# Patient Record
Sex: MALE | Race: BLACK OR AFRICAN AMERICAN | NOT HISPANIC OR LATINO | Employment: UNEMPLOYED | URBAN - METROPOLITAN AREA
[De-identification: names, ages, dates, MRNs, and addresses within clinical notes are randomized per-mention and may not be internally consistent; named-entity substitution may affect disease eponyms.]

---

## 2018-05-24 ENCOUNTER — HOSPITAL ENCOUNTER (EMERGENCY)
Facility: OTHER | Age: 53
Discharge: HOME | End: 2018-05-24
Attending: EMERGENCY MEDICINE
Payer: MEDICAID

## 2018-05-24 VITALS
SYSTOLIC BLOOD PRESSURE: 152 MMHG | TEMPERATURE: 98 F | OXYGEN SATURATION: 99 % | HEART RATE: 63 BPM | BODY MASS INDEX: 31.83 KG/M2 | HEIGHT: 72 IN | RESPIRATION RATE: 17 BRPM | DIASTOLIC BLOOD PRESSURE: 102 MMHG | WEIGHT: 235 LBS

## 2018-05-24 DIAGNOSIS — S86.911A MUSCLE STRAIN OF RIGHT LOWER LEG, INITIAL ENCOUNTER: Primary | ICD-10-CM

## 2018-05-24 PROCEDURE — 99283 EMERGENCY DEPT VISIT LOW MDM: CPT | Mod: 25

## 2018-05-24 PROCEDURE — 96372 THER/PROPH/DIAG INJ SC/IM: CPT

## 2018-05-24 PROCEDURE — 63600175 PHARM REV CODE 636 W HCPCS: Performed by: EMERGENCY MEDICINE

## 2018-05-24 RX ORDER — ORPHENADRINE CITRATE 30 MG/ML
60 INJECTION INTRAMUSCULAR; INTRAVENOUS
Status: COMPLETED | OUTPATIENT
Start: 2018-05-24 | End: 2018-05-24

## 2018-05-24 RX ORDER — MELOXICAM 7.5 MG/1
7.5 TABLET ORAL DAILY
Qty: 30 TABLET | Refills: 0 | Status: ON HOLD | OUTPATIENT
Start: 2018-05-24 | End: 2023-11-06 | Stop reason: HOSPADM

## 2018-05-24 RX ORDER — KETOROLAC TROMETHAMINE 30 MG/ML
30 INJECTION, SOLUTION INTRAMUSCULAR; INTRAVENOUS
Status: COMPLETED | OUTPATIENT
Start: 2018-05-24 | End: 2018-05-24

## 2018-05-24 RX ORDER — METHOCARBAMOL 500 MG/1
1000 TABLET, FILM COATED ORAL 3 TIMES DAILY PRN
Qty: 20 TABLET | Refills: 0 | Status: SHIPPED | OUTPATIENT
Start: 2018-05-24 | End: 2018-05-29

## 2018-05-24 RX ADMIN — ORPHENADRINE CITRATE 60 MG: 30 INJECTION INTRAMUSCULAR; INTRAVENOUS at 11:05

## 2018-05-24 RX ADMIN — KETOROLAC TROMETHAMINE 30 MG: 30 INJECTION, SOLUTION INTRAMUSCULAR at 11:05

## 2018-05-24 NOTE — ED PROVIDER NOTES
Encounter Date: 5/24/2018    SCRIBE #1 NOTE: I, Constantino Melo, am scribing for, and in the presence of, Dr. Brandon .       History     Chief Complaint   Patient presents with    Leg Pain     Patient c/o right knee and foot pain for the past 2 days.      Time seen by provider: 11:22 AM    This is a 52 y.o. male who presents with complaint of constant, moderate, right lower extremity pain and swelling that began two days ago. Patient states he experiences pain at his right knee, and it radiates down to his right foot. The pain worsens with sitting for extended periods of time, and standing relieves the pain. He is a , walks up four flights of stairs multiple times a day, and denies recent falls or trauma. He also reports taking Ibuprofen and Tylenol with no relief. He denies significant past medical history. Patient has no additional complaints. NKDA.         The history is provided by the patient.     Review of patient's allergies indicates:  No Known Allergies  History reviewed. No pertinent past medical history.  History reviewed. No pertinent surgical history.  History reviewed. No pertinent family history.  Social History   Substance Use Topics    Smoking status: Never Smoker    Smokeless tobacco: Never Used    Alcohol use Yes      Comment: occasional     Review of Systems   Constitutional: Negative for chills and fever.   HENT: Negative for congestion, rhinorrhea and sore throat.    Respiratory: Negative for cough and shortness of breath.    Cardiovascular: Negative for chest pain.   Gastrointestinal: Negative for abdominal pain, diarrhea, nausea and vomiting.   Genitourinary: Negative for decreased urine volume and dysuria.   Musculoskeletal: Negative for back pain.        Positive for RLE pain and swelling.    Skin: Negative for rash.   Neurological: Negative for dizziness and weakness.       Physical Exam     Initial Vitals [05/24/18 1013]   BP Pulse Resp Temp SpO2   (!) 160/101 79 18  98.1 °F (36.7 °C) 100 %      MAP       120.67         Physical Exam    Nursing note and vitals reviewed.  Constitutional: He appears well-developed and well-nourished. No distress.   HENT:   Head: Normocephalic and atraumatic.   Eyes: Conjunctivae and EOM are normal. Pupils are equal, round, and reactive to light.   Neck: Normal range of motion. Neck supple.   Musculoskeletal: He exhibits tenderness.   Tenderness present to right, anterolateral, proximal half of lower leg over muscular region. No bony tenderness. Small suprapatellar effusion present. Good ROM of knee and able to bear full weight independently on right leg. No distal edema or palpable cords. NVID. Hip and ankle normal.    Neurological: He is alert and oriented to person, place, and time. He has normal strength.   Skin: Skin is warm and dry.   Psychiatric: He has a normal mood and affect. His behavior is normal. Judgment and thought content normal.         ED Course   Procedures  Labs Reviewed - No data to display                     Scribe Attestation:   Scribe #1: I performed the above scribed service and the documentation accurately describes the services I performed. I attest to the accuracy of the note.    Attending Attestation:           Physician Attestation for Scribe:  Physician Attestation Statement for Scribe #1: I, Dr. Brandon, reviewed documentation, as scribed by Constantino Melo  in my presence, and it is both accurate and complete.           Patient presents complaining of pain in the right lower leg.  He has tenderness along the muscular lateral aspect of the upper portion of the lower leg.  No bony tenderness of the knee or tib/fib.  He does have a small effusion.  Neurovascular intact distally.  No distal edema.  Treat with anti-inflammatories, muscle relaxant for likely muscular pain.  Work note for tomorrow provided.  Encouraged follow-up with primary care, especially if symptoms persist.  Return to the emergency department in the  interim for new/worsening symptoms.             Clinical Impression:     1. Muscle strain of right lower leg, initial encounter                                 Chong Brandon II, MD  05/24/18 1958

## 2018-05-24 NOTE — ED NOTES
Patient Identifiers for Dionicio Bess checked and correct  LOC: The patient is awake, alert and aware of environment with an appropriate affect, the patient is oriented x 3 and speaking appropriate.  APPEARANCE: Patient resting comfortably and in no acute distress. The patient is clean and well groomed. The patient's clothing is properly fastened.  SKIN: The skin is warm and dry. The patient has normal skin turgor and moist mucus membranes. No rashes or lesions upon observation. Skin Intact , no breakdown noted.  Musculoskeletal :  Normal right knee & RLE range of motion noted. Moves all extremities well. Swelling observed on the lateral right knee with mild palpation tenderness of the left knee. Palpation tenderness of the LLE greater.  RESPIRATORY: Airway is open and patent, respirations are spontaneous, patient has a normal effort and rate.   PULSES: 2+ radial & pedal pulses, symmetrical in all extremities.  Will continue to monitor

## 2018-05-24 NOTE — ED TRIAGE NOTES
Pt c/o right lateral knee pain & swelling which started Monday & has progressively worsened. Pt c/o pain which radiated from the lateral knee to the lateral lower leg above the ankle. Pt reports pain is exacerbated with walking, standing or when the LLE is dependent. Pt reports taking ibuprofen & tylenol with no relief.

## 2021-06-14 ENCOUNTER — HOSPITAL ENCOUNTER (EMERGENCY)
Facility: HOSPITAL | Age: 56
Discharge: HOME | End: 2021-06-14
Attending: EMERGENCY MEDICINE
Payer: MEDICAID

## 2021-06-14 VITALS
BODY MASS INDEX: 30.48 KG/M2 | OXYGEN SATURATION: 96 % | HEIGHT: 72 IN | SYSTOLIC BLOOD PRESSURE: 146 MMHG | DIASTOLIC BLOOD PRESSURE: 83 MMHG | TEMPERATURE: 98 F | WEIGHT: 225 LBS | RESPIRATION RATE: 18 BRPM | HEART RATE: 83 BPM

## 2021-06-14 DIAGNOSIS — M25.40 JOINT SWELLING: Primary | ICD-10-CM

## 2021-06-14 DIAGNOSIS — M70.22 OLECRANON BURSITIS OF LEFT ELBOW: ICD-10-CM

## 2021-06-14 PROCEDURE — 99283 PR EMERGENCY DEPT VISIT,LEVEL III: ICD-10-PCS | Mod: ,,, | Performed by: EMERGENCY MEDICINE

## 2021-06-14 PROCEDURE — 25000003 PHARM REV CODE 250: Performed by: EMERGENCY MEDICINE

## 2021-06-14 PROCEDURE — 99283 EMERGENCY DEPT VISIT LOW MDM: CPT | Mod: ,,, | Performed by: EMERGENCY MEDICINE

## 2021-06-14 PROCEDURE — 99283 EMERGENCY DEPT VISIT LOW MDM: CPT | Mod: 25

## 2021-06-14 RX ORDER — IBUPROFEN 600 MG/1
600 TABLET ORAL EVERY 6 HOURS PRN
Qty: 20 TABLET | Refills: 0 | Status: ON HOLD | OUTPATIENT
Start: 2021-06-14 | End: 2023-11-06 | Stop reason: HOSPADM

## 2021-06-14 RX ORDER — IBUPROFEN 600 MG/1
600 TABLET ORAL
Status: COMPLETED | OUTPATIENT
Start: 2021-06-14 | End: 2021-06-14

## 2021-06-14 RX ADMIN — IBUPROFEN 600 MG: 600 TABLET, FILM COATED ORAL at 01:06

## 2021-06-15 ENCOUNTER — TELEPHONE (OUTPATIENT)
Dept: EMERGENCY MEDICINE | Facility: HOSPITAL | Age: 56
End: 2021-06-15

## 2023-07-11 ENCOUNTER — HOSPITAL ENCOUNTER (EMERGENCY)
Facility: OTHER | Age: 58
Discharge: HOME | End: 2023-07-11
Attending: EMERGENCY MEDICINE
Payer: MEDICAID

## 2023-07-11 VITALS
HEART RATE: 70 BPM | BODY MASS INDEX: 29.8 KG/M2 | WEIGHT: 220 LBS | RESPIRATION RATE: 18 BRPM | HEIGHT: 72 IN | SYSTOLIC BLOOD PRESSURE: 142 MMHG | DIASTOLIC BLOOD PRESSURE: 92 MMHG | TEMPERATURE: 99 F | OXYGEN SATURATION: 97 %

## 2023-07-11 DIAGNOSIS — I26.99 PULMONARY EMBOLISM, UNSPECIFIED CHRONICITY, UNSPECIFIED PULMONARY EMBOLISM TYPE, UNSPECIFIED WHETHER ACUTE COR PULMONALE PRESENT: ICD-10-CM

## 2023-07-11 DIAGNOSIS — M79.622 LEFT UPPER ARM PAIN: Primary | ICD-10-CM

## 2023-07-11 DIAGNOSIS — R07.9 CHEST PAIN: ICD-10-CM

## 2023-07-11 LAB
ANION GAP SERPL CALC-SCNC: 12 MMOL/L (ref 8–16)
BASOPHILS # BLD AUTO: 0.04 K/UL (ref 0–0.2)
BASOPHILS NFR BLD: 0.6 % (ref 0–1.9)
BNP SERPL-MCNC: 106 PG/ML (ref 0–99)
BUN SERPL-MCNC: 11 MG/DL (ref 6–20)
CALCIUM SERPL-MCNC: 9.7 MG/DL (ref 8.7–10.5)
CHLORIDE SERPL-SCNC: 104 MMOL/L (ref 95–110)
CO2 SERPL-SCNC: 23 MMOL/L (ref 23–29)
CREAT SERPL-MCNC: 0.9 MG/DL (ref 0.5–1.4)
D DIMER PPP IA.FEU-MCNC: 1.38 MG/L FEU
DIFFERENTIAL METHOD: ABNORMAL
EOSINOPHIL # BLD AUTO: 0.3 K/UL (ref 0–0.5)
EOSINOPHIL NFR BLD: 4.6 % (ref 0–8)
ERYTHROCYTE [DISTWIDTH] IN BLOOD BY AUTOMATED COUNT: 13.2 % (ref 11.5–14.5)
EST. GFR  (NO RACE VARIABLE): >60 ML/MIN/1.73 M^2
GLUCOSE SERPL-MCNC: 89 MG/DL (ref 70–110)
HCT VFR BLD AUTO: 40.2 % (ref 40–54)
HGB BLD-MCNC: 13.4 G/DL (ref 14–18)
IMM GRANULOCYTES # BLD AUTO: 0.03 K/UL (ref 0–0.04)
IMM GRANULOCYTES NFR BLD AUTO: 0.5 % (ref 0–0.5)
LYMPHOCYTES # BLD AUTO: 2.3 K/UL (ref 1–4.8)
LYMPHOCYTES NFR BLD: 37.1 % (ref 18–48)
MCH RBC QN AUTO: 30.3 PG (ref 27–31)
MCHC RBC AUTO-ENTMCNC: 33.3 G/DL (ref 32–36)
MCV RBC AUTO: 91 FL (ref 82–98)
MONOCYTES # BLD AUTO: 0.6 K/UL (ref 0.3–1)
MONOCYTES NFR BLD: 8.8 % (ref 4–15)
NEUTROPHILS # BLD AUTO: 3 K/UL (ref 1.8–7.7)
NEUTROPHILS NFR BLD: 48.4 % (ref 38–73)
NRBC BLD-RTO: 0 /100 WBC
PLATELET # BLD AUTO: 486 K/UL (ref 150–450)
PMV BLD AUTO: 9 FL (ref 9.2–12.9)
POTASSIUM SERPL-SCNC: 3.9 MMOL/L (ref 3.5–5.1)
RBC # BLD AUTO: 4.42 M/UL (ref 4.6–6.2)
SODIUM SERPL-SCNC: 139 MMOL/L (ref 136–145)
TROPONIN I SERPL DL<=0.01 NG/ML-MCNC: 0.02 NG/ML (ref 0–0.03)
WBC # BLD AUTO: 6.25 K/UL (ref 3.9–12.7)

## 2023-07-11 PROCEDURE — 80048 BASIC METABOLIC PNL TOTAL CA: CPT | Performed by: EMERGENCY MEDICINE

## 2023-07-11 PROCEDURE — 93005 ELECTROCARDIOGRAM TRACING: CPT

## 2023-07-11 PROCEDURE — 85379 FIBRIN DEGRADATION QUANT: CPT | Performed by: EMERGENCY MEDICINE

## 2023-07-11 PROCEDURE — 96374 THER/PROPH/DIAG INJ IV PUSH: CPT

## 2023-07-11 PROCEDURE — 93010 EKG 12-LEAD: ICD-10-PCS | Mod: ,,, | Performed by: INTERNAL MEDICINE

## 2023-07-11 PROCEDURE — 63600175 PHARM REV CODE 636 W HCPCS: Performed by: EMERGENCY MEDICINE

## 2023-07-11 PROCEDURE — 85025 COMPLETE CBC W/AUTO DIFF WBC: CPT | Performed by: EMERGENCY MEDICINE

## 2023-07-11 PROCEDURE — 93010 ELECTROCARDIOGRAM REPORT: CPT | Mod: ,,, | Performed by: INTERNAL MEDICINE

## 2023-07-11 PROCEDURE — 25500020 PHARM REV CODE 255: Performed by: EMERGENCY MEDICINE

## 2023-07-11 PROCEDURE — 25000003 PHARM REV CODE 250: Performed by: EMERGENCY MEDICINE

## 2023-07-11 PROCEDURE — 84484 ASSAY OF TROPONIN QUANT: CPT | Performed by: EMERGENCY MEDICINE

## 2023-07-11 PROCEDURE — 83880 ASSAY OF NATRIURETIC PEPTIDE: CPT | Performed by: EMERGENCY MEDICINE

## 2023-07-11 PROCEDURE — 99285 EMERGENCY DEPT VISIT HI MDM: CPT | Mod: 25

## 2023-07-11 RX ORDER — KETOROLAC TROMETHAMINE 30 MG/ML
10 INJECTION, SOLUTION INTRAMUSCULAR; INTRAVENOUS
Status: COMPLETED | OUTPATIENT
Start: 2023-07-11 | End: 2023-07-11

## 2023-07-11 RX ORDER — NAPROXEN SODIUM 220 MG/1
162 TABLET, FILM COATED ORAL
Status: COMPLETED | OUTPATIENT
Start: 2023-07-11 | End: 2023-07-11

## 2023-07-11 RX ORDER — HYDROCODONE BITARTRATE AND ACETAMINOPHEN 5; 325 MG/1; MG/1
1 TABLET ORAL
Status: COMPLETED | OUTPATIENT
Start: 2023-07-11 | End: 2023-07-11

## 2023-07-11 RX ADMIN — ASPIRIN 81 MG CHEWABLE TABLET 162 MG: 81 TABLET CHEWABLE at 02:07

## 2023-07-11 RX ADMIN — KETOROLAC TROMETHAMINE 10 MG: 30 INJECTION, SOLUTION INTRAMUSCULAR; INTRAVENOUS at 02:07

## 2023-07-11 RX ADMIN — HYDROCODONE BITARTRATE AND ACETAMINOPHEN 1 TABLET: 5; 325 TABLET ORAL at 05:07

## 2023-07-11 RX ADMIN — IOHEXOL 100 ML: 350 INJECTION, SOLUTION INTRAVENOUS at 03:07

## 2023-07-11 NOTE — ED TRIAGE NOTES
Pt c/o mid sternal chest pain without radiation and SOB that's worse with exertion X 10 hours. Pt also c/o LUE swelling X 1 day, hx of stroke, left sided deficits at baseline.  Pt is aaox3

## 2023-07-11 NOTE — DISCHARGE INSTRUCTIONS
It is very important that you continue taking the Eliquis as prescribed and that you follow-up with your primary care physician in 2 days as scheduled to establish care and for further management of your chronic left arm pain as well as monitoring of your blood clot.

## 2023-07-11 NOTE — ED PROVIDER NOTES
Encounter Date: 7/11/2023    SCRIBE #1 NOTE: I, Marietta Nieto, am scribing for, and in the presence of,  Vida Cruz MD. I have scribed the following portions of the note - the EKG reading. Other sections scribed: HPI, ROS, PE.     History     Chief Complaint   Patient presents with    Chest Pain     Pt c/o mid sternal chest pain without radiation and SOB that's worse with exertion X 10 hours. Pt also c/o LUE swelling X 1 day, hx of stroke, left sided deficits at baseline.       Dionicio Bess is a 58 y.o. male who presents to the ED c/o chest pain onset 1500 this afternoon. Patient states he was outside when he started experiencing mid sternal chest pain described as a achy stabbing. It has since been consistent and waxes and wanes in severity. Pt endorses Sob accompanied with chest pain. States earlier this year he suffered a stroke and was left with left sided weakness. At examination patient endorses having left sided arm pain secondary to stroke and previous fracture x 2 months ago. Pt denies smoking and EtOH use. He does endorse an unrelated dry cough. In addition, he reports being diagnosed with a blood clot x 1 week ago where he was then placed on blood thinners. Denies dizziness, lightheadedness, diaphoresis, nausea, and emesis. This is the extent of the patient's complaints at this time.        The history is provided by the patient and medical records. No  was used.   Review of patient's allergies indicates:  No Known Allergies  Past Medical History:   Diagnosis Date    Hypertension     Stroke      No past surgical history on file.  No family history on file.  Social History     Tobacco Use    Smoking status: Never    Smokeless tobacco: Never   Substance Use Topics    Alcohol use: Yes     Comment: occasional    Drug use: No     Review of Systems   Constitutional:  Negative for chills, diaphoresis and fever.   HENT:  Negative for sore throat.    Respiratory:  Positive for cough and  shortness of breath.    Cardiovascular:  Positive for chest pain.   Gastrointestinal:  Negative for blood in stool, constipation, diarrhea, nausea and vomiting.   Genitourinary:  Negative for dysuria.   Musculoskeletal:  Negative for back pain.        Positive for left sided arm pain.   Skin:  Negative for rash.   Neurological:  Negative for dizziness, syncope, weakness, light-headedness and headaches.   Hematological:  Does not bruise/bleed easily.   All other systems reviewed and are negative.    Physical Exam     Initial Vitals [07/11/23 0121]   BP Pulse Resp Temp SpO2   (!) 132/92 (!) 53 20 98.6 °F (37 °C) 97 %      MAP       --         Physical Exam    Nursing note and vitals reviewed.  Constitutional: He appears well-developed. He is cooperative.   HENT:   Head: Atraumatic.   Eyes: Conjunctivae and lids are normal.   Neck:   Normal range of motion.  Cardiovascular:  Normal rate.           Musculoskeletal:         General: Normal range of motion.      Cervical back: Normal range of motion.     Neurological: He is alert.   Skin: No rash noted.   Psychiatric: He has a normal mood and affect. His speech is normal and behavior is normal.       ED Course   Procedures  Labs Reviewed   CBC W/ AUTO DIFFERENTIAL - Abnormal; Notable for the following components:       Result Value    RBC 4.42 (*)     Hemoglobin 13.4 (*)     Platelets 486 (*)     MPV 9.0 (*)     All other components within normal limits   D DIMER, QUANTITATIVE - Abnormal; Notable for the following components:    D-Dimer 1.38 (*)     All other components within normal limits   B-TYPE NATRIURETIC PEPTIDE - Abnormal; Notable for the following components:     (*)     All other components within normal limits   TROPONIN I   BASIC METABOLIC PANEL     EKG Readings: (Independently Interpreted)   Initial Reading: No STEMI.   I have personally reviewed the EKG, sinus rhythm, 70 rate, first degree AV block, T-wave inferior inversion in leads 5 and 6, No st  elevations.        Imaging Results               CTA Chest Non-Coronary (PE Studies) (Final result)  Result time 07/11/23 05:05:01      Final result by Jodie Londono MD (07/11/23 05:05:01)                   Impression:      1. Findings concerning for pulmonary embolism involving a distal segmental/subsegmental branch vessel supplying the right lower lobe.  2. Right lower lobe pleural based opacity with right pleural fluid.  Findings could relate to underlying developing pulmonary infarct versus underlying infectious process.  Evaluation of pulmonary parenchyma is limited due to respiratory motion artifact.  3. Cardiomegaly.  Reflux of contrast into the IVC and hepatic veins which can be seen with elevated right heart pressure.  No compelling evidence of right heart strain.  Coronary artery atherosclerosis.  Further assessment with echocardiography as warranted.  This report was flagged in Epic as abnormal.      Electronically signed by: Jodie Londono MD  Date:    07/11/2023  Time:    05:05               Narrative:    EXAMINATION:  CTA CHEST NON CORONARY (PE STUDIES)    CLINICAL HISTORY:  Pulmonary embolism (PE) suspected, high prob;    TECHNIQUE:  Low dose axial images, sagittal and coronal reformations were obtained from the thoracic inlet to the lung bases following the IV administration of 100 mL of Omnipaque 350.  Contrast timing was optimized to evaluate the pulmonary arteries.  MIP images were performed.    COMPARISON:  None    FINDINGS:  The thoracic aorta maintains normal caliber, contour, and course with scattered atherosclerotic calcification within its course.  There is no evidence of aneurysmal dilation or dissection.  The heart is prominent in size.  No significant pericardial fluid.  There is calcific atherosclerosis of the coronary vessels.  There is reflux of contrast into the IVC and hepatic veins which can be seen with elevated right heart pressures.  Esophagus maintains a normal course and  caliber.There is no axillary, mediastinal, or hilar lymph node enlargement.    There is a possible filling defect within a distal segmental/subsegmental branch vessel supplying the right lower lobe concerning for pulmonary possible pulmonary thromboembolism.  No large central pulmonary embolus.    Evaluation of pulmonary parenchyma is limited due to respiratory motion artifact.  There is right pleural fluid with the pleural based focal right lower lobe airspace opacity.  Findings could relate to underlying infectious process or developing pulmonary infarct.  No pneumothorax.    Visualized structures of the upper abdomen demonstrate no acute abnormalities.  Visualized osseous structures are intact with degenerative change.    Findings discussed with Dr. Vida Cruz MD at time of discovery/dictation via epic secure chat messenger.  Receipt of results was confirmed.                                       Medications   aspirin chewable tablet 162 mg (162 mg Oral Given 7/11/23 0233)   ketorolac injection 9.999 mg (9.999 mg Intravenous Given 7/11/23 0233)   iohexoL (OMNIPAQUE 350) injection 100 mL (100 mLs Intravenous Given 7/11/23 0330)   HYDROcodone-acetaminophen 5-325 mg per tablet 1 tablet (1 tablet Oral Given 7/11/23 0528)     Medical Decision Making:   History:   Old Medical Records: I decided to obtain old medical records.  Clinical Tests:   Lab Tests: Reviewed and Ordered  Radiological Study: Ordered and Reviewed  Additional MDM:   Comments: 58-year-old male with history of hypertension, AFib/a flutter, NSTEMI and PE presents complaining of left-sided chest pain that started proximally 2 hours prior to arrival.  He states the chest pain was minimal compared to the left arm pain which has been present since his CVA and fall 5 months ago.    His initial EKG did show inverted T-waves in the inferior and lateral leads with no available old EKG for comparison.  Per chart review, he has been previously diagnosed with an  NSTEMI.  From the records I was able to review, I do not see where he was diagnosed with a PE.  He did have an ultrasound of the left upper extremity at [facility] on 06/06 which showed no evidence of a DVT.  I also see that he has been prescribed Eliquis 5 mg b.i.d..  He states he was not initially compliant with it but has been taking it consistently for the past week.  He is currently staying at the [facility] and states that the give it to him every morning and evening.    Troponin obtained over 10 hours after the onset of his chest pain was within normal limits.  D-dimer, however, was elevated.  Since I was not able to confirm the PE that the patient reports he was told he had recently at [facility], I did obtain a CT of the chest.  The CT shows findings concerning for a pulmonary embolism involving a distal segmental/subsegmental branch of the right lower lobe with opacity and right pleural fluid and no right heart strain.  Concern for developing pulmonary infarct versus infectious process.  The patient denies any symptoms concerning for an infectious process.    Throughout his ED stay the patient has not been in any apparent respiratory distress and oxygen saturation on room air have remained in the upper 90s.  The  only complaint the patient has continuously expressed was left arm pain which is chronic.  On exam his fingers are contracted and he has pain with flexing as well as palpation of the upper arm.  Neurovascularly he is intact.  I do not feel any further emergent imaging is warranted at this time.    Per the patient, the possible right lower lobe pulmonary embolism is a known problem for which he is already being anticoagulated and he is reliably taking his medications since it is being administered by the staff at the [facility].  He also reports having an upcoming appointment with primary care in 2 days.  Based on this, I do not feel that he is appropriate for further  outpatient management with which the patient is in agreement.  He will be discharged back to the [facility] at this time in stable condition..      Scribe Attestation:   Scribe #1: I performed the above scribed service and the documentation accurately describes the services I performed. I attest to the accuracy of the note.                   Clinical Impression:   Final diagnoses:  [R07.9] Chest pain  [M79.622] Left upper arm pain (Primary)  [I26.99] Pulmonary embolism, unspecified chronicity, unspecified pulmonary embolism type, unspecified whether acute cor pulmonale present        ED Disposition Condition    Discharge Stable          ED Prescriptions    None       Follow-up Information       Follow up With Specialties Details Why Contact Info    [facility] Emergency Dept Emergency Medicine Go to  If symptoms worsen [id number] [id number] [phone]    Your primary care doctor              Physician Attestation for Scribe: I, Vida Cruz  , reviewed documentation as scribed in my presence, which is both accurate and complete.       Vida Cruz MD  07/11/23 [id number]

## 2023-11-05 ENCOUNTER — HOSPITAL ENCOUNTER (OUTPATIENT)
Facility: HOSPITAL | Age: 58
Discharge: HOME | End: 2023-11-07
Attending: EMERGENCY MEDICINE | Admitting: INTERNAL MEDICINE
Payer: MEDICAID

## 2023-11-05 DIAGNOSIS — R06.02 SOB (SHORTNESS OF BREATH): ICD-10-CM

## 2023-11-05 DIAGNOSIS — R07.9 CHEST PAIN: ICD-10-CM

## 2023-11-05 DIAGNOSIS — I50.21 ACUTE SYSTOLIC CONGESTIVE HEART FAILURE: Primary | ICD-10-CM

## 2023-11-05 DIAGNOSIS — R79.89 ELEVATED BRAIN NATRIURETIC PEPTIDE (BNP) LEVEL: ICD-10-CM

## 2023-11-05 DIAGNOSIS — R79.89 ELEVATED TROPONIN I LEVEL: ICD-10-CM

## 2023-11-05 DIAGNOSIS — I50.43 ACUTE ON CHRONIC COMBINED SYSTOLIC AND DIASTOLIC CONGESTIVE HEART FAILURE: ICD-10-CM

## 2023-11-05 PROBLEM — I69.30 HISTORY OF ISCHEMIC CEREBROVASCULAR ACCIDENT (CVA) WITH RESIDUAL DEFICIT: Status: ACTIVE | Noted: 2023-05-18

## 2023-11-05 PROBLEM — I10 ESSENTIAL HYPERTENSION: Status: ACTIVE | Noted: 2023-05-18

## 2023-11-05 PROBLEM — I48.91 ATRIAL FIBRILLATION WITH RVR: Status: ACTIVE | Noted: 2023-05-28

## 2023-11-05 LAB
ALBUMIN SERPL BCP-MCNC: 4.1 G/DL (ref 3.5–5.2)
ALP SERPL-CCNC: 53 U/L (ref 55–135)
ALT SERPL W/O P-5'-P-CCNC: 14 U/L (ref 10–44)
ANION GAP SERPL CALC-SCNC: 13 MMOL/L (ref 8–16)
AST SERPL-CCNC: 37 U/L (ref 10–40)
BASOPHILS # BLD AUTO: 0.03 K/UL (ref 0–0.2)
BASOPHILS NFR BLD: 0.6 % (ref 0–1.9)
BILIRUB SERPL-MCNC: 0.2 MG/DL (ref 0.1–1)
BNP SERPL-MCNC: 215 PG/ML (ref 0–99)
BUN SERPL-MCNC: 8 MG/DL (ref 6–20)
CALCIUM SERPL-MCNC: 9.2 MG/DL (ref 8.7–10.5)
CHLORIDE SERPL-SCNC: 105 MMOL/L (ref 95–110)
CO2 SERPL-SCNC: 21 MMOL/L (ref 23–29)
CREAT SERPL-MCNC: 1.1 MG/DL (ref 0.5–1.4)
DIFFERENTIAL METHOD: ABNORMAL
EOSINOPHIL # BLD AUTO: 0.2 K/UL (ref 0–0.5)
EOSINOPHIL NFR BLD: 3.5 % (ref 0–8)
ERYTHROCYTE [DISTWIDTH] IN BLOOD BY AUTOMATED COUNT: 13.9 % (ref 11.5–14.5)
EST. GFR  (NO RACE VARIABLE): >60 ML/MIN/1.73 M^2
GLUCOSE SERPL-MCNC: 83 MG/DL (ref 70–110)
HCT VFR BLD AUTO: 41.2 % (ref 40–54)
HGB BLD-MCNC: 14 G/DL (ref 14–18)
IMM GRANULOCYTES # BLD AUTO: 0 K/UL (ref 0–0.04)
IMM GRANULOCYTES NFR BLD AUTO: 0 % (ref 0–0.5)
LYMPHOCYTES # BLD AUTO: 2.3 K/UL (ref 1–4.8)
LYMPHOCYTES NFR BLD: 50 % (ref 18–48)
MCH RBC QN AUTO: 32.6 PG (ref 27–31)
MCHC RBC AUTO-ENTMCNC: 34 G/DL (ref 32–36)
MCV RBC AUTO: 96 FL (ref 82–98)
MONOCYTES # BLD AUTO: 0.4 K/UL (ref 0.3–1)
MONOCYTES NFR BLD: 8.7 % (ref 4–15)
NEUTROPHILS # BLD AUTO: 1.7 K/UL (ref 1.8–7.7)
NEUTROPHILS NFR BLD: 37.2 % (ref 38–73)
NRBC BLD-RTO: 0 /100 WBC
PLATELET # BLD AUTO: 201 K/UL (ref 150–450)
PMV BLD AUTO: 10.3 FL (ref 9.2–12.9)
POTASSIUM SERPL-SCNC: 4.3 MMOL/L (ref 3.5–5.1)
PROT SERPL-MCNC: 8.1 G/DL (ref 6–8.4)
RBC # BLD AUTO: 4.3 M/UL (ref 4.6–6.2)
SODIUM SERPL-SCNC: 139 MMOL/L (ref 136–145)
TROPONIN I SERPL DL<=0.01 NG/ML-MCNC: 0.09 NG/ML (ref 0–0.03)
WBC # BLD AUTO: 4.62 K/UL (ref 3.9–12.7)

## 2023-11-05 PROCEDURE — 94761 N-INVAS EAR/PLS OXIMETRY MLT: CPT

## 2023-11-05 PROCEDURE — 84484 ASSAY OF TROPONIN QUANT: CPT | Performed by: EMERGENCY MEDICINE

## 2023-11-05 PROCEDURE — 85025 COMPLETE CBC W/AUTO DIFF WBC: CPT | Performed by: EMERGENCY MEDICINE

## 2023-11-05 PROCEDURE — 63600175 PHARM REV CODE 636 W HCPCS

## 2023-11-05 PROCEDURE — 93005 ELECTROCARDIOGRAM TRACING: CPT

## 2023-11-05 PROCEDURE — G0378 HOSPITAL OBSERVATION PER HR: HCPCS

## 2023-11-05 PROCEDURE — 96374 THER/PROPH/DIAG INJ IV PUSH: CPT

## 2023-11-05 PROCEDURE — 99285 EMERGENCY DEPT VISIT HI MDM: CPT | Mod: 25

## 2023-11-05 PROCEDURE — 83880 ASSAY OF NATRIURETIC PEPTIDE: CPT | Performed by: EMERGENCY MEDICINE

## 2023-11-05 PROCEDURE — 93010 ELECTROCARDIOGRAM REPORT: CPT | Mod: ,,, | Performed by: INTERNAL MEDICINE

## 2023-11-05 PROCEDURE — 80053 COMPREHEN METABOLIC PANEL: CPT | Performed by: EMERGENCY MEDICINE

## 2023-11-05 PROCEDURE — 93010 EKG 12-LEAD: ICD-10-PCS | Mod: ,,, | Performed by: INTERNAL MEDICINE

## 2023-11-05 RX ORDER — TALC
6 POWDER (GRAM) TOPICAL NIGHTLY PRN
Status: DISCONTINUED | OUTPATIENT
Start: 2023-11-05 | End: 2023-11-07 | Stop reason: HOSPADM

## 2023-11-05 RX ORDER — FUROSEMIDE 10 MG/ML
40 INJECTION INTRAMUSCULAR; INTRAVENOUS
Status: COMPLETED | OUTPATIENT
Start: 2023-11-05 | End: 2023-11-05

## 2023-11-05 RX ORDER — METOPROLOL SUCCINATE 50 MG/1
50 TABLET, EXTENDED RELEASE ORAL
Status: ON HOLD | COMMUNITY
Start: 2023-08-03 | End: 2023-11-06 | Stop reason: SDUPTHER

## 2023-11-05 RX ORDER — GABAPENTIN 400 MG/1
400 CAPSULE ORAL
Status: ON HOLD | COMMUNITY
Start: 2023-07-05 | End: 2023-11-06 | Stop reason: HOSPADM

## 2023-11-05 RX ORDER — METHOCARBAMOL 500 MG/1
1000 TABLET, FILM COATED ORAL
Status: ON HOLD | COMMUNITY
Start: 2023-07-05 | End: 2023-11-06 | Stop reason: HOSPADM

## 2023-11-05 RX ORDER — PANTOPRAZOLE SODIUM 20 MG/1
1 TABLET, DELAYED RELEASE ORAL EVERY MORNING
Status: ON HOLD | COMMUNITY
Start: 2023-05-30 | End: 2023-11-06 | Stop reason: SDUPTHER

## 2023-11-05 RX ORDER — IBUPROFEN 200 MG
24 TABLET ORAL
Status: DISCONTINUED | OUTPATIENT
Start: 2023-11-05 | End: 2023-11-07 | Stop reason: HOSPADM

## 2023-11-05 RX ORDER — LANOLIN ALCOHOL/MO/W.PET/CERES
200 CREAM (GRAM) TOPICAL
Status: ON HOLD | COMMUNITY
Start: 2023-06-29 | End: 2023-11-06 | Stop reason: SDUPTHER

## 2023-11-05 RX ORDER — ATORVASTATIN CALCIUM 80 MG/1
80 TABLET, FILM COATED ORAL NIGHTLY
Status: ON HOLD | COMMUNITY
Start: 2023-06-29 | End: 2023-11-06 | Stop reason: SDUPTHER

## 2023-11-05 RX ORDER — ACETAMINOPHEN 325 MG/1
650 TABLET ORAL EVERY 4 HOURS PRN
Status: DISCONTINUED | OUTPATIENT
Start: 2023-11-05 | End: 2023-11-07 | Stop reason: HOSPADM

## 2023-11-05 RX ORDER — SODIUM CHLORIDE 0.9 % (FLUSH) 0.9 %
10 SYRINGE (ML) INJECTION
Status: DISCONTINUED | OUTPATIENT
Start: 2023-11-05 | End: 2023-11-07 | Stop reason: HOSPADM

## 2023-11-05 RX ORDER — SPIRONOLACTONE 25 MG/1
25 TABLET ORAL
Status: ON HOLD | COMMUNITY
Start: 2023-06-29 | End: 2023-11-06 | Stop reason: SDUPTHER

## 2023-11-05 RX ORDER — TRAMADOL HYDROCHLORIDE 50 MG/1
50 TABLET ORAL 2 TIMES DAILY
Status: ON HOLD | COMMUNITY
Start: 2023-08-02 | End: 2023-11-06 | Stop reason: HOSPADM

## 2023-11-05 RX ORDER — NALTREXONE HYDROCHLORIDE 50 MG/1
50 TABLET, FILM COATED ORAL
Status: ON HOLD | COMMUNITY
Start: 2023-07-05 | End: 2023-11-06 | Stop reason: HOSPADM

## 2023-11-05 RX ORDER — GLUCAGON 1 MG
1 KIT INJECTION
Status: DISCONTINUED | OUTPATIENT
Start: 2023-11-05 | End: 2023-11-07 | Stop reason: HOSPADM

## 2023-11-05 RX ORDER — FUROSEMIDE 40 MG/1
40 TABLET ORAL
Status: ON HOLD | COMMUNITY
Start: 2023-06-07 | End: 2023-11-06 | Stop reason: SDUPTHER

## 2023-11-05 RX ORDER — ONDANSETRON 8 MG/1
8 TABLET, ORALLY DISINTEGRATING ORAL EVERY 8 HOURS PRN
Status: DISCONTINUED | OUTPATIENT
Start: 2023-11-05 | End: 2023-11-07 | Stop reason: HOSPADM

## 2023-11-05 RX ORDER — NAPROXEN SODIUM 220 MG/1
81 TABLET, FILM COATED ORAL
Status: ON HOLD | COMMUNITY
Start: 2023-06-29 | End: 2023-11-06 | Stop reason: SDUPTHER

## 2023-11-05 RX ORDER — EMPAGLIFLOZIN 10 MG/1
10 TABLET, FILM COATED ORAL
Status: ON HOLD | COMMUNITY
Start: 2023-06-29 | End: 2023-11-06 | Stop reason: SDUPTHER

## 2023-11-05 RX ORDER — FLUOXETINE HYDROCHLORIDE 20 MG/1
20 CAPSULE ORAL
Status: ON HOLD | COMMUNITY
Start: 2023-06-29 | End: 2023-11-06 | Stop reason: SDUPTHER

## 2023-11-05 RX ORDER — AMLODIPINE BESYLATE 10 MG/1
10 TABLET ORAL
Status: ON HOLD | COMMUNITY
Start: 2023-08-03 | End: 2023-11-06 | Stop reason: SDUPTHER

## 2023-11-05 RX ORDER — POLYETHYLENE GLYCOL 3350 17 G/17G
17 POWDER, FOR SOLUTION ORAL DAILY PRN
Status: DISCONTINUED | OUTPATIENT
Start: 2023-11-05 | End: 2023-11-07 | Stop reason: HOSPADM

## 2023-11-05 RX ORDER — BISACODYL 10 MG
10 SUPPOSITORY, RECTAL RECTAL DAILY PRN
Status: DISCONTINUED | OUTPATIENT
Start: 2023-11-05 | End: 2023-11-07 | Stop reason: HOSPADM

## 2023-11-05 RX ORDER — IBUPROFEN 200 MG
16 TABLET ORAL
Status: DISCONTINUED | OUTPATIENT
Start: 2023-11-05 | End: 2023-11-07 | Stop reason: HOSPADM

## 2023-11-05 RX ORDER — PROMETHAZINE HYDROCHLORIDE 25 MG/1
25 TABLET ORAL EVERY 6 HOURS PRN
Status: DISCONTINUED | OUTPATIENT
Start: 2023-11-05 | End: 2023-11-07 | Stop reason: HOSPADM

## 2023-11-05 RX ORDER — LOSARTAN POTASSIUM 25 MG/1
1 TABLET ORAL DAILY
Status: ON HOLD | COMMUNITY
Start: 2023-06-04 | End: 2023-11-06 | Stop reason: SDUPTHER

## 2023-11-05 RX ADMIN — FUROSEMIDE 40 MG: 10 INJECTION, SOLUTION INTRAVENOUS at 10:11

## 2023-11-05 NOTE — Clinical Note
Diagnosis: SOB (shortness of breath) [968540]   Future Attending Provider: NIKKI BRENNER [[id number]]   Admitting Provider:: NIKKI BRENNER [[id number]]

## 2023-11-06 DIAGNOSIS — R06.02 SOB (SHORTNESS OF BREATH): Primary | ICD-10-CM

## 2023-11-06 PROBLEM — I25.119 CORONARY ARTERY DISEASE INVOLVING NATIVE CORONARY ARTERY WITH ANGINA PECTORIS: Status: ACTIVE | Noted: 2023-11-06

## 2023-11-06 PROBLEM — R79.89 ELEVATED TROPONIN: Status: ACTIVE | Noted: 2023-11-06

## 2023-11-06 LAB
ANION GAP SERPL CALC-SCNC: 12 MMOL/L (ref 8–16)
ASCENDING AORTA: 3.47 CM
AV INDEX (PROSTH): 0.52
AV MEAN GRADIENT: 7 MMHG
AV VALVE AREA: 2.36 CM²
BASOPHILS # BLD AUTO: 0.02 K/UL (ref 0–0.2)
BASOPHILS NFR BLD: 0.5 % (ref 0–1.9)
BSA FOR ECHO PROCEDURE: 2.17 M2
BUN SERPL-MCNC: 9 MG/DL (ref 6–20)
CALCIUM SERPL-MCNC: 9.6 MG/DL (ref 8.7–10.5)
CHLORIDE SERPL-SCNC: 102 MMOL/L (ref 95–110)
CHOLEST SERPL-MCNC: 225 MG/DL (ref 120–199)
CHOLEST/HDLC SERPL: 2.6 {RATIO} (ref 2–5)
CO2 SERPL-SCNC: 27 MMOL/L (ref 23–29)
CREAT SERPL-MCNC: 1.1 MG/DL (ref 0.5–1.4)
CV ECHO LV RWT: 0.41 CM
DIFFERENTIAL METHOD: ABNORMAL
DOP CALC AO VTI: 23.6 CM
DOP CALC LVOT AREA: 4.5 CM2
DOP CALC LVOT DIAMETER: 2.4 CM
DOP CALC LVOT PEAK VEL: 0.55 M/S
DOP CALC LVOT STROKE VOLUME: 55.62 CM3
DOP CALCLVOT PEAK VEL VTI: 12.3 CM
E WAVE DECELERATION TIME: 173.52 MSEC
E/A RATIO: 0.84
ECHO LV POSTERIOR WALL: 1.15 CM (ref 0.6–1.1)
EOSINOPHIL # BLD AUTO: 0.2 K/UL (ref 0–0.5)
EOSINOPHIL NFR BLD: 4.2 % (ref 0–8)
ERYTHROCYTE [DISTWIDTH] IN BLOOD BY AUTOMATED COUNT: 14 % (ref 11.5–14.5)
EST. GFR  (NO RACE VARIABLE): >60 ML/MIN/1.73 M^2
ESTIMATED AVG GLUCOSE: 100 MG/DL (ref 68–131)
FRACTIONAL SHORTENING: 21 % (ref 28–44)
GLUCOSE SERPL-MCNC: 83 MG/DL (ref 70–110)
HBA1C MFR BLD: 5.1 % (ref 4–5.6)
HCT VFR BLD AUTO: 42.6 % (ref 40–54)
HCV AB SERPL QL IA: NORMAL
HDLC SERPL-MCNC: 85 MG/DL (ref 40–75)
HDLC SERPL: 37.8 % (ref 20–50)
HGB BLD-MCNC: 14.2 G/DL (ref 14–18)
HIV 1+2 AB+HIV1 P24 AG SERPL QL IA: NORMAL
IMM GRANULOCYTES # BLD AUTO: 0 K/UL (ref 0–0.04)
IMM GRANULOCYTES NFR BLD AUTO: 0 % (ref 0–0.5)
INTERVENTRICULAR SEPTUM: 1.02 CM (ref 0.6–1.1)
IVRT: 145.58 MSEC
LA MAJOR: 6.59 CM
LA MINOR: 6 CM
LA WIDTH: 4.36 CM
LDLC SERPL CALC-MCNC: 126 MG/DL (ref 63–159)
LEFT ATRIUM SIZE: 4.31 CM
LEFT ATRIUM VOLUME INDEX MOD: 35.8 ML/M2
LEFT ATRIUM VOLUME INDEX: 46.7 ML/M2
LEFT ATRIUM VOLUME MOD: 76.95 CM3
LEFT ATRIUM VOLUME: 100.33 CM3
LEFT INTERNAL DIMENSION IN SYSTOLE: 4.45 CM (ref 2.1–4)
LEFT VENTRICLE DIASTOLIC VOLUME INDEX: 72.36 ML/M2
LEFT VENTRICLE DIASTOLIC VOLUME: 155.58 ML
LEFT VENTRICLE MASS INDEX: 115 G/M2
LEFT VENTRICLE SYSTOLIC VOLUME INDEX: 42 ML/M2
LEFT VENTRICLE SYSTOLIC VOLUME: 90.24 ML
LEFT VENTRICULAR INTERNAL DIMENSION IN DIASTOLE: 5.63 CM (ref 3.5–6)
LEFT VENTRICULAR MASS: 246.95 G
LV SEPTAL E/E' RATIO: 9.8 M/S
LYMPHOCYTES # BLD AUTO: 1.5 K/UL (ref 1–4.8)
LYMPHOCYTES NFR BLD: 37 % (ref 18–48)
MAGNESIUM SERPL-MCNC: 1.9 MG/DL (ref 1.6–2.6)
MCH RBC QN AUTO: 32 PG (ref 27–31)
MCHC RBC AUTO-ENTMCNC: 33.3 G/DL (ref 32–36)
MCV RBC AUTO: 96 FL (ref 82–98)
MONOCYTES # BLD AUTO: 0.4 K/UL (ref 0.3–1)
MONOCYTES NFR BLD: 10.3 % (ref 4–15)
MV A" WAVE DURATION": 9.42 MSEC
MV PEAK A VEL: 0.58 M/S
MV PEAK E VEL: 0.49 M/S
MV STENOSIS PRESSURE HALF TIME: 50.32 MS
MV VALVE AREA P 1/2 METHOD: 4.37 CM2
NEUTROPHILS # BLD AUTO: 2 K/UL (ref 1.8–7.7)
NEUTROPHILS NFR BLD: 48 % (ref 38–73)
NONHDLC SERPL-MCNC: 140 MG/DL
NRBC BLD-RTO: 0 /100 WBC
PHOSPHATE SERPL-MCNC: 3.6 MG/DL (ref 2.7–4.5)
PISA TR MAX VEL: 2.01 M/S
PLATELET # BLD AUTO: 208 K/UL (ref 150–450)
PMV BLD AUTO: 10.6 FL (ref 9.2–12.9)
POCT GLUCOSE: 108 MG/DL (ref 70–110)
POCT GLUCOSE: 68 MG/DL (ref 70–110)
POCT GLUCOSE: 86 MG/DL (ref 70–110)
POCT GLUCOSE: 97 MG/DL (ref 70–110)
POCT GLUCOSE: 99 MG/DL (ref 70–110)
POTASSIUM SERPL-SCNC: 3.4 MMOL/L (ref 3.5–5.1)
PULM VEIN S/D RATIO: 1.13
PV PEAK D VEL: 0.24 M/S
PV PEAK S VEL: 0.27 M/S
RA MAJOR: 5.74 CM
RA PRESSURE ESTIMATED: 3 MMHG
RA WIDTH: 4 CM
RBC # BLD AUTO: 4.44 M/UL (ref 4.6–6.2)
RIGHT VENTRICULAR END-DIASTOLIC DIMENSION: 4.1 CM
RV TB RVSP: 5 MMHG
SINUS: 3.57 CM
SODIUM SERPL-SCNC: 141 MMOL/L (ref 136–145)
STJ: 2.99 CM
TDI SEPTAL: 0.05 M/S
TR MAX PG: 16 MMHG
TRICUSPID ANNULAR PLANE SYSTOLIC EXCURSION: 2.3 CM
TRIGL SERPL-MCNC: 70 MG/DL (ref 30–150)
TROPONIN I SERPL DL<=0.01 NG/ML-MCNC: 0.07 NG/ML (ref 0–0.03)
TROPONIN I SERPL DL<=0.01 NG/ML-MCNC: 0.11 NG/ML (ref 0–0.03)
TSH SERPL DL<=0.005 MIU/L-ACNC: 1.57 UIU/ML (ref 0.4–4)
TV REST PULMONARY ARTERY PRESSURE: 19 MMHG
WBC # BLD AUTO: 4.08 K/UL (ref 3.9–12.7)
Z-SCORE OF LEFT VENTRICULAR DIMENSION IN END DIASTOLE: -2.15
Z-SCORE OF LEFT VENTRICULAR DIMENSION IN END SYSTOLE: 0.36

## 2023-11-06 PROCEDURE — 25000003 PHARM REV CODE 250: Performed by: PHYSICIAN ASSISTANT

## 2023-11-06 PROCEDURE — 84443 ASSAY THYROID STIM HORMONE: CPT | Performed by: PHYSICIAN ASSISTANT

## 2023-11-06 PROCEDURE — 84484 ASSAY OF TROPONIN QUANT: CPT | Mod: 91 | Performed by: PHYSICIAN ASSISTANT

## 2023-11-06 PROCEDURE — 83735 ASSAY OF MAGNESIUM: CPT | Performed by: PHYSICIAN ASSISTANT

## 2023-11-06 PROCEDURE — 84484 ASSAY OF TROPONIN QUANT: CPT | Performed by: PHYSICIAN ASSISTANT

## 2023-11-06 PROCEDURE — 85025 COMPLETE CBC W/AUTO DIFF WBC: CPT | Performed by: PHYSICIAN ASSISTANT

## 2023-11-06 PROCEDURE — 84100 ASSAY OF PHOSPHORUS: CPT | Performed by: PHYSICIAN ASSISTANT

## 2023-11-06 PROCEDURE — 96375 TX/PRO/DX INJ NEW DRUG ADDON: CPT | Mod: 59

## 2023-11-06 PROCEDURE — G0378 HOSPITAL OBSERVATION PER HR: HCPCS

## 2023-11-06 PROCEDURE — 80048 BASIC METABOLIC PNL TOTAL CA: CPT | Performed by: PHYSICIAN ASSISTANT

## 2023-11-06 PROCEDURE — 80061 LIPID PANEL: CPT | Performed by: PHYSICIAN ASSISTANT

## 2023-11-06 PROCEDURE — 63600175 PHARM REV CODE 636 W HCPCS: Performed by: PHYSICIAN ASSISTANT

## 2023-11-06 PROCEDURE — 86803 HEPATITIS C AB TEST: CPT | Performed by: PHYSICIAN ASSISTANT

## 2023-11-06 PROCEDURE — 83036 HEMOGLOBIN GLYCOSYLATED A1C: CPT | Performed by: PHYSICIAN ASSISTANT

## 2023-11-06 PROCEDURE — 25000003 PHARM REV CODE 250

## 2023-11-06 PROCEDURE — 87389 HIV-1 AG W/HIV-1&-2 AB AG IA: CPT | Performed by: PHYSICIAN ASSISTANT

## 2023-11-06 PROCEDURE — 36415 COLL VENOUS BLD VENIPUNCTURE: CPT | Performed by: PHYSICIAN ASSISTANT

## 2023-11-06 RX ORDER — PANTOPRAZOLE SODIUM 20 MG/1
20 TABLET, DELAYED RELEASE ORAL EVERY MORNING
Status: DISCONTINUED | OUTPATIENT
Start: 2023-11-06 | End: 2023-11-07 | Stop reason: HOSPADM

## 2023-11-06 RX ORDER — METOPROLOL SUCCINATE 50 MG/1
50 TABLET, EXTENDED RELEASE ORAL DAILY
Qty: 30 TABLET | Refills: 2 | Status: ON HOLD | OUTPATIENT
Start: 2023-11-06 | End: 2024-01-20

## 2023-11-06 RX ORDER — LOSARTAN POTASSIUM 25 MG/1
25 TABLET ORAL DAILY
Qty: 90 TABLET | Refills: 0 | Status: ON HOLD | OUTPATIENT
Start: 2023-11-06 | End: 2024-01-20

## 2023-11-06 RX ORDER — THIAMINE HCL 100 MG
200 TABLET ORAL DAILY
Status: DISCONTINUED | OUTPATIENT
Start: 2023-11-06 | End: 2023-11-07 | Stop reason: HOSPADM

## 2023-11-06 RX ORDER — FLUOXETINE HYDROCHLORIDE 20 MG/1
20 CAPSULE ORAL DAILY
Qty: 30 CAPSULE | Refills: 2 | Status: ON HOLD | OUTPATIENT
Start: 2023-11-06 | End: 2024-01-20

## 2023-11-06 RX ORDER — ATORVASTATIN CALCIUM 40 MG/1
80 TABLET, FILM COATED ORAL NIGHTLY
Status: DISCONTINUED | OUTPATIENT
Start: 2023-11-06 | End: 2023-11-07 | Stop reason: HOSPADM

## 2023-11-06 RX ORDER — ATORVASTATIN CALCIUM 80 MG/1
80 TABLET, FILM COATED ORAL NIGHTLY
Qty: 90 TABLET | Refills: 0 | Status: ON HOLD | OUTPATIENT
Start: 2023-11-06 | End: 2024-01-20

## 2023-11-06 RX ORDER — NAPROXEN SODIUM 220 MG/1
81 TABLET, FILM COATED ORAL DAILY
Qty: 90 TABLET | Refills: 0 | Status: ON HOLD | OUTPATIENT
Start: 2023-11-06 | End: 2024-01-20

## 2023-11-06 RX ORDER — METOPROLOL SUCCINATE 50 MG/1
50 TABLET, EXTENDED RELEASE ORAL DAILY
Status: DISCONTINUED | OUTPATIENT
Start: 2023-11-06 | End: 2023-11-07 | Stop reason: HOSPADM

## 2023-11-06 RX ORDER — LANOLIN ALCOHOL/MO/W.PET/CERES
200 CREAM (GRAM) TOPICAL DAILY
Qty: 60 TABLET | Refills: 2 | Status: SHIPPED | OUTPATIENT
Start: 2023-11-06 | End: 2024-02-04

## 2023-11-06 RX ORDER — PANTOPRAZOLE SODIUM 20 MG/1
20 TABLET, DELAYED RELEASE ORAL EVERY MORNING
Qty: 30 TABLET | Refills: 2 | Status: SHIPPED | OUTPATIENT
Start: 2023-11-06 | End: 2024-03-18

## 2023-11-06 RX ORDER — AMLODIPINE BESYLATE 10 MG/1
10 TABLET ORAL DAILY
Qty: 30 TABLET | Refills: 2 | Status: ON HOLD | OUTPATIENT
Start: 2023-11-06 | End: 2024-01-20

## 2023-11-06 RX ORDER — LANOLIN ALCOHOL/MO/W.PET/CERES
400 CREAM (GRAM) TOPICAL EVERY 4 HOURS
Status: COMPLETED | OUTPATIENT
Start: 2023-11-06 | End: 2023-11-06

## 2023-11-06 RX ORDER — NAPROXEN SODIUM 220 MG/1
81 TABLET, FILM COATED ORAL DAILY
Status: DISCONTINUED | OUTPATIENT
Start: 2023-11-06 | End: 2023-11-07 | Stop reason: HOSPADM

## 2023-11-06 RX ORDER — EMPAGLIFLOZIN 10 MG/1
10 TABLET, FILM COATED ORAL DAILY
Qty: 30 TABLET | Refills: 2 | Status: ON HOLD | OUTPATIENT
Start: 2023-11-06 | End: 2024-01-20

## 2023-11-06 RX ORDER — SPIRONOLACTONE 25 MG/1
25 TABLET ORAL DAILY
Status: DISCONTINUED | OUTPATIENT
Start: 2023-11-06 | End: 2023-11-07 | Stop reason: HOSPADM

## 2023-11-06 RX ORDER — FUROSEMIDE 10 MG/ML
40 INJECTION INTRAMUSCULAR; INTRAVENOUS
Status: DISCONTINUED | OUTPATIENT
Start: 2023-11-06 | End: 2023-11-07 | Stop reason: HOSPADM

## 2023-11-06 RX ORDER — FLUOXETINE HYDROCHLORIDE 20 MG/1
20 CAPSULE ORAL DAILY
Status: DISCONTINUED | OUTPATIENT
Start: 2023-11-06 | End: 2023-11-07 | Stop reason: HOSPADM

## 2023-11-06 RX ORDER — FUROSEMIDE 40 MG/1
40 TABLET ORAL DAILY
Qty: 30 TABLET | Refills: 2 | Status: ON HOLD | OUTPATIENT
Start: 2023-11-06 | End: 2024-01-20

## 2023-11-06 RX ORDER — AMLODIPINE BESYLATE 10 MG/1
10 TABLET ORAL DAILY
Status: DISCONTINUED | OUTPATIENT
Start: 2023-11-06 | End: 2023-11-07 | Stop reason: HOSPADM

## 2023-11-06 RX ORDER — LOSARTAN POTASSIUM 25 MG/1
25 TABLET ORAL DAILY
Status: DISCONTINUED | OUTPATIENT
Start: 2023-11-06 | End: 2023-11-07 | Stop reason: HOSPADM

## 2023-11-06 RX ORDER — POTASSIUM CHLORIDE 20 MEQ/1
40 TABLET, EXTENDED RELEASE ORAL ONCE
Status: COMPLETED | OUTPATIENT
Start: 2023-11-06 | End: 2023-11-06

## 2023-11-06 RX ORDER — SPIRONOLACTONE 25 MG/1
25 TABLET ORAL DAILY
Qty: 90 TABLET | Refills: 0 | Status: ON HOLD | OUTPATIENT
Start: 2023-11-06 | End: 2024-01-20

## 2023-11-06 RX ADMIN — FLUOXETINE 20 MG: 20 CAPSULE ORAL at 09:11

## 2023-11-06 RX ADMIN — ATORVASTATIN CALCIUM 80 MG: 40 TABLET, FILM COATED ORAL at 08:11

## 2023-11-06 RX ADMIN — APIXABAN 5 MG: 5 TABLET, FILM COATED ORAL at 09:11

## 2023-11-06 RX ADMIN — APIXABAN 5 MG: 5 TABLET, FILM COATED ORAL at 08:11

## 2023-11-06 RX ADMIN — ASPIRIN 81 MG CHEWABLE TABLET 81 MG: 81 TABLET CHEWABLE at 09:11

## 2023-11-06 RX ADMIN — POTASSIUM CHLORIDE 40 MEQ: 1500 TABLET, EXTENDED RELEASE ORAL at 09:11

## 2023-11-06 RX ADMIN — PANTOPRAZOLE SODIUM 20 MG: 20 TABLET, DELAYED RELEASE ORAL at 06:11

## 2023-11-06 RX ADMIN — Medication 200 MG: at 09:11

## 2023-11-06 RX ADMIN — METOPROLOL SUCCINATE 50 MG: 50 TABLET, EXTENDED RELEASE ORAL at 09:11

## 2023-11-06 RX ADMIN — LOSARTAN POTASSIUM 25 MG: 25 TABLET, FILM COATED ORAL at 09:11

## 2023-11-06 RX ADMIN — APIXABAN 5 MG: 5 TABLET, FILM COATED ORAL at 01:11

## 2023-11-06 RX ADMIN — Medication 400 MG: at 09:11

## 2023-11-06 RX ADMIN — FUROSEMIDE 40 MG: 10 INJECTION, SOLUTION INTRAVENOUS at 08:11

## 2023-11-06 RX ADMIN — ATORVASTATIN CALCIUM 80 MG: 40 TABLET, FILM COATED ORAL at 01:11

## 2023-11-06 RX ADMIN — FUROSEMIDE 40 MG: 10 INJECTION, SOLUTION INTRAVENOUS at 09:11

## 2023-11-06 RX ADMIN — DEXTROSE MONOHYDRATE 125 ML: 100 INJECTION, SOLUTION INTRAVENOUS at 07:11

## 2023-11-06 RX ADMIN — SPIRONOLACTONE 25 MG: 25 TABLET, FILM COATED ORAL at 09:11

## 2023-11-06 RX ADMIN — AMLODIPINE BESYLATE 10 MG: 10 TABLET ORAL at 09:11

## 2023-11-06 NOTE — ASSESSMENT & PLAN NOTE
Elevated troponin  Elevated trop and chest pain possibly from volume overload and uncontrolled HTN, though also concerned for ACS collin since non compliant w/ meds (possible stent placement in the past).    - trop 0.086>> trend  - EKG with nonspecific TWAs  - trend trop-- if uptrends, will place cardiology consult   - NPO at [facility] as precaution  - echo  - continue home BB, statin, ASA  - IV diuresis as above

## 2023-11-06 NOTE — SUBJECTIVE & OBJECTIVE
Past Medical History:   Diagnosis Date    Hypertension     Stroke        History reviewed. No pertinent surgical history.    Review of patient's allergies indicates:  No Known Allergies    No current facility-administered medications on file prior to encounter.     Current Outpatient Medications on File Prior to Encounter   Medication Sig    apixaban (ELIQUIS) 5 mg Tab Take 5 mg by mouth.    losartan (COZAAR) 25 MG tablet Take 1 tablet by mouth once daily.    pantoprazole (PROTONIX) 20 MG tablet Take 1 tablet by mouth every morning.    amLODIPine (NORVASC) 10 MG tablet Take 10 mg by mouth.    aspirin 81 MG Chew Take 81 mg by mouth.    atorvastatin (LIPITOR) 80 MG tablet Take 80 mg by mouth every evening.    FLUoxetine 20 MG capsule Take 20 mg by mouth.    furosemide (LASIX) 40 MG tablet Take 40 mg by mouth.    gabapentin (NEURONTIN) 400 MG capsule Take 400 mg by mouth.    ibuprofen (ADVIL,MOTRIN) 600 MG tablet Take 1 tablet (600 mg total) by mouth every 6 (six) hours as needed for Pain.    JARDIANCE 10 mg tablet Take 10 mg by mouth.    meloxicam (MOBIC) 7.5 MG tablet Take 1 tablet (7.5 mg total) by mouth once daily.    methocarbamoL (ROBAXIN) 500 MG Tab Take 1,000 mg by mouth.    metoprolol succinate (TOPROL-XL) 50 MG 24 hr tablet Take 50 mg by mouth.    naltrexone (DEPADE) 50 mg tablet Take 50 mg by mouth.    spironolactone (ALDACTONE) 25 MG tablet Take 25 mg by mouth.    thiamine 100 MG tablet Take 200 mg by mouth.    traMADoL (ULTRAM) 50 mg tablet Take 50 mg by mouth 2 (two) times daily.     Family History    None       Tobacco Use    Smoking status: Never    Smokeless tobacco: Never   Substance and Sexual Activity    Alcohol use: Yes     Comment: occasional    Drug use: No    Sexual activity: Not on file     Review of Systems   Constitutional:  Negative for activity change, chills and fever.   HENT:  Negative for congestion, trouble swallowing and voice change.    Eyes:  Negative for photophobia and visual  disturbance.   Respiratory:  Positive for chest tightness and shortness of breath. Negative for wheezing.    Cardiovascular:  Positive for chest pain. Negative for palpitations and leg swelling.   Gastrointestinal:  Negative for abdominal pain, constipation, diarrhea, nausea and vomiting.   Genitourinary:  Negative for dysuria, frequency, hematuria and urgency.   Musculoskeletal:  Negative for arthralgias, back pain and gait problem.   Skin:  Negative for color change and rash.   Neurological:  Negative for dizziness, syncope, weakness, light-headedness, numbness and headaches.   Psychiatric/Behavioral:  Negative for agitation and confusion. The patient is not nervous/anxious.      Objective:     Vital Signs (Most Recent):  Temp: 98.5 °F (36.9 °C) (11/06/23 0014)  Pulse: 86 (11/06/23 0014)  Resp: 18 (11/06/23 0014)  BP: (!) 162/99 (11/06/23 0014)  SpO2: 98 % (11/06/23 0014) Vital Signs (24h Range):  Temp:  [98 °F (36.7 °C)-98.5 °F (36.9 °C)] 98.5 °F (36.9 °C)  Pulse:  [77-90] 86  Resp:  [18-20] 18  SpO2:  [97 %-98 %] 98 %  BP: (134-164)/() 162/99     Weight: 92.7 kg (204 lb 5.9 oz)  Body mass index is 27.72 kg/m².     Physical Exam  Vitals and nursing note reviewed.   Constitutional:       General: He is not in acute distress.     Appearance: He is well-developed.   HENT:      Head: Normocephalic and atraumatic.      Mouth/Throat:      Pharynx: No oropharyngeal exudate.   Eyes:      General: No scleral icterus.     Conjunctiva/sclera: Conjunctivae normal.   Cardiovascular:      Rate and Rhythm: Normal rate and regular rhythm.      Heart sounds: Normal heart sounds.   Pulmonary:      Effort: Pulmonary effort is normal. No respiratory distress.      Breath sounds: No wheezing.      Comments: Diminished BS to BLL  Abdominal:      General: Bowel sounds are normal. There is no distension.      Palpations: Abdomen is soft.      Tenderness: There is no abdominal tenderness.   Musculoskeletal:         General: No  tenderness. Normal range of motion.      Cervical back: Normal range of motion and neck supple.      Right lower leg: Edema present.      Left lower leg: Edema present.   Lymphadenopathy:      Cervical: No cervical adenopathy.   Skin:     General: Skin is warm and dry.      Capillary Refill: Capillary refill takes less than 2 seconds.      Findings: No rash.   Neurological:      Mental Status: He is alert and oriented to person, place, and time.      Cranial Nerves: No cranial nerve deficit.      Sensory: No sensory deficit.      Coordination: Coordination normal.   Psychiatric:         Behavior: Behavior normal.         Thought Content: Thought content normal.         Judgment: Judgment normal.                Significant Labs: All pertinent labs within the past 24 hours have been reviewed.  CBC:   Recent Labs   Lab 11/05/23  2140   WBC 4.62   HGB 14.0   HCT 41.2        CMP:   Recent Labs   Lab 11/05/23  2140      K 4.3      CO2 21*   GLU 83   BUN 8   CREATININE 1.1   CALCIUM 9.2   PROT 8.1   ALBUMIN 4.1   BILITOT 0.2   ALKPHOS 53*   AST 37   ALT 14   ANIONGAP 13       Significant Imaging: I have reviewed all pertinent imaging results/findings within the past 24 hours.  X-Ray Chest AP Portable  Narrative: EXAMINATION:  XR CHEST AP PORTABLE    CLINICAL HISTORY:  shortness of breath;    TECHNIQUE:  Single frontal view of the chest was performed.    COMPARISON:  None    FINDINGS:  Loop recorder device projects over the left side of the heart.    No consolidation, pleural effusion or pneumothorax.    Cardiomediastinal silhouette is enlarged.  Impression: No acute findings in the chest.    Cardiomegaly.  Loop recorder device present.    Electronically signed by: Brendon Dillard MD  Date:    11/05/2023  Time:    22:23

## 2023-11-06 NOTE — HOSPITAL COURSE
Dionicio Bess is a 58 y.o. male who was admitted to hospital medicine for CHF exacerbation. Started on Lasix 40mg daily with good urine output. Most recent echo with EF of 25-30%, grade I diastolic function, CVP of 3. Patient dry appearing on exam. Working with  for placement following d/c as patient is homeless and has not had his medications in months. On my evaluation this morning, the patient reports feeling well and is eager to discharge home. All questions were answered. Patient acknowledged understanding of discharge instructions and feels safe to discharge home. Patient was discharged on 11/7/2023 in stable condition with cardiology follow-up. Education regarding condition provided and return precautions given.

## 2023-11-06 NOTE — ED NOTES
Received pt AAO and in NAD.  Pt breathing E/U on room air.  Call bell in reach with bed rails up x2.  Pt placed on continuous cardiac, O2, and BP monitoring.  Pt and family advised of plan of care to include all test and procedures. Patient stable at this time; will continue with plan of care.

## 2023-11-06 NOTE — ED TRIAGE NOTES
57 y/o M presents to ER with c/c 8/10 nonradiating, L midclavicular, pressure quality c/p x 2 days. States he has not been taking his medicine. H/x heart stents and CVA

## 2023-11-06 NOTE — ED PROVIDER NOTES
Encounter Date: 11/5/2023       History     Chief Complaint   Patient presents with    Shortness of Breath     Patient reports that he began feeling SOB yesterday, states that it is worsened when lying down. Reports that he is having some CP associated.      Patient is a 58-year-old male with past medical history of heart failure, previous stroke in February, hypertension and tobacco use that presents to the emergency department with shortness of breath.  Patient states that for the past couple of days he has been having worsening shortness of breath.  Yesterday evening he was trying to sleep he woke up gasping for air and had to sit side of the bed/sulfa for 10 15 minutes for him to catch his breath.  States that he had some chest pain at that time.  It resolved when he was able to catch his breath.  He denies cough, denies fever, denies lower extremity swelling.  States he walks every day for about a mile but has to stop every block to catch his breath.    No other symptoms at this time.         Review of patient's allergies indicates:  No Known Allergies  Past Medical History:   Diagnosis Date    Hypertension     Stroke      History reviewed. No pertinent surgical history.  History reviewed. No pertinent family history.  Social History     Tobacco Use    Smoking status: Never    Smokeless tobacco: Never   Substance Use Topics    Alcohol use: Yes     Comment: occasional    Drug use: No     Review of Systems  ROS negative except as noted in HPI     Physical Exam     Initial Vitals [11/05/23 2038]   BP Pulse Resp Temp SpO2   (!) 134/99 90 18 98 °F (36.7 °C) 97 %      MAP       --         Physical Exam    Nursing note and vitals reviewed.  Constitutional: Vital signs are normal. He is Obese . He is cooperative.  Non-toxic appearance. He does not have a sickly appearance.   HENT:   Head: Normocephalic.   Right Ear: External ear normal.   Left Ear: External ear normal.   Nose: Nose normal.   Mouth/Throat: Oropharynx is  clear and moist.   Eyes: Conjunctivae and EOM are normal. Right eye exhibits no discharge. Left eye exhibits no discharge. No scleral icterus.   Neck: No thyromegaly present.   Normal range of motion.  Cardiovascular:  Normal rate, regular rhythm, normal heart sounds and intact distal pulses.     Exam reveals no friction rub.       No murmur heard.  Pulmonary/Chest: No respiratory distress. He has no wheezes. He has no rhonchi. He has rales.   Abdominal: Abdomen is soft. He exhibits no distension.   Musculoskeletal:         General: No tenderness. Normal range of motion.      Cervical back: Normal range of motion.     Neurological: He is alert and oriented to person, place, and time. He has normal strength. No cranial nerve deficit or sensory deficit.   Skin: Skin is warm. Capillary refill takes less than 2 seconds. No abscess noted. No erythema.   Psychiatric: He has a normal mood and affect.         ED Course   Procedures  Labs Reviewed   CBC W/ AUTO DIFFERENTIAL - Abnormal; Notable for the following components:       Result Value    RBC 4.30 (*)     MCH 32.6 (*)     Gran # (ANC) 1.7 (*)     Gran % 37.2 (*)     Lymph % 50.0 (*)     All other components within normal limits   COMPREHENSIVE METABOLIC PANEL - Abnormal; Notable for the following components:    CO2 21 (*)     Alkaline Phosphatase 53 (*)     All other components within normal limits   TROPONIN I - Abnormal; Notable for the following components:    Troponin I 0.089 (*)     All other components within normal limits   B-TYPE NATRIURETIC PEPTIDE - Abnormal; Notable for the following components:     (*)     All other components within normal limits   HIV 1 / 2 ANTIBODY   HEPATITIS C ANTIBODY   TROPONIN I     EKG Readings: (Independently Interpreted)   Sinus rhythm with first-degree AV block, no ST elevations or depressions.  Some nonspecific ST changes.       Imaging Results              X-Ray Chest AP Portable (Final result)  Result time 11/05/23  22:23:12      Final result by Brendon Dillard MD (11/05/23 22:23:12)                   Impression:      No acute findings in the chest.    Cardiomegaly.  Loop recorder device present.      Electronically signed by: Brendon Dillard MD  Date:    11/05/2023  Time:    22:23               Narrative:    EXAMINATION:  XR CHEST AP PORTABLE    CLINICAL HISTORY:  shortness of breath;    TECHNIQUE:  Single frontal view of the chest was performed.    COMPARISON:  None    FINDINGS:  Loop recorder device projects over the left side of the heart.    No consolidation, pleural effusion or pneumothorax.    Cardiomediastinal silhouette is enlarged.                                    X-Rays:   Independently Interpreted Readings:   Chest X-Ray: Increased vascular congestion however no consolidation or pulmonary effusion, patient does have a cardiac monitor in place.  There is cardiomegaly     Medications   sodium chloride 0.9% flush 10 mL (has no administration in time range)   melatonin tablet 6 mg (has no administration in time range)   ondansetron disintegrating tablet 8 mg (has no administration in time range)   promethazine tablet 25 mg (has no administration in time range)   polyethylene glycol packet 17 g (has no administration in time range)   bisacodyL suppository 10 mg (has no administration in time range)   acetaminophen tablet 650 mg (has no administration in time range)   glucose chewable tablet 16 g (has no administration in time range)   glucose chewable tablet 24 g (has no administration in time range)   glucagon (human recombinant) injection 1 mg (has no administration in time range)   dextrose 10% bolus 125 mL 125 mL (has no administration in time range)   dextrose 10% bolus 250 mL 250 mL (has no administration in time range)   furosemide injection 40 mg (40 mg Intravenous Given 11/5/23 4025)     Medical Decision Making  Patient is a 58-year-old male with past medical history of heart failure, previous stroke in  February, hypertension and tobacco use that presents to the emergency department with shortness of breath.    On initial evaluation patient is not acute distress, protecting his airway in vitals are within normal limits.  He was cooperative with physical exam and history taking    Differential includes CHF exacerbation, fluid overload secondary to other pathology or medication noncompliance.  Low suspicion for pneumonia or pulmonary embolism.  Low suspicion for ACS or dysrhythmia.    Workup to include cardiac labs, EKG, basic labs and a chest x-ray.  Bedside ultrasound demonstrated reduced EF as well as possible pleural effusion.  No pericardial effusion.    Troponin elevated, BNP elevated in setting of shortness a breath concerning for CHF exacerbation.    Interventions given IV Lasix x1.    We will admit to hospital medicine for CHF exacerbation and medication titration.  Suspect that exacerbation secondary to medication noncompliance at this time.        Amount and/or Complexity of Data Reviewed  Labs: ordered. Decision-making details documented in ED Course.  Radiology: ordered and independent interpretation performed. Decision-making details documented in ED Course.  ECG/medicine tests: ordered and independent interpretation performed. Decision-making details documented in ED Course.    Risk  Prescription drug management.                               Clinical Impression:   Final diagnoses:  [R06.02] SOB (shortness of breath)  [I50.21] Acute systolic congestive heart failure (Primary)  [R79.89] Elevated troponin I level  [R79.89] Elevated brain natriuretic peptide (BNP) level        ED Disposition Condition    Observation Stable                Beatriz Lewis,   Resident  11/06/23 0006

## 2023-11-06 NOTE — PLAN OF CARE
Pt arrived to unit via w/c, AAOX4. Pleasant mood. Eyeglasses intact, broken. Resp even and unlabored. Ambulated w/o difficulty. Lt sided weakness/upper ext contraction noted. Skin W/D to touch. Cont of B/B, urinal at bedside. NAD noted at present. Will continue to monitor.  Problem: Adult Inpatient Plan of Care  Goal: Plan of Care Review  Outcome: Ongoing, Progressing     Problem: Fall Injury Risk  Goal: Absence of Fall and Fall-Related Injury  Outcome: Ongoing, Progressing     Problem: Chest Pain  Goal: Resolution of Chest Pain Symptoms  Outcome: Ongoing, Progressing

## 2023-11-06 NOTE — CONSULTS
Food & Nutrition  Education    Diet Education: Fluid and Salt Restriction  Time Spent: 8 Minutes  Learners: Patient       Nutrition Education provided with handouts: Low Sodium Nutrition Therapy, Fluid-Restricted Nutrition Therapy          Comments: RD attempted to speak with patient regarding fluid and salt restriction. Patient is not appropriate for diet education at this time. RD left educational handout at bedside for patient to review on his own. Considering patients housing and financial barriers, patient would benefit form continuous assistance from SW/CM. Ensure coupons provided to patient. Recommendations provided below.      All questions and concerns answered. Dietitian's contact information provided.       Follow-Up: Yes    Please Re-consult as needed    Recommendations     1. Continue Cardiac/ Low-Sodium Diet, fluid per MD.  2. Consider adding Boost Plus BID- Vanilla to better optimize nutrition.   3. Monitor I/O's, weight, and labs.  4. RD to monitor and follow-up.     Goals: Meet % EEN/EPN by follow-up date.  Nutrition Goal Status: new  Communication of RD Recs: other (comment) (POC)    Thanks!    Maria Isabel Núñez Registration Eligible, Provisional LDN

## 2023-11-06 NOTE — SUBJECTIVE & OBJECTIVE
Interval History: NAEON. VSS. Continuing IV diuresis and will d/c when medically ready.     Review of Systems   Constitutional:  Negative for chills and fever.   Respiratory:  Positive for shortness of breath. Negative for chest tightness.    Cardiovascular:  Negative for chest pain and leg swelling.   Gastrointestinal:  Negative for abdominal pain and nausea.   Neurological:  Negative for dizziness and weakness.     Objective:     Vital Signs (Most Recent):  Temp: 98 °F (36.7 °C) (11/06/23 1159)  Pulse: 66 (11/06/23 1458)  Resp: 18 (11/06/23 1159)  BP: (!) 114/58 (11/06/23 1159)  SpO2: 97 % (11/06/23 1159) Vital Signs (24h Range):  Temp:  [98 °F (36.7 °C)-98.5 °F (36.9 °C)] 98 °F (36.7 °C)  Pulse:  [66-90] 66  Resp:  [18-20] 18  SpO2:  [95 %-98 %] 97 %  BP: (114-164)/() 114/58     Weight: 92.5 kg (204 lb)  Body mass index is 27.66 kg/m².    Intake/Output Summary (Last 24 hours) at 11/6/2023 1524  Last data filed at 11/6/2023 1133  Gross per 24 hour   Intake --   Output 1800 ml   Net -1800 ml         Physical Exam  Vitals and nursing note reviewed.   Constitutional:       Appearance: He is well-developed.   Eyes:      Pupils: Pupils are equal, round, and reactive to light.   Neck:      Vascular: JVD present.   Cardiovascular:      Rate and Rhythm: Normal rate and regular rhythm.   Pulmonary:      Effort: Pulmonary effort is normal.      Breath sounds: Normal breath sounds.   Abdominal:      General: Bowel sounds are normal. There is distension.      Palpations: Abdomen is soft.      Tenderness: There is no abdominal tenderness.   Musculoskeletal:         General: No tenderness.      Right lower leg: No edema.      Left lower leg: No edema.   Skin:     General: Skin is warm and dry.   Neurological:      Mental Status: He is alert and oriented to person, place, and time.   Psychiatric:         Behavior: Behavior normal.             Significant Labs: All pertinent labs within the past 24 hours have been  reviewed.  CBC:   Recent Labs   Lab 11/05/23 2140 11/06/23  0632   WBC 4.62 4.08   HGB 14.0 14.2   HCT 41.2 42.6    208     CMP:   Recent Labs   Lab 11/05/23 2140 11/06/23  0632    141   K 4.3 3.4*    102   CO2 21* 27   GLU 83 83   BUN 8 9   CREATININE 1.1 1.1   CALCIUM 9.2 9.6   PROT 8.1  --    ALBUMIN 4.1  --    BILITOT 0.2  --    ALKPHOS 53*  --    AST 37  --    ALT 14  --    ANIONGAP 13 12     Troponin:   Recent Labs   Lab 11/05/23 2140 11/06/23  0014 11/06/23  0632   TROPONINI 0.089* 0.108* 0.068*       Significant Imaging: I have reviewed all pertinent imaging results/findings within the past 24 hours.

## 2023-11-06 NOTE — PROGRESS NOTES
[facility] [address] [place] [address] Medicine  Progress Note    Patient Name: Dionicio Bess  MRN: [id number]  Patient Class: OP- Observation   Admission Date: 11/5/2023  Length of Stay: 0 days  Attending Physician: Corey Turner MD  Primary Care Provider: Isela, Primary Doctor        Subjective:     Principal Problem:Acute on chronic combined systolic and diastolic congestive heart failure        HPI:  Dionicio Bess is a 58 y.o. male with a PMHx of CHF (EF 25-30%), recent CVA in February, possible CAD with stent placement (?), HTN who [place] [facility]. Patient is a poor historian. He reports worsening shortness of breath for the past few weeks but most notable over the last few days. SOB worsens with exertion and with lying flat. He also complains of intermittent left sided chest pain described as a pressure since symptoms onset. Chest pain worsens with exertion. He had to sit up onside his bed yesterday to try to catch his breath for several minutes. Symptoms have improved since receiving IV lasix in the ED prior to my exam. Admits to non compliance with home medications for the past month as he is currently homeless and is out of his medications. Denies fever, chills, N/V, abdominal pain, LE swelling, dizziness, HA, vision change or syncope.     ED: AFVSS. No leukocytosis or electroylyte abnormalities. Trop 0.89, . EKG with non-specific TWAs. CXR without acute findings. Given lasix 40mg IVP.       Overview/Hospital Course:  Dionicio Bess is a 58 y.o. male who was admitted to hospital medicine for CHF exacerbation. Started on Lasix 40mg daily with good urine output. Most recent echo with EF of 25-30%, grade I diastolic function, CVP of 3. Working with CM for placement following d/c as patient is homeless and has not had his medications in months. Patient will be discharged when medically ready with cardiology follow up.       Interval History: NAEON. VSS. Continuing IV diuresis  and will d/c when medically ready.     Review of Systems   Constitutional:  Negative for chills and fever.   Respiratory:  Positive for shortness of breath. Negative for chest tightness.    Cardiovascular:  Negative for chest pain and leg swelling.   Gastrointestinal:  Negative for abdominal pain and nausea.   Neurological:  Negative for dizziness and weakness.     Objective:     Vital Signs (Most Recent):  Temp: 98 °F (36.7 °C) (11/06/23 1159)  Pulse: 66 (11/06/23 1458)  Resp: 18 (11/06/23 1159)  BP: (!) 114/58 (11/06/23 1159)  SpO2: 97 % (11/06/23 1159) Vital Signs (24h Range):  Temp:  [98 °F (36.7 °C)-98.5 °F (36.9 °C)] 98 °F (36.7 °C)  Pulse:  [66-90] 66  Resp:  [18-20] 18  SpO2:  [95 %-98 %] 97 %  BP: (114-164)/() 114/58     Weight: 92.5 kg (204 lb)  Body mass index is 27.66 kg/m².    Intake/Output Summary (Last 24 hours) at 11/6/2023 1524  Last data filed at 11/6/2023 1133  Gross per 24 hour   Intake --   Output 1800 ml   Net -1800 ml         Physical Exam  Vitals and nursing note reviewed.   Constitutional:       Appearance: He is well-developed.   Eyes:      Pupils: Pupils are equal, round, and reactive to light.   Neck:      Vascular: JVD present.   Cardiovascular:      Rate and Rhythm: Normal rate and regular rhythm.   Pulmonary:      Effort: Pulmonary effort is normal.      Breath sounds: Normal breath sounds.   Abdominal:      General: Bowel sounds are normal. There is distension.      Palpations: Abdomen is soft.      Tenderness: There is no abdominal tenderness.   Musculoskeletal:         General: No tenderness.      Right lower leg: No edema.      Left lower leg: No edema.   Skin:     General: Skin is warm and dry.   Neurological:      Mental Status: He is alert and oriented to person, place, and time.   Psychiatric:         Behavior: Behavior normal.             Significant Labs: All pertinent labs within the past 24 hours have been reviewed.  CBC:   Recent Labs   Lab 11/05/23  3380  11/06/23  0632   WBC 4.62 4.08   HGB 14.0 14.2   HCT 41.2 42.6    208     CMP:   Recent Labs   Lab 11/05/23  2140 11/06/23  0632    141   K 4.3 3.4*    102   CO2 21* 27   GLU 83 83   BUN 8 9   CREATININE 1.1 1.1   CALCIUM 9.2 9.6   PROT 8.1  --    ALBUMIN 4.1  --    BILITOT 0.2  --    ALKPHOS 53*  --    AST 37  --    ALT 14  --    ANIONGAP 13 12     Troponin:   Recent Labs   Lab 11/05/23  2140 11/06/23  0014 11/06/23  0632   TROPONINI 0.089* 0.108* 0.068*       Significant Imaging: I have reviewed all pertinent imaging results/findings within the past 24 hours.      Assessment/Plan:      * Acute on chronic combined systolic and diastolic congestive heart failure  - noncompliant with low Na diet and home diuretics  - , CXR without acute findings  - start IV lasix 40 mg BID  - echo   -Results for orders placed during the hospital encounter of 11/05/23    Echo    Interpretation Summary    Left Ventricle: The left ventricle is normal in size. Normal wall thickness. Global hypokinesis present. There is severely reduced systolic function with a visually estimated ejection fraction of 25 - 30%. Grade I diastolic dysfunction.    Right Ventricle: Mild right ventricular enlargement. Wall thickness is normal. Right ventricle wall motion  is normal. Systolic function is mildly reduced.    Biatrial enlargement    Pulmonary Artery: The estimated pulmonary artery systolic pressure is 19 mmHg.    IVC/SVC: Normal venous pressure at 3 mmHg.  - optimize GDMT as toletated  - keep K>4, Mg>2  - monitor tele  - strict I/Os, daily weights    Coronary artery disease involving native coronary artery with angina pectoris  Elevated troponin  Elevated trop and chest pain possibly from volume overload and uncontrolled HTN, though also concerned for ACS collin since non compliant w/ meds (possible stent placement in the past).    - trop 0.086>> 0.108 >> 0.068  - EKG with nonspecific TWAs  - trend trop-- if uptrends,  will place cardiology consult   - NPO at [facility] as precaution  - echo see resulted in CHF exacerbation  - continue home BB, statin, ASA  - IV diuresis as above    History of ischemic cerebrovascular accident (CVA) with residual deficit  - continue home ASA, statin     Essential hypertension  - continue amlodipine, losartan and MTP    Atrial fibrillation  - resume home eliquis and MTP      VTE Risk Mitigation (From admission, onward)         Ordered     apixaban tablet 5 mg  2 times daily         11/06/23 0721     IP VTE HIGH RISK PATIENT  Once         11/05/23 2259     Reason for No Pharmacological VTE Prophylaxis  Once        Question:  Reasons:  Answer:  Already adequately anticoagulated on oral Anticoagulants    11/05/23 2259     Place sequential compression device  Until discontinued         11/05/23 2258                Discharge Planning   ERWIN: 11/7/2023     Code Status: Full Code   Is the patient medically ready for discharge?: No    Reason for patient still in hospital (select all that apply): Patient trending condition, Laboratory test, Treatment and Pending disposition  Discharge Plan A: Other            Blaire Vaca PA-C  Department of Hospital Medicine   [facility] 11[facility]

## 2023-11-06 NOTE — ASSESSMENT & PLAN NOTE
- noncompliant with low Na diet and home diuretics  - , CXR without acute findings  - start IV lasix 40 mg BID  - echo   -Results for orders placed during the hospital encounter of 11/05/23    Echo    Interpretation Summary    Left Ventricle: The left ventricle is normal in size. Normal wall thickness. Global hypokinesis present. There is severely reduced systolic function with a visually estimated ejection fraction of 25 - 30%. Grade I diastolic dysfunction.    Right Ventricle: Mild right ventricular enlargement. Wall thickness is normal. Right ventricle wall motion  is normal. Systolic function is mildly reduced.    Biatrial enlargement    Pulmonary Artery: The estimated pulmonary artery systolic pressure is 19 mmHg.    IVC/SVC: Normal venous pressure at 3 mmHg.  - optimize GDMT as toletated  - keep K>4, Mg>2  - monitor tele  - strict I/Os, daily weights

## 2023-11-06 NOTE — EKG INTERPRETATIONS - EMERGENCY DEPT.
Independently interpreted by MD:  Rate 87, NSR, no stemi, no ectopy, no hypertrophy, 1st degree AV block, LAFB, nonspecific T wave changes

## 2023-11-06 NOTE — ED NOTES
Telemetry Verification   Patient placed on Telemetry Box  Verified with War Room  Tech    Box # [id number]   Rate 80   Rhythm nsr

## 2023-11-06 NOTE — ASSESSMENT & PLAN NOTE
Elevated troponin  Elevated trop and chest pain possibly from volume overload and uncontrolled HTN, though also concerned for ACS collin since non compliant w/ meds (possible stent placement in the past).    - trop 0.086>> 0.108 >> 0.068  - EKG with nonspecific TWAs  - trend trop-- if uptrends, will place cardiology consult   - NPO at MN as precaution  - echo see resulted in CHF exacerbation  - continue home BB, statin, ASA  - IV diuresis as above

## 2023-11-06 NOTE — PLAN OF CARE
11/06/23 0948   Discharge Assessment   Assessment Type Discharge Planning Assessment   Confirmed/corrected address, phone number and insurance No   Reason Other (see comment)  (Patient states that he is homeless and haven't lived at the [facility] in a while per patient)   Source of Information patient   When was your last doctors appointment?   ([facility] last month)   Communicated ERWIN with patient/caregiver Date not available/Unable to determine   Reason For Admission SOB   People in Home alone   Do you expect to return to your current living situation? Yes   Do you have help at home or someone to help you manage your care at home? No   Prior to hospitilization cognitive status: Alert/Oriented   Current cognitive status: Alert/Oriented   Walking or Climbing Stairs ambulation difficulty, requires equipment   Mobility Management cane   Equipment Currently Used at Home cane, straight   Readmission within 30 days? No   Patient currently being followed by outpatient case management? No   Do you currently have service(s) that help you manage your care at home? No   Do you take prescription medications? Yes   Do you have prescription coverage? Yes   Coverage [employer]   Do you have any problems affording any of your prescribed medications? No   Is the patient taking medications as prescribed? yes   Who is going to help you get home at discharge? no one   How do you get to doctors appointments? other (see comments)   Are you on dialysis? No   Do you take coumadin? No   DME Needed Upon Discharge  none   Discharge Plan discussed with: Patient   Transition of Care Barriers Homeless   Discharge Plan A Other   Discharge Plan B Shelter   Housing Stability   In the last 12 months, was there a time when you were not able to pay the mortgage or rent on time? Y   In the last 12 months, was there a time when you did not have a steady place to sleep or slept in a shelter (including now)? Y   Transportation Needs   In the  past 12 months, has lack of transportation kept you from medical appointments or from getting medications? yes   In the past 12 months, has lack of transportation kept you from meetings, work, or from getting things needed for daily living? Yes     Patient states that he has been homeless since 2023. CM asked about his income. Patient states that he does not receive an income. CM asked about how he afford his medications. Patient states that  he chantelle his  insurance. Patient did not state any issue with with taking his medications. CM asked about going to a shelter. Patient states that he does not want to go to a shelter. CM asked about family. Patient states that he has a nephew in town but he does not have room for him. That's the only member he said. Patient states that his parents are . Patient states that he uses a cane but no cane at bedside. Cecelia Aquino RN

## 2023-11-06 NOTE — NURSING
Patient alert and oriented today. Not complaining but stated that he had no one to help him get his daily necessities and medications.

## 2023-11-06 NOTE — H&P
[facility] - Emergency Dept  [facility] Medicine  History & Physical    Patient Name: Dionicio Bess  MRN: [id number]  Patient Class: OP- Observation  Admission Date: 11/5/2023  Attending Physician: Corey Turner MD   Primary Care Provider: Isela, Primary Doctor         Patient information was obtained from patient, past medical records and ER records.     Subjective:     Principal Problem:Acute on chronic combined systolic and diastolic congestive heart failure    Chief Complaint:   Chief Complaint   Patient presents with    Shortness of Breath     Patient reports that he began feeling SOB yesterday, states that it is worsened when lying down. Reports that he is having some CP associated.         HPI: Dionicio Bess is a 58 y.o. male with a PMHx of CHF (EF 25-30%), recent CVA in February, possible CAD with stent placement (?), HTN who presents to [facility] for evaluation of shortness of breath. Patient is a poor historian. He reports worsening shortness of breath for the past few weeks but most notable over the last few days. SOB worsens with exertion and with lying flat. He also complains of intermittent left sided chest pain described as a pressure since symptoms onset. Chest pain worsens with exertion. He had to sit up onside his bed yesterday to try to catch his breath for several minutes. Symptoms have improved since receiving IV lasix in the ED prior to my exam. Admits to non compliance with home medications for the past month as he is currently homeless and is out of his medications. Denies fever, chills, N/V, abdominal pain, LE swelling, dizziness, HA, vision change or syncope.     ED: AFVSS. No leukocytosis or electroylyte abnormalities. Trop 0.89, . EKG with non-specific TWAs. CXR without acute findings. Given lasix 40mg IVP.       Past Medical History:   Diagnosis Date    Hypertension     Stroke        History reviewed. No pertinent surgical history.    Review of patient's allergies indicates:  No Known  Allergies    No current facility-administered medications on file prior to encounter.     Current Outpatient Medications on File Prior to Encounter   Medication Sig    apixaban (ELIQUIS) 5 mg Tab Take 5 mg by mouth.    losartan (COZAAR) 25 MG tablet Take 1 tablet by mouth once daily.    pantoprazole (PROTONIX) 20 MG tablet Take 1 tablet by mouth every morning.    amLODIPine (NORVASC) 10 MG tablet Take 10 mg by mouth.    aspirin 81 MG Chew Take 81 mg by mouth.    atorvastatin (LIPITOR) 80 MG tablet Take 80 mg by mouth every evening.    FLUoxetine 20 MG capsule Take 20 mg by mouth.    furosemide (LASIX) 40 MG tablet Take 40 mg by mouth.    gabapentin (NEURONTIN) 400 MG capsule Take 400 mg by mouth.    ibuprofen (ADVIL,MOTRIN) 600 MG tablet Take 1 tablet (600 mg total) by mouth every 6 (six) hours as needed for Pain.    JARDIANCE 10 mg tablet Take 10 mg by mouth.    meloxicam (MOBIC) 7.5 MG tablet Take 1 tablet (7.5 mg total) by mouth once daily.    methocarbamoL (ROBAXIN) 500 MG Tab Take 1,000 mg by mouth.    metoprolol succinate (TOPROL-XL) 50 MG 24 hr tablet Take 50 mg by mouth.    naltrexone (DEPADE) 50 mg tablet Take 50 mg by mouth.    spironolactone (ALDACTONE) 25 MG tablet Take 25 mg by mouth.    thiamine 100 MG tablet Take 200 mg by mouth.    traMADoL (ULTRAM) 50 mg tablet Take 50 mg by mouth 2 (two) times daily.     Family History    None       Tobacco Use    Smoking status: Never    Smokeless tobacco: Never   Substance and Sexual Activity    Alcohol use: Yes     Comment: occasional    Drug use: No    Sexual activity: Not on file     Review of Systems   Constitutional:  Negative for activity change, chills and fever.   HENT:  Negative for congestion, trouble swallowing and voice change.    Eyes:  Negative for photophobia and visual disturbance.   Respiratory:  Positive for chest tightness and shortness of breath. Negative for wheezing.    Cardiovascular:  Positive for chest pain. Negative for palpitations and  leg swelling.   Gastrointestinal:  Negative for abdominal pain, constipation, diarrhea, nausea and vomiting.   Genitourinary:  Negative for dysuria, frequency, hematuria and urgency.   Musculoskeletal:  Negative for arthralgias, back pain and gait problem.   Skin:  Negative for color change and rash.   Neurological:  Negative for dizziness, syncope, weakness, light-headedness, numbness and headaches.   Psychiatric/Behavioral:  Negative for agitation and confusion. The patient is not nervous/anxious.      Objective:     Vital Signs (Most Recent):  Temp: 98.5 °F (36.9 °C) (11/06/23 0014)  Pulse: 86 (11/06/23 0014)  Resp: 18 (11/06/23 0014)  BP: (!) 162/99 (11/06/23 0014)  SpO2: 98 % (11/06/23 0014) Vital Signs (24h Range):  Temp:  [98 °F (36.7 °C)-98.5 °F (36.9 °C)] 98.5 °F (36.9 °C)  Pulse:  [77-90] 86  Resp:  [18-20] 18  SpO2:  [97 %-98 %] 98 %  BP: (134-164)/() 162/99     Weight: 92.7 kg (204 lb 5.9 oz)  Body mass index is 27.72 kg/m².     Physical Exam  Vitals and nursing note reviewed.   Constitutional:       General: He is not in acute distress.     Appearance: He is well-developed.   HENT:      Head: Normocephalic and atraumatic.      Mouth/Throat:      Pharynx: No oropharyngeal exudate.   Eyes:      General: No scleral icterus.     Conjunctiva/sclera: Conjunctivae normal.   Cardiovascular:      Rate and Rhythm: Normal rate and regular rhythm.      Heart sounds: Normal heart sounds.   Pulmonary:      Effort: Pulmonary effort is normal. No respiratory distress.      Breath sounds: No wheezing.      Comments: Diminished BS to BLL  Abdominal:      General: Bowel sounds are normal. There is no distension.      Palpations: Abdomen is soft.      Tenderness: There is no abdominal tenderness.   Musculoskeletal:         General: No tenderness. Normal range of motion.      Cervical back: Normal range of motion and neck supple.      Right lower leg: Edema present.      Left lower leg: Edema present.    Lymphadenopathy:      Cervical: No cervical adenopathy.   Skin:     General: Skin is warm and dry.      Capillary Refill: Capillary refill takes less than 2 seconds.      Findings: No rash.   Neurological:      Mental Status: He is alert and oriented to person, place, and time.      Cranial Nerves: No cranial nerve deficit.      Sensory: No sensory deficit.      Coordination: Coordination normal.   Psychiatric:         Behavior: Behavior normal.         Thought Content: Thought content normal.         Judgment: Judgment normal.                Significant Labs: All pertinent labs within the past 24 hours have been reviewed.  CBC:   Recent Labs   Lab 11/05/23  2140   WBC 4.62   HGB 14.0   HCT 41.2        CMP:   Recent Labs   Lab 11/05/23  2140      K 4.3      CO2 21*   GLU 83   BUN 8   CREATININE 1.1   CALCIUM 9.2   PROT 8.1   ALBUMIN 4.1   BILITOT 0.2   ALKPHOS 53*   AST 37   ALT 14   ANIONGAP 13       Significant Imaging: I have reviewed all pertinent imaging results/findings within the past 24 hours.  X-Ray Chest AP Portable  Narrative: EXAMINATION:  XR CHEST AP PORTABLE    CLINICAL HISTORY:  shortness of breath;    TECHNIQUE:  Single frontal view of the chest was performed.    COMPARISON:  None    FINDINGS:  Loop recorder device projects over the left side of the heart.    No consolidation, pleural effusion or pneumothorax.    Cardiomediastinal silhouette is enlarged.  Impression: No acute findings in the chest.    Cardiomegaly.  Loop recorder device present.    Electronically signed by: Brendon Dillard MD  Date:    11/05/2023  Time:    22:23        Assessment/Plan:     * Acute on chronic combined systolic and diastolic congestive heart failure  - noncompliant with low Na diet and home diuretics  - , CXR without acute findings  - start IV lasix 40 mg BID  - repeat echo  - optimize GDMT as toletated  - keep K>4, Mg>2  - monitor tele  - strict I/Os, daily weights    Coronary artery  disease involving native coronary artery with angina pectoris  Elevated troponin  Elevated trop and chest pain possibly from volume overload and uncontrolled HTN, though also concerned for ACS collin since non compliant w/ meds (possible stent placement in the past).    - trop 0.086>> trend  - EKG with nonspecific TWAs  - trend trop-- if uptrends, will place cardiology consult   - NPO at MN as precaution  - echo  - continue home BB, statin, ASA  - IV diuresis as above    History of ischemic cerebrovascular accident (CVA) with residual deficit  - continue home ASA, statin     Essential hypertension  - continue amlodipine, losartan and MTP    Atrial fibrillation  - resume home eliquis and MTP    VTE Risk Mitigation (From admission, onward)           Ordered     apixaban tablet 5 mg  2 times daily         11/06/23 0035     IP VTE HIGH RISK PATIENT  Once         11/05/23 2259     Reason for No Pharmacological VTE Prophylaxis  Once        Question:  Reasons:  Answer:  Already adequately anticoagulated on oral Anticoagulants    11/05/23 2259     Place sequential compression device  Until discontinued         11/05/23 2258                         On 11/06/2023, patient should be placed in hospital observation services under my care in collaboration with Dr. Giovanny Rodriguez.          Sanaz Parker PA-C  Department of Hospital Medicine  [facility] - Emergency Dept

## 2023-11-06 NOTE — HPI
Dionicio Bess is a 58 y.o. male with a PMHx of CHF (EF 25-30%), recent CVA in February, possible CAD with stent placement (?), HTN who presents to [facility] for evaluation of shortness of breath. Patient is a poor historian. He reports worsening shortness of breath for the past few weeks but most notable over the last few days. SOB worsens with exertion and with lying flat. He also complains of intermittent left sided chest pain described as a pressure since symptoms onset. Chest pain worsens with exertion. He had to sit up onside his bed yesterday to try to catch his breath for several minutes. Symptoms have improved since receiving IV lasix in the ED prior to my exam. Admits to non compliance with home medications for the past month as he is currently homeless and is out of his medications. Denies fever, chills, N/V, abdominal pain, LE swelling, dizziness, HA, vision change or syncope.     ED: AFVSS. No leukocytosis or electroylyte abnormalities. Trop 0.89, . EKG with non-specific TWAs. CXR without acute findings. Given lasix 40mg IVP.

## 2023-11-06 NOTE — ASSESSMENT & PLAN NOTE
- noncompliant with low Na diet and home diuretics  - , CXR without acute findings  - start IV lasix 40 mg BID  - repeat echo  - optimize GDMT as toletated  - keep K>4, Mg>2  - monitor tele  - strict I/Os, daily weights

## 2023-11-06 NOTE — ED NOTES
Patient identifiers for Dionicio Bess 58 y.o. male checked and correct.  Chief Complaint   Patient presents with    Shortness of Breath     Patient reports that he began feeling SOB yesterday, states that it is worsened when lying down. Reports that he is having some CP associated.      Past Medical History:   Diagnosis Date    Hypertension     Stroke      Allergies reported: Review of patient's allergies indicates:  No Known Allergies      LOC: Patient is awake, alert, and aware of environment with an appropriate affect. Patient is oriented x 4 and speaking appropriately.  APPEARANCE: Patient resting comfortably and in no acute distress. Patient is clean and well groomed, patient's clothing is properly fastened.  HEENT: - JVD, + midline trach  SKIN: The skin is warm and dry. Patient has normal skin turgor and moist mucus membranes.   MUSKULOSKELETAL: Patient is moving all extremities, L hemiparesis.  RESPIRATORY: Airway is open and patent. Respirations are spontaneous and non-labored with normal effort and rate.  CARDIAC: Patient has a normal rate and rhythm. 90 on cardiac monitor. No peripheral edema noted. Endorses 8/10, pressure quality, nonradiating L midclavicular c/p.   ABDOMEN: No distention noted. Soft and non-tender upon palpation.  NEUROLOGICAL: pupils 4 mm, PERRL. Facial expression is symmetrical. Hand grasps are equal bilaterally. Normal sensation in all extremities when touched with finger.

## 2023-11-07 VITALS
HEART RATE: 60 BPM | HEIGHT: 72 IN | OXYGEN SATURATION: 97 % | SYSTOLIC BLOOD PRESSURE: 125 MMHG | DIASTOLIC BLOOD PRESSURE: 90 MMHG | TEMPERATURE: 99 F | WEIGHT: 204 LBS | RESPIRATION RATE: 18 BRPM | BODY MASS INDEX: 27.63 KG/M2

## 2023-11-07 LAB
ANION GAP SERPL CALC-SCNC: 12 MMOL/L (ref 8–16)
BASOPHILS # BLD AUTO: 0.03 K/UL (ref 0–0.2)
BASOPHILS NFR BLD: 0.6 % (ref 0–1.9)
BUN SERPL-MCNC: 12 MG/DL (ref 6–20)
CALCIUM SERPL-MCNC: 10.3 MG/DL (ref 8.7–10.5)
CHLORIDE SERPL-SCNC: 98 MMOL/L (ref 95–110)
CO2 SERPL-SCNC: 28 MMOL/L (ref 23–29)
CREAT SERPL-MCNC: 1 MG/DL (ref 0.5–1.4)
DIFFERENTIAL METHOD: ABNORMAL
EOSINOPHIL # BLD AUTO: 0.2 K/UL (ref 0–0.5)
EOSINOPHIL NFR BLD: 4.8 % (ref 0–8)
ERYTHROCYTE [DISTWIDTH] IN BLOOD BY AUTOMATED COUNT: 13.7 % (ref 11.5–14.5)
EST. GFR  (NO RACE VARIABLE): >60 ML/MIN/1.73 M^2
GLUCOSE SERPL-MCNC: 97 MG/DL (ref 70–110)
HCT VFR BLD AUTO: 46.8 % (ref 40–54)
HGB BLD-MCNC: 15.7 G/DL (ref 14–18)
IMM GRANULOCYTES # BLD AUTO: 0.01 K/UL (ref 0–0.04)
IMM GRANULOCYTES NFR BLD AUTO: 0.2 % (ref 0–0.5)
LYMPHOCYTES # BLD AUTO: 1.8 K/UL (ref 1–4.8)
LYMPHOCYTES NFR BLD: 36.6 % (ref 18–48)
MAGNESIUM SERPL-MCNC: 2 MG/DL (ref 1.6–2.6)
MCH RBC QN AUTO: 31.7 PG (ref 27–31)
MCHC RBC AUTO-ENTMCNC: 33.5 G/DL (ref 32–36)
MCV RBC AUTO: 95 FL (ref 82–98)
MONOCYTES # BLD AUTO: 0.6 K/UL (ref 0.3–1)
MONOCYTES NFR BLD: 11.6 % (ref 4–15)
NEUTROPHILS # BLD AUTO: 2.2 K/UL (ref 1.8–7.7)
NEUTROPHILS NFR BLD: 46.2 % (ref 38–73)
NRBC BLD-RTO: 0 /100 WBC
PHOSPHATE SERPL-MCNC: 3.8 MG/DL (ref 2.7–4.5)
PLATELET # BLD AUTO: 236 K/UL (ref 150–450)
PMV BLD AUTO: 10 FL (ref 9.2–12.9)
POCT GLUCOSE: 94 MG/DL (ref 70–110)
POCT GLUCOSE: 97 MG/DL (ref 70–110)
POCT GLUCOSE: 97 MG/DL (ref 70–110)
POTASSIUM SERPL-SCNC: 3.5 MMOL/L (ref 3.5–5.1)
RBC # BLD AUTO: 4.95 M/UL (ref 4.6–6.2)
SODIUM SERPL-SCNC: 138 MMOL/L (ref 136–145)
WBC # BLD AUTO: 4.81 K/UL (ref 3.9–12.7)

## 2023-11-07 PROCEDURE — 80048 BASIC METABOLIC PNL TOTAL CA: CPT | Performed by: PHYSICIAN ASSISTANT

## 2023-11-07 PROCEDURE — 36415 COLL VENOUS BLD VENIPUNCTURE: CPT

## 2023-11-07 PROCEDURE — 36415 COLL VENOUS BLD VENIPUNCTURE: CPT | Performed by: PHYSICIAN ASSISTANT

## 2023-11-07 PROCEDURE — 84100 ASSAY OF PHOSPHORUS: CPT | Performed by: PHYSICIAN ASSISTANT

## 2023-11-07 PROCEDURE — 25000003 PHARM REV CODE 250: Performed by: PHYSICIAN ASSISTANT

## 2023-11-07 PROCEDURE — 63600175 PHARM REV CODE 636 W HCPCS

## 2023-11-07 PROCEDURE — 96375 TX/PRO/DX INJ NEW DRUG ADDON: CPT

## 2023-11-07 PROCEDURE — G0378 HOSPITAL OBSERVATION PER HR: HCPCS

## 2023-11-07 PROCEDURE — 83735 ASSAY OF MAGNESIUM: CPT

## 2023-11-07 PROCEDURE — 85025 COMPLETE CBC W/AUTO DIFF WBC: CPT | Performed by: PHYSICIAN ASSISTANT

## 2023-11-07 PROCEDURE — 63600175 PHARM REV CODE 636 W HCPCS: Performed by: PHYSICIAN ASSISTANT

## 2023-11-07 PROCEDURE — 25000003 PHARM REV CODE 250

## 2023-11-07 RX ORDER — METHOCARBAMOL 500 MG/1
500 TABLET, FILM COATED ORAL ONCE
Status: COMPLETED | OUTPATIENT
Start: 2023-11-07 | End: 2023-11-07

## 2023-11-07 RX ORDER — POTASSIUM CHLORIDE 20 MEQ/1
40 TABLET, EXTENDED RELEASE ORAL ONCE
Status: COMPLETED | OUTPATIENT
Start: 2023-11-07 | End: 2023-11-07

## 2023-11-07 RX ORDER — KETOROLAC TROMETHAMINE 15 MG/ML
15 INJECTION, SOLUTION INTRAMUSCULAR; INTRAVENOUS ONCE
Status: COMPLETED | OUTPATIENT
Start: 2023-11-07 | End: 2023-11-07

## 2023-11-07 RX ADMIN — ASPIRIN 81 MG CHEWABLE TABLET 81 MG: 81 TABLET CHEWABLE at 08:11

## 2023-11-07 RX ADMIN — PANTOPRAZOLE SODIUM 20 MG: 20 TABLET, DELAYED RELEASE ORAL at 06:11

## 2023-11-07 RX ADMIN — KETOROLAC TROMETHAMINE 15 MG: 15 INJECTION, SOLUTION INTRAMUSCULAR; INTRAVENOUS at 03:11

## 2023-11-07 RX ADMIN — ACETAMINOPHEN 650 MG: 325 TABLET ORAL at 08:11

## 2023-11-07 RX ADMIN — METOPROLOL SUCCINATE 50 MG: 50 TABLET, EXTENDED RELEASE ORAL at 08:11

## 2023-11-07 RX ADMIN — FUROSEMIDE 40 MG: 10 INJECTION, SOLUTION INTRAVENOUS at 08:11

## 2023-11-07 RX ADMIN — Medication 200 MG: at 08:11

## 2023-11-07 RX ADMIN — POTASSIUM CHLORIDE 40 MEQ: 1500 TABLET, EXTENDED RELEASE ORAL at 08:11

## 2023-11-07 RX ADMIN — LOSARTAN POTASSIUM 25 MG: 25 TABLET, FILM COATED ORAL at 08:11

## 2023-11-07 RX ADMIN — APIXABAN 5 MG: 5 TABLET, FILM COATED ORAL at 09:11

## 2023-11-07 RX ADMIN — AMLODIPINE BESYLATE 10 MG: 10 TABLET ORAL at 09:11

## 2023-11-07 RX ADMIN — SPIRONOLACTONE 25 MG: 25 TABLET, FILM COATED ORAL at 08:11

## 2023-11-07 RX ADMIN — METHOCARBAMOL 500 MG: 500 TABLET ORAL at 03:11

## 2023-11-07 RX ADMIN — FLUOXETINE 20 MG: 20 CAPSULE ORAL at 08:11

## 2023-11-07 NOTE — PLAN OF CARE
CM called Single home [phone] and [facility] apartment, [phone]. CM had to leave a message for each. Cecelia Aquino RN

## 2023-11-07 NOTE — PLAN OF CARE
CM received a call from Luke. He states that he is  for [employer]. He states that they work with [employer]. He states that they have an application process through a third party. They have a place in [facility] that is a dorm type property and drug free environment.  He gave CM 3 [phone],  [facility] [phone], and [facility] [phone]. Information given to patient. Cecelia adhikari RN.

## 2023-11-07 NOTE — PLAN OF CARE
had reached out to [facility] but they do not have a bed.  states that patient can go to [facility] medical respite.  CM talked with patient about respite at [facility]. Patient is in agreement. MD had ordered his medication in bubble pack. CM called pharmacy and spoke with Ilana @ [facility]. She states that they are working on it. Medications should be delivered between 5:30and 6pm. CM notified  medical team, nurse, and patient. Patient's cards appointment has been made and placed on his AVS. Cecelia Aquino RN

## 2023-11-07 NOTE — PLAN OF CARE
Cm gave patient information for [employer]. Patient  states that he called number on the flyer [phone]. Patient states it went to voicemail. CM called and it went to voicemail.CM left voice message. CM looked for another number for [employer]. CM called [phone]. They gave Cm number for Group home [phone],   Single [phone]  [facility] apartments [phone].   CM called  number for group home but its for intellectually delayed individuals.  CM called for single home but no one answered. CM will continue to follow. Cecelia Aquino, RN

## 2023-11-07 NOTE — PLAN OF CARE
Problem: Adult Inpatient Plan of Care  Goal: Plan of Care Review  Outcome: Ongoing, Progressing     Problem: Infection  Goal: Absence of Infection Signs and Symptoms  Outcome: Ongoing, Progressing     Problem: Fall Injury Risk  Goal: Absence of Fall and Fall-Related Injury  Outcome: Ongoing, Progressing     Problem: Pain Acute  Goal: Acceptable Pain Control and Functional Ability  Outcome: Ongoing, Progressing     Problem: Chest Pain  Goal: Resolution of Chest Pain Symptoms  Outcome: Ongoing, Progressing

## 2023-11-08 NOTE — PLAN OF CARE
QUINTON ordered [employer] for patient and advised nurse of pickup time.    QUINTON Feliciano  [phone]

## 2023-11-08 NOTE — PLAN OF CARE
[facility] - Observation 11H  Discharge Final Note    Primary Care Provider: No, Primary Doctor    Expected Discharge Date: 11/7/2023    Patient discharged to [facility] via lyft transportation. 11/7/23.      Discharge Plan A and Plan B have been determined by review of patient's clinical status, future medical and therapeutic needs, and coverage/benefits for post-acute care in coordination with multidisciplinary team members.        Final Discharge Note (most recent)       Final Note - 11/08/23 0932          Final Note    Assessment Type Final Discharge Note (P)                      Important Message from Medicare                 Future Appointments   Date Time Provider Department Center   11/13/2023  2:40 PM Sami Glez MD [facility] CARDIO [facility]   11/14/2023  1:00 PM LAB, APPOINTMENT [facility] LAB P [facility]   11/14/2023  1:30 PM Kiesha Ly PA-C [facility]   11/14/2023  1:30 PM [facility]        scheduled post-discharge follow-up appointment and information added to AVS.  Cecelia Aquino, RN

## 2023-11-08 NOTE — DISCHARGE SUMMARY
[facility] [address] [place] [address] Medicine  Discharge Summary      Patient Name: Dionicio Bess  MRN: [id number]  ALLY: [id number]  Patient Class: OP- Observation  Admission Date: 11/5/2023  Hospital Length of Stay: 0 days  Discharge Date and Time: 11/7/2023  7:00 PM  Attending Physician: Isela att. providers found   Discharging Provider: Blaire Vaca PA-C  Primary Care Provider: Isela Primary Doctor  [facility] [place]: [facility] Blaire Vaca PA-C  Primary Care Team: [facility]     HPI:   Dionicio Bess is a 58 y.o. male with a PMHx of CHF (EF 25-30%), recent CVA in February, possible CAD with stent placement (?), HTN who [place] [facility] [place] of shortness of breath. Patient is a poor historian. He reports worsening shortness of breath for the past few weeks but most notable over the last few days. SOB worsens with exertion and with lying flat. He also complains of intermittent left sided chest pain described as a pressure since symptoms onset. Chest pain worsens with exertion. He had to sit up onside his bed yesterday to try to catch his breath for several minutes. Symptoms have improved since receiving IV lasix in the ED prior to my exam. Admits to non compliance with home medications for the past month as he is currently homeless and is out of his medications. Denies fever, chills, N/V, abdominal pain, LE swelling, dizziness, HA, vision change or syncope.     ED: AFVSS. No leukocytosis or electroylyte abnormalities. Trop 0.89, . EKG with non-specific TWAs. CXR without acute findings. Given lasix 40mg IVP.       * No surgery found *      Hospital Course:   Dionicio Bess is a 58 y.o. male who was admitted to hospital medicine for CHF exacerbation. Started on Lasix 40mg daily with good urine output. Most recent echo with EF of 25-30%, grade I diastolic function, CVP of 3. Patient dry appearing on exam. Working with CM for placement following d/c as patient is homeless and has not had his medications  in months. On my evaluation this morning, the patient reports feeling well and is eager to discharge home. All questions were answered. Patient acknowledged understanding of discharge instructions and feels safe to discharge home. Patient was discharged on 11/7/2023 in stable condition with cardiology follow-up. Education regarding condition provided and return precautions given.          Goals of Care Treatment Preferences:  Code Status: Full Code      Consults:   Consults (From admission, onward)        Status Ordering Provider     Inpatient consult to Registered Dietitian/Nutritionist  Once        Provider:  (Not yet assigned)    ELO Barton          No new Assessment & Plan notes have been filed under this hospital service since the last note was generated.  Service: Hospital Medicine    Final Active Diagnoses:    Diagnosis Date Noted POA    PRINCIPAL PROBLEM:  Acute on chronic combined systolic and diastolic congestive heart failure [I50.43] 11/05/2023 Yes    Coronary artery disease involving native coronary artery with angina pectoris [I25.119] 11/06/2023 Yes    Elevated troponin [R79.89] 11/06/2023 Yes    Atrial fibrillation [I48.91] 05/28/2023 Yes    Essential hypertension [I10] 05/18/2023 Yes    History of ischemic cerebrovascular accident (CVA) with residual deficit [I69.30] 05/18/2023 Not Applicable      Problems Resolved During this Admission:       Discharged Condition: stable    Disposition: Home or Self Care    Follow Up:    Patient Instructions:      Ambulatory referral/consult to Cardiology   Standing Status: Future   Referral Priority: Urgent Referral Type: Consultation   Referral Reason: Specialty Services Required   Requested Specialty: Cardiology   Number of Visits Requested: 1     Diet Cardiac     Notify your health care provider if you experience any of the following:  difficulty breathing or increased cough     Notify your health care provider if you experience any of  the following:  persistent dizziness, light-headedness, or visual disturbances     Notify your health care provider if you experience any of the following:  increased confusion or weakness     Activity as tolerated       Significant Diagnostic Studies: Labs: All labs within the past 24 hours have been reviewed  Results for orders placed during the hospital encounter of 11/05/23    Echo    Interpretation Summary    Left Ventricle: The left ventricle is normal in size. Normal wall thickness. Global hypokinesis present. There is severely reduced systolic function with a visually estimated ejection fraction of 25 - 30%. Grade I diastolic dysfunction.    Right Ventricle: Mild right ventricular enlargement. Wall thickness is normal. Right ventricle wall motion  is normal. Systolic function is mildly reduced.    Biatrial enlargement    Pulmonary Artery: The estimated pulmonary artery systolic pressure is 19 mmHg.    IVC/SVC: Normal venous pressure at 3 mmHg.      Pending Diagnostic Studies:     None         Medications:  Reconciled Home Medications:      Medication List      CHANGE how you take these medications    amLODIPine 10 MG tablet  Commonly known as: NORVASC  Take 1 tablet (10 mg total) by mouth once daily.  What changed: when to take this     aspirin 81 MG Chew  Chew and swallow 1 (1 mg total) by mouth once daily.  What changed: when to take this     ELIQUIS 5 mg Tab  Generic drug: apixaban  Take 1 tablet (5 mg total) by mouth 2 (two) times daily.  What changed: when to take this     FLUoxetine 20 MG capsule  Take 1 capsule (20 mg total) by mouth once daily.  What changed: when to take this     furosemide 40 MG tablet  Commonly known as: LASIX  Take 1 tablet (40 mg total) by mouth once daily.  What changed: when to take this     JARDIANCE 10 mg tablet  Generic drug: empagliflozin  Take 1 tablet (10 mg total) by mouth once daily.  What changed: when to take this     losartan 25 MG tablet  Commonly known as:  COZAAR  Take 1 tablet (25 mg total) by mouth once daily.  What changed: when to take this     metoprolol succinate 50 MG 24 hr tablet  Commonly known as: TOPROL-XL  Take 1 tablet (50 mg total) by mouth once daily.  What changed: when to take this     spironolactone 25 MG tablet  Commonly known as: ALDACTONE  Take 1 tablet (25 mg total) by mouth once daily.  What changed: when to take this     thiamine 100 MG tablet  Take 2 tablets (200 mg total) by mouth once daily.  What changed: when to take this        CONTINUE taking these medications    atorvastatin 80 MG tablet  Commonly known as: LIPITOR  Take 1 tablet (80 mg total) by mouth every evening.     pantoprazole 20 MG tablet  Commonly known as: PROTONIX  Take 1 tablet (20 mg total) by mouth every morning.        STOP taking these medications    gabapentin 400 MG capsule  Commonly known as: NEURONTIN     ibuprofen 600 MG tablet  Commonly known as: ADVIL,MOTRIN     meloxicam 7.5 MG tablet  Commonly known as: MOBIC     methocarbamoL 500 MG Tab  Commonly known as: ROBAXIN     naltrexone 50 mg tablet  Commonly known as: DEPADE     traMADoL 50 mg tablet  Commonly known as: ULTRAM            Indwelling Lines/Drains at time of discharge:   Lines/Drains/Airways     None                 Time spent on the discharge of patient: 36 minutes         Blaire Vaca PA-C  Department of Hospital Medicine  [facility] 11H

## 2023-11-09 ENCOUNTER — TELEPHONE (OUTPATIENT)
Dept: CARDIOLOGY | Facility: CLINIC | Age: 58
End: 2023-11-09
Payer: MEDICAID

## 2023-11-09 NOTE — TELEPHONE ENCOUNTER
"[facility]) hospital discharge 48-72 hour phone follow up completed.     Most Recent Hospital Discharge Date:  November 7, 2023    Last admission Diagnosis/chief complaint: SOB, BLEE    TCC RN Navigator spoke with  Pt, Mr Greenberg.    Current Patient reported weight: Pt unable to weight self to to socioeconomic barriers.    Current Patient reported blood pressure and heart rate: Pt unable to weight self to to socioeconomic barriers.    Pt reports the following:  [x]  Shortness of Breath with Activity  []  Shortness of Breath at rest   []  Fatigue  []  Edema   [] Chest pain or tightness  [] Weight Increase since discharge  [] None of the above    Medications:   Discharge medication reviewed with pt.  Pt reports having medication list available and has all medications at home for use per list.   Pt appreciative of blister packages from [facility] asks that all Rxs be filled at facility going forward. RN reassured pt he can  medications from [facility] pharmacy.     Education:   Confirmed pt received "Home Care Guide for Heart Failure Patients" while admitted.   Reviewed key points as listed below.     Recommend 2 -3 gram sodium restriction and 1500 cc-2000 cc fluid restriction.  Encourage physical activity with graded exercise program.  Requested patient to weigh themselves daily, and to notify us if their weight increases by more than 3 lbs in 1 day or 5 lbs in 3 days.   Reminded patient to use "Daily weight and symptom tracker" to record and guide patient on when and how to call HFTCC. PT may also use symptom tracker if no scale available  Pt reports being in the GREEN Zone. If in yellow/red, reminded that they should be calling HFTCC today or now.     Watch for these Signs and Symptoms: If any of these occur, contact HFTCC immediately:   Increase in shortness of breath with movement   Increase in swelling in your legs and ankles   Weight gain of more than 3 pounds in a day or 5 pounds in 3 " days.   Difficulty breathing when you are lying down   Worsening fatigue or tiredness   Stomach bloating, a full feeling or a loss of appetite   Increased coughing--especially when you are lying down      Pt was able to verbalize back to RN in their own words correct diet/fluid restrictions, necessity for exercise, warning signs and symptoms, when and how to contact their TCC team.      Pt educated on follow-up plan while in HFTCC program to include:   Week 1 -  F/u appt with Provider and RN (Date)  November 14, 2023 at 1:30 PM   Week 2-5 - In person/ Virtual/ phone call check ins    Week 5-7 - Pt will discharge from H[facility] and transition to longterm care provider (Cardiology/PCP/ Advanced Heart Failure).      Patient active on myChart? No      Pt given the following contact information for ease of communication: [phone] (Mon-Fri, 8a-5p) & for urgent issues on the weekend to page the Heart Transplant MD on call.  Pt also encouraged utilize [facility] messaging as well.      Will follow up with pt at first clinic visit and RN navigator available for pt questions, issues or concerns. RN reached put to cardiology  for new appt for [place] in [facility] [place]. Appt on 11/13/23 is not feasible d/t lack of transportation.   to contact pt to reschedule.

## 2023-11-13 ENCOUNTER — OFFICE VISIT (OUTPATIENT)
Dept: CARDIOLOGY | Facility: CLINIC | Age: 58
End: 2023-11-13
Payer: MEDICAID

## 2023-11-13 ENCOUNTER — TELEPHONE (OUTPATIENT)
Dept: OPTOMETRY | Facility: CLINIC | Age: 58
End: 2023-11-13
Payer: MEDICAID

## 2023-11-13 VITALS
RESPIRATION RATE: 18 BRPM | BODY MASS INDEX: 27.11 KG/M2 | HEIGHT: 72 IN | HEART RATE: 65 BPM | SYSTOLIC BLOOD PRESSURE: 118 MMHG | DIASTOLIC BLOOD PRESSURE: 88 MMHG | WEIGHT: 200.19 LBS | OXYGEN SATURATION: 98 %

## 2023-11-13 DIAGNOSIS — I69.30 HISTORY OF ISCHEMIC CEREBROVASCULAR ACCIDENT (CVA) WITH RESIDUAL DEFICIT: ICD-10-CM

## 2023-11-13 DIAGNOSIS — I50.42 CHRONIC COMBINED SYSTOLIC AND DIASTOLIC HEART FAILURE: Primary | ICD-10-CM

## 2023-11-13 DIAGNOSIS — I48.0 PAROXYSMAL ATRIAL FIBRILLATION: ICD-10-CM

## 2023-11-13 DIAGNOSIS — I50.43 ACUTE ON CHRONIC COMBINED SYSTOLIC AND DIASTOLIC CONGESTIVE HEART FAILURE: ICD-10-CM

## 2023-11-13 DIAGNOSIS — E78.5 HYPERLIPIDEMIA, UNSPECIFIED HYPERLIPIDEMIA TYPE: ICD-10-CM

## 2023-11-13 DIAGNOSIS — I48.0 PAROXYSMAL A-FIB: ICD-10-CM

## 2023-11-13 DIAGNOSIS — I25.119 CORONARY ARTERY DISEASE INVOLVING NATIVE CORONARY ARTERY OF NATIVE HEART WITH ANGINA PECTORIS: ICD-10-CM

## 2023-11-13 DIAGNOSIS — I21.4 NSTEMI (NON-ST ELEVATED MYOCARDIAL INFARCTION): ICD-10-CM

## 2023-11-13 DIAGNOSIS — I10 ESSENTIAL HYPERTENSION: ICD-10-CM

## 2023-11-13 PROCEDURE — 3079F PR MOST RECENT DIASTOLIC BLOOD PRESSURE 80-89 MM HG: ICD-10-PCS | Mod: CPTII,,, | Performed by: INTERNAL MEDICINE

## 2023-11-13 PROCEDURE — 99999 PR PBB SHADOW E&M-EST. PATIENT-LVL IV: ICD-10-PCS | Mod: PBBFAC,,, | Performed by: INTERNAL MEDICINE

## 2023-11-13 PROCEDURE — 99214 OFFICE O/P EST MOD 30 MIN: CPT | Mod: PBBFAC | Performed by: INTERNAL MEDICINE

## 2023-11-13 PROCEDURE — 1159F PR MEDICATION LIST DOCUMENTED IN MEDICAL RECORD: ICD-10-PCS | Mod: CPTII,,, | Performed by: INTERNAL MEDICINE

## 2023-11-13 PROCEDURE — 3008F BODY MASS INDEX DOCD: CPT | Mod: CPTII,,, | Performed by: INTERNAL MEDICINE

## 2023-11-13 PROCEDURE — 3074F PR MOST RECENT SYSTOLIC BLOOD PRESSURE < 130 MM HG: ICD-10-PCS | Mod: CPTII,,, | Performed by: INTERNAL MEDICINE

## 2023-11-13 PROCEDURE — 3079F DIAST BP 80-89 MM HG: CPT | Mod: CPTII,,, | Performed by: INTERNAL MEDICINE

## 2023-11-13 PROCEDURE — 3044F HG A1C LEVEL LT 7.0%: CPT | Mod: CPTII,,, | Performed by: INTERNAL MEDICINE

## 2023-11-13 PROCEDURE — 99204 OFFICE O/P NEW MOD 45 MIN: CPT | Mod: S$PBB,,, | Performed by: INTERNAL MEDICINE

## 2023-11-13 PROCEDURE — 1159F MED LIST DOCD IN RCRD: CPT | Mod: CPTII,,, | Performed by: INTERNAL MEDICINE

## 2023-11-13 PROCEDURE — 3044F PR MOST RECENT HEMOGLOBIN A1C LEVEL <7.0%: ICD-10-PCS | Mod: CPTII,,, | Performed by: INTERNAL MEDICINE

## 2023-11-13 PROCEDURE — 4010F PR ACE/ARB THEARPY RXD/TAKEN: ICD-10-PCS | Mod: CPTII,,, | Performed by: INTERNAL MEDICINE

## 2023-11-13 PROCEDURE — 3074F SYST BP LT 130 MM HG: CPT | Mod: CPTII,,, | Performed by: INTERNAL MEDICINE

## 2023-11-13 PROCEDURE — 4010F ACE/ARB THERAPY RXD/TAKEN: CPT | Mod: CPTII,,, | Performed by: INTERNAL MEDICINE

## 2023-11-13 PROCEDURE — 99999 PR PBB SHADOW E&M-EST. PATIENT-LVL IV: CPT | Mod: PBBFAC,,, | Performed by: INTERNAL MEDICINE

## 2023-11-13 PROCEDURE — 3008F PR BODY MASS INDEX (BMI) DOCUMENTED: ICD-10-PCS | Mod: CPTII,,, | Performed by: INTERNAL MEDICINE

## 2023-11-13 PROCEDURE — 99204 PR OFFICE/OUTPT VISIT, NEW, LEVL IV, 45-59 MIN: ICD-10-PCS | Mod: S$PBB,,, | Performed by: INTERNAL MEDICINE

## 2023-11-13 NOTE — PROGRESS NOTES
HF T[facility] Provider Note (Initial Clinic) Consult Note    Date of original referral: 11/6/23  Age: 58 y.o.  Gender: male  Ethnicity: Black or   Number of admissions for CHF within the preceding year: 1   Duration of CHF: diagnosed recent admission  Type of Congestive Heart Failure: Combined   Etiology: unspecified  Social history: denies tobacco use, endorses current alcohol use of 2 beers daily, former crack-cocaine use (quit s/p CVA)  Enrolled in Infusion suite: no    Diagnostic Labs:   EKG - 11/06/2023  CXR - 11/05/2023  ECHO - 11/06/2023  Stress test -   Stress echo -   Pharmacologic stress -   Cardiac catheterization -    Cardiac MRI -     Lab Results   Component Value Date     11/07/2023     11/06/2023    K 3.5 11/07/2023    K 3.4 (L) 11/06/2023    CL 98 11/07/2023     11/06/2023    CO2 28 11/07/2023    CO2 27 11/06/2023    GLU 97 11/07/2023    GLU 83 11/06/2023    BUN 12 11/07/2023    BUN 9 11/06/2023    CREATININE 1.0 11/07/2023    CREATININE 1.1 11/06/2023    CALCIUM 10.3 11/07/2023    CALCIUM 9.6 11/06/2023    PROT 8.1 11/05/2023    ALBUMIN 4.1 11/05/2023    ALBUMIN 4.6 05/28/2023    BILITOT 0.2 11/05/2023    BILITOT 1.0 05/28/2023    ALKPHOS 53 (L) 11/05/2023    AST 37 11/05/2023    AST 15 05/28/2023    ALT 14 11/05/2023    ALT 5 05/28/2023    ANIONGAP 12 11/07/2023    ANIONGAP 12 11/06/2023    ESTGFRAFRICA 43 (L) 05/30/2023    ESTGFRAFRICA 42 (L) 05/29/2023       Lab Results   Component Value Date    WBC 4.81 11/07/2023    WBC 4.08 11/06/2023    RBC 4.95 11/07/2023    RBC 4.44 (L) 11/06/2023    HGB 15.7 11/07/2023    HGB 14.2 11/06/2023    HCT 46.8 11/07/2023    HCT 42.6 11/06/2023    MCV 95 11/07/2023    MCV 96 11/06/2023    MCH 31.7 (H) 11/07/2023    MCH 32.0 (H) 11/06/2023    MCHC 33.5 11/07/2023    MCHC 33.3 11/06/2023    RDW 13.7 11/07/2023    RDW 14.0 11/06/2023     11/07/2023     11/06/2023    MPV 10.0 11/07/2023    MPV 10.6 11/06/2023    IMMGR 0.2  11/07/2023    IMMGR 0.0 11/06/2023    IGABS 0.01 11/07/2023    IGABS 0.00 11/06/2023    LYMPH 1.8 11/07/2023    LYMPH 36.6 11/07/2023    MONO 0.6 11/07/2023    MONO 11.6 11/07/2023    EOS 0.2 11/07/2023    EOS 0.2 11/06/2023    BASO 0.03 11/07/2023    BASO 0.02 11/06/2023    NRBC 0 11/07/2023    NRBC 0 11/06/2023    GRAN 2.2 11/07/2023    GRAN 46.2 11/07/2023    EOSINOPHIL 4.8 11/07/2023    EOSINOPHIL 4.2 11/06/2023    BASOPHIL 0.6 11/07/2023    BASOPHIL 0.5 11/06/2023       Lab Results   Component Value Date     (H) 11/05/2023     (H) 07/11/2023    MG 2.0 11/07/2023    MG 1.9 11/06/2023    PHOS 3.8 11/07/2023    PHOS 3.6 11/06/2023    TROPONINI 0.068 (H) 11/06/2023    TROPONINI 0.108 (H) 11/06/2023    HGBA1C 5.1 11/06/2023    HGBA1C 5.0 05/18/2023    TSH 1.569 11/06/2023    TSH 3.71 05/28/2023       Lab Results   Component Value Date    CHOL 225 (H) 11/06/2023    TRIG 70 11/06/2023    HDL 85 (H) 11/06/2023    LDLCALC 126.0 11/06/2023    CHOLHDL 37.8 11/06/2023    TOTALCHOLEST 2.6 11/06/2023    NONHDLCHOL 140 11/06/2023    COLORU Dark Yellow (A) 06/02/2023    GLUCUA Negative 06/02/2023    BILIRUBINUA Negative 06/02/2023    OCCULTUA Negative 06/02/2023    LEUKOCYTESUR Trace 06/02/2023       List all implanted cardiac devices:   Loop recorder: yes      Current Outpatient Medications on File Prior to Visit   Medication Sig Dispense Refill    amLODIPine (NORVASC) 10 MG tablet Take 1 tablet (10 mg total) by mouth once daily. 30 tablet 2    apixaban (ELIQUIS) 5 mg Tab Take 1 tablet (5 mg total) by mouth 2 (two) times daily. 60 tablet 2    aspirin 81 MG Chew Chew and swallow 1 (1 mg total) by mouth once daily. 90 tablet 0    atorvastatin (LIPITOR) 80 MG tablet Take 1 tablet (80 mg total) by mouth every evening. 90 tablet 0    FLUoxetine 20 MG capsule Take 1 capsule (20 mg total) by mouth once daily. 30 capsule 2    furosemide (LASIX) 40 MG tablet Take 1 tablet (40 mg total) by mouth once daily. 30 tablet 2     JARDIANCE 10 mg tablet Take 1 tablet (10 mg total) by mouth once daily. 30 tablet 2    losartan (COZAAR) 25 MG tablet Take 1 tablet (25 mg total) by mouth once daily. 90 tablet 0    metoprolol succinate (TOPROL-XL) 50 MG 24 hr tablet Take 1 tablet (50 mg total) by mouth once daily. 30 tablet 2    pantoprazole (PROTONIX) 20 MG tablet Take 1 tablet (20 mg total) by mouth every morning. 30 tablet 2    spironolactone (ALDACTONE) 25 MG tablet Take 1 tablet (25 mg total) by mouth once daily. 90 tablet 0    thiamine 100 MG tablet Take 2 tablets (200 mg total) by mouth once daily. 60 tablet 2     No current facility-administered medications on file prior to visit.         HPI:  Patient previously could walk 4-5 blocks and pace normal before SOB. Endorses SOB walking across lobby to clinic today   Patient sleeps on 1 number of pillows   Patient wakes up SOB, has to get out of bed, associated cough- denies PND symptoms   Palpitations - denies   Dizzy, light-headed, pre-syncope or syncope- intermittent dizziness 2/2 losing glasses and associated blurry vision. Denies pre-syncope/syncope   Other information felt pertinent to HPI: Mr. Dionicio Bess is a 57 yo male with a PMHx of HFrEF (EF 25-30%), recent CVA in February, possible CAD with stent placement (?), HTN, homelessness who [facility] recent admission for ADHF. In ED AFVSS. No leukocytosis or electroylyte abnormalities. Trop 0.89, . EKG with non-specific TWAs. CXR without acute findings. TTE with EF of 25-30%, grade I diastolic function. He was adequately diuresed with IVP lasix 40mg daily and GDMT initiated prior to discharge. Working with  for placement following d/c as patient is homeless and has not had his medications in months.      PHYSICAL: There were no vitals filed for this visit.   Wt Readings from Last 3 Encounters:   11/13/23 90.8 kg (200 lb 2.8 oz)   11/06/23 92.5 kg (204 lb)   07/11/23 99.8 kg (220 lb)       JVD: no    Heart rhythm: regular  Cardiac murmur: No    S3: no  S4: no  Lungs: clear   Hepatojugular reflux: no  Edema: no      Echo 11/6/23:    Left Ventricle: The left ventricle is normal in size. Normal wall thickness. Global hypokinesis present. There is severely reduced systolic function with a visually estimated ejection fraction of 25 - 30%. Grade I diastolic dysfunction.    Right Ventricle: Mild right ventricular enlargement. Wall thickness is normal. Right ventricle wall motion  is normal. Systolic function is mildly reduced.    Biatrial enlargement    Pulmonary Artery: The estimated pulmonary artery systolic pressure is 19 mmHg.    IVC/SVC: Normal venous pressure at 3 mmHg.    ASSESSMENT: HFrEF    PLAN:      Patient Instructions:   Instruct the patient to notify this clinic if HH, a physician or an advanced care provider wants to change medication one of their HF medications   Activity and Diet restrictions:   Recommend 2-3 gram sodium restriction and 1500cc- 2000cc fluid restriction.  Encourage physical activity with graded exercise program.  Requested patient to weigh themselves daily, and to notify us if their weight increases by more than 3 lbs in 1 day or 5 lbs in 3 days.    Medication changes (include current dose and changed dose): NYHA Class II-III symptoms. Appears well compensated on exam. Established care with [facility] yesterday with plan for ischemic eval given new HFrEF. Otherwise on adequate GDMT. Relies on prepackaged med packs for prescriptions. Continue current regimen for now. Consider changing losartan to entresto in future to further optimize GDMT as finances allow. Former crack-cocaine use, quit s/p CVA earlier this year. Discussed risk of adverse interactions with BB therapy not limited to and including death. Pt voiced understanding.  Upcoming labs and date anticipated: RTC in 2 weeks for repeat labs and close follow up  Other diagnostic tests ordered: PT/OT ordered for LUE stiffness s/p  CVA    Plan for HTS referral on HFTCC completion.    Kiesha Ly PA-C

## 2023-11-13 NOTE — ASSESSMENT & PLAN NOTE
Non walking nuclear stress test ordered.  Because of his previous stroke he can not ambulate on the treadmill.

## 2023-11-13 NOTE — TELEPHONE ENCOUNTER
----- Message from Becka Goyal sent at 11/13/2023 11:23 AM CST -----  Contact: pt @ [phone]  LIANG WAN calling regarding Appointment Access  (message) for #pt is calling to get n p appt for routine, asking for call back

## 2023-11-13 NOTE — ASSESSMENT & PLAN NOTE
Holter monitor ordered.  He is in sinus rhythm today.  I presume his stroke was AFib related.  Eliquis to continue.

## 2023-11-13 NOTE — PROGRESS NOTES
[facility] Cardiology     Subjective:    Patient ID:  Dionicio Bess is a 58 y.o. male who presents for evaluation of Coronary Artery Disease, Atrial Fibrillation, Cerebrovascular Accident, Hypertension, and Hyperlipidemia    Review of patient's allergies indicates:  No Known Allergies   He was recently discharged from [facility] with history of recent stroke related to AFib February 2023.  His echo November 6, 2023 shows EF 25-30% with wall motion abnormalities.  He had heart failure symptoms.  He was discharged on furosemide 40 mg and Jardiance.  He is on metoprolol and losartan.  His blood pressure is in good range today.    The patient has no documented history of myocardial infarction and ischemic cardiomyopathy.  He does have coronary calcifications noted on imaging studies.  His Electrocardiogram during his last hospitalization was sinus rhythm.  It looks like he has had diagnosis of AFib earlier this year.  He is on DOAC anticoagulation and aspirin.  Peak troponin was 0.10 ng/mL.    He is compliant with medications.  He resides at a shelter home.  His shortness of breath has improved.  He reports occasional chest pain but not with activity classically.  He does have a normal serum creatinine.  His pretreatment LDL was 126 mg%, he was discharged on 80 mg atorvastatin.    It does not look that Cardiology was consulted when he was hospitalized.  He has residual left-sided weakness from his stroke arm weakness greater than leg weakness.        Review of Systems   Constitutional: Negative for chills, decreased appetite, diaphoresis, fever, malaise/fatigue, night sweats, weight gain and weight loss.   HENT:  Negative for congestion, ear discharge, ear pain, hearing loss, hoarse voice, nosebleeds, odynophagia, sore throat, stridor and tinnitus.    Eyes:  Negative for blurred vision, discharge, double vision, pain, photophobia, redness, vision loss in left  eye, vision loss in right eye, visual disturbance and visual halos.   Cardiovascular:  Positive for chest pain and dyspnea on exertion. Negative for claudication, cyanosis, irregular heartbeat, leg swelling, near-syncope, orthopnea, palpitations, paroxysmal nocturnal dyspnea and syncope.   Respiratory:  Positive for shortness of breath. Negative for cough, hemoptysis, sleep disturbances due to breathing, snoring, sputum production and wheezing.    Endocrine: Negative for cold intolerance, heat intolerance, polydipsia, polyphagia and polyuria.   Hematologic/Lymphatic: Negative for adenopathy and bleeding problem. Does not bruise/bleed easily.   Skin:  Negative for color change, dry skin, flushing, itching, nail changes, poor wound healing, rash, skin cancer, suspicious lesions and unusual hair distribution.   Musculoskeletal:  Negative for arthritis, back pain, falls, gout, joint pain, joint swelling, muscle cramps, muscle weakness, myalgias, neck pain and stiffness.   Gastrointestinal:  Negative for bloating, abdominal pain, anorexia, change in bowel habit, bowel incontinence, constipation, diarrhea, dysphagia, excessive appetite, flatus, heartburn, hematemesis, hematochezia, hemorrhoids, jaundice, melena, nausea and vomiting.   Genitourinary:  Negative for bladder incontinence, decreased libido, dysuria, flank pain, frequency, genital sores, hematuria, hesitancy, incomplete emptying, nocturia and urgency.   Neurological:  Positive for weakness. Negative for aphonia, brief paralysis, difficulty with concentration, disturbances in coordination, excessive daytime sleepiness, dizziness, focal weakness, headaches, light-headedness, loss of balance, numbness, paresthesias, seizures, sensory change, tremors and vertigo.   Psychiatric/Behavioral:  Negative for altered mental status, depression, hallucinations, memory loss, substance abuse, suicidal ideas and thoughts of violence. The patient does not have insomnia and is  "not nervous/anxious.    Allergic/Immunologic: Negative for hives and persistent infections.        Objective:       Vitals:    11/13/23 1446   BP: 118/88   Pulse: 65   Resp: 18   SpO2: 98%   Weight: 90.8 kg (200 lb 2.8 oz)   Height: 6' 0.01" (1.829 m)    Physical Exam  Constitutional:       General: He is not in acute distress.     Appearance: He is well-developed. He is not diaphoretic.   HENT:      Head: Normocephalic and atraumatic.      Nose: Nose normal.   Eyes:      General: No scleral icterus.        Right eye: No discharge.      Conjunctiva/sclera: Conjunctivae normal.      Pupils: Pupils are equal, round, and reactive to light.   Neck:      Thyroid: No thyromegaly.      Vascular: No JVD.      Trachea: No tracheal deviation.   Cardiovascular:      Rate and Rhythm: Normal rate and regular rhythm.      Pulses:           Carotid pulses are 2+ on the right side and 2+ on the left side.       Radial pulses are 2+ on the right side and 2+ on the left side.      Heart sounds: Normal heart sounds. No murmur heard.     No friction rub. No gallop.   Pulmonary:      Effort: Pulmonary effort is normal. No respiratory distress.      Breath sounds: Normal breath sounds. No stridor. No wheezing or rales.   Chest:      Chest wall: No tenderness.   Abdominal:      General: Bowel sounds are normal. There is no distension.      Palpations: Abdomen is soft. There is no mass.      Tenderness: There is no abdominal tenderness. There is no guarding or rebound.   Musculoskeletal:         General: No tenderness. Normal range of motion.      Cervical back: Normal range of motion and neck supple.   Lymphadenopathy:      Cervical: No cervical adenopathy.   Skin:     General: Skin is warm and dry.      Coloration: Skin is not pale.      Findings: No erythema or rash.   Neurological:      Mental Status: He is alert and oriented to person, place, and time.      Cranial Nerves: No cranial nerve deficit.      Coordination: Coordination " normal.      Comments: Left-sided weakness   Psychiatric:         Behavior: Behavior normal.         Thought Content: Thought content normal.         Judgment: Judgment normal.           Assessment:       1. Chronic combined systolic and diastolic heart failure    2. Paroxysmal A-fib    3. Acute on chronic combined systolic and diastolic congestive heart failure    4. Paroxysmal atrial fibrillation    5. NSTEMI (non-ST elevated myocardial infarction)    6. Essential hypertension    7. Coronary artery disease involving native coronary artery of native heart with angina pectoris    8. History of ischemic cerebrovascular accident (CVA) with residual deficit    9. Hyperlipidemia, unspecified hyperlipidemia type      Results for orders placed or performed during the hospital encounter of 11/05/23   CBC auto differential   Result Value Ref Range    WBC 4.62 3.90 - 12.70 K/uL    RBC 4.30 (L) 4.60 - 6.20 M/uL    Hemoglobin 14.0 14.0 - 18.0 g/dL    Hematocrit 41.2 40.0 - 54.0 %    MCV 96 82 - 98 fL    MCH 32.6 (H) 27.0 - 31.0 pg    MCHC 34.0 32.0 - 36.0 g/dL    RDW 13.9 11.5 - 14.5 %    Platelets 201 150 - 450 K/uL    MPV 10.3 9.2 - 12.9 fL    Immature Granulocytes 0.0 0.0 - 0.5 %    Gran # (ANC) 1.7 (L) 1.8 - 7.7 K/uL    Immature Grans (Abs) 0.00 0.00 - 0.04 K/uL    Lymph # 2.3 1.0 - 4.8 K/uL    Mono # 0.4 0.3 - 1.0 K/uL    Eos # 0.2 0.0 - 0.5 K/uL    Baso # 0.03 0.00 - 0.20 K/uL    nRBC 0 0 /100 WBC    Gran % 37.2 (L) 38.0 - 73.0 %    Lymph % 50.0 (H) 18.0 - 48.0 %    Mono % 8.7 4.0 - 15.0 %    Eosinophil % 3.5 0.0 - 8.0 %    Basophil % 0.6 0.0 - 1.9 %    Differential Method Automated    Comprehensive metabolic panel   Result Value Ref Range    Sodium 139 136 - 145 mmol/L    Potassium 4.3 3.5 - 5.1 mmol/L    Chloride 105 95 - 110 mmol/L    CO2 21 (L) 23 - 29 mmol/L    Glucose 83 70 - 110 mg/dL    BUN 8 6 - 20 mg/dL    Creatinine 1.1 0.5 - 1.4 mg/dL    Calcium 9.2 8.7 - 10.5 mg/dL    Total Protein 8.1 6.0 - 8.4 g/dL     Albumin 4.1 3.5 - 5.2 g/dL    Total Bilirubin 0.2 0.1 - 1.0 mg/dL    Alkaline Phosphatase 53 (L) 55 - 135 U/L    AST 37 10 - 40 U/L    ALT 14 10 - 44 U/L    eGFR >60.0 >60 mL/min/1.73 m^2    Anion Gap 13 8 - 16 mmol/L   Troponin I   Result Value Ref Range    Troponin I 0.089 (H) 0.000 - 0.026 ng/mL   Brain natriuretic peptide   Result Value Ref Range     (H) 0 - 99 pg/mL   HIV 1/2 Ag/Ab (4th Gen)   Result Value Ref Range    HIV 1/2 Ag/Ab Non-reactive Non-reactive   Hepatitis C Antibody   Result Value Ref Range    Hepatitis C Ab Non-reactive Non-reactive   Troponin I   Result Value Ref Range    Troponin I 0.108 (H) 0.000 - 0.026 ng/mL   Basic metabolic panel   Result Value Ref Range    Sodium 141 136 - 145 mmol/L    Potassium 3.4 (L) 3.5 - 5.1 mmol/L    Chloride 102 95 - 110 mmol/L    CO2 27 23 - 29 mmol/L    Glucose 83 70 - 110 mg/dL    BUN 9 6 - 20 mg/dL    Creatinine 1.1 0.5 - 1.4 mg/dL    Calcium 9.6 8.7 - 10.5 mg/dL    Anion Gap 12 8 - 16 mmol/L    eGFR >60.0 >60 mL/min/1.73 m^2   Hemoglobin A1c   Result Value Ref Range    Hemoglobin A1C 5.1 4.0 - 5.6 %    Estimated Avg Glucose 100 68 - 131 mg/dL   Magnesium   Result Value Ref Range    Magnesium 1.9 1.6 - 2.6 mg/dL   Phosphorus   Result Value Ref Range    Phosphorus 3.6 2.7 - 4.5 mg/dL   CBC with Automated Differential   Result Value Ref Range    WBC 4.08 3.90 - 12.70 K/uL    RBC 4.44 (L) 4.60 - 6.20 M/uL    Hemoglobin 14.2 14.0 - 18.0 g/dL    Hematocrit 42.6 40.0 - 54.0 %    MCV 96 82 - 98 fL    MCH 32.0 (H) 27.0 - 31.0 pg    MCHC 33.3 32.0 - 36.0 g/dL    RDW 14.0 11.5 - 14.5 %    Platelets 208 150 - 450 K/uL    MPV 10.6 9.2 - 12.9 fL    Immature Granulocytes 0.0 0.0 - 0.5 %    Gran # (ANC) 2.0 1.8 - 7.7 K/uL    Immature Grans (Abs) 0.00 0.00 - 0.04 K/uL    Lymph # 1.5 1.0 - 4.8 K/uL    Mono # 0.4 0.3 - 1.0 K/uL    Eos # 0.2 0.0 - 0.5 K/uL    Baso # 0.02 0.00 - 0.20 K/uL    nRBC 0 0 /100 WBC    Gran % 48.0 38.0 - 73.0 %    Lymph % 37.0 18.0 - 48.0 %     Mono % 10.3 4.0 - 15.0 %    Eosinophil % 4.2 0.0 - 8.0 %    Basophil % 0.5 0.0 - 1.9 %    Differential Method Automated    Troponin I   Result Value Ref Range    Troponin I 0.068 (H) 0.000 - 0.026 ng/mL   Lipid Panel   Result Value Ref Range    Cholesterol 225 (H) 120 - 199 mg/dL    Triglycerides 70 30 - 150 mg/dL    HDL 85 (H) 40 - 75 mg/dL    LDL Cholesterol 126.0 63.0 - 159.0 mg/dL    HDL/Cholesterol Ratio 37.8 20.0 - 50.0 %    Total Cholesterol/HDL Ratio 2.6 2.0 - 5.0    Non-HDL Cholesterol 140 mg/dL   TSH   Result Value Ref Range    TSH 1.569 0.400 - 4.000 uIU/mL   Basic metabolic panel   Result Value Ref Range    Sodium 138 136 - 145 mmol/L    Potassium 3.5 3.5 - 5.1 mmol/L    Chloride 98 95 - 110 mmol/L    CO2 28 23 - 29 mmol/L    Glucose 97 70 - 110 mg/dL    BUN 12 6 - 20 mg/dL    Creatinine 1.0 0.5 - 1.4 mg/dL    Calcium 10.3 8.7 - 10.5 mg/dL    Anion Gap 12 8 - 16 mmol/L    eGFR >60.0 >60 mL/min/1.73 m^2   Phosphorus   Result Value Ref Range    Phosphorus 3.8 2.7 - 4.5 mg/dL   CBC with Automated Differential   Result Value Ref Range    WBC 4.81 3.90 - 12.70 K/uL    RBC 4.95 4.60 - 6.20 M/uL    Hemoglobin 15.7 14.0 - 18.0 g/dL    Hematocrit 46.8 40.0 - 54.0 %    MCV 95 82 - 98 fL    MCH 31.7 (H) 27.0 - 31.0 pg    MCHC 33.5 32.0 - 36.0 g/dL    RDW 13.7 11.5 - 14.5 %    Platelets 236 150 - 450 K/uL    MPV 10.0 9.2 - 12.9 fL    Immature Granulocytes 0.2 0.0 - 0.5 %    Gran # (ANC) 2.2 1.8 - 7.7 K/uL    Immature Grans (Abs) 0.01 0.00 - 0.04 K/uL    Lymph # 1.8 1.0 - 4.8 K/uL    Mono # 0.6 0.3 - 1.0 K/uL    Eos # 0.2 0.0 - 0.5 K/uL    Baso # 0.03 0.00 - 0.20 K/uL    nRBC 0 0 /100 WBC    Gran % 46.2 38.0 - 73.0 %    Lymph % 36.6 18.0 - 48.0 %    Mono % 11.6 4.0 - 15.0 %    Eosinophil % 4.8 0.0 - 8.0 %    Basophil % 0.6 0.0 - 1.9 %    Differential Method Automated    Magnesium   Result Value Ref Range    Magnesium 2.0 1.6 - 2.6 mg/dL   Echo   Result Value Ref Range    Ascending aorta 3.47 cm    STJ 2.99 cm    AV  "mean gradient 7 mmHg    Ao VTI 23.60 cm    IVRT 145.58 msec    IVS 1.02 0.6 - 1.1 cm    LA size 4.31 cm    Left Atrium Major Axis 6.59 cm    Left Atrium Minor Axis 6.00 cm    LVIDd 5.63 3.5 - 6.0 cm    LVIDs 4.45 (A) 2.1 - 4.0 cm    LVOT diameter 2.4 cm    LVOT peak VTI 12.30 cm    Posterior Wall 1.15 (A) 0.6 - 1.1 cm    MV Peak A Brandon 0.58 m/s    E wave deceleration time 173.52 msec    MV Peak E Brandon 0.49 m/s    PV Peak D Brandon 0.24 m/s    PV Peak S Brandon 0.27 m/s    RA Major Axis 5.74 cm    RA Width 4.00 cm    RVDD 4.10 cm    Sinus 3.57 cm    TAPSE 2.30 cm    TR Max Brandon 2.01 m/s    LA WIDTH 4.36 cm    MV stenosis pressure 1/2 time 50.32 ms    LV Diastolic Volume 155.58 mL    LV Systolic Volume 90.24 mL    LVOT peak brandon 0.55 m/s    TDI SEPTAL 0.05 m/s    LA volume (mod) 76.95 cm3    MV "A" wave duration 9.42 msec    LV SEPTAL E/E' RATIO 9.80 m/s    FS 21 %    LA volume 100.33 cm3    LV mass 246.95 g    ZLVIDD -2.15     ZLVIDS 0.36     Left Ventricle Relative Wall Thickness 0.41 cm    AV valve area 2.36 cm²    AV index (prosthetic) 0.52     MV valve area p 1/2 method 4.37 cm2    E/A ratio 0.84     Pulm vein S/D ratio 1.13     LVOT area 4.5 cm2    LVOT stroke volume 55.62 cm3    LV Systolic Volume Index 42.0 mL/m2    LV Diastolic Volume Index 72.36 mL/m2    LA Volume Index 46.7 mL/m2    LV Mass Index 115 g/m2    Triscuspid Valve Regurgitation Peak Gradient 16 mmHg    LA Volume Index (Mod) 35.8 mL/m2    BSA 2.17 m2    TV resting pulmonary artery pressure 19 mmHg    RV TB RVSP 5 mmHg    Est. RA pres 3 mmHg   POCT glucose   Result Value Ref Range    POCT Glucose 86 70 - 110 mg/dL   POCT glucose   Result Value Ref Range    POCT Glucose 68 (L) 70 - 110 mg/dL   POCT glucose   Result Value Ref Range    POCT Glucose 99 70 - 110 mg/dL   POCT glucose   Result Value Ref Range    POCT Glucose 97 70 - 110 mg/dL   POCT glucose   Result Value Ref Range    POCT Glucose 108 70 - 110 mg/dL   POCT glucose   Result Value Ref Range    POCT " Glucose 97 70 - 110 mg/dL   POCT glucose   Result Value Ref Range    POCT Glucose 97 70 - 110 mg/dL   POCT glucose   Result Value Ref Range    POCT Glucose 94 70 - 110 mg/dL         Current Outpatient Medications:     amLODIPine (NORVASC) 10 MG tablet, Take 1 tablet (10 mg total) by mouth once daily., Disp: 30 tablet, Rfl: 2    apixaban (ELIQUIS) 5 mg Tab, Take 1 tablet (5 mg total) by mouth 2 (two) times daily., Disp: 60 tablet, Rfl: 2    aspirin 81 MG Chew, Chew and swallow 1 (1 mg total) by mouth once daily., Disp: 90 tablet, Rfl: 0    atorvastatin (LIPITOR) 80 MG tablet, Take 1 tablet (80 mg total) by mouth every evening., Disp: 90 tablet, Rfl: 0    FLUoxetine 20 MG capsule, Take 1 capsule (20 mg total) by mouth once daily., Disp: 30 capsule, Rfl: 2    furosemide (LASIX) 40 MG tablet, Take 1 tablet (40 mg total) by mouth once daily., Disp: 30 tablet, Rfl: 2    JARDIANCE 10 mg tablet, Take 1 tablet (10 mg total) by mouth once daily., Disp: 30 tablet, Rfl: 2    losartan (COZAAR) 25 MG tablet, Take 1 tablet (25 mg total) by mouth once daily., Disp: 90 tablet, Rfl: 0    metoprolol succinate (TOPROL-XL) 50 MG 24 hr tablet, Take 1 tablet (50 mg total) by mouth once daily., Disp: 30 tablet, Rfl: 2    pantoprazole (PROTONIX) 20 MG tablet, Take 1 tablet (20 mg total) by mouth every morning., Disp: 30 tablet, Rfl: 2    spironolactone (ALDACTONE) 25 MG tablet, Take 1 tablet (25 mg total) by mouth once daily., Disp: 90 tablet, Rfl: 0    thiamine 100 MG tablet, Take 2 tablets (200 mg total) by mouth once daily., Disp: 60 tablet, Rfl: 2     Lab Results   Component Value Date    WBC 4.81 11/07/2023    RBC 4.95 11/07/2023    HGB 15.7 11/07/2023    HCT 46.8 11/07/2023    MCV 95 11/07/2023    MCH 31.7 (H) 11/07/2023    MCHC 33.5 11/07/2023    RDW 13.7 11/07/2023     11/07/2023    MPV 10.0 11/07/2023    GRAN 2.2 11/07/2023    GRAN 46.2 11/07/2023    LYMPH 1.8 11/07/2023    LYMPH 36.6 11/07/2023    MONO 0.6 11/07/2023     "MONO 11.6 11/07/2023    EOS 0.2 11/07/2023    BASO 0.03 11/07/2023    EOSINOPHIL 4.8 11/07/2023    BASOPHIL 0.6 11/07/2023    MG 2.0 11/07/2023        CMP  Lab Results   Component Value Date     11/07/2023    K 3.5 11/07/2023    CL 98 11/07/2023    CO2 28 11/07/2023    GLU 97 11/07/2023    BUN 12 11/07/2023    CREATININE 1.0 11/07/2023    CALCIUM 10.3 11/07/2023    PROT 8.1 11/05/2023    ALBUMIN 4.1 11/05/2023    BILITOT 0.2 11/05/2023    ALKPHOS 53 (L) 11/05/2023    AST 37 11/05/2023    ALT 14 11/05/2023    ANIONGAP 12 11/07/2023    ESTGFRAFRICA 43 (L) 05/30/2023        No results found for: "LABBLOO", "LABURIN", "RESPIRATORYC", "GSRESP"         Results for orders placed or performed during the hospital encounter of 11/05/23   EKG 12-lead    Collection Time: 11/05/23  8:51 PM    Narrative    Test Reason : R06.02,    Vent. Rate : 087 BPM     Atrial Rate : 087 BPM     P-R Int : 232 ms          QRS Dur : 092 ms      QT Int : 390 ms       P-R-T Axes : 065 -62 062 degrees     QTc Int : 469 ms    Sinus rhythm with 1st degree A-V block  Left anterior fascicular block  Nonspecific T wave abnormality  Abnormal ECG  When compared with ECG of 11-JUL-2023 01:30,  Premature atrial complexes are no longer Present  Confirmed by Saúl Klein MD ([id number]) on 11/6/2023 3:13:28 PM    Referred By: [email or web address]   SELF           Confirmed By:Saúl Klein MD                  Plan:       Problem List Items Addressed This Visit          Neuro    History of ischemic cerebrovascular accident (CVA) with residual deficit     Presumed AFib related February 2023 event.  He has left-sided weakness.            Cardiac/Vascular    Paroxysmal atrial fibrillation     Holter monitor ordered.  He is in sinus rhythm today.  I presume his stroke was AFib related.  Eliquis to continue.         Essential hypertension     I will keep his current medications the same.         NSTEMI (non-ST elevated myocardial infarction)     Non walking nuclear stress " test ordered.  Because of his previous stroke he can not ambulate on the treadmill.         Acute on chronic combined systolic and diastolic congestive heart failure     EF 25-30% with segmental wall motion abnormalities.  Lexiscan ordered to look for viability.    There is no infarct pattern on his electrocardiogram, findings support left anterior fascicular block with ST T wave abnormalities.           Coronary artery disease involving native coronary artery with angina pectoris     No definitive infarct pattern on Electrocardiogram.  Peak troponin on recent hospitalization 0.10 ng/mL.  He is reporting chest pain intermittently but not on exertion regularly.                       Hyperlipidemia     Pretreatment .  Atorvastatin 80 mg ordered.  Follow-up labs will be needed.          Other Visit Diagnoses       Chronic combined systolic and diastolic heart failure    -  Primary    Relevant Orders    NM Myocardial Perfusion Spect Multi Pharmacologic    Nuclear Stress Test    Paroxysmal A-fib        Relevant Orders    Holter monitor - 48 hour            By echo assessment he has an ischemic cardiomyopathy.  A Lexiscan is ordered as well as a Holter monitor.  All medications currently utilize will be continued.    I advised a 2 month follow-up.    Thank you for allowing me to participate in your patient's care.                Sami Glez MD  11/13/2023   3:05 PM

## 2023-11-13 NOTE — ASSESSMENT & PLAN NOTE
EF 25-30% with segmental wall motion abnormalities.  Lexiscan ordered to look for viability.    There is no infarct pattern on his electrocardiogram, findings support left anterior fascicular block with ST T wave abnormalities.

## 2023-11-13 NOTE — ASSESSMENT & PLAN NOTE
No definitive infarct pattern on Electrocardiogram.  Peak troponin on recent hospitalization 0.10 ng/mL.  He is reporting chest pain intermittently but not on exertion regularly.

## 2023-11-14 ENCOUNTER — OFFICE VISIT (OUTPATIENT)
Dept: CARDIOLOGY | Facility: CLINIC | Age: 58
End: 2023-11-14
Payer: MEDICAID

## 2023-11-14 ENCOUNTER — LAB VISIT (OUTPATIENT)
Dept: LAB | Facility: OTHER | Age: 58
End: 2023-11-14
Payer: MEDICAID

## 2023-11-14 ENCOUNTER — DOCUMENTATION ONLY (OUTPATIENT)
Dept: CARDIOLOGY | Facility: CLINIC | Age: 58
End: 2023-11-14

## 2023-11-14 VITALS
HEART RATE: 58 BPM | WEIGHT: 202.94 LBS | SYSTOLIC BLOOD PRESSURE: 118 MMHG | DIASTOLIC BLOOD PRESSURE: 78 MMHG | BODY MASS INDEX: 27.49 KG/M2 | HEIGHT: 72 IN | OXYGEN SATURATION: 97 %

## 2023-11-14 DIAGNOSIS — R06.02 SOB (SHORTNESS OF BREATH): ICD-10-CM

## 2023-11-14 DIAGNOSIS — I10 ESSENTIAL HYPERTENSION: ICD-10-CM

## 2023-11-14 DIAGNOSIS — I48.0 PAROXYSMAL ATRIAL FIBRILLATION: ICD-10-CM

## 2023-11-14 DIAGNOSIS — I50.20 HFREF (HEART FAILURE WITH REDUCED EJECTION FRACTION): Primary | ICD-10-CM

## 2023-11-14 DIAGNOSIS — I69.30 HISTORY OF ISCHEMIC CEREBROVASCULAR ACCIDENT (CVA) WITH RESIDUAL DEFICIT: ICD-10-CM

## 2023-11-14 LAB
ALBUMIN SERPL BCP-MCNC: 4.5 G/DL (ref 3.5–5.2)
ALP SERPL-CCNC: 57 U/L (ref 55–135)
ALT SERPL W/O P-5'-P-CCNC: 14 U/L (ref 10–44)
ANION GAP SERPL CALC-SCNC: 12 MMOL/L (ref 8–16)
AST SERPL-CCNC: 24 U/L (ref 10–40)
BASOPHILS # BLD AUTO: 0.02 K/UL (ref 0–0.2)
BASOPHILS NFR BLD: 0.4 % (ref 0–1.9)
BILIRUB SERPL-MCNC: 0.6 MG/DL (ref 0.1–1)
BNP SERPL-MCNC: 107 PG/ML (ref 0–99)
BUN SERPL-MCNC: 14 MG/DL (ref 6–20)
CALCIUM SERPL-MCNC: 9.7 MG/DL (ref 8.7–10.5)
CHLORIDE SERPL-SCNC: 99 MMOL/L (ref 95–110)
CO2 SERPL-SCNC: 25 MMOL/L (ref 23–29)
CREAT SERPL-MCNC: 1.4 MG/DL (ref 0.5–1.4)
DIFFERENTIAL METHOD: ABNORMAL
EOSINOPHIL # BLD AUTO: 0.2 K/UL (ref 0–0.5)
EOSINOPHIL NFR BLD: 3.6 % (ref 0–8)
ERYTHROCYTE [DISTWIDTH] IN BLOOD BY AUTOMATED COUNT: 12.8 % (ref 11.5–14.5)
EST. GFR  (NO RACE VARIABLE): 59 ML/MIN/1.73 M^2
GLUCOSE SERPL-MCNC: 109 MG/DL (ref 70–110)
HCT VFR BLD AUTO: 46 % (ref 40–54)
HGB BLD-MCNC: 15.7 G/DL (ref 14–18)
IMM GRANULOCYTES # BLD AUTO: 0.01 K/UL (ref 0–0.04)
IMM GRANULOCYTES NFR BLD AUTO: 0.2 % (ref 0–0.5)
LYMPHOCYTES # BLD AUTO: 2 K/UL (ref 1–4.8)
LYMPHOCYTES NFR BLD: 37.2 % (ref 18–48)
MAGNESIUM SERPL-MCNC: 2 MG/DL (ref 1.6–2.6)
MCH RBC QN AUTO: 32 PG (ref 27–31)
MCHC RBC AUTO-ENTMCNC: 34.1 G/DL (ref 32–36)
MCV RBC AUTO: 94 FL (ref 82–98)
MONOCYTES # BLD AUTO: 0.7 K/UL (ref 0.3–1)
MONOCYTES NFR BLD: 12.8 % (ref 4–15)
NEUTROPHILS # BLD AUTO: 2.5 K/UL (ref 1.8–7.7)
NEUTROPHILS NFR BLD: 45.8 % (ref 38–73)
NRBC BLD-RTO: 0 /100 WBC
PHOSPHATE SERPL-MCNC: 3.9 MG/DL (ref 2.7–4.5)
PLATELET # BLD AUTO: 232 K/UL (ref 150–450)
PMV BLD AUTO: 10.2 FL (ref 9.2–12.9)
POTASSIUM SERPL-SCNC: 3.7 MMOL/L (ref 3.5–5.1)
PROT SERPL-MCNC: 8.5 G/DL (ref 6–8.4)
RBC # BLD AUTO: 4.9 M/UL (ref 4.6–6.2)
SODIUM SERPL-SCNC: 136 MMOL/L (ref 136–145)
WBC # BLD AUTO: 5.49 K/UL (ref 3.9–12.7)

## 2023-11-14 PROCEDURE — 99999 PR PBB SHADOW E&M-EST. PATIENT-LVL IV: ICD-10-PCS | Mod: PBBFAC,,,

## 2023-11-14 PROCEDURE — 3074F SYST BP LT 130 MM HG: CPT | Mod: CPTII,,,

## 2023-11-14 PROCEDURE — 99204 OFFICE O/P NEW MOD 45 MIN: CPT | Mod: S$PBB,,,

## 2023-11-14 PROCEDURE — 80053 COMPREHEN METABOLIC PANEL: CPT

## 2023-11-14 PROCEDURE — 1160F PR REVIEW ALL MEDS BY PRESCRIBER/CLIN PHARMACIST DOCUMENTED: ICD-10-PCS | Mod: CPTII,,,

## 2023-11-14 PROCEDURE — 3078F PR MOST RECENT DIASTOLIC BLOOD PRESSURE < 80 MM HG: ICD-10-PCS | Mod: CPTII,,,

## 2023-11-14 PROCEDURE — 99204 PR OFFICE/OUTPT VISIT, NEW, LEVL IV, 45-59 MIN: ICD-10-PCS | Mod: S$PBB,,,

## 2023-11-14 PROCEDURE — 83880 ASSAY OF NATRIURETIC PEPTIDE: CPT

## 2023-11-14 PROCEDURE — 1159F PR MEDICATION LIST DOCUMENTED IN MEDICAL RECORD: ICD-10-PCS | Mod: CPTII,,,

## 2023-11-14 PROCEDURE — 85025 COMPLETE CBC W/AUTO DIFF WBC: CPT

## 2023-11-14 PROCEDURE — 4010F PR ACE/ARB THEARPY RXD/TAKEN: ICD-10-PCS | Mod: CPTII,,,

## 2023-11-14 PROCEDURE — 83735 ASSAY OF MAGNESIUM: CPT

## 2023-11-14 PROCEDURE — 3044F PR MOST RECENT HEMOGLOBIN A1C LEVEL <7.0%: ICD-10-PCS | Mod: CPTII,,,

## 2023-11-14 PROCEDURE — 3008F BODY MASS INDEX DOCD: CPT | Mod: CPTII,,,

## 2023-11-14 PROCEDURE — 99214 OFFICE O/P EST MOD 30 MIN: CPT | Mod: PBBFAC

## 2023-11-14 PROCEDURE — 1160F RVW MEDS BY RX/DR IN RCRD: CPT | Mod: CPTII,,,

## 2023-11-14 PROCEDURE — 3044F HG A1C LEVEL LT 7.0%: CPT | Mod: CPTII,,,

## 2023-11-14 PROCEDURE — 3074F PR MOST RECENT SYSTOLIC BLOOD PRESSURE < 130 MM HG: ICD-10-PCS | Mod: CPTII,,,

## 2023-11-14 PROCEDURE — 84100 ASSAY OF PHOSPHORUS: CPT

## 2023-11-14 PROCEDURE — 3008F PR BODY MASS INDEX (BMI) DOCUMENTED: ICD-10-PCS | Mod: CPTII,,,

## 2023-11-14 PROCEDURE — 99999 PR PBB SHADOW E&M-EST. PATIENT-LVL IV: CPT | Mod: PBBFAC,,,

## 2023-11-14 PROCEDURE — 36415 COLL VENOUS BLD VENIPUNCTURE: CPT

## 2023-11-14 PROCEDURE — 3078F DIAST BP <80 MM HG: CPT | Mod: CPTII,,,

## 2023-11-14 PROCEDURE — 4010F ACE/ARB THERAPY RXD/TAKEN: CPT | Mod: CPTII,,,

## 2023-11-14 PROCEDURE — 1159F MED LIST DOCD IN RCRD: CPT | Mod: CPTII,,,

## 2023-11-14 NOTE — PROGRESS NOTES
PT here for his first [facility] visit, PT is Homeless ans staying at the [facility] while he gets all of his MD appointments taken care of. PT requests assistance with getting a voucher so that he can get a place to stay. Reiterated salt and fluid restrictions in his diet however PT reminds me that he has no place to cook and is looking for housing. PT had not eaten today so Peanut butter crackers and Yi Sun given. PT is not allergic to Peanuts.

## 2023-11-21 ENCOUNTER — TELEPHONE (OUTPATIENT)
Dept: CARDIOLOGY | Facility: CLINIC | Age: 58
End: 2023-11-21
Payer: MEDICAID

## 2023-11-28 ENCOUNTER — LAB VISIT (OUTPATIENT)
Dept: LAB | Facility: OTHER | Age: 58
End: 2023-11-28
Payer: MEDICAID

## 2023-11-28 DIAGNOSIS — R06.02 SOB (SHORTNESS OF BREATH): ICD-10-CM

## 2023-11-28 LAB
ALBUMIN SERPL BCP-MCNC: 4.3 G/DL (ref 3.5–5.2)
ALP SERPL-CCNC: 61 U/L (ref 55–135)
ALT SERPL W/O P-5'-P-CCNC: 21 U/L (ref 10–44)
ANION GAP SERPL CALC-SCNC: 8 MMOL/L (ref 8–16)
AST SERPL-CCNC: 34 U/L (ref 10–40)
BASOPHILS # BLD AUTO: 0.03 K/UL (ref 0–0.2)
BASOPHILS NFR BLD: 0.6 % (ref 0–1.9)
BILIRUB SERPL-MCNC: 0.8 MG/DL (ref 0.1–1)
BNP SERPL-MCNC: 116 PG/ML (ref 0–99)
BUN SERPL-MCNC: 13 MG/DL (ref 6–20)
CALCIUM SERPL-MCNC: 9.8 MG/DL (ref 8.7–10.5)
CHLORIDE SERPL-SCNC: 101 MMOL/L (ref 95–110)
CO2 SERPL-SCNC: 31 MMOL/L (ref 23–29)
CREAT SERPL-MCNC: 1.2 MG/DL (ref 0.5–1.4)
DIFFERENTIAL METHOD: ABNORMAL
EOSINOPHIL # BLD AUTO: 0.2 K/UL (ref 0–0.5)
EOSINOPHIL NFR BLD: 4 % (ref 0–8)
ERYTHROCYTE [DISTWIDTH] IN BLOOD BY AUTOMATED COUNT: 13.1 % (ref 11.5–14.5)
EST. GFR  (NO RACE VARIABLE): >60 ML/MIN/1.73 M^2
GLUCOSE SERPL-MCNC: 94 MG/DL (ref 70–110)
HCT VFR BLD AUTO: 46.3 % (ref 40–54)
HGB BLD-MCNC: 15.7 G/DL (ref 14–18)
IMM GRANULOCYTES # BLD AUTO: 0.01 K/UL (ref 0–0.04)
IMM GRANULOCYTES NFR BLD AUTO: 0.2 % (ref 0–0.5)
LYMPHOCYTES # BLD AUTO: 1.8 K/UL (ref 1–4.8)
LYMPHOCYTES NFR BLD: 35.4 % (ref 18–48)
MAGNESIUM SERPL-MCNC: 2 MG/DL (ref 1.6–2.6)
MCH RBC QN AUTO: 31.8 PG (ref 27–31)
MCHC RBC AUTO-ENTMCNC: 33.9 G/DL (ref 32–36)
MCV RBC AUTO: 94 FL (ref 82–98)
MONOCYTES # BLD AUTO: 0.5 K/UL (ref 0.3–1)
MONOCYTES NFR BLD: 9.3 % (ref 4–15)
NEUTROPHILS # BLD AUTO: 2.6 K/UL (ref 1.8–7.7)
NEUTROPHILS NFR BLD: 50.5 % (ref 38–73)
NRBC BLD-RTO: 0 /100 WBC
PHOSPHATE SERPL-MCNC: 3.8 MG/DL (ref 2.7–4.5)
PLATELET # BLD AUTO: 261 K/UL (ref 150–450)
PMV BLD AUTO: 9.5 FL (ref 9.2–12.9)
POTASSIUM SERPL-SCNC: 3.8 MMOL/L (ref 3.5–5.1)
PROT SERPL-MCNC: 8.7 G/DL (ref 6–8.4)
RBC # BLD AUTO: 4.93 M/UL (ref 4.6–6.2)
SODIUM SERPL-SCNC: 140 MMOL/L (ref 136–145)
WBC # BLD AUTO: 5.06 K/UL (ref 3.9–12.7)

## 2023-11-28 PROCEDURE — 36415 COLL VENOUS BLD VENIPUNCTURE: CPT

## 2023-11-28 PROCEDURE — 85025 COMPLETE CBC W/AUTO DIFF WBC: CPT

## 2023-11-28 PROCEDURE — 80053 COMPREHEN METABOLIC PANEL: CPT

## 2023-11-28 PROCEDURE — 83735 ASSAY OF MAGNESIUM: CPT

## 2023-11-28 PROCEDURE — 84100 ASSAY OF PHOSPHORUS: CPT

## 2023-11-28 PROCEDURE — 83880 ASSAY OF NATRIURETIC PEPTIDE: CPT

## 2023-12-01 ENCOUNTER — HOSPITAL ENCOUNTER (OUTPATIENT)
Dept: CARDIOLOGY | Facility: HOSPITAL | Age: 58
Discharge: HOME | End: 2023-12-01
Attending: INTERNAL MEDICINE
Payer: MEDICAID

## 2023-12-01 ENCOUNTER — TELEPHONE (OUTPATIENT)
Dept: ELECTROPHYSIOLOGY | Facility: CLINIC | Age: 58
End: 2023-12-01
Payer: MEDICAID

## 2023-12-01 ENCOUNTER — HOSPITAL ENCOUNTER (OUTPATIENT)
Dept: RADIOLOGY | Facility: HOSPITAL | Age: 58
Discharge: HOME | End: 2023-12-01
Attending: INTERNAL MEDICINE
Payer: MEDICAID

## 2023-12-01 DIAGNOSIS — I50.42 CHRONIC COMBINED SYSTOLIC AND DIASTOLIC HEART FAILURE: ICD-10-CM

## 2023-12-01 DIAGNOSIS — I48.0 PAROXYSMAL A-FIB: ICD-10-CM

## 2023-12-01 LAB
CV STRESS BASE HR: 75 BPM
DIASTOLIC BLOOD PRESSURE: 36 MMHG
OHS CV CPX 85 PERCENT MAX PREDICTED HEART RATE MALE: 139
OHS CV CPX MAX PREDICTED HEART RATE: 164
OHS CV CPX PATIENT IS FEMALE: 0
OHS CV CPX PATIENT IS MALE: 1
OHS CV CPX PEAK DIASTOLIC BLOOD PRESSURE: 83 MMHG
OHS CV CPX PEAK HEAR RATE: 103 BPM
OHS CV CPX PEAK RATE PRESSURE PRODUCT: NORMAL
OHS CV CPX PEAK SYSTOLIC BLOOD PRESSURE: 129 MMHG
OHS CV CPX PERCENT MAX PREDICTED HEART RATE ACHIEVED: 63
OHS CV CPX RATE PRESSURE PRODUCT PRESENTING: 7125
SYSTOLIC BLOOD PRESSURE: 95 MMHG

## 2023-12-01 PROCEDURE — 93018 NUCLEAR STRESS TEST (CUPID ONLY): ICD-10-PCS | Mod: ,,, | Performed by: INTERNAL MEDICINE

## 2023-12-01 PROCEDURE — 93017 CV STRESS TEST TRACING ONLY: CPT

## 2023-12-01 PROCEDURE — 78452 NM MYOCARDIAL PERFUSION SPECT MULTI PHARM: ICD-10-PCS | Mod: 26,,, | Performed by: STUDENT IN AN ORGANIZED HEALTH CARE EDUCATION/TRAINING PROGRAM

## 2023-12-01 PROCEDURE — 93018 CV STRESS TEST I&R ONLY: CPT | Mod: ,,, | Performed by: INTERNAL MEDICINE

## 2023-12-01 PROCEDURE — A9502 TC99M TETROFOSMIN: HCPCS

## 2023-12-01 PROCEDURE — 78452 HT MUSCLE IMAGE SPECT MULT: CPT | Mod: TC

## 2023-12-01 PROCEDURE — 93016 NUCLEAR STRESS TEST (CUPID ONLY): ICD-10-PCS | Mod: ,,, | Performed by: INTERNAL MEDICINE

## 2023-12-01 PROCEDURE — 93225 XTRNL ECG REC<48 HRS REC: CPT

## 2023-12-01 PROCEDURE — 78452 HT MUSCLE IMAGE SPECT MULT: CPT | Mod: 26,,, | Performed by: STUDENT IN AN ORGANIZED HEALTH CARE EDUCATION/TRAINING PROGRAM

## 2023-12-01 PROCEDURE — 93016 CV STRESS TEST SUPVJ ONLY: CPT | Mod: ,,, | Performed by: INTERNAL MEDICINE

## 2023-12-01 NOTE — TELEPHONE ENCOUNTER
----- Message from Meghan Vazquez sent at 12/1/2023  1:15 PM CST -----  Regarding: Needs Appt Today  Good Afternoon,       Pt thought his 48 holter monitor appt was at [facility] but it's at the [facility] location,he would like to get and appt today for [facility].     Contact @ [phone]    Thanks

## 2023-12-01 NOTE — TELEPHONE ENCOUNTER
Received message regarding holter monitor. It appears that patient checked in at the [place] location to get his monitor. I called the patient to make sure. He did not answer. I left a message with my call back number.

## 2023-12-05 ENCOUNTER — PATIENT OUTREACH (OUTPATIENT)
Dept: ADMINISTRATIVE | Facility: OTHER | Age: 58
End: 2023-12-05
Payer: MEDICAID

## 2023-12-05 ENCOUNTER — TELEPHONE (OUTPATIENT)
Dept: PHARMACY | Facility: CLINIC | Age: 58
End: 2023-12-05
Payer: MEDICAID

## 2023-12-05 NOTE — TELEPHONE ENCOUNTER
Unfortunately, The Pharmacy Patient Assistance Team is unable to assist Mr. Bess at this time due to the following reasons      Patient has Medicaid/Government Insurance          Pharmacy Patient Assistance Team

## 2023-12-12 ENCOUNTER — DOCUMENTATION ONLY (OUTPATIENT)
Dept: CARDIOLOGY | Facility: CLINIC | Age: 58
End: 2023-12-12
Payer: MEDICAID

## 2023-12-22 ENCOUNTER — HOSPITAL ENCOUNTER (OUTPATIENT)
Dept: CARDIOLOGY | Facility: HOSPITAL | Age: 58
Discharge: HOME | End: 2023-12-22
Attending: INTERNAL MEDICINE
Payer: MEDICAID

## 2023-12-22 PROCEDURE — 93226 XTRNL ECG REC<48 HR SCAN A/R: CPT

## 2023-12-27 LAB
OHS CV EVENT MONITOR DAY: 0
OHS CV HOLTER LENGTH DECIMAL HOURS: 36.3
OHS CV HOLTER LENGTH HOURS: 36
OHS CV HOLTER LENGTH MINUTES: 18
OHS CV HOLTER SINUS AVERAGE HR: 89
OHS CV HOLTER SINUS MAX HR: 150
OHS CV HOLTER SINUS MIN HR: 55

## 2023-12-29 ENCOUNTER — HOSPITAL ENCOUNTER (INPATIENT)
Facility: HOSPITAL | Age: 58
LOS: 10 days | Discharge: NURSING, REHAB OR LONG-TERM CARE | DRG: 492 | End: 2024-01-08
Attending: EMERGENCY MEDICINE | Admitting: EMERGENCY MEDICINE
Payer: MEDICAID

## 2023-12-29 DIAGNOSIS — S99.919A ANKLE INJURY, INITIAL ENCOUNTER: ICD-10-CM

## 2023-12-29 DIAGNOSIS — R07.9 CHEST PAIN: ICD-10-CM
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
## 2023-12-29 DIAGNOSIS — S82.852A CLOSED TRIMALLEOLAR FRACTURE OF LEFT ANKLE, INITIAL ENCOUNTER: Primary | ICD-10-CM

## 2023-12-29 DIAGNOSIS — S93.432A ANKLE SYNDESMOSIS DISRUPTION, LEFT, INITIAL ENCOUNTER: ICD-10-CM

## 2023-12-29 DIAGNOSIS — S89.90XA LEG INJURY: ICD-10-CM

## 2023-12-29 DIAGNOSIS — S99.929A FOOT INJURY: ICD-10-CM

## 2023-12-29 LAB
ALBUMIN SERPL BCP-MCNC: 4.3 G/DL (ref 3.5–5.2)
ALP SERPL-CCNC: 56 U/L (ref 55–135)
ALT SERPL W/O P-5'-P-CCNC: 10 U/L (ref 10–44)
ANION GAP SERPL CALC-SCNC: 14 MMOL/L (ref 8–16)
AST SERPL-CCNC: 29 U/L (ref 10–40)
BASOPHILS # BLD AUTO: 0.02 K/UL (ref 0–0.2)
BASOPHILS NFR BLD: 0.4 % (ref 0–1.9)
BILIRUB SERPL-MCNC: 0.4 MG/DL (ref 0.1–1)
BUN SERPL-MCNC: 14 MG/DL (ref 6–20)
CALCIUM SERPL-MCNC: 9.3 MG/DL (ref 8.7–10.5)
CHLORIDE SERPL-SCNC: 98 MMOL/L (ref 95–110)
CO2 SERPL-SCNC: 27 MMOL/L (ref 23–29)
CREAT SERPL-MCNC: 0.9 MG/DL (ref 0.5–1.4)
DIFFERENTIAL METHOD BLD: ABNORMAL
EOSINOPHIL # BLD AUTO: 0.2 K/UL (ref 0–0.5)
EOSINOPHIL NFR BLD: 2.6 % (ref 0–8)
ERYTHROCYTE [DISTWIDTH] IN BLOOD BY AUTOMATED COUNT: 13.5 % (ref 11.5–14.5)
EST. GFR  (NO RACE VARIABLE): >60 ML/MIN/1.73 M^2
ETHANOL SERPL-MCNC: 277 MG/DL
GLUCOSE SERPL-MCNC: 103 MG/DL (ref 70–110)
HCT VFR BLD AUTO: 38.5 % (ref 40–54)
HGB BLD-MCNC: 13.2 G/DL (ref 14–18)
IMM GRANULOCYTES # BLD AUTO: 0.02 K/UL (ref 0–0.04)
IMM GRANULOCYTES NFR BLD AUTO: 0.4 % (ref 0–0.5)
LYMPHOCYTES # BLD AUTO: 1.8 K/UL (ref 1–4.8)
LYMPHOCYTES NFR BLD: 31.6 % (ref 18–48)
MCH RBC QN AUTO: 32 PG (ref 27–31)
MCHC RBC AUTO-ENTMCNC: 34.3 G/DL (ref 32–36)
MCV RBC AUTO: 93 FL (ref 82–98)
MONOCYTES # BLD AUTO: 0.5 K/UL (ref 0.3–1)
MONOCYTES NFR BLD: 8.1 % (ref 4–15)
NEUTROPHILS # BLD AUTO: 3.3 K/UL (ref 1.8–7.7)
NEUTROPHILS NFR BLD: 56.9 % (ref 38–73)
NRBC BLD-RTO: 0 /100 WBC
PLATELET # BLD AUTO: 212 K/UL (ref 150–450)
PMV BLD AUTO: 10.3 FL (ref 9.2–12.9)
POTASSIUM SERPL-SCNC: 4 MMOL/L (ref 3.5–5.1)
PROT SERPL-MCNC: 8.5 G/DL (ref 6–8.4)
RBC # BLD AUTO: 4.13 M/UL (ref 4.6–6.2)
SODIUM SERPL-SCNC: 139 MMOL/L (ref 136–145)
WBC # BLD AUTO: 5.7 K/UL (ref 3.9–12.7)

## 2023-12-29 PROCEDURE — 12000002 HC ACUTE/MED SURGE SEMI-PRIVATE ROOM

## 2023-12-29 PROCEDURE — 80053 COMPREHEN METABOLIC PANEL: CPT | Performed by: EMERGENCY MEDICINE

## 2023-12-29 PROCEDURE — 82077 ASSAY SPEC XCP UR&BREATH IA: CPT | Performed by: EMERGENCY MEDICINE

## 2023-12-29 PROCEDURE — 93005 ELECTROCARDIOGRAM TRACING: CPT | Performed by: INTERNAL MEDICINE

## 2023-12-29 PROCEDURE — 83880 ASSAY OF NATRIURETIC PEPTIDE: CPT | Performed by: EMERGENCY MEDICINE

## 2023-12-29 PROCEDURE — 63600175 PHARM REV CODE 636 W HCPCS

## 2023-12-29 PROCEDURE — 93005 ELECTROCARDIOGRAM TRACING: CPT

## 2023-12-29 PROCEDURE — 85025 COMPLETE CBC W/AUTO DIFF WBC: CPT | Performed by: EMERGENCY MEDICINE

## 2023-12-29 PROCEDURE — 96375 TX/PRO/DX INJ NEW DRUG ADDON: CPT

## 2023-12-29 PROCEDURE — 93010 ELECTROCARDIOGRAM REPORT: CPT | Mod: ,,, | Performed by: INTERNAL MEDICINE

## 2023-12-29 PROCEDURE — 96374 THER/PROPH/DIAG INJ IV PUSH: CPT

## 2023-12-29 PROCEDURE — 84484 ASSAY OF TROPONIN QUANT: CPT | Performed by: EMERGENCY MEDICINE

## 2023-12-29 PROCEDURE — 99285 EMERGENCY DEPT VISIT HI MDM: CPT | Mod: 25

## 2023-12-29 RX ORDER — KETOROLAC TROMETHAMINE 30 MG/ML
10 INJECTION, SOLUTION INTRAMUSCULAR; INTRAVENOUS
Status: COMPLETED | OUTPATIENT
Start: 2023-12-29 | End: 2023-12-29

## 2023-12-29 RX ADMIN — KETOROLAC TROMETHAMINE 10 MG: 30 INJECTION, SOLUTION INTRAMUSCULAR; INTRAVENOUS at 11:12

## 2023-12-30 PROBLEM — I50.43 ACUTE ON CHRONIC COMBINED SYSTOLIC AND DIASTOLIC CONGESTIVE HEART FAILURE: Chronic | Status: ACTIVE | Noted: 2023-11-05

## 2023-12-30 PROBLEM — I10 ESSENTIAL HYPERTENSION: Chronic | Status: ACTIVE | Noted: 2023-05-18

## 2023-12-30 PROBLEM — F10.10 ETOH ABUSE: Status: ACTIVE | Noted: 2023-12-30

## 2023-12-30 PROBLEM — R07.9 CHEST PAIN: Status: ACTIVE | Noted: 2023-12-30

## 2023-12-30 PROBLEM — S82.852A CLOSED TRIMALLEOLAR FRACTURE OF LEFT ANKLE: Status: ACTIVE | Noted: 2023-12-30

## 2023-12-30 PROBLEM — I50.42 CHRONIC COMBINED SYSTOLIC AND DIASTOLIC CONGESTIVE HEART FAILURE: Status: ACTIVE | Noted: 2023-11-05

## 2023-12-30 PROBLEM — F10.220 ALCOHOL DEPENDENCE WITH UNCOMPLICATED INTOXICATION: Status: ACTIVE | Noted: 2023-12-30
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
## 2023-12-30 PROBLEM — I63.411 STROKE DUE TO EMBOLISM OF RIGHT MIDDLE CEREBRAL ARTERY: Status: ACTIVE | Noted: 2023-12-30

## 2023-12-30 PROBLEM — E78.5 HYPERLIPIDEMIA: Chronic | Status: ACTIVE | Noted: 2023-11-13

## 2023-12-30 PROBLEM — I69.30 HISTORY OF ISCHEMIC CEREBROVASCULAR ACCIDENT (CVA) WITH RESIDUAL DEFICIT: Chronic | Status: ACTIVE | Noted: 2023-05-18

## 2023-12-30 PROBLEM — S82.842A BIMALLEOLAR ANKLE FRACTURE, LEFT, CLOSED, INITIAL ENCOUNTER: Status: ACTIVE | Noted: 2023-12-30

## 2023-12-30 PROBLEM — I48.0 PAROXYSMAL ATRIAL FIBRILLATION: Chronic | Status: ACTIVE | Noted: 2023-05-28

## 2023-12-30 PROBLEM — E78.00 PURE HYPERCHOLESTEROLEMIA: Status: ACTIVE | Noted: 2023-11-13

## 2023-12-30 PROBLEM — I25.119 CORONARY ARTERY DISEASE INVOLVING NATIVE CORONARY ARTERY WITH ANGINA PECTORIS: Chronic | Status: ACTIVE | Noted: 2023-11-06

## 2023-12-30 LAB
BNP SERPL-MCNC: 84 PG/ML (ref 0–99)
TROPONIN I SERPL DL<=0.01 NG/ML-MCNC: 0.01 NG/ML (ref 0–0.03)
TROPONIN I SERPL DL<=0.01 NG/ML-MCNC: 0.03 NG/ML (ref 0–0.03)

## 2023-12-30 PROCEDURE — 63600175 PHARM REV CODE 636 W HCPCS

## 2023-12-30 PROCEDURE — 84484 ASSAY OF TROPONIN QUANT: CPT

## 2023-12-30 PROCEDURE — 25500020 PHARM REV CODE 255: Performed by: EMERGENCY MEDICINE

## 2023-12-30 PROCEDURE — G0378 HOSPITAL OBSERVATION PER HR: HCPCS

## 2023-12-30 PROCEDURE — 93005 ELECTROCARDIOGRAM TRACING: CPT

## 2023-12-30 PROCEDURE — 99223 1ST HOSP IP/OBS HIGH 75: CPT | Mod: ,,, | Performed by: PSYCHIATRY & NEUROLOGY

## 2023-12-30 PROCEDURE — 11000001 HC ACUTE MED/SURG PRIVATE ROOM

## 2023-12-30 PROCEDURE — A9585 GADOBUTROL INJECTION: HCPCS | Performed by: EMERGENCY MEDICINE

## 2023-12-30 PROCEDURE — 93010 ELECTROCARDIOGRAM REPORT: CPT | Mod: ,,, | Performed by: INTERNAL MEDICINE

## 2023-12-30 PROCEDURE — 36415 COLL VENOUS BLD VENIPUNCTURE: CPT

## 2023-12-30 PROCEDURE — 93005 ELECTROCARDIOGRAM TRACING: CPT | Performed by: INTERNAL MEDICINE

## 2023-12-30 PROCEDURE — 25000003 PHARM REV CODE 250

## 2023-12-30 PROCEDURE — 25000003 PHARM REV CODE 250: Performed by: PHYSICIAN ASSISTANT

## 2023-12-30 RX ORDER — ACETAMINOPHEN 500 MG
1000 TABLET ORAL EVERY 8 HOURS
Status: DISCONTINUED | OUTPATIENT
Start: 2023-12-30 | End: 2024-01-08 | Stop reason: HOSPADM

## 2023-12-30 RX ORDER — NALOXONE HCL 0.4 MG/ML
0.02 VIAL (ML) INJECTION
Status: DISCONTINUED | OUTPATIENT
Start: 2023-12-30 | End: 2024-01-08 | Stop reason: HOSPADM

## 2023-12-30 RX ORDER — SODIUM CHLORIDE, SODIUM LACTATE, POTASSIUM CHLORIDE, CALCIUM CHLORIDE 600; 310; 30; 20 MG/100ML; MG/100ML; MG/100ML; MG/100ML
INJECTION, SOLUTION INTRAVENOUS CONTINUOUS
Status: DISCONTINUED | OUTPATIENT
Start: 2023-12-30 | End: 2023-12-30

## 2023-12-30 RX ORDER — FOLIC ACID 1 MG/1
1 TABLET ORAL DAILY
Status: DISCONTINUED | OUTPATIENT
Start: 2023-12-31 | End: 2024-01-08 | Stop reason: HOSPADM

## 2023-12-30 RX ORDER — ONDANSETRON 8 MG/1
8 TABLET, ORALLY DISINTEGRATING ORAL EVERY 8 HOURS PRN
Status: DISCONTINUED | OUTPATIENT
Start: 2023-12-30 | End: 2024-01-08 | Stop reason: HOSPADM

## 2023-12-30 RX ORDER — PROCHLORPERAZINE EDISYLATE 5 MG/ML
5 INJECTION INTRAMUSCULAR; INTRAVENOUS EVERY 6 HOURS PRN
Status: DISCONTINUED | OUTPATIENT
Start: 2023-12-30 | End: 2024-01-08 | Stop reason: HOSPADM

## 2023-12-30 RX ORDER — PANTOPRAZOLE SODIUM 20 MG/1
20 TABLET, DELAYED RELEASE ORAL EVERY MORNING
Status: DISCONTINUED | OUTPATIENT
Start: 2023-12-30 | End: 2024-01-08 | Stop reason: HOSPADM

## 2023-12-30 RX ORDER — OXYCODONE HYDROCHLORIDE 5 MG/1
5 TABLET ORAL EVERY 6 HOURS PRN
Status: DISCONTINUED | OUTPATIENT
Start: 2023-12-30 | End: 2024-01-08 | Stop reason: HOSPADM

## 2023-12-30 RX ORDER — HYDROMORPHONE HYDROCHLORIDE 1 MG/ML
1 INJECTION, SOLUTION INTRAMUSCULAR; INTRAVENOUS; SUBCUTANEOUS
Status: COMPLETED | OUTPATIENT
Start: 2023-12-30 | End: 2023-12-30

## 2023-12-30 RX ORDER — METHOCARBAMOL 500 MG/1
500 TABLET, FILM COATED ORAL 4 TIMES DAILY
Status: DISCONTINUED | OUTPATIENT
Start: 2023-12-30 | End: 2023-12-31

## 2023-12-30 RX ORDER — IPRATROPIUM BROMIDE AND ALBUTEROL SULFATE 2.5; .5 MG/3ML; MG/3ML
3 SOLUTION RESPIRATORY (INHALATION) EVERY 4 HOURS PRN
Status: DISCONTINUED | OUTPATIENT
Start: 2023-12-30 | End: 2024-01-08 | Stop reason: HOSPADM

## 2023-12-30 RX ORDER — LOSARTAN POTASSIUM 25 MG/1
25 TABLET ORAL DAILY
Status: DISCONTINUED | OUTPATIENT
Start: 2023-12-31 | End: 2024-01-08 | Stop reason: HOSPADM

## 2023-12-30 RX ORDER — GADOBUTROL 604.72 MG/ML
10 INJECTION INTRAVENOUS
Status: COMPLETED | OUTPATIENT
Start: 2023-12-30 | End: 2023-12-30

## 2023-12-30 RX ORDER — TALC
6 POWDER (GRAM) TOPICAL NIGHTLY PRN
Status: DISCONTINUED | OUTPATIENT
Start: 2023-12-30 | End: 2024-01-08 | Stop reason: HOSPADM

## 2023-12-30 RX ORDER — METOPROLOL SUCCINATE 50 MG/1
50 TABLET, EXTENDED RELEASE ORAL DAILY
Status: DISCONTINUED | OUTPATIENT
Start: 2023-12-30 | End: 2024-01-08 | Stop reason: HOSPADM

## 2023-12-30 RX ORDER — MAG HYDROX/ALUMINUM HYD/SIMETH 200-200-20
30 SUSPENSION, ORAL (FINAL DOSE FORM) ORAL 4 TIMES DAILY PRN
Status: DISCONTINUED | OUTPATIENT
Start: 2023-12-30 | End: 2024-01-08 | Stop reason: HOSPADM

## 2023-12-30 RX ORDER — FACIAL-BODY WIPES
10 EACH TOPICAL DAILY PRN
Status: DISCONTINUED | OUTPATIENT
Start: 2023-12-30 | End: 2024-01-08 | Stop reason: HOSPADM

## 2023-12-30 RX ORDER — THIAMINE HCL 100 MG
200 TABLET ORAL DAILY
Status: DISCONTINUED | OUTPATIENT
Start: 2023-12-31 | End: 2024-01-08 | Stop reason: HOSPADM

## 2023-12-30 RX ORDER — OXYCODONE HYDROCHLORIDE 10 MG/1
10 TABLET ORAL EVERY 6 HOURS PRN
Status: DISCONTINUED | OUTPATIENT
Start: 2023-12-30 | End: 2024-01-08 | Stop reason: HOSPADM

## 2023-12-30 RX ORDER — HYDROMORPHONE HYDROCHLORIDE 1 MG/ML
1 INJECTION, SOLUTION INTRAMUSCULAR; INTRAVENOUS; SUBCUTANEOUS
Status: DISCONTINUED | OUTPATIENT
Start: 2023-12-30 | End: 2023-12-30

## 2023-12-30 RX ORDER — ACETAMINOPHEN 325 MG/1
650 TABLET ORAL EVERY 4 HOURS PRN
Status: DISCONTINUED | OUTPATIENT
Start: 2023-12-30 | End: 2023-12-30

## 2023-12-30 RX ORDER — AMLODIPINE BESYLATE 5 MG/1
10 TABLET ORAL DAILY
Status: DISCONTINUED | OUTPATIENT
Start: 2023-12-31 | End: 2024-01-08 | Stop reason: HOSPADM

## 2023-12-30 RX ORDER — FUROSEMIDE 40 MG/1
40 TABLET ORAL DAILY
Status: DISCONTINUED | OUTPATIENT
Start: 2023-12-30 | End: 2024-01-08 | Stop reason: HOSPADM

## 2023-12-30 RX ORDER — FLUOXETINE HYDROCHLORIDE 20 MG/1
20 CAPSULE ORAL DAILY
Status: DISCONTINUED | OUTPATIENT
Start: 2023-12-30 | End: 2024-01-08 | Stop reason: HOSPADM

## 2023-12-30 RX ORDER — MORPHINE SULFATE 4 MG/ML
4 INJECTION, SOLUTION INTRAMUSCULAR; INTRAVENOUS
Status: COMPLETED | OUTPATIENT
Start: 2023-12-30 | End: 2023-12-30

## 2023-12-30 RX ORDER — ONDANSETRON 2 MG/ML
4 INJECTION INTRAMUSCULAR; INTRAVENOUS
Status: COMPLETED | OUTPATIENT
Start: 2023-12-30 | End: 2023-12-30

## 2023-12-30 RX ORDER — SODIUM CHLORIDE 0.9 % (FLUSH) 0.9 %
5 SYRINGE (ML) INJECTION
Status: DISCONTINUED | OUTPATIENT
Start: 2023-12-30 | End: 2024-01-08 | Stop reason: HOSPADM

## 2023-12-30 RX ORDER — THIAMINE HCL 100 MG
200 TABLET ORAL DAILY
Status: DISCONTINUED | OUTPATIENT
Start: 2023-12-30 | End: 2023-12-30

## 2023-12-30 RX ORDER — LORAZEPAM 2 MG/ML
1 INJECTION INTRAMUSCULAR
Status: DISCONTINUED | OUTPATIENT
Start: 2023-12-30 | End: 2024-01-08 | Stop reason: HOSPADM

## 2023-12-30 RX ORDER — GUAIFENESIN 100 MG/5ML
81 LIQUID (ML) ORAL DAILY
Status: DISCONTINUED | OUTPATIENT
Start: 2023-12-30 | End: 2024-01-08 | Stop reason: HOSPADM

## 2023-12-30 RX ORDER — DIPHENHYDRAMINE HCL 25 MG
25 CAPSULE ORAL EVERY 6 HOURS PRN
Status: DISCONTINUED | OUTPATIENT
Start: 2023-12-30 | End: 2024-01-08 | Stop reason: HOSPADM

## 2023-12-30 RX ORDER — POLYETHYLENE GLYCOL 3350 17 G/17G
17 POWDER, FOR SOLUTION ORAL 2 TIMES DAILY PRN
Status: DISCONTINUED | OUTPATIENT
Start: 2023-12-30 | End: 2024-01-02

## 2023-12-30 RX ORDER — SIMETHICONE 80 MG
1 TABLET,CHEWABLE ORAL 4 TIMES DAILY PRN
Status: DISCONTINUED | OUTPATIENT
Start: 2023-12-30 | End: 2024-01-08 | Stop reason: HOSPADM

## 2023-12-30 RX ORDER — SPIRONOLACTONE 25 MG/1
25 TABLET ORAL DAILY
Status: DISCONTINUED | OUTPATIENT
Start: 2023-12-31 | End: 2024-01-08 | Stop reason: HOSPADM

## 2023-12-30 RX ORDER — ACETAMINOPHEN 500 MG
1000 TABLET ORAL EVERY 8 HOURS PRN
Status: DISCONTINUED | OUTPATIENT
Start: 2023-12-30 | End: 2023-12-30

## 2023-12-30 RX ORDER — ATORVASTATIN CALCIUM 40 MG/1
80 TABLET, FILM COATED ORAL NIGHTLY
Status: DISCONTINUED | OUTPATIENT
Start: 2023-12-30 | End: 2024-01-08 | Stop reason: HOSPADM

## 2023-12-30 RX ADMIN — METHOCARBAMOL 500 MG: 500 TABLET ORAL at 05:12

## 2023-12-30 RX ADMIN — FUROSEMIDE 40 MG: 40 TABLET ORAL at 02:12

## 2023-12-30 RX ADMIN — GADOBUTROL 10 ML: 604.72 INJECTION INTRAVENOUS at 09:12

## 2023-12-30 RX ADMIN — ACETAMINOPHEN 1000 MG: 500 TABLET ORAL at 02:12

## 2023-12-30 RX ADMIN — FLUOXETINE HYDROCHLORIDE 20 MG: 20 CAPSULE ORAL at 02:12

## 2023-12-30 RX ADMIN — METHOCARBAMOL 500 MG: 500 TABLET ORAL at 02:12

## 2023-12-30 RX ADMIN — OXYCODONE HYDROCHLORIDE 10 MG: 10 TABLET ORAL at 02:12

## 2023-12-30 RX ADMIN — ATORVASTATIN CALCIUM 80 MG: 40 TABLET, FILM COATED ORAL at 09:12

## 2023-12-30 RX ADMIN — HYDROMORPHONE HYDROCHLORIDE 1 MG: 1 INJECTION, SOLUTION INTRAMUSCULAR; INTRAVENOUS; SUBCUTANEOUS at 10:12

## 2023-12-30 RX ADMIN — PANTOPRAZOLE SODIUM 20 MG: 20 TABLET, DELAYED RELEASE ORAL at 02:12

## 2023-12-30 RX ADMIN — APIXABAN 5 MG: 5 TABLET, FILM COATED ORAL at 09:12

## 2023-12-30 RX ADMIN — OXYCODONE HYDROCHLORIDE 10 MG: 10 TABLET ORAL at 09:12

## 2023-12-30 RX ADMIN — ASPIRIN 81 MG CHEWABLE TABLET 81 MG: 81 TABLET CHEWABLE at 02:12

## 2023-12-30 RX ADMIN — DIPHENHYDRAMINE HYDROCHLORIDE 25 MG: 25 CAPSULE ORAL at 09:12

## 2023-12-30 RX ADMIN — METOPROLOL SUCCINATE 50 MG: 50 TABLET, EXTENDED RELEASE ORAL at 02:12

## 2023-12-30 RX ADMIN — METHOCARBAMOL 500 MG: 500 TABLET ORAL at 09:12

## 2023-12-30 RX ADMIN — ACETAMINOPHEN 1000 MG: 500 TABLET ORAL at 09:12

## 2023-12-30 RX ADMIN — ONDANSETRON 4 MG: 2 INJECTION INTRAMUSCULAR; INTRAVENOUS at 01:12

## 2023-12-30 RX ADMIN — MORPHINE SULFATE 4 MG: 4 INJECTION INTRAVENOUS at 01:12

## 2023-12-30 RX ADMIN — SODIUM CHLORIDE, POTASSIUM CHLORIDE, SODIUM LACTATE AND CALCIUM CHLORIDE: 600; 310; 30; 20 INJECTION, SOLUTION INTRAVENOUS at 02:12

## 2023-12-30 NOTE — NURSING
Nurses Note -- 4 Eyes      12/30/2023   5:56 PM      Skin assessed during: Admit      [x] No Altered Skin Integrity Present    []Prevention Measures Documented      [] Yes- Altered Skin Integrity Present or Discovered   [] LDA Added if Not in Epic (Describe Wound)   [] New Altered Skin Integrity was Present on Admit and Documented in LDA   [] Wound Image Taken    Wound Care Consulted? No    Attending Nurse:  Beatriz Oliver RN    Second RN/Staff Member:   Be WHITE RN

## 2023-12-30 NOTE — ED NOTES
Report received from Rylee, RN. Assume care of pt. Pt resting comfortably and independently repositioned in stretcher with bed locked in lowest position for safety. NAD noted at this time. Respirations even and unlabored and visible chest rise noted.  Patient offered bathroom assistance and denies need at this time. Pt instructed to call if assistance is needed. Pt on continuous cardiac, BP, and O2 monitoring. Call light within reach. Family at bedside. No needs at this time. Will continue to monitor.

## 2023-12-30 NOTE — ASSESSMENT & PLAN NOTE
Patient with Paroxysmal (<7 days) atrial fibrillation which is controlled currently with Beta Blocker. Patient is currently in sinus rhythm.[id number] Score: 1.  Anticoagulation indicated. Anticoagulation done with eliquis 5mg .

## 2023-12-30 NOTE — PROVIDER PROGRESS NOTES - EMERGENCY DEPT.
Encounter Date: 12/29/2023    ED Physician Progress Notes        Physician Note:   Signout Note  I received signout from the previous providers.     Chief complaint:  Ankle Pain (Tripped while walking. +deformity, swelling to left ankle. Distal pulses palpable. +ETOH. Hx of stroke and left side deficits. Pain 6/10 to left ankle. )      Per sign out and chart review: Dionicio Bess is a 58-year-old male with a history of paroxysmal AFib on Eliquis, hypertension, CVA with residual left-sided deficits, CAD status post stent placement, hyperlipidemia presenting to the emergency department after mechanical fall    During ED stay patient noted to have new hypodensity on CT scan concerning for CVA and MRI was ordered.  Patient noted to have displaced left ankle fracture ortho was consulted.    Pt signed out to me pending:  Ortho evaluation and MRI brain.    Update/ Disposition:  MRI brain demonstrates new acute infarct of basal ganglia and corona radiata stroke team was notified who evaluated the patient and recommended admission to hospital medicine.  Patient remained stable and was admitted to Hospital Medicine.    Patient, caregiver and/or family understands the plan and verbalized agreement. All questions answered.     Diagnostic Impression:    Fall  Foot injury  Ankle injury, initial encounter  Leg injury  Chest pain

## 2023-12-30 NOTE — ASSESSMENT & PLAN NOTE
Stroke risk factor. Listed home meds are amlodipine 10 mg, losartan 25 mg, lasix 40 mg QD, and spironolactone 25 mg.

## 2023-12-30 NOTE — ASSESSMENT & PLAN NOTE
Dionicio Bess is a 58 y.o. male with multiple medical comorbidities, an elevated troponin, elevated ETOH, new CT head findings concerning for infarct, and a left sided functional bimalleolar ankle fracture sustained after a ground level fall yesterday, closed and at neurovascular baseline for the LLE. Reduced and splinted in the ED, see procedure note below for further details.     - Patient is likely being admitted for work up of potential new infarct and trending of elevated troponin.    - At this time patient is too swollen for definitive fixation  - NWB to LLE, keep iced and elevated  - CT ankle pending for preoperative planning  - Orthopedics will continue to follow while inpatient  - WBC 4.13, Hgb 13.2, HCT 38.5, , , Creatinine 0.9    From orthopedic perspective, patient could follow up outpatient for definitive fixation of ankle; however, should patient be admitted for work up and treatment of other ailments, he could be scheduled for surgery next week if he is still admitted and optimized for surgery.

## 2023-12-30 NOTE — ED TRIAGE NOTES
Dionicio Bess, a 58 y.o. male presents to the ED w/ complaint of left ankle pain onset PTA. Patient reports he had a few beers and then stood up to walk and fell. Left ankle deformity and left knee deformity and pain. CMS intact in foot. Patient has baseline left side weakness d/t hx stroke. Denies other injury. Occasionally complains of chest pain and then redacts it    Triage note:  Chief Complaint   Patient presents with    Ankle Pain     Tripped while walking. +deformity, swelling to left ankle. Distal pulses palpable. +ETOH. Hx of stroke and left side deficits. Pain 6/10 to left ankle.      Review of patient's allergies indicates:  No Known Allergies  Past Medical History:   Diagnosis Date    Stroke

## 2023-12-30 NOTE — SUBJECTIVE & OBJECTIVE
Past Medical History:   Diagnosis Date    CAD (coronary artery disease)     HLD (hyperlipidemia)     HTN (hypertension)     Stroke     R MCA 2/2023       No past surgical history on file.    Review of patient's allergies indicates:  No Known Allergies    No current facility-administered medications on file prior to encounter.     Current Outpatient Medications on File Prior to Encounter   Medication Sig    amLODIPine (NORVASC) 10 MG tablet Take 1 tablet (10 mg total) by mouth once daily.    apixaban (ELIQUIS) 5 mg Tab Take 1 tablet (5 mg total) by mouth 2 (two) times daily.    aspirin 81 MG Chew Chew and swallow 1 (1 mg total) by mouth once daily.    atorvastatin (LIPITOR) 80 MG tablet Take 1 tablet (80 mg total) by mouth every evening.    FLUoxetine 20 MG capsule Take 1 capsule (20 mg total) by mouth once daily.    furosemide (LASIX) 40 MG tablet Take 1 tablet (40 mg total) by mouth once daily.    JARDIANCE 10 mg tablet Take 1 tablet (10 mg total) by mouth once daily.    losartan (COZAAR) 25 MG tablet Take 1 tablet (25 mg total) by mouth once daily.    metoprolol succinate (TOPROL-XL) 50 MG 24 hr tablet Take 1 tablet (50 mg total) by mouth once daily.    pantoprazole (PROTONIX) 20 MG tablet Take 1 tablet (20 mg total) by mouth every morning.    spironolactone (ALDACTONE) 25 MG tablet Take 1 tablet (25 mg total) by mouth once daily.    thiamine 100 MG tablet Take 2 tablets (200 mg total) by mouth once daily.     Family History    None       Tobacco Use    Smoking status: Never    Smokeless tobacco: Never   Substance and Sexual Activity    Alcohol use: Yes     Comment: occasional    Drug use: No    Sexual activity: Not on file     Review of Systems   Constitutional:  Negative for activity change, chills and fever.        +fall   HENT:  Negative for trouble swallowing.    Eyes:  Negative for photophobia and visual disturbance.   Respiratory:  Negative for chest tightness, shortness of breath and wheezing.     Cardiovascular:  Positive for chest pain. Negative for palpitations and leg swelling.   Gastrointestinal:  Positive for abdominal pain and nausea. Negative for constipation, diarrhea and vomiting.   Genitourinary:  Negative for dysuria, frequency, hematuria and urgency.   Musculoskeletal:  Positive for arthralgias and joint swelling. Negative for back pain and gait problem.   Skin:  Negative for color change and rash.   Neurological:  Positive for weakness (chronic L side). Negative for dizziness, syncope, light-headedness, numbness and headaches.   Psychiatric/Behavioral:  Negative for agitation and confusion. The patient is not nervous/anxious.      Objective:     Vital Signs (Most Recent):  Temp: 99.2 °F (37.3 °C) (12/30/23 1145)  Pulse: 65 (12/30/23 1145)  Resp: 18 (12/30/23 1145)  BP: 119/82 (12/30/23 1145)  SpO2: 95 % (12/30/23 1145) Vital Signs (24h Range):  Temp:  [98.4 °F (36.9 °C)-99.2 °F (37.3 °C)] 99.2 °F (37.3 °C)  Pulse:  [59-79] 65  Resp:  [10-23] 18  SpO2:  [94 %-100 %] 95 %  BP: (113-138)/(63-97) 119/82        There is no height or weight on file to calculate BMI.     Physical Exam  Vitals and nursing note reviewed.   Constitutional:       General: He is not in acute distress.     Appearance: He is well-developed.   HENT:      Head: Normocephalic and atraumatic.      Mouth/Throat:      Pharynx: No oropharyngeal exudate.   Eyes:      Conjunctiva/sclera: Conjunctivae normal.      Pupils: Pupils are equal, round, and reactive to light.   Cardiovascular:      Rate and Rhythm: Normal rate and regular rhythm.      Heart sounds: Normal heart sounds.   Pulmonary:      Effort: Pulmonary effort is normal. No respiratory distress.      Breath sounds: Normal breath sounds. No wheezing.   Abdominal:      General: Bowel sounds are normal. There is no distension.      Palpations: Abdomen is soft.      Tenderness: There is no abdominal tenderness.   Musculoskeletal:         General: Signs of injury present. No  tenderness. Normal range of motion.      Cervical back: Normal range of motion and neck supple.      Comments:  Cast and dressing in place over distal L leg. Able to wiggle toes. Sensation intact.    Lymphadenopathy:      Cervical: No cervical adenopathy.   Skin:     General: Skin is warm and dry.      Capillary Refill: Capillary refill takes less than 2 seconds.      Findings: No rash.   Neurological:      Mental Status: He is alert and oriented to person, place, and time.      Cranial Nerves: No cranial nerve deficit.      Sensory: No sensory deficit.      Coordination: Coordination normal.   Psychiatric:         Behavior: Behavior normal.         Thought Content: Thought content normal.         Judgment: Judgment normal.              CRANIAL NERVES     CN III, IV, VI   Pupils are equal, round, and reactive to light.       Significant Labs: All pertinent labs within the past 24 hours have been reviewed.  CBC:   Recent Labs   Lab 12/29/23 2211   WBC 5.70   HGB 13.2*   HCT 38.5*        CMP:   Recent Labs   Lab 12/29/23 2211      K 4.0   CL 98   CO2 27      BUN 14   CREATININE 0.9   CALCIUM 9.3   PROT 8.5*   ALBUMIN 4.3   BILITOT 0.4   ALKPHOS 56   AST 29   ALT 10   ANIONGAP 14     Cardiac Markers:   Recent Labs   Lab 12/29/23 2211   BNP 84     Troponin:   Recent Labs   Lab 12/29/23 2211   TROPONINI 0.026       Significant Imaging: I have reviewed all pertinent imaging results/findings within the past 24 hours.  Imaging Results              CT Ankle (Including Hindfoot) Without Contrast Right (Final result)  Result time 12/30/23 11:16:00      Final result by Cecelia Subramanian MD (12/30/23 11:16:00)                   Impression:      Trimalleolar fracture(Clay B, Lauge-Samano stage 4)      Electronically signed by: Cecelia Subramanian MD  Date:    12/30/2023  Time:    11:16               Narrative:    EXAMINATION:  CT ANKLE (INCLUDING HINDFOOT) WITHOUT CONTRAST RIGHT; CT 3D RECON WITH  INDEPENDENT WS    CLINICAL HISTORY:  2mm cuts with 3d recons;; 2mm cuts with 3d recons;Defer;  Unspecified injury of unspecified ankle, initial encounter    TECHNIQUE:  0.625 mm axial images of the ankle obtained, coronal and sagittal images reformatted.  3D images reconstructed.    COMPARISON:  None    FINDINGS:  Trimalleolar fracture.    -Oblique fracture of the distal fibula above the tibiotalar joint with 3 mm lateral displacement of the more distal fragment.    -There is 6.6 mm lateral subluxation/displacement of the talus relative to the talar dome.    -Ill define tiny avulsed osseous fragment displaced just anterior and lateral to the medial malleolus.  (200: 39-44).    -There is a tiny osseous fragment just adjacent to the posterior malleolus, suspicious for fracture.  (201: 37-41).    The talar dome/status is intact.  The calcaneus is intact.  The tarsal bones and proximal metatarsal bones appear normal.  Moderate-size inferior calcaneal spur.    The visualized flexor and extensor tendons appear to course normally.    Circumferential subcutaneous soft tissue edema about the lower leg and ankle region and dorsal aspect of the foot.  No soft tissue air, no foreign body.                                       CT 3D RECON WITH INDEPENDENT WS (Final result)  Result time 12/30/23 11:16:00      Final result by Cecelia Subramanian MD (12/30/23 11:16:00)                   Impression:      Trimalleolar fracture(Clay B, Lauge-Samano stage 4)      Electronically signed by: Cecelia Subramanian MD  Date:    12/30/2023  Time:    11:16               Narrative:    EXAMINATION:  CT ANKLE (INCLUDING HINDFOOT) WITHOUT CONTRAST RIGHT; CT 3D RECON WITH INDEPENDENT WS    CLINICAL HISTORY:  2mm cuts with 3d recons;; 2mm cuts with 3d recons;Defer;  Unspecified injury of unspecified ankle, initial encounter    TECHNIQUE:  0.625 mm axial images of the ankle obtained, coronal and sagittal images reformatted.  3D images  reconstructed.    COMPARISON:  None    FINDINGS:  Trimalleolar fracture.    -Oblique fracture of the distal fibula above the tibiotalar joint with 3 mm lateral displacement of the more distal fragment.    -There is 6.6 mm lateral subluxation/displacement of the talus relative to the talar dome.    -Ill define tiny avulsed osseous fragment displaced just anterior and lateral to the medial malleolus.  (200: 39-44).    -There is a tiny osseous fragment just adjacent to the posterior malleolus, suspicious for fracture.  (201: 37-41).    The talar dome/status is intact.  The calcaneus is intact.  The tarsal bones and proximal metatarsal bones appear normal.  Moderate-size inferior calcaneal spur.    The visualized flexor and extensor tendons appear to course normally.    Circumferential subcutaneous soft tissue edema about the lower leg and ankle region and dorsal aspect of the foot.  No soft tissue air, no foreign body.                                        MRI Brain W WO Contrast (Final result)  Result time 12/30/23 09:45:47      Final result by Avery Cervantes MD (12/30/23 09:45:47)                   Impression:      1. Motion compromised examination.  2. Small foci of acute to early subacute ischemia noted in the right corona radiata/basal ganglia/internal capsule, adjoining remote large MCA territory infarct.  Equivocal punctate right parietal cortical infarct margin the posterior aspect of the infarct territory.  3. No acute intracranial hemorrhage or mass effect.  4. Remote post ischemic sequelae, as discussed.  This report was flagged in Epic as abnormal.      Electronically signed by: Avery Cervantes  Date:    12/30/2023  Time:    09:45               Narrative:    EXAMINATION:  MRI BRAIN W WO CONTRAST    CLINICAL HISTORY:  Stroke, follow up;    TECHNIQUE:  Multiplanar multisequence MR imaging of the brain was performed before and after the administration of 10 mL Gadavist intravenous  contrast.    COMPARISON:  CT head performed 12/30/2023.    FINDINGS:  Parenchyma: Redemonstrated extensive encephalomalacia and gliosis in the right cerebral hemispheric corresponding to remote MCA territory infarct.  There is ex vacuo dilatation of the right lateral ventricle and its lateral wallerian degeneration.    Few small foci of restricted diffusion and T2 weighted signal abnormality are noted in the adjoining right corona radiata, basal ganglia internal capsule in keeping with acute to early subacute ischemia.  Question small focus of intermediate restricted diffusion involving posterior right parietal cortex (axial DWI series 6, image 44).  Additional at areas of suspected T2 shine through effect are noted marginating the remote right MCA infarct territory.    Remote blood products are noted with prior right MCA territory infarct.  No definite acute hemorrhage appreciated the current study.    Few additional nonspecific foci of nonenhancing T2/FLAIR hyperintense signal in the white matter would be in keeping with sequela of chronic small vessel ischemic disease.    Additional comments: There is no midline shift, abnormal extra-axial fluid collection, or acute intracranial hemorrhage. The basal cisterns are patent.    Ventricles: No evidence of hydrocephalus.    Flow voids: The normal major intracranial arterial flow voids are visualized.    Enhancement: No abnormal intra-axial or extra-axial enhancement is identified.    Sinuses and mastoid air cells: Scattered relatively modest paranasal sinus mucosal thickening with small retention cysts.  Nonspecific fluid signal opacification of the left greater than right mastoid air cells, possibly on the basis of effusion and/or mucosal thickening.    Orbits: Question minor elongation of the globes in AP dimension, as may be seen the setting of axial myopia versus staphyloma.  Note that appearance may be accentuated by motion.    Midline structures: The pituitary  and craniocervical junction are normal.    Marrow: Degenerative marrow change in the spine.                                       X-Ray Ankle Complete Left (Final result)  Result time 12/30/23 08:16:39      Final result by Cecelia Subramanian MD (12/30/23 08:16:39)                   Impression:      Distal fibula fracture, medial malleolar tip fracture, unchanged alignment from the prior examination.      Electronically signed by: Cecelia Subramanian MD  Date:    12/30/2023  Time:    08:16               Narrative:    EXAMINATION:  XR ANKLE COMPLETE 3 VIEW LEFT    CLINICAL HISTORY:  Fracture;    TECHNIQUE:  AP, lateral and oblique views of the left ankle were performed.    COMPARISON:  12/29/2023, 23:24    FINDINGS:  There is an immobilization device.    The tibiotalar joint alignment remains normal.    There is an oblique fracture of the distal fibula just proximal to the tibiotalar joint extending to the syndesmosis.    Subtle nondisplaced fracture of the medial malleolus tip.    The posterior tibial margin appears normal.    There is a sizable inferior calcaneal spur.    The remainder of the visualized osseous structures and soft tissues appear within normal limits.    The soft tissue edema noted about the ankle region.                                       CT Cervical Spine Without Contrast (Final result)  Result time 12/30/23 04:07:05      Final result by Brendon Dillard MD (12/30/23 04:07:05)                   Impression:      No evidence of acute fracture or traumatic subluxation.    Multilevel degenerative changes are detailed above.    Electronically signed by resident: Angie George  Date:    12/30/2023  Time:    03:33    Electronically signed by: Brendon Dillard MD  Date:    12/30/2023  Time:    04:07               Narrative:    EXAMINATION:  CT CERVICAL SPINE WITHOUT CONTRAST    CLINICAL HISTORY:  Neck trauma, intoxicated or obtunded (Age >= 16y);    TECHNIQUE:  Low dose axial images, sagittal and  coronal reformations were performed though the cervical spine.  Contrast was not administered.    COMPARISON:  CT 07/11/2023    FINDINGS:  Alignment: Normal.    Vertebrae: No fracture.  No lytic or blastic lesion.    Discs: Mild multilevel disc height loss.    C1-2: Dens is intact.  Pre-dens space is maintained.    Skull base and craniocervical junction: Normal.    Degenerative findings:    Multilevel degenerative change with areas of facet arthropathy and uncovertebral spurring.    There is no high-grade spinal canal stenosis through the cervical spine.    C2-C3: Moderate right neural foraminal narrowing.    C3-C4: Moderate to severe right neural foraminal narrowing.    C4-C5: No spinal canal stenosis or neural foraminal narrowing.    C5-C6: Severe right and mild-to-moderate left neural foraminal narrowing.    C6-C7: Mild bilateral neural foraminal narrowing.    Paraspinal muscles & soft tissues: Unremarkable.                                        CT Head Without Contrast (Final result)  Result time 12/30/23 03:36:03      Final result by Brendon Dillard MD (12/30/23 03:36:03)                   Impression:      Large area of hypoattenuation through the right MCA territory. Some of this likely corresponds to previously reported area of infarct on outside imaging report dated 05/07/2023 (images not available for comparison), but some regions are concerning for more recent component of infarct (there is edema with loss of normal sulcation).  Recommend correlation with outside images and further evaluation with MRI as clinically warranted to evaluate for potential acute on chronic infarct.    This report was flagged in Epic as abnormal.    Electronically signed by resident: Angie George  Date:    12/30/2023  Time:    03:26    Electronically signed by: Brendon Dillard MD  Date:    12/30/2023  Time:    03:36               Narrative:    EXAMINATION:  CT HEAD WITHOUT CONTRAST    CLINICAL HISTORY:  Head trauma,  moderate-severe;    TECHNIQUE:  Low dose axial CT images obtained throughout the head without the use of intravenous contrast.  Axial, sagittal and coronal reconstructions were performed.    COMPARISON:  Outside imaging report dated 05/07/2023 (images not available for comparison)    FINDINGS:  Large area of hypoattenuation through the right MCA territory.  Some of this likely corresponds to previously reported area of infarct on outside imaging report dated 05/07/2023 (images not available for comparison), but some regions may show edema with loss of normal sulcation, concerning for recent component of infarct.  Recommend correlation with outside imaging study and further evaluation with MRI if clinically warranted.  No evidence of acute intracranial hemorrhage.    Ex vacuo dilation of the right lateral ventricle, likely related to documented remote right MCA infarct.    No extra-axial blood or fluid collections.    No displaced calvarial fracture.    Small left mastoid effusion.  Mild mucosal thickening of the left maxillary sinus.                                       X-Ray Chest AP Portable (Final result)  Result time 12/30/23 00:52:32      Final result by Brendon Dillard MD (12/30/23 00:52:32)                   Impression:      Cardiomegaly with mild perihilar edema.      Electronically signed by: Brendon Dillard MD  Date:    12/30/2023  Time:    00:52               Narrative:    EXAMINATION:  XR CHEST AP PORTABLE    CLINICAL HISTORY:  Chest pain, unspecified    TECHNIQUE:  Single frontal view of the chest was performed.    COMPARISON:  11/05/2023.    FINDINGS:  Loop recorder device overlies the heart, similar to prior.    Cardiomegaly with mild perihilar edema.    Heart and lungs otherwise appear unchanged when allowing for differences in technique and positioning.                                       X-Ray Tibia Fibula 2 View Left (Final result)  Result time 12/30/23 01:03:34      Final result by Nishant  Brendon IBARRA MD (12/30/23 01:03:34)                   Impression:      Mildly displaced oblique fracture distal left fibula with distal fracture fragment displaced a few mm laterally. Fracture extends to the syndesmosis just above the tibiotalar joint. Minimally displaced avulsion fracture tip of the medial malleolus.  No additional acute fracture identified. No dislocation.  Slight widening of the ankle mortise medially.      Electronically signed by: Brendon Dillard MD  Date:    12/30/2023  Time:    01:03               Narrative:    EXAMINATION:  XR ANKLE COMPLETE 3 VIEW LEFT; XR TIBIA FIBULA 2 VIEW LEFT; XR FOOT COMPLETE 3 VIEW LEFT    CLINICAL HISTORY:  Unspecified injury of unspecified ankle, initial encounter; Unspecified injury of unspecified lower leg, initial encounter; Unspecified injury of unspecified foot, initial encounter    TECHNIQUE:  AP, lateral and oblique views of the left ankle and left foot were performed.  AP and lateral views left tibia and fibula were performed.    COMPARISON:  None    FINDINGS:  Mildly displaced oblique fracture distal left fibula with distal fracture fragment displaced a few mm laterally.  Fracture extends to the syndesmosis just above the tibiotalar joint.  Minimally displaced avulsion fracture tip of the medial malleolus.  No additional acute fracture identified.  No dislocation.  Slight widening of the ankle mortise medially.  Talar dome is maintained.  Lisfranc joints appear congruent.  Enthesopathy on the plantar aspect of the calcaneus.  Moderate degenerative change 1st MTP joint.  Soft tissue swelling about the ankle, particularly medially.                                       X-Ray Foot Complete Left (Final result)  Result time 12/30/23 01:03:34      Final result by Brendon Dillard MD (12/30/23 01:03:34)                   Impression:      Mildly displaced oblique fracture distal left fibula with distal fracture fragment displaced a few mm laterally. Fracture  extends to the syndesmosis just above the tibiotalar joint. Minimally displaced avulsion fracture tip of the medial malleolus.  No additional acute fracture identified. No dislocation.  Slight widening of the ankle mortise medially.      Electronically signed by: Brendon Dillard MD  Date:    12/30/2023  Time:    01:03               Narrative:    EXAMINATION:  XR ANKLE COMPLETE 3 VIEW LEFT; XR TIBIA FIBULA 2 VIEW LEFT; XR FOOT COMPLETE 3 VIEW LEFT    CLINICAL HISTORY:  Unspecified injury of unspecified ankle, initial encounter; Unspecified injury of unspecified lower leg, initial encounter; Unspecified injury of unspecified foot, initial encounter    TECHNIQUE:  AP, lateral and oblique views of the left ankle and left foot were performed.  AP and lateral views left tibia and fibula were performed.    COMPARISON:  None    FINDINGS:  Mildly displaced oblique fracture distal left fibula with distal fracture fragment displaced a few mm laterally.  Fracture extends to the syndesmosis just above the tibiotalar joint.  Minimally displaced avulsion fracture tip of the medial malleolus.  No additional acute fracture identified.  No dislocation.  Slight widening of the ankle mortise medially.  Talar dome is maintained.  Lisfranc joints appear congruent.  Enthesopathy on the plantar aspect of the calcaneus.  Moderate degenerative change 1st MTP joint.  Soft tissue swelling about the ankle, particularly medially.                                       X-Ray Ankle Complete Left (Final result)  Result time 12/30/23 01:03:34      Final result by Brendon Dillard MD (12/30/23 01:03:34)                   Impression:      Mildly displaced oblique fracture distal left fibula with distal fracture fragment displaced a few mm laterally. Fracture extends to the syndesmosis just above the tibiotalar joint. Minimally displaced avulsion fracture tip of the medial malleolus.  No additional acute fracture identified. No dislocation.  Slight  widening of the ankle mortise medially.      Electronically signed by: Brendon Dillard MD  Date:    12/30/2023  Time:    01:03               Narrative:    EXAMINATION:  XR ANKLE COMPLETE 3 VIEW LEFT; XR TIBIA FIBULA 2 VIEW LEFT; XR FOOT COMPLETE 3 VIEW LEFT    CLINICAL HISTORY:  Unspecified injury of unspecified ankle, initial encounter; Unspecified injury of unspecified lower leg, initial encounter; Unspecified injury of unspecified foot, initial encounter    TECHNIQUE:  AP, lateral and oblique views of the left ankle and left foot were performed.  AP and lateral views left tibia and fibula were performed.    COMPARISON:  None    FINDINGS:  Mildly displaced oblique fracture distal left fibula with distal fracture fragment displaced a few mm laterally.  Fracture extends to the syndesmosis just above the tibiotalar joint.  Minimally displaced avulsion fracture tip of the medial malleolus.  No additional acute fracture identified.  No dislocation.  Slight widening of the ankle mortise medially.  Talar dome is maintained.  Lisfranc joints appear congruent.  Enthesopathy on the plantar aspect of the calcaneus.  Moderate degenerative change 1st MTP joint.  Soft tissue swelling about the ankle, particularly medially.

## 2023-12-30 NOTE — ASSESSMENT & PLAN NOTE
- serum ETOH 277 on admit  - denies hx of withdrawal or seizures  - start folic/thiamine/MV  - start cIVFs  - last drink on prior to fall  - does not appear to be actively withdrawing on interview. CIWA q4h for now  - Ativan 1mg IV prn for anxiety, tremulousness, HR >110, CIWA >8

## 2023-12-30 NOTE — ED NOTES
LOC: The patient is awake, alert and aware of environment with an appropriate affect, the patient is oriented x 3 and speaking appropriately.   APPEARANCE: Patient appears comfortable and in no acute distress, patient is clean and well groomed.  HEENT: Head is normocephalic and sits midline on the shoulders, eyes are symmetrical with no blurry vision or glasses, Ears are clean, dry, intact, patent with no cerumen blocking the pathway, no hearing aids present, nose is free from clean, dry, intact, without any drainage, both nostrils are patent.   SKIN: The skin dry, color consistent with ethnicity, patient has normal skin turgor and moist mucus membranes, skin intact, no breakdown or bruising noted.   MUSCULOSKELETAL: + unable to move left ankle beyond wiggling toes, obvious deformity noted in left ankle, all other extremities are WDL  RESPIRATORY: Airway is open and patent, respirations are spontaneous, patient has a normal effort and rate, no accessory muscle use noted, pt has O2 sats noted at 99% on 2L NC, breath sounds are clear and equal bilaterally  CARDIAC: Patient has a normal rate and regular rhythm, no edema noted, capillary refill < 3 seconds.   GASTRO: Soft and non tender to palpation, no distention noted, normoactive bowel sounds present in all four quadrants. Pt states bowel movements have been regular.  : Pt denies any pain or frequency with urination.  NEURO: Pt opens eyes spontaneously, behavior appropriate to situation, follows commands, facial expression symmetrical, bilateral hand grasp equal and even, purposeful motor response noted, normal sensation in all extremities when touched with a finger, JOSE LUIS noted  Peripheral Vascular: All pulses 2+, all extremities warm except for left foot which is ice cold compared to right foot, no numbness or tingling to any extremities, no edema noted

## 2023-12-30 NOTE — ASSESSMENT & PLAN NOTE
- echo with EF 25-30% with GI LV DD, RV systolic function mildly reduced  - BNP 84 on admit. No rales on exam. Swelling to LUE 2/2 injury, but no edema to RLE. No concern for acute exacerbation  - Continue Beta Blocker, ACE/ARB, Furosemide, and Aldactone and monitor clinical status closely.      - Monitor on telemetry.   - Monitor strict Is&Os and daily weights.  Place on fluid restriction of 1.5 L.

## 2023-12-30 NOTE — ASSESSMENT & PLAN NOTE
- reports 2-3 days of intermittent chest pain. Episodes lasting for a few minutes.  - trop 0.026, will repeat (lower than baseline)  - EKG without acute ischemic changes   - stat EKG for chest pain   - monitor tele

## 2023-12-30 NOTE — PROVIDER PROGRESS NOTES - EMERGENCY DEPT.
Encounter Date: 12/29/2023    ED Physician Progress Notes         EKG - STEMI Decision  Initial Reading: No STEMI present.

## 2023-12-30 NOTE — CONSULTS
[facility] - Emergency Dept  Orthopedics  Consult Note    Patient Name: Dionicio Bess  MRN: [id number]  Admission Date: 12/29/2023  Hospital Length of Stay: 0 days  Attending Provider: Rebecca Sanches MD  Primary Care Provider: Isela, Primary Doctor    Inpatient consult to Orthopedic Surgery  Consult performed by: Tony Carl MD  Consult ordered by: Rebecca Sanches MD        Subjective:     Principal Problem:<principal problem not specified>    Chief Complaint:   Chief Complaint   Patient presents with    Ankle Pain     Tripped while walking. +deformity, swelling to left ankle. Distal pulses palpable. +ETOH. Hx of stroke and left side deficits. Pain 6/10 to left ankle.         HPI: Dionicio Bess is a 58 y.o. male with PMH significant for a fib on eliquis, HTN, CVA with residual left sided weakness, NSTEMI, CHF, CAD, and HLD presenting with left sided ankle pain after sustaining a ground level fall last night. Patient states that he was intoxicated last night when he tripped and felt immediate pain and inability to bear weight on the left ankle. Patient denies any head trauma or LOC. The patient denies prior hx of falls. Patient denies any new numbness and tingling. Denies any other musculoskeletal pain or injuries. Prior right sided FPL repair done at [facility] in 2015.  Walks w/out assisted devices at baseline.  They deny IV drug use.   They deny tobacco use.   They endorse frequent alcohol use with current history of alcohol abuse disorder, but he denies history of alcohol withdrawal, seizures, or hallucinations.   They deny immunosuppressant medications.  They deny chemotherapy.  They deny radiation therapy.     Past Medical History:   Diagnosis Date    Stroke        No past surgical history on file.    Review of patient's allergies indicates:  No Known Allergies    Current Facility-Administered Medications   Medication    HYDROmorphone injection 1 mg     Current Outpatient Medications   Medication Sig     amLODIPine (NORVASC) 10 MG tablet Take 1 tablet (10 mg total) by mouth once daily.    apixaban (ELIQUIS) 5 mg Tab Take 1 tablet (5 mg total) by mouth 2 (two) times daily.    aspirin 81 MG Chew Chew and swallow 1 (1 mg total) by mouth once daily.    atorvastatin (LIPITOR) 80 MG tablet Take 1 tablet (80 mg total) by mouth every evening.    FLUoxetine 20 MG capsule Take 1 capsule (20 mg total) by mouth once daily.    furosemide (LASIX) 40 MG tablet Take 1 tablet (40 mg total) by mouth once daily.    JARDIANCE 10 mg tablet Take 1 tablet (10 mg total) by mouth once daily.    losartan (COZAAR) 25 MG tablet Take 1 tablet (25 mg total) by mouth once daily.    metoprolol succinate (TOPROL-XL) 50 MG 24 hr tablet Take 1 tablet (50 mg total) by mouth once daily.    pantoprazole (PROTONIX) 20 MG tablet Take 1 tablet (20 mg total) by mouth every morning.    spironolactone (ALDACTONE) 25 MG tablet Take 1 tablet (25 mg total) by mouth once daily.    thiamine 100 MG tablet Take 2 tablets (200 mg total) by mouth once daily.     Family History    None       Tobacco Use    Smoking status: Never    Smokeless tobacco: Never   Substance and Sexual Activity    Alcohol use: Yes     Comment: occasional    Drug use: No    Sexual activity: Not on file     ROS    Constitutional: negative for fevers  Eyes: negative visual changes  ENT: negative for hearing loss  Respiratory: negative for dyspnea  Cardiovascular: negative for chest pain  Gastrointestinal: negative for abdominal pain  Genitourinary: negative for dysuria  Neurological: negative for headaches  Behavioral/Psych: negative for hallucinations  Endocrine: negative for temperature intolerance    Objective:     Vital Signs (Most Recent):  Temp: 98.7 °F (37.1 °C) (12/30/23 0700)  Pulse: (!) 59 (12/30/23 0700)  Resp: 10 (12/30/23 0700)  BP: 117/85 (12/30/23 0700)  SpO2: 100 % (12/30/23 0700) Vital Signs (24h Range):  Temp:  [98.4 °F (36.9 °C)-98.7 °F (37.1 °C)] 98.7 °F (37.1 °C)  Pulse:   "[59-72] 59  Resp:  [10-23] 10  SpO2:  [97 %-100 %] 100 %  BP: (113-125)/(63-91) 117/85           There is no height or weight on file to calculate BMI.    No intake or output data in the 24 hours ending 12/30/23 0820     Ortho/SPM Exam     Gen:  No acute distress, well-developed, well nourished.  CV:  Peripherally well-perfused. 2+ radial pulses, symmetric.  Respiratory:  Normal respiratory effort. No accessory muscle use.   Head/Neck:  Normocephalic.  Atraumatic. Sclera anicteric. TM. Neck supple.  Neuro: CN 2-12 grossly intact. No FND. Awake. Alert. Oriented to person, place, time, and situation.   Abdomen: Soft, NTND.      MSK:  Left Upper Extremity  Inspection  - Skin intact throughout, no open wounds  - No swelling  - No ecchymosis, erythema, or signs of cellulitis  Palpation  - NonTTP throughout, no palpable abnormality   Range of motion  - AROM of the shoulder, elbow, wrist, and fingers limited due to prior stroke, flexion contracture at the wrist that is passively correctable.   Stability  - No evidence of joint dislocation or abnormal laxity   Neurovascular  - SILT throughout  - Compartments soft  - Radial artery palpated  - Capillary Refill <3s  - Muscle tone normal    Right Upper Extremity  Inspection  - Skin intact throughout, no open wounds  - No swelling  - No ecchymosis, erythema, or signs of cellulitis  Palpation  - NonTTP throughout, no palpable abnormality   Range of motion  - AROM and PROM of the shoulder, elbow, wrist, and hand intact  Stability  - No evidence of joint dislocation or abnormal laxity Neurovascular  - AIN/PIN/Radial/Median/Ulnar Nerves assessed in isolation without deficit  - Able to give thumbs up, make "OK" sign, cross IF/LF, abduct/adduct fingers, make fist  - SILT throughout  - Compartments soft  - Radial artery palpated  - Capillary Refill <3s  - Muscle tone normal      Left Lower Extremity  Inspection  - Skin intact throughout, no open wounds  - Significant swelling about the " ankle, ecchymosis over the dorsum of the ankle  - No erythema, or signs of cellulitis  Palpation  - TTP throughout the ankle, nontender otherwise through LLE  Range of motion  - AROM and PROM of the hip, knee, and foot intact  Stability  - Ankle appears moderately dislocated laterally  Neurovascular  - TA/EHL/Gastroc/FHL assessed in isolation without deficit  - SILT throughout  - Compartments soft  - DP palpated   - Capillary Refill <3s  - Negative Log roll  - Muscle tone normal    Righgt Lower Extremity  Inspection  - Skin intact throughout, no open wounds  - No swelling  - No ecchymosis, erythema, or signs of cellulitis  Palpation  - NonTTP throughout, no palpable abnormality   Range of motion  - AROM and PROM of the hip, knee, ankle, and foot intact  Stability  - No evidence of joint dislocation or abnormal laxity, Neurovascular  - TA/EHL/Gastroc/FHL assessed in isolation without deficit  - SILT throughout  - Compartments soft  - DP palpated   - Capillary Refill <3s  - Negative Log roll  - Negative Stinchfield  - Muscle tone normal    Spine/pelvis/axial body:  No tenderness to palpation of cervical, thoracic, or lumbar spine  No pain with compression of pelvis  No chest wall or abdominal tenderness  No decubitus ulcers  Muscle tone normal      Significant Labs: CBC:   Recent Labs   Lab 12/29/23  2211   WBC 5.70   HGB 13.2*   HCT 38.5*        CMP:   Recent Labs   Lab 12/29/23  2211      K 4.0   CL 98   CO2 27      BUN 14   CREATININE 0.9   CALCIUM 9.3   PROT 8.5*   ALBUMIN 4.3   BILITOT 0.4   ALKPHOS 56   AST 29   ALT 10   ANIONGAP 14     All pertinent labs within the past 24 hours have been reviewed.    Significant Imaging: CT: I have reviewed all pertinent results/findings and my personal findings are:  CT ankle pending, CT head shows large area of hypoattenuation through the right MCA territory concerning for new infarct.   X-Ray: I have reviewed all pertinent results/findings and my  personal findings are:  XR of the left ankle shows a functional bimalleolar ankle fracture with smith B fibula fracture and lateral talar shift. Post reduction films show improved tibiotalar alignment.   I have reviewed and interpreted all pertinent imaging results/findings.  Assessment/Plan:     Bimalleolar ankle fracture, left, closed, initial encounter  Dionicio Bess is a 58 y.o. male with multiple medical comorbidities, an elevated troponin, elevated ETOH, new CT head findings concerning for infarct, and a left sided functional bimalleolar ankle fracture sustained after a ground level fall yesterday, closed and at neurovascular baseline for the LLE. Reduced and splinted in the ED, see procedure note below for further details.     - Patient is likely being admitted for work up of potential new infarct and trending of elevated troponin.    - At this time patient is too swollen for definitive fixation  - NWB to LLE, keep iced and elevated  - CT ankle pending for preoperative planning  - Orthopedics will continue to follow while inpatient  - WBC 4.13, Hgb 13.2, HCT 38.5, , , Creatinine 0.9    From orthopedic perspective, patient could follow up outpatient for definitive fixation of ankle; however, should patient be admitted for work up and treatment of other ailments, he could be scheduled for surgery next week if he is still admitted and optimized for surgery.         MUNIRA MOSLEY MD  Orthopedics  [facility] - Emergency Dept

## 2023-12-30 NOTE — CONSULTS
[facility] - Emergency Dept  Vascular Neurology  [facility]  Consult Note    Consults  Assessment/Plan:     Patient is a 58 y.o. year old male with:    Stroke due to embolism of right middle cerebral artery  58 year old man with Afib on Eliquis, HTN, HLD, CAD, prior R MCA infarct 2/2023 with residual LSW and dysarthria who presented after fall from standing 12/29 PM. Found to have left ankle fracture. CTH with hypodensity, MRI showed small infarcts in R MCA territory. Believe this is an incidental finding given patient reports his weakness and dysarthria are at baseline with no recent change.     Etiology of these infarcts likely cardioembolic from known Afib. No further stroke workup indicated at this time. Recommend continuing anticoagulation with Eliquis 5 mg BID. If plan for surgery, then resume AC when safe postop from surgical perspective. Med list includes ASA 81 mg, which is not indicated from a stroke perspective given he is anticoagulated.     Vascular Neurology will sign off. Please call with questions.    Antithrombotics for secondary stroke prevention: Anticoagulants: continue Apixaban 5 mg BID     Statins for secondary stroke prevention and hyperlipidemia, if present: Statins: continue Atorvastatin- 80 mg daily    Aggressive risk factor modification: HTN, HLD, Diet, Exercise, A-Fib, CAD     Rehab efforts: The patient has been evaluated by a stroke team provider and the therapy needs have been fully considered based off the presenting complaints and exam findings. Does not require therapy eval from stroke perspective given no new or worse weakness/dysarthria.    Diagnostics ordered/pending: None       Bimalleolar ankle fracture, left, closed, initial encounter  Found on presentation, evaluated by Ortho    Hyperlipidemia  Stroke risk factor   - continue atorvastatin 80 mg QD    Coronary artery disease involving native coronary artery with angina pectoris  - high intensity statin    Acute  on chronic combined systolic and diastolic congestive heart failure  TTE 11/2023 with EF 25-30%, biatrial enlargement, and global hypokinesis    History of ischemic cerebrovascular accident (CVA) with residual deficit  Residual LSW and dysarthria after R MCA infarct in 2/2023    Essential hypertension  Stroke risk factor. Listed home meds are amlodipine 10 mg, losartan 25 mg, lasix 40 mg QD, and spironolactone 25 mg.    Paroxysmal atrial fibrillation  Stroke risk factor  - continue Eliquis  - on metoprolol succinate for rate control        STROKE DOCUMENTATION          NIH Scale:  1a. Level of Consciousness: 0-->Alert, keenly responsive  1b. LOC Questions: 0-->Answers both questions correctly  1c. LOC Commands: 0-->Performs both tasks correctly  2. Best Gaze: 0-->Normal  3. Visual: 0-->No visual loss  4. Facial Palsy: 2-->Partial paralysis (total or near-total paralysis of lower face)  5a. Motor Arm, Left: 1-->Drift, limb holds 90 (or 45) degrees, but drifts down before full 10 seconds, does not hit bed or other support  5b. Motor Arm, Right: 0-->No drift, limb holds 90 (or 45) degrees for full 10 secs  6a. Motor Leg, Left: 2-->Some effort against gravity, leg falls to bed by 5 secs, but has some effort against gravity  6b. Motor Leg, Right: 0-->No drift, leg holds 30 degree position for full 5 secs  7. Limb Ataxia: 0-->Absent  8. Sensory: 0-->Normal, no sensory loss  9. Best Language: 0-->No aphasia, normal  10. Dysarthria: 1-->Mild-to-moderate dysarthria, patient slurs at least some words and, at worst, can be understood with some difficulty  11. Extinction and Inattention (formerly Neglect): 0-->No abnormality  Total (NIH Stroke Scale): 6    Modified Jacob Score: 2  [place] Coma Scale:15   [id number] Score:5      Thrombolysis Candidate? No, Out of window - Symptom onset > 4.5 hours, Patient back to neurological baseline     Delays to Thrombolysis?  Not Applicable    Interventional Revascularization Candidate?    Is the patient eligible for mechanical endovascular reperfusion (CELESTE)?  No; at this time symptoms not suggestive of large vessel occlusion    Delays to Thrombectomy? Not Applicable    Hemorrhagic change of an Ischemic Stroke: Does this patient have an ischemic stroke with hemorrhagic changes? No     Subjective:     History of Present Illness:  Dionicio Bess is a 58 year old man with PMH Afib on Eliquis, prior R MCA infarct 2/2023 with residual LSW and dysarthria, HTN, CAD, HLD who presented 12/29 after ground level fall. He reports that he was walking up the steps at the shelter he [facility] at when he tripped and fell onto his left side. Found to have L malleolus fracture and evaluated by Orthopedics. CTH showed hypodensity, MRI showed punctate infarcts in R MCA in territory.    Patient reports no recent change in his speech or his left side strength. He takes his meds in pill packs and reports adherence to his AM and PM doses although is unable to specify the name of the blood thinner he takes. Denies missing doses recently.           Past Medical History:   Diagnosis Date    CAD (coronary artery disease)     HLD (hyperlipidemia)     HTN (hypertension)     Stroke     R MCA 2/2023     No past surgical history on file.  Social History     Tobacco Use    Smoking status: Never    Smokeless tobacco: Never   Substance Use Topics    Alcohol use: Yes     Comment: occasional    Drug use: No     Review of patient's allergies indicates:  No Known Allergies    Medications: I have reviewed the current medication administration record.    (Not in a hospital admission)      Review of Systems   Constitutional:  Negative for diaphoresis and fever.   HENT:  Negative for nosebleeds and rhinorrhea.    Eyes:  Negative for redness and itching.   Respiratory:  Negative for cough and stridor.    Cardiovascular:  Negative for chest pain and leg swelling.   Gastrointestinal:  Negative for abdominal distention and vomiting.   Genitourinary:   Negative for difficulty urinating and hematuria.   Musculoskeletal:  Positive for myalgias (L side s/p fall). Negative for neck stiffness.   Neurological:  Positive for weakness (L side, chronic). Negative for facial asymmetry and speech difficulty.   Psychiatric/Behavioral:  Negative for agitation and confusion.      Objective:     Vital Signs (Most Recent):  Temp: 99.1 °F (37.3 °C) (12/30/23 1015)  Pulse: 79 (12/30/23 1030)  Resp: 18 (12/30/23 1030)  BP: 119/77 (12/30/23 1030)  SpO2: (!) 94 % (12/30/23 1030)    Vital Signs Range (Last 24H):  Temp:  [98.4 °F (36.9 °C)-99.1 °F (37.3 °C)]   Pulse:  [59-79]   Resp:  [10-23]   BP: (113-138)/(63-97)   SpO2:  [94 %-100 %]        Physical Exam  Vitals and nursing note reviewed.   Constitutional:       General: He is not in acute distress.  HENT:      Head: Normocephalic and atraumatic.      Nose: Nose normal. No congestion.   Eyes:      Extraocular Movements: Extraocular movements intact.      Conjunctiva/sclera: Conjunctivae normal.   Cardiovascular:      Rate and Rhythm: Normal rate.   Pulmonary:      Effort: Pulmonary effort is normal. No respiratory distress.   Musculoskeletal:      Comments: Cast and dressing in place over distal L leg   Skin:     General: Skin is warm and dry.   Neurological:      Mental Status: He is alert.          Neurological Exam:   LOC: alert  Attention Span: Good   Language: No aphasia  Articulation: Dysarthria - baseline per patient  Orientation: Person, Place, Time   EOM (CN III, IV, VI): Full/intact  Facial Sensation (CN V): Normal  Facial Movement (CN VII): Lower facial weakness on the Left - baseline per patient  Motor: Arm left  4/5 shoulder abduction, elbow flexion/extension; 1/5 finger extension  Leg left  5/5 proximally  Arm right  Normal 5/5  Leg right Normal 5/5  Cerebellum: No evidence of appendicular or axial ataxia  Sensation: intact to light touch in all extremities      Laboratory:  CMP:   Recent Labs   Lab 12/29/23  9275    CALCIUM 9.3   ALBUMIN 4.3   PROT 8.5*      K 4.0   CO2 27   CL 98   BUN 14   CREATININE 0.9   ALKPHOS 56   ALT 10   AST 29   BILITOT 0.4     CBC:   Recent Labs   Lab 12/29/23  2211   WBC 5.70   RBC 4.13*   HGB 13.2*   HCT 38.5*      MCV 93   MCH 32.0*   MCHC 34.3       Diagnostic Results:  Brain imaging:  CT Head w/o contrast 12/30/23  Large area of hypoattenuation through the right MCA territory. Some of this likely corresponds to previously reported area of infarct on outside imaging report dated 05/07/2023 (images not available for comparison), but some regions are concerning for more recent component of infarct (there is edema with loss of normal sulcation).  Recommend correlation with outside images and further evaluation with MRI as clinically warranted to evaluate for potential acute on chronic infarct.     MRI Brain w/wo contrast 12/30/23      Vessel Imaging:  N/A    Cardiac Evaluation:   TTE 11/6/23    Left Ventricle: The left ventricle is normal in size. Normal wall thickness. Global hypokinesis present. There is severely reduced systolic function with a visually estimated ejection fraction of 25 - 30%. Grade I diastolic dysfunction.    Right Ventricle: Mild right ventricular enlargement. Wall thickness is normal. Right ventricle wall motion  is normal. Systolic function is mildly reduced.    Biatrial enlargement    Pulmonary Artery: The estimated pulmonary artery systolic pressure is 19 mmHg.    IVC/SVC: Normal venous pressure at 3 mmHg.    Tammi Cazares MD  Com[facility] St[facility]  Department of Vascular Neurology   [facility] - Emergency Dept

## 2023-12-30 NOTE — HPI
Dionicio Bess is a 58 year old man with PMH Afib on Eliquis, prior R MCA infarct 2/2023 with residual LSW and dysarthria, HTN, CAD, HLD who presented 12/29 after ground level fall. He reports that he was walking up the steps at the shelter he stays at when he tripped and fell onto his left side. Found to have L malleolus fracture and evaluated by Orthopedics. CTH showed hypodensity, MRI showed punctate infarcts in R MCA in territory.    Patient reports no recent change in his speech or his left side strength. He takes his meds in pill packs and reports adherence to his AM and PM doses although is unable to specify the name of the blood thinner he takes. Denies missing doses recently.

## 2023-12-30 NOTE — ASSESSMENT & PLAN NOTE
- prior R MCA infarct 2/2023 with residual LSW and dysarthria   - Vascular neuro consulted:    - CTH with hypodensity, MRI showed small infarcts in R MCA territory. Believe this is an incidental finding given patient reports his weakness and dysarthria are at baseline with no recent change.     - Etiology of these infarcts likely cardioembolic from known Afib. No further stroke workup indicated at this time.    - Recommend continuing anticoagulation with Eliquis 5 mg BID. If plan for surgery, then resume AC when safe postop from surgical perspective. Med list includes ASA 81 mg, which is not indicated from a stroke perspective given he is anticoagulated.

## 2023-12-30 NOTE — ASSESSMENT & PLAN NOTE
- imaging with left sided functional bimalleolar ankle fracture sustained after a ground level fall yesterday   - reduced and splinted in ED  - Ortho consulted:   - At this time patient is too swollen for definitive fixation  - NWB to LLE, keep iced and elevated  - Orthopedics will continue to follow while inpatient    - From orthopedic perspective, patient could follow up outpatient for definitive fixation of ankle; however, should patient be admitted for work up and treatment of other ailments, he could be scheduled for surgery next week if he is still admitted and optimized for surgery.   - MM pain control

## 2023-12-30 NOTE — ED NOTES
Telemetry Verification   Patient placed on Telemetry Box  Verified with War Room  Box # [id number]   Monitor Tech Maykel   Rate 92   Rhythm Normal Sinus

## 2023-12-30 NOTE — SUBJECTIVE & OBJECTIVE
Past Medical History:   Diagnosis Date    Stroke        No past surgical history on file.    Review of patient's allergies indicates:  No Known Allergies    Current Facility-Administered Medications   Medication    HYDROmorphone injection 1 mg     Current Outpatient Medications   Medication Sig    amLODIPine (NORVASC) 10 MG tablet Take 1 tablet (10 mg total) by mouth once daily.    apixaban (ELIQUIS) 5 mg Tab Take 1 tablet (5 mg total) by mouth 2 (two) times daily.    aspirin 81 MG Chew Chew and swallow 1 (1 mg total) by mouth once daily.    atorvastatin (LIPITOR) 80 MG tablet Take 1 tablet (80 mg total) by mouth every evening.    FLUoxetine 20 MG capsule Take 1 capsule (20 mg total) by mouth once daily.    furosemide (LASIX) 40 MG tablet Take 1 tablet (40 mg total) by mouth once daily.    JARDIANCE 10 mg tablet Take 1 tablet (10 mg total) by mouth once daily.    losartan (COZAAR) 25 MG tablet Take 1 tablet (25 mg total) by mouth once daily.    metoprolol succinate (TOPROL-XL) 50 MG 24 hr tablet Take 1 tablet (50 mg total) by mouth once daily.    pantoprazole (PROTONIX) 20 MG tablet Take 1 tablet (20 mg total) by mouth every morning.    spironolactone (ALDACTONE) 25 MG tablet Take 1 tablet (25 mg total) by mouth once daily.    thiamine 100 MG tablet Take 2 tablets (200 mg total) by mouth once daily.     Family History    None       Tobacco Use    Smoking status: Never    Smokeless tobacco: Never   Substance and Sexual Activity    Alcohol use: Yes     Comment: occasional    Drug use: No    Sexual activity: Not on file     ROS    Constitutional: negative for fevers  Eyes: negative visual changes  ENT: negative for hearing loss  Respiratory: negative for dyspnea  Cardiovascular: negative for chest pain  Gastrointestinal: negative for abdominal pain  Genitourinary: negative for dysuria  Neurological: negative for headaches  Behavioral/Psych: negative for hallucinations  Endocrine: negative for temperature  intolerance    Objective:     Vital Signs (Most Recent):  Temp: 98.7 °F (37.1 °C) (12/30/23 0700)  Pulse: (!) 59 (12/30/23 0700)  Resp: 10 (12/30/23 0700)  BP: 117/85 (12/30/23 0700)  SpO2: 100 % (12/30/23 0700) Vital Signs (24h Range):  Temp:  [98.4 °F (36.9 °C)-98.7 °F (37.1 °C)] 98.7 °F (37.1 °C)  Pulse:  [59-72] 59  Resp:  [10-23] 10  SpO2:  [97 %-100 %] 100 %  BP: (113-125)/(63-91) 117/85           There is no height or weight on file to calculate BMI.    No intake or output data in the 24 hours ending 12/30/23 0820     Ortho/SPM Exam     Gen:  No acute distress, well-developed, well nourished.  CV:  Peripherally well-perfused. 2+ radial pulses, symmetric.  Respiratory:  Normal respiratory effort. No accessory muscle use.   Head/Neck:  Normocephalic.  Atraumatic. Sclera anicteric. TM. Neck supple.  Neuro: CN 2-12 grossly intact. No FND. Awake. Alert. Oriented to person, place, time, and situation.   Abdomen: Soft, NTND.      MSK:  Left Upper Extremity  Inspection  - Skin intact throughout, no open wounds  - No swelling  - No ecchymosis, erythema, or signs of cellulitis  Palpation  - NonTTP throughout, no palpable abnormality   Range of motion  - AROM of the shoulder, elbow, wrist, and fingers limited due to prior stroke, flexion contracture at the wrist that is passively correctable.   Stability  - No evidence of joint dislocation or abnormal laxity   Neurovascular  - SILT throughout  - Compartments soft  - Radial artery palpated  - Capillary Refill <3s  - Muscle tone normal    Right Upper Extremity  Inspection  - Skin intact throughout, no open wounds  - No swelling  - No ecchymosis, erythema, or signs of cellulitis  Palpation  - NonTTP throughout, no palpable abnormality   Range of motion  - AROM and PROM of the shoulder, elbow, wrist, and hand intact  Stability  - No evidence of joint dislocation or abnormal laxity Neurovascular  - AIN/PIN/Radial/Median/Ulnar Nerves assessed in isolation without deficit  -  "Able to give thumbs up, make "OK" sign, cross IF/LF, abduct/adduct fingers, make fist  - SILT throughout  - Compartments soft  - Radial artery palpated  - Capillary Refill <3s  - Muscle tone normal      Left Lower Extremity  Inspection  - Skin intact throughout, no open wounds  - Significant swelling about the ankle, ecchymosis over the dorsum of the ankle  - No erythema, or signs of cellulitis  Palpation  - TTP throughout the ankle, nontender otherwise through LLE  Range of motion  - AROM and PROM of the hip, knee, and foot intact  Stability  - Ankle appears moderately dislocated laterally  Neurovascular  - TA/EHL/Gastroc/FHL assessed in isolation without deficit  - SILT throughout  - Compartments soft  - DP palpated   - Capillary Refill <3s  - Negative Log roll  - Muscle tone normal    Righgt Lower Extremity  Inspection  - Skin intact throughout, no open wounds  - No swelling  - No ecchymosis, erythema, or signs of cellulitis  Palpation  - NonTTP throughout, no palpable abnormality   Range of motion  - AROM and PROM of the hip, knee, ankle, and foot intact  Stability  - No evidence of joint dislocation or abnormal laxity, Neurovascular  - TA/EHL/Gastroc/FHL assessed in isolation without deficit  - SILT throughout  - Compartments soft  - DP palpated   - Capillary Refill <3s  - Negative Log roll  - Negative Stinchfield  - Muscle tone normal    Spine/pelvis/axial body:  No tenderness to palpation of cervical, thoracic, or lumbar spine  No pain with compression of pelvis  No chest wall or abdominal tenderness  No decubitus ulcers  Muscle tone normal      Significant Labs: CBC:   Recent Labs   Lab 12/29/23  2211   WBC 5.70   HGB 13.2*   HCT 38.5*        CMP:   Recent Labs   Lab 12/29/23  2211      K 4.0   CL 98   CO2 27      BUN 14   CREATININE 0.9   CALCIUM 9.3   PROT 8.5*   ALBUMIN 4.3   BILITOT 0.4   ALKPHOS 56   AST 29   ALT 10   ANIONGAP 14     All pertinent labs within the past 24 hours have " been reviewed.    Significant Imaging: CT: I have reviewed all pertinent results/findings and my personal findings are:  CT ankle pending, CT head shows large area of hypoattenuation through the right MCA territory concerning for new infarct.   X-Ray: I have reviewed all pertinent results/findings and my personal findings are:  XR of the left ankle shows a functional bimalleolar ankle fracture with smith B fibula fracture and lateral talar shift. Post reduction films show improved tibiotalar alignment.   I have reviewed and interpreted all pertinent imaging results/findings.

## 2023-12-30 NOTE — ED PROVIDER NOTES
History:  Dionicio Bess is a 58 y.o. male who presents to the ED with Ankle Pain (Tripped while walking. +deformity, swelling to left ankle. Distal pulses palpable. +ETOH. Hx of stroke and left side deficits. Pain 6/10 to left ankle. )    Described as 58-year-old male with a history of paroxysmal AFib on Eliquis, hypertension, CVA with residual left-sided deficits, CAD status post stent placement, hyperlipidemia presenting to the emergency department after mechanical fall, also complaining of chest pain.  He reports today he tripped on a step, hurting his left ankle.  He does endorse striking his head in his on blood thinners.  He did not lose consciousness.  He also reports that he was had intermittent chest pain over the past 2-3 days, none currently, with no associated symptoms.  He reports he was a history of a stent and has had similar pains due to this in the past.    On chart review, patient had a stress test performed last month that was nondiagnostic though abnormal.  It does not appear that this was ever followed up on by cardiology as an outpatient.  The patient does have socioeconomic issues and resides in a homeless shelter.    Review of Systems: All systems reviewed and are negative except as per history of present illness.    Medications:   Previous Medications    AMLODIPINE (NORVASC) 10 MG TABLET    Take 1 tablet (10 mg total) by mouth once daily.    APIXABAN (ELIQUIS) 5 MG TAB    Take 1 tablet (5 mg total) by mouth 2 (two) times daily.    ASPIRIN 81 MG CHEW    Chew and swallow 1 (1 mg total) by mouth once daily.    ATORVASTATIN (LIPITOR) 80 MG TABLET    Take 1 tablet (80 mg total) by mouth every evening.    FLUOXETINE 20 MG CAPSULE    Take 1 capsule (20 mg total) by mouth once daily.    FUROSEMIDE (LASIX) 40 MG TABLET    Take 1 tablet (40 mg total) by mouth once daily.    JARDIANCE 10 MG TABLET    Take 1 tablet (10 mg total) by mouth once daily.    LOSARTAN (COZAAR) 25 MG TABLET    Take 1 tablet (25  mg total) by mouth once daily.    METOPROLOL SUCCINATE (TOPROL-XL) 50 MG 24 HR TABLET    Take 1 tablet (50 mg total) by mouth once daily.    PANTOPRAZOLE (PROTONIX) 20 MG TABLET    Take 1 tablet (20 mg total) by mouth every morning.    SPIRONOLACTONE (ALDACTONE) 25 MG TABLET    Take 1 tablet (25 mg total) by mouth once daily.    THIAMINE 100 MG TABLET    Take 2 tablets (200 mg total) by mouth once daily.       PMH:   Past Medical History:   Diagnosis Date    Stroke      PSH: No past surgical history on file.  Allergies: He has No Known Allergies.  Social History: Marital Status: single. He  reports that he has never smoked. He has never used smokeless tobacco.. He  reports current alcohol use..       Exam:  VITAL SIGNS:   Vitals:    12/30/23 0152 12/30/23 0157 12/30/23 0244 12/30/23 0503   BP:  125/88  (!) 125/91   Pulse:  70 70 60   Resp: 19  20 14   Temp:  98.7 °F (37.1 °C)     TempSrc:  Axillary     SpO2:  97%  100%     Const: Awake and alert, NAD   Head: Atraumatic  Eyes: Normal Conjunctiva  ENT: Normal External Ears, Nose and Mouth.  Neck: Full range of motion. No meningismus.  Resp: Normal respiratory effort, No distress  Cardio: Equal and intact distal pulses  Abd: Soft, non tender, non distended.   Skin: No petechiae or rashes  Ext: +TTP about L ankle with associated STS. Decreased ROM ankle due to pain. Distal pulse and sensation intact. Contractures L hand.   Neur: Awake and alert, L sided facial droop, 3/5 strength LUE/LLE  Psych: Normal Mood and Affect    Data:  Results for orders placed or performed during the hospital encounter of 12/29/23   CBC auto differential   Result Value Ref Range    WBC 5.70 3.90 - 12.70 K/uL    RBC 4.13 (L) 4.60 - 6.20 M/uL    Hemoglobin 13.2 (L) 14.0 - 18.0 g/dL    Hematocrit 38.5 (L) 40.0 - 54.0 %    MCV 93 82 - 98 fL    MCH 32.0 (H) 27.0 - 31.0 pg    MCHC 34.3 32.0 - 36.0 g/dL    RDW 13.5 11.5 - 14.5 %    Platelets 212 150 - 450 K/uL    MPV 10.3 9.2 - 12.9 fL    Immature  Granulocytes 0.4 0.0 - 0.5 %    Gran # (ANC) 3.3 1.8 - 7.7 K/uL    Immature Grans (Abs) 0.02 0.00 - 0.04 K/uL    Lymph # 1.8 1.0 - 4.8 K/uL    Mono # 0.5 0.3 - 1.0 K/uL    Eos # 0.2 0.0 - 0.5 K/uL    Baso # 0.02 0.00 - 0.20 K/uL    nRBC 0 0 /100 WBC    Gran % 56.9 38.0 - 73.0 %    Lymph % 31.6 18.0 - 48.0 %    Mono % 8.1 4.0 - 15.0 %    Eosinophil % 2.6 0.0 - 8.0 %    Basophil % 0.4 0.0 - 1.9 %    Differential Method Automated    Comprehensive metabolic panel   Result Value Ref Range    Sodium 139 136 - 145 mmol/L    Potassium 4.0 3.5 - 5.1 mmol/L    Chloride 98 95 - 110 mmol/L    CO2 27 23 - 29 mmol/L    Glucose 103 70 - 110 mg/dL    BUN 14 6 - 20 mg/dL    Creatinine 0.9 0.5 - 1.4 mg/dL    Calcium 9.3 8.7 - 10.5 mg/dL    Total Protein 8.5 (H) 6.0 - 8.4 g/dL    Albumin 4.3 3.5 - 5.2 g/dL    Total Bilirubin 0.4 0.1 - 1.0 mg/dL    Alkaline Phosphatase 56 55 - 135 U/L    AST 29 10 - 40 U/L    ALT 10 10 - 44 U/L    eGFR >60.0 >60 mL/min/1.73 m^2    Anion Gap 14 8 - 16 mmol/L   Ethanol   Result Value Ref Range    Alcohol, Serum 277 (H) <10 mg/dL   Troponin I   Result Value Ref Range    Troponin I 0.026 0.000 - 0.026 ng/mL   BNP   Result Value Ref Range    BNP 84 0 - 99 pg/mL     Imaging Results              CT Cervical Spine Without Contrast (Final result)  Result time 12/30/23 04:07:05      Final result by Brendon Dillard MD (12/30/23 04:07:05)                   Impression:      No evidence of acute fracture or traumatic subluxation.    Multilevel degenerative changes are detailed above.    Electronically signed by resident: Angie George  Date:    12/30/2023  Time:    03:33    Electronically signed by: Brendon Dillard MD  Date:    12/30/2023  Time:    04:07               Narrative:    EXAMINATION:  CT CERVICAL SPINE WITHOUT CONTRAST    CLINICAL HISTORY:  Neck trauma, intoxicated or obtunded (Age >= 16y);    TECHNIQUE:  Low dose axial images, sagittal and coronal reformations were performed though the cervical  spine.  Contrast was not administered.    COMPARISON:  CT 07/11/2023    FINDINGS:  Alignment: Normal.    Vertebrae: No fracture.  No lytic or blastic lesion.    Discs: Mild multilevel disc height loss.    C1-2: Dens is intact.  Pre-dens space is maintained.    Skull base and craniocervical junction: Normal.    Degenerative findings:    Multilevel degenerative change with areas of facet arthropathy and uncovertebral spurring.    There is no high-grade spinal canal stenosis through the cervical spine.    C2-C3: Moderate right neural foraminal narrowing.    C3-C4: Moderate to severe right neural foraminal narrowing.    C4-C5: No spinal canal stenosis or neural foraminal narrowing.    C5-C6: Severe right and mild-to-moderate left neural foraminal narrowing.    C6-C7: Mild bilateral neural foraminal narrowing.    Paraspinal muscles & soft tissues: Unremarkable.                                        CT Head Without Contrast (Final result)  Result time 12/30/23 03:36:03      Final result by Brendon Dillard MD (12/30/23 03:36:03)                   Impression:      Large area of hypoattenuation through the right MCA territory. Some of this likely corresponds to previously reported area of infarct on outside imaging report dated 05/07/2023 (images not available for comparison), but some regions are concerning for more recent component of infarct (there is edema with loss of normal sulcation).  Recommend correlation with outside images and further evaluation with MRI as clinically warranted to evaluate for potential acute on chronic infarct.    This report was flagged in Epic as abnormal.    Electronically signed by resident: Angei George  Date:    12/30/2023  Time:    03:26    Electronically signed by: Brendon Dillard MD  Date:    12/30/2023  Time:    03:36               Narrative:    EXAMINATION:  CT HEAD WITHOUT CONTRAST    CLINICAL HISTORY:  Head trauma, moderate-severe;    TECHNIQUE:  Low dose axial CT images  obtained throughout the head without the use of intravenous contrast.  Axial, sagittal and coronal reconstructions were performed.    COMPARISON:  Outside imaging report dated 05/07/2023 (images not available for comparison)    FINDINGS:  Large area of hypoattenuation through the right MCA territory.  Some of this likely corresponds to previously reported area of infarct on outside imaging report dated 05/07/2023 (images not available for comparison), but some regions may show edema with loss of normal sulcation, concerning for recent component of infarct.  Recommend correlation with outside imaging study and further evaluation with MRI if clinically warranted.  No evidence of acute intracranial hemorrhage.    Ex vacuo dilation of the right lateral ventricle, likely related to documented remote right MCA infarct.    No extra-axial blood or fluid collections.    No displaced calvarial fracture.    Small left mastoid effusion.  Mild mucosal thickening of the left maxillary sinus.                                       X-Ray Chest AP Portable (Final result)  Result time 12/30/23 00:52:32      Final result by Brendon Dillard MD (12/30/23 00:52:32)                   Impression:      Cardiomegaly with mild perihilar edema.      Electronically signed by: Brendon Dillard MD  Date:    12/30/2023  Time:    00:52               Narrative:    EXAMINATION:  XR CHEST AP PORTABLE    CLINICAL HISTORY:  Chest pain, unspecified    TECHNIQUE:  Single frontal view of the chest was performed.    COMPARISON:  11/05/2023.    FINDINGS:  Loop recorder device overlies the heart, similar to prior.    Cardiomegaly with mild perihilar edema.    Heart and lungs otherwise appear unchanged when allowing for differences in technique and positioning.                                       X-Ray Tibia Fibula 2 View Left (Final result)  Result time 12/30/23 01:03:34      Final result by Brendon Dillard MD (12/30/23 01:03:34)                    Impression:      Mildly displaced oblique fracture distal left fibula with distal fracture fragment displaced a few mm laterally. Fracture extends to the syndesmosis just above the tibiotalar joint. Minimally displaced avulsion fracture tip of the medial malleolus.  No additional acute fracture identified. No dislocation.  Slight widening of the ankle mortise medially.      Electronically signed by: Brendon Dillard MD  Date:    12/30/2023  Time:    01:03               Narrative:    EXAMINATION:  XR ANKLE COMPLETE 3 VIEW LEFT; XR TIBIA FIBULA 2 VIEW LEFT; XR FOOT COMPLETE 3 VIEW LEFT    CLINICAL HISTORY:  Unspecified injury of unspecified ankle, initial encounter; Unspecified injury of unspecified lower leg, initial encounter; Unspecified injury of unspecified foot, initial encounter    TECHNIQUE:  AP, lateral and oblique views of the left ankle and left foot were performed.  AP and lateral views left tibia and fibula were performed.    COMPARISON:  None    FINDINGS:  Mildly displaced oblique fracture distal left fibula with distal fracture fragment displaced a few mm laterally.  Fracture extends to the syndesmosis just above the tibiotalar joint.  Minimally displaced avulsion fracture tip of the medial malleolus.  No additional acute fracture identified.  No dislocation.  Slight widening of the ankle mortise medially.  Talar dome is maintained.  Lisfranc joints appear congruent.  Enthesopathy on the plantar aspect of the calcaneus.  Moderate degenerative change 1st MTP joint.  Soft tissue swelling about the ankle, particularly medially.                                       X-Ray Foot Complete Left (Final result)  Result time 12/30/23 01:03:34      Final result by Brendon Dillard MD (12/30/23 01:03:34)                   Impression:      Mildly displaced oblique fracture distal left fibula with distal fracture fragment displaced a few mm laterally. Fracture extends to the syndesmosis just above the tibiotalar  joint. Minimally displaced avulsion fracture tip of the medial malleolus.  No additional acute fracture identified. No dislocation.  Slight widening of the ankle mortise medially.      Electronically signed by: Brendon Dillard MD  Date:    12/30/2023  Time:    01:03               Narrative:    EXAMINATION:  XR ANKLE COMPLETE 3 VIEW LEFT; XR TIBIA FIBULA 2 VIEW LEFT; XR FOOT COMPLETE 3 VIEW LEFT    CLINICAL HISTORY:  Unspecified injury of unspecified ankle, initial encounter; Unspecified injury of unspecified lower leg, initial encounter; Unspecified injury of unspecified foot, initial encounter    TECHNIQUE:  AP, lateral and oblique views of the left ankle and left foot were performed.  AP and lateral views left tibia and fibula were performed.    COMPARISON:  None    FINDINGS:  Mildly displaced oblique fracture distal left fibula with distal fracture fragment displaced a few mm laterally.  Fracture extends to the syndesmosis just above the tibiotalar joint.  Minimally displaced avulsion fracture tip of the medial malleolus.  No additional acute fracture identified.  No dislocation.  Slight widening of the ankle mortise medially.  Talar dome is maintained.  Lisfranc joints appear congruent.  Enthesopathy on the plantar aspect of the calcaneus.  Moderate degenerative change 1st MTP joint.  Soft tissue swelling about the ankle, particularly medially.                                       X-Ray Ankle Complete Left (Final result)  Result time 12/30/23 01:03:34      Final result by Brendon Dillard MD (12/30/23 01:03:34)                   Impression:      Mildly displaced oblique fracture distal left fibula with distal fracture fragment displaced a few mm laterally. Fracture extends to the syndesmosis just above the tibiotalar joint. Minimally displaced avulsion fracture tip of the medial malleolus.  No additional acute fracture identified. No dislocation.  Slight widening of the ankle mortise  medially.      Electronically signed by: Brendon Dillard MD  Date:    2023  Time:    01:03               Narrative:    EXAMINATION:  XR ANKLE COMPLETE 3 VIEW LEFT; XR TIBIA FIBULA 2 VIEW LEFT; XR FOOT COMPLETE 3 VIEW LEFT    CLINICAL HISTORY:  Unspecified injury of unspecified ankle, initial encounter; Unspecified injury of unspecified lower leg, initial encounter; Unspecified injury of unspecified foot, initial encounter    TECHNIQUE:  AP, lateral and oblique views of the left ankle and left foot were performed.  AP and lateral views left tibia and fibula were performed.    COMPARISON:  None    FINDINGS:  Mildly displaced oblique fracture distal left fibula with distal fracture fragment displaced a few mm laterally.  Fracture extends to the syndesmosis just above the tibiotalar joint.  Minimally displaced avulsion fracture tip of the medial malleolus.  No additional acute fracture identified.  No dislocation.  Slight widening of the ankle mortise medially.  Talar dome is maintained.  Lisfranc joints appear congruent.  Enthesopathy on the plantar aspect of the calcaneus.  Moderate degenerative change 1st MTP joint.  Soft tissue swelling about the ankle, particularly medially.                                    12-LEAD EKG INTERPRETATION BY ME:  Rate/Rhythm: Normal Sinus Rhythm with rate of 71 beats per minute, PACs  QRS, ST, T-waves: No changes consistent with acute ischemia, nonspecific TW flattening  Impression:  No evidence of ischemia or arrhythmia     Labs & Imaging studies were reviewed independently by me.     Medical Decision Makin-year-old male presenting to the emergency department after a mechanical fall complaining of left ankle pain.  X-ray does show fracture of his lateral and medial malleolus with widening of his mortise.  Orthopedic surgery was consulted.  He was given morphine for pain control as well as Toradol.  No other signs of acute traumatic injury.  He was on blood thinners and  does report hitting his head.  CT imaging obtained and negative for ICH or C-spine injury.  CT of the head is concerning for acute on chronic stroke.  Patient denies any acute change in his chronic slight left-sided weakness.  MRI ordered and pending at time of sign-out.  He also complained of chest pain.  Chest x-ray does not reveal any acute abnormalities.  EKG she was nonspecific T-wave flattening with no acute ischemic changes.  Troponin is negative x1 and will be trended.  BNP is normal, doubt CHF exacerbation.  Low suspicion for PE.  He had a stress test performed in November that was reportedly abnormal and nondiagnostic.  He was not had any follow up since that time.  He was currently living in a homeless shelter.  Plan for admission for further ACS evaluation following MRI.    Critical Care Time: 55 minutes  Treatments/Evaluations: Close monitoring and treatment of unstable vital signs, cardiorespiratory, and neurologic status, while maintaining tight balance of fluid, respiratory, and cardiac interventions. This time includes discussing the case with the patient and the patients family. This time does not include all procedures stated elsewhere in this record. This time also includes reviewing old records, labs and radiological studies. This time includes examining and re-examining the patient. Additionally, this time also includes arranging care with admitting and consulting physicians.       Clinical Impression:  1. Fall    2. Foot injury    3. Ankle injury, initial encounter    4. Leg injury    5. Chest pain                 Rebecca Sanches MD  12/30/23 0697

## 2023-12-30 NOTE — HPI
Dionicio Bess is a 58 y.o. male with PMH significant for a fib on eliquis, HTN, CVA with residual left sided weakness, NSTEMI, CHF, CAD, and HLD presenting with left sided ankle pain after sustaining a ground level fall last night. Patient states that he was intoxicated last night when he tripped and felt immediate pain and inability to bear weight on the left ankle. Patient denies any head trauma or LOC. The patient denies prior hx of falls. Patient denies any new numbness and tingling. Denies any other musculoskeletal pain or injuries. Prior right sided FPL repair done at [facility] in 2015.  Walks w/out assisted devices at baseline.  They deny IV drug use.   They deny tobacco use.   They endorse frequent alcohol use with current history of alcohol abuse disorder, but he denies history of alcohol withdrawal, seizures, or hallucinations.   They deny immunosuppressant medications.  They deny chemotherapy.  They deny radiation therapy.

## 2023-12-30 NOTE — H&P
[facility] - Emergency Dept  [facility] Medicine  History & Physical    Patient Name: Dionicio Bess  MRN: [id number]  Patient Class: OP- Observation  Admission Date: 12/29/2023  Attending Physician: Sarah Hart MD   Primary Care Provider: Isela, Primary Doctor         Patient information was obtained from patient and ER records.     Subjective:     Principal Problem:Bimalleolar ankle fracture, left, closed, initial encounter    Chief Complaint:   Chief Complaint   Patient presents with    Ankle Pain     Tripped while walking. +deformity, swelling to left ankle. Distal pulses palpable. +ETOH. Hx of stroke and left side deficits. Pain 6/10 to left ankle.         HPI: Dionicio Bess is a 57 yo M with PMHx of pAfib (on eliquis), HTN, CVA with residual L sided deficits, CAD s/p PCI, HLD, HFrEF (EF 25-30%) who presented to ED for swelling and pain to his L ankle after a ground level mechanical fall. Patient reports he tripped on a step. Now c/o 9/10 pain to L ankle with inability to bear weight. Denies LOC or head trauma. Patient also endorses intermittent lower midsternal chest pain for the past 2-3 days. Denies chest pain currently.  Also admits cramping epigastric abdominal pain with associated nausea that developed 45 mins ago. On chart review, patient had a stress test performed last month that was nondiagnostic though abnormal.  It does not appear that this was ever followed up on by cardiology as an outpatient.  The patient does have socioeconomic issues and resides in a homeless shelter ([facility]). Endorses frequent alcohol use with 2-3 beers daily. States he did have some vodka and beers yesterday before the fall. Denies fever, chills, palpitations, SOB, cough, v/d, dysuria, numbness, paresthesias, headaches.     In ED: Afebrile. HDS. Satting well on 2L NC. CBC without leukocytosis and with stable chronic anemia. CMP unremarkable. BNP 84. Trop 0.026. serum ETOH 277.  CXR with cardiomegaly and mild perihilar  edema. CT ankle with trimalleolar fracture. Ortho consulted; recommending NWB to LLE and to keep iced and elevated. Too swollen for fixation at this time. CT cervical spine with degenerative changes, but without acute fracture or subluxation. CT/ MRI brain Small foci of acute to early subacute ischemia noted in the right corona radiata/basal ganglia/internal capsule, adjoining remote large MCA territory infarct.  Equivocal punctate right parietal cortical infarct margin the posterior aspect of the infarct territory. Vascular neurology consulted; recommend continuing eliquis and statin. Given IV morphine 4 mg,  dilaudid 1 mg , IV toradol, and IV zofran. Placed in observation.     Past Medical History:   Diagnosis Date    CAD (coronary artery disease)     HLD (hyperlipidemia)     HTN (hypertension)     Stroke     R MCA 2/2023       No past surgical history on file.    Review of patient's allergies indicates:  No Known Allergies    No current facility-administered medications on file prior to encounter.     Current Outpatient Medications on File Prior to Encounter   Medication Sig    amLODIPine (NORVASC) 10 MG tablet Take 1 tablet (10 mg total) by mouth once daily.    apixaban (ELIQUIS) 5 mg Tab Take 1 tablet (5 mg total) by mouth 2 (two) times daily.    aspirin 81 MG Chew Chew and swallow 1 (1 mg total) by mouth once daily.    atorvastatin (LIPITOR) 80 MG tablet Take 1 tablet (80 mg total) by mouth every evening.    FLUoxetine 20 MG capsule Take 1 capsule (20 mg total) by mouth once daily.    furosemide (LASIX) 40 MG tablet Take 1 tablet (40 mg total) by mouth once daily.    JARDIANCE 10 mg tablet Take 1 tablet (10 mg total) by mouth once daily.    losartan (COZAAR) 25 MG tablet Take 1 tablet (25 mg total) by mouth once daily.    metoprolol succinate (TOPROL-XL) 50 MG 24 hr tablet Take 1 tablet (50 mg total) by mouth once daily.    pantoprazole (PROTONIX) 20 MG tablet Take 1 tablet (20 mg total) by mouth every  morning.    spironolactone (ALDACTONE) 25 MG tablet Take 1 tablet (25 mg total) by mouth once daily.    thiamine 100 MG tablet Take 2 tablets (200 mg total) by mouth once daily.     Family History    None       Tobacco Use    Smoking status: Never    Smokeless tobacco: Never   Substance and Sexual Activity    Alcohol use: Yes     Comment: occasional    Drug use: No    Sexual activity: Not on file     Review of Systems   Constitutional:  Negative for activity change, chills and fever.        +fall   HENT:  Negative for trouble swallowing.    Eyes:  Negative for photophobia and visual disturbance.   Respiratory:  Negative for chest tightness, shortness of breath and wheezing.    Cardiovascular:  Positive for chest pain. Negative for palpitations and leg swelling.   Gastrointestinal:  Positive for abdominal pain and nausea. Negative for constipation, diarrhea and vomiting.   Genitourinary:  Negative for dysuria, frequency, hematuria and urgency.   Musculoskeletal:  Positive for arthralgias and joint swelling. Negative for back pain and gait problem.   Skin:  Negative for color change and rash.   Neurological:  Positive for weakness (chronic L side). Negative for dizziness, syncope, light-headedness, numbness and headaches.   Psychiatric/Behavioral:  Negative for agitation and confusion. The patient is not nervous/anxious.      Objective:     Vital Signs (Most Recent):  Temp: 99.2 °F (37.3 °C) (12/30/23 1145)  Pulse: 65 (12/30/23 1145)  Resp: 18 (12/30/23 1145)  BP: 119/82 (12/30/23 1145)  SpO2: 95 % (12/30/23 1145) Vital Signs (24h Range):  Temp:  [98.4 °F (36.9 °C)-99.2 °F (37.3 °C)] 99.2 °F (37.3 °C)  Pulse:  [59-79] 65  Resp:  [10-23] 18  SpO2:  [94 %-100 %] 95 %  BP: (113-138)/(63-97) 119/82        There is no height or weight on file to calculate BMI.     Physical Exam  Vitals and nursing note reviewed.   Constitutional:       General: He is not in acute distress.     Appearance: He is well-developed.   HENT:       Head: Normocephalic and atraumatic.      Mouth/Throat:      Pharynx: No oropharyngeal exudate.   Eyes:      Conjunctiva/sclera: Conjunctivae normal.      Pupils: Pupils are equal, round, and reactive to light.   Cardiovascular:      Rate and Rhythm: Normal rate and regular rhythm.      Heart sounds: Normal heart sounds.   Pulmonary:      Effort: Pulmonary effort is normal. No respiratory distress.      Breath sounds: Normal breath sounds. No wheezing.   Abdominal:      General: Bowel sounds are normal. There is no distension.      Palpations: Abdomen is soft.      Tenderness: There is no abdominal tenderness.   Musculoskeletal:         General: Signs of injury present. No tenderness. Normal range of motion.      Cervical back: Normal range of motion and neck supple.      Comments:  Cast and dressing in place over distal L leg. Able to wiggle toes. Sensation intact.    Lymphadenopathy:      Cervical: No cervical adenopathy.   Skin:     General: Skin is warm and dry.      Capillary Refill: Capillary refill takes less than 2 seconds.      Findings: No rash.   Neurological:      Mental Status: He is alert and oriented to person, place, and time.      Cranial Nerves: No cranial nerve deficit.      Sensory: No sensory deficit.      Coordination: Coordination normal.   Psychiatric:         Behavior: Behavior normal.         Thought Content: Thought content normal.         Judgment: Judgment normal.              CRANIAL NERVES     CN III, IV, VI   Pupils are equal, round, and reactive to light.       Significant Labs: All pertinent labs within the past 24 hours have been reviewed.  CBC:   Recent Labs   Lab 12/29/23  2211   WBC 5.70   HGB 13.2*   HCT 38.5*        CMP:   Recent Labs   Lab 12/29/23  2211      K 4.0   CL 98   CO2 27      BUN 14   CREATININE 0.9   CALCIUM 9.3   PROT 8.5*   ALBUMIN 4.3   BILITOT 0.4   ALKPHOS 56   AST 29   ALT 10   ANIONGAP 14     Cardiac Markers:   Recent Labs   Lab  12/29/23  2211   BNP 84     Troponin:   Recent Labs   Lab 12/29/23 2211   TROPONINI 0.026       Significant Imaging: I have reviewed all pertinent imaging results/findings within the past 24 hours.  Imaging Results              CT Ankle (Including Hindfoot) Without Contrast Right (Final result)  Result time 12/30/23 11:16:00      Final result by Cecelia Subramanian MD (12/30/23 11:16:00)                   Impression:      Trimalleolar fracture(Clay B, Lauge-Samano stage 4)      Electronically signed by: Cecelia Subramanian MD  Date:    12/30/2023  Time:    11:16               Narrative:    EXAMINATION:  CT ANKLE (INCLUDING HINDFOOT) WITHOUT CONTRAST RIGHT; CT 3D RECON WITH INDEPENDENT WS    CLINICAL HISTORY:  2mm cuts with 3d recons;; 2mm cuts with 3d recons;Defer;  Unspecified injury of unspecified ankle, initial encounter    TECHNIQUE:  0.625 mm axial images of the ankle obtained, coronal and sagittal images reformatted.  3D images reconstructed.    COMPARISON:  None    FINDINGS:  Trimalleolar fracture.    -Oblique fracture of the distal fibula above the tibiotalar joint with 3 mm lateral displacement of the more distal fragment.    -There is 6.6 mm lateral subluxation/displacement of the talus relative to the talar dome.    -Ill define tiny avulsed osseous fragment displaced just anterior and lateral to the medial malleolus.  (200: 39-44).    -There is a tiny osseous fragment just adjacent to the posterior malleolus, suspicious for fracture.  (201: 37-41).    The talar dome/status is intact.  The calcaneus is intact.  The tarsal bones and proximal metatarsal bones appear normal.  Moderate-size inferior calcaneal spur.    The visualized flexor and extensor tendons appear to course normally.    Circumferential subcutaneous soft tissue edema about the lower leg and ankle region and dorsal aspect of the foot.  No soft tissue air, no foreign body.                                       CT 3D RECON WITH INDEPENDENT  WS (Final result)  Result time 12/30/23 11:16:00      Final result by Cecelia Subramanian MD (12/30/23 11:16:00)                   Impression:      Trimalleolar fracture(Clay B, Lauge-Samano stage 4)      Electronically signed by: Cecelia Subramanian MD  Date:    12/30/2023  Time:    11:16               Narrative:    EXAMINATION:  CT ANKLE (INCLUDING HINDFOOT) WITHOUT CONTRAST RIGHT; CT 3D RECON WITH INDEPENDENT WS    CLINICAL HISTORY:  2mm cuts with 3d recons;; 2mm cuts with 3d recons;Defer;  Unspecified injury of unspecified ankle, initial encounter    TECHNIQUE:  0.625 mm axial images of the ankle obtained, coronal and sagittal images reformatted.  3D images reconstructed.    COMPARISON:  None    FINDINGS:  Trimalleolar fracture.    -Oblique fracture of the distal fibula above the tibiotalar joint with 3 mm lateral displacement of the more distal fragment.    -There is 6.6 mm lateral subluxation/displacement of the talus relative to the talar dome.    -Ill define tiny avulsed osseous fragment displaced just anterior and lateral to the medial malleolus.  (200: 39-44).    -There is a tiny osseous fragment just adjacent to the posterior malleolus, suspicious for fracture.  (201: 37-41).    The talar dome/status is intact.  The calcaneus is intact.  The tarsal bones and proximal metatarsal bones appear normal.  Moderate-size inferior calcaneal spur.    The visualized flexor and extensor tendons appear to course normally.    Circumferential subcutaneous soft tissue edema about the lower leg and ankle region and dorsal aspect of the foot.  No soft tissue air, no foreign body.                                        MRI Brain W WO Contrast (Final result)  Result time 12/30/23 09:45:47      Final result by Avery Cervantes MD (12/30/23 09:45:47)                   Impression:      1. Motion compromised examination.  2. Small foci of acute to early subacute ischemia noted in the right corona radiata/basal  ganglia/internal capsule, adjoining remote large MCA territory infarct.  Equivocal punctate right parietal cortical infarct margin the posterior aspect of the infarct territory.  3. No acute intracranial hemorrhage or mass effect.  4. Remote post ischemic sequelae, as discussed.  This report was flagged in Epic as abnormal.      Electronically signed by: Avery Cervantes  Date:    12/30/2023  Time:    09:45               Narrative:    EXAMINATION:  MRI BRAIN W WO CONTRAST    CLINICAL HISTORY:  Stroke, follow up;    TECHNIQUE:  Multiplanar multisequence MR imaging of the brain was performed before and after the administration of 10 mL Gadavist intravenous contrast.    COMPARISON:  CT head performed 12/30/2023.    FINDINGS:  Parenchyma: Redemonstrated extensive encephalomalacia and gliosis in the right cerebral hemispheric corresponding to remote MCA territory infarct.  There is ex vacuo dilatation of the right lateral ventricle and its lateral wallerian degeneration.    Few small foci of restricted diffusion and T2 weighted signal abnormality are noted in the adjoining right corona radiata, basal ganglia internal capsule in keeping with acute to early subacute ischemia.  Question small focus of intermediate restricted diffusion involving posterior right parietal cortex (axial DWI series 6, image 44).  Additional at areas of suspected T2 shine through effect are noted marginating the remote right MCA infarct territory.    Remote blood products are noted with prior right MCA territory infarct.  No definite acute hemorrhage appreciated the current study.    Few additional nonspecific foci of nonenhancing T2/FLAIR hyperintense signal in the white matter would be in keeping with sequela of chronic small vessel ischemic disease.    Additional comments: There is no midline shift, abnormal extra-axial fluid collection, or acute intracranial hemorrhage. The basal cisterns are patent.    Ventricles: No evidence of  hydrocephalus.    Flow voids: The normal major intracranial arterial flow voids are visualized.    Enhancement: No abnormal intra-axial or extra-axial enhancement is identified.    Sinuses and mastoid air cells: Scattered relatively modest paranasal sinus mucosal thickening with small retention cysts.  Nonspecific fluid signal opacification of the left greater than right mastoid air cells, possibly on the basis of effusion and/or mucosal thickening.    Orbits: Question minor elongation of the globes in AP dimension, as may be seen the setting of axial myopia versus staphyloma.  Note that appearance may be accentuated by motion.    Midline structures: The pituitary and craniocervical junction are normal.    Marrow: Degenerative marrow change in the spine.                                       X-Ray Ankle Complete Left (Final result)  Result time 12/30/23 08:16:39      Final result by Cecelia Subramanian MD (12/30/23 08:16:39)                   Impression:      Distal fibula fracture, medial malleolar tip fracture, unchanged alignment from the prior examination.      Electronically signed by: Cecelia Subramanian MD  Date:    12/30/2023  Time:    08:16               Narrative:    EXAMINATION:  XR ANKLE COMPLETE 3 VIEW LEFT    CLINICAL HISTORY:  Fracture;    TECHNIQUE:  AP, lateral and oblique views of the left ankle were performed.    COMPARISON:  12/29/2023, 23:24    FINDINGS:  There is an immobilization device.    The tibiotalar joint alignment remains normal.    There is an oblique fracture of the distal fibula just proximal to the tibiotalar joint extending to the syndesmosis.    Subtle nondisplaced fracture of the medial malleolus tip.    The posterior tibial margin appears normal.    There is a sizable inferior calcaneal spur.    The remainder of the visualized osseous structures and soft tissues appear within normal limits.    The soft tissue edema noted about the ankle region.                                        CT Cervical Spine Without Contrast (Final result)  Result time 12/30/23 04:07:05      Final result by Brendon Dillard MD (12/30/23 04:07:05)                   Impression:      No evidence of acute fracture or traumatic subluxation.    Multilevel degenerative changes are detailed above.    Electronically signed by resident: Angie George  Date:    12/30/2023  Time:    03:33    Electronically signed by: Brendon Dillard MD  Date:    12/30/2023  Time:    04:07               Narrative:    EXAMINATION:  CT CERVICAL SPINE WITHOUT CONTRAST    CLINICAL HISTORY:  Neck trauma, intoxicated or obtunded (Age >= 16y);    TECHNIQUE:  Low dose axial images, sagittal and coronal reformations were performed though the cervical spine.  Contrast was not administered.    COMPARISON:  CT 07/11/2023    FINDINGS:  Alignment: Normal.    Vertebrae: No fracture.  No lytic or blastic lesion.    Discs: Mild multilevel disc height loss.    C1-2: Dens is intact.  Pre-dens space is maintained.    Skull base and craniocervical junction: Normal.    Degenerative findings:    Multilevel degenerative change with areas of facet arthropathy and uncovertebral spurring.    There is no high-grade spinal canal stenosis through the cervical spine.    C2-C3: Moderate right neural foraminal narrowing.    C3-C4: Moderate to severe right neural foraminal narrowing.    C4-C5: No spinal canal stenosis or neural foraminal narrowing.    C5-C6: Severe right and mild-to-moderate left neural foraminal narrowing.    C6-C7: Mild bilateral neural foraminal narrowing.    Paraspinal muscles & soft tissues: Unremarkable.                                        CT Head Without Contrast (Final result)  Result time 12/30/23 03:36:03      Final result by Brendon Dillard MD (12/30/23 03:36:03)                   Impression:      Large area of hypoattenuation through the right MCA territory. Some of this likely corresponds to previously reported area of infarct on  outside imaging report dated 05/07/2023 (images not available for comparison), but some regions are concerning for more recent component of infarct (there is edema with loss of normal sulcation).  Recommend correlation with outside images and further evaluation with MRI as clinically warranted to evaluate for potential acute on chronic infarct.    This report was flagged in Epic as abnormal.    Electronically signed by resident: Angie George  Date:    12/30/2023  Time:    03:26    Electronically signed by: Brendon Dillard MD  Date:    12/30/2023  Time:    03:36               Narrative:    EXAMINATION:  CT HEAD WITHOUT CONTRAST    CLINICAL HISTORY:  Head trauma, moderate-severe;    TECHNIQUE:  Low dose axial CT images obtained throughout the head without the use of intravenous contrast.  Axial, sagittal and coronal reconstructions were performed.    COMPARISON:  Outside imaging report dated 05/07/2023 (images not available for comparison)    FINDINGS:  Large area of hypoattenuation through the right MCA territory.  Some of this likely corresponds to previously reported area of infarct on outside imaging report dated 05/07/2023 (images not available for comparison), but some regions may show edema with loss of normal sulcation, concerning for recent component of infarct.  Recommend correlation with outside imaging study and further evaluation with MRI if clinically warranted.  No evidence of acute intracranial hemorrhage.    Ex vacuo dilation of the right lateral ventricle, likely related to documented remote right MCA infarct.    No extra-axial blood or fluid collections.    No displaced calvarial fracture.    Small left mastoid effusion.  Mild mucosal thickening of the left maxillary sinus.                                       X-Ray Chest AP Portable (Final result)  Result time 12/30/23 00:52:32      Final result by Brendon Dillard MD (12/30/23 00:52:32)                   Impression:      Cardiomegaly with  mild perihilar edema.      Electronically signed by: Brendon Dillard MD  Date:    12/30/2023  Time:    00:52               Narrative:    EXAMINATION:  XR CHEST AP PORTABLE    CLINICAL HISTORY:  Chest pain, unspecified    TECHNIQUE:  Single frontal view of the chest was performed.    COMPARISON:  11/05/2023.    FINDINGS:  Loop recorder device overlies the heart, similar to prior.    Cardiomegaly with mild perihilar edema.    Heart and lungs otherwise appear unchanged when allowing for differences in technique and positioning.                                       X-Ray Tibia Fibula 2 View Left (Final result)  Result time 12/30/23 01:03:34      Final result by Brendon Dillard MD (12/30/23 01:03:34)                   Impression:      Mildly displaced oblique fracture distal left fibula with distal fracture fragment displaced a few mm laterally. Fracture extends to the syndesmosis just above the tibiotalar joint. Minimally displaced avulsion fracture tip of the medial malleolus.  No additional acute fracture identified. No dislocation.  Slight widening of the ankle mortise medially.      Electronically signed by: Brendon Dillard MD  Date:    12/30/2023  Time:    01:03               Narrative:    EXAMINATION:  XR ANKLE COMPLETE 3 VIEW LEFT; XR TIBIA FIBULA 2 VIEW LEFT; XR FOOT COMPLETE 3 VIEW LEFT    CLINICAL HISTORY:  Unspecified injury of unspecified ankle, initial encounter; Unspecified injury of unspecified lower leg, initial encounter; Unspecified injury of unspecified foot, initial encounter    TECHNIQUE:  AP, lateral and oblique views of the left ankle and left foot were performed.  AP and lateral views left tibia and fibula were performed.    COMPARISON:  None    FINDINGS:  Mildly displaced oblique fracture distal left fibula with distal fracture fragment displaced a few mm laterally.  Fracture extends to the syndesmosis just above the tibiotalar joint.  Minimally displaced avulsion fracture tip of the medial  malleolus.  No additional acute fracture identified.  No dislocation.  Slight widening of the ankle mortise medially.  Talar dome is maintained.  Lisfranc joints appear congruent.  Enthesopathy on the plantar aspect of the calcaneus.  Moderate degenerative change 1st MTP joint.  Soft tissue swelling about the ankle, particularly medially.                                       X-Ray Foot Complete Left (Final result)  Result time 12/30/23 01:03:34      Final result by Brendon Dillard MD (12/30/23 01:03:34)                   Impression:      Mildly displaced oblique fracture distal left fibula with distal fracture fragment displaced a few mm laterally. Fracture extends to the syndesmosis just above the tibiotalar joint. Minimally displaced avulsion fracture tip of the medial malleolus.  No additional acute fracture identified. No dislocation.  Slight widening of the ankle mortise medially.      Electronically signed by: Brendon Dillard MD  Date:    12/30/2023  Time:    01:03               Narrative:    EXAMINATION:  XR ANKLE COMPLETE 3 VIEW LEFT; XR TIBIA FIBULA 2 VIEW LEFT; XR FOOT COMPLETE 3 VIEW LEFT    CLINICAL HISTORY:  Unspecified injury of unspecified ankle, initial encounter; Unspecified injury of unspecified lower leg, initial encounter; Unspecified injury of unspecified foot, initial encounter    TECHNIQUE:  AP, lateral and oblique views of the left ankle and left foot were performed.  AP and lateral views left tibia and fibula were performed.    COMPARISON:  None    FINDINGS:  Mildly displaced oblique fracture distal left fibula with distal fracture fragment displaced a few mm laterally.  Fracture extends to the syndesmosis just above the tibiotalar joint.  Minimally displaced avulsion fracture tip of the medial malleolus.  No additional acute fracture identified.  No dislocation.  Slight widening of the ankle mortise medially.  Talar dome is maintained.  Lisfranc joints appear congruent.  Enthesopathy  on the plantar aspect of the calcaneus.  Moderate degenerative change 1st MTP joint.  Soft tissue swelling about the ankle, particularly medially.                                       X-Ray Ankle Complete Left (Final result)  Result time 12/30/23 01:03:34      Final result by Brendon Dillard MD (12/30/23 01:03:34)                   Impression:      Mildly displaced oblique fracture distal left fibula with distal fracture fragment displaced a few mm laterally. Fracture extends to the syndesmosis just above the tibiotalar joint. Minimally displaced avulsion fracture tip of the medial malleolus.  No additional acute fracture identified. No dislocation.  Slight widening of the ankle mortise medially.      Electronically signed by: Brendon Dillard MD  Date:    12/30/2023  Time:    01:03               Narrative:    EXAMINATION:  XR ANKLE COMPLETE 3 VIEW LEFT; XR TIBIA FIBULA 2 VIEW LEFT; XR FOOT COMPLETE 3 VIEW LEFT    CLINICAL HISTORY:  Unspecified injury of unspecified ankle, initial encounter; Unspecified injury of unspecified lower leg, initial encounter; Unspecified injury of unspecified foot, initial encounter    TECHNIQUE:  AP, lateral and oblique views of the left ankle and left foot were performed.  AP and lateral views left tibia and fibula were performed.    COMPARISON:  None    FINDINGS:  Mildly displaced oblique fracture distal left fibula with distal fracture fragment displaced a few mm laterally.  Fracture extends to the syndesmosis just above the tibiotalar joint.  Minimally displaced avulsion fracture tip of the medial malleolus.  No additional acute fracture identified.  No dislocation.  Slight widening of the ankle mortise medially.  Talar dome is maintained.  Lisfranc joints appear congruent.  Enthesopathy on the plantar aspect of the calcaneus.  Moderate degenerative change 1st MTP joint.  Soft tissue swelling about the ankle, particularly medially.                                     Assessment/Plan:     * Bimalleolar ankle fracture, left, closed, initial encounter  - imaging with left sided functional bimalleolar ankle fracture sustained after a ground level fall yesterday   - reduced and splinted in ED  - Ortho consulted:   - At this time patient is too swollen for definitive fixation  - NWB to LLE, keep iced and elevated  - Orthopedics will continue to follow while inpatient    - From orthopedic perspective, patient could follow up outpatient for definitive fixation of ankle; however, should patient be admitted for work up and treatment of other ailments, he could be scheduled for surgery next week if he is still admitted and optimized for surgery.   - MM pain control    Stroke due to embolism of right middle cerebral artery  - prior R MCA infarct 2/2023 with residual LSW and dysarthria   - Vascular neuro consulted:    - CTH with hypodensity, MRI showed small infarcts in R MCA territory. Believe this is an incidental finding given patient reports his weakness and dysarthria are at baseline with no recent change.     - Etiology of these infarcts likely cardioembolic from known Afib. No further stroke workup indicated at this time.    - Recommend continuing anticoagulation with Eliquis 5 mg BID. If plan for surgery, then resume AC when safe postop from surgical perspective. Med list includes ASA 81 mg, which is not indicated from a stroke perspective given he is anticoagulated.    ETOH abuse  - serum ETOH 277 on admit  - denies hx of withdrawal or seizures  - start folic/thiamine/MV  - start cIVFs  - last drink on prior to fall  - does not appear to be actively withdrawing on interview. CIWA q4h for now  - Ativan 1mg IV prn for anxiety, tremulousness, HR >110, CIWA >8    Chest pain  - reports 2-3 days of intermittent chest pain. Episodes lasting for a few minutes.  - trop 0.026, will repeat (lower than baseline)  - EKG without acute ischemic changes   - stat EKG for chest pain   - monitor  tele    Hyperlipidemia  - continue statin    Coronary artery disease involving native coronary artery with angina pectoris  Patient with known CAD s/p stent placement, which is controlled Will continue  eliquis and Statin and monitor for S/Sx of angina/ACS. Continue to monitor on telemetry.     Acute on chronic combined systolic and diastolic congestive heart failure  - echo with EF 25-30% with GI LV DD, RV systolic function mildly reduced  - BNP 84 on admit. No rales on exam. Swelling to LUE 2/2 injury, but no edema to RLE. No concern for acute exacerbation  - Continue Beta Blocker, ACE/ARB, Furosemide, and Aldactone and monitor clinical status closely.      - Monitor on telemetry.   - Monitor strict Is&Os and daily weights.  Place on fluid restriction of 1.5 L.     Essential hypertension  - normotensive on admit, states compliant with home meds  - continue amlodipine 10 mg, lasix 40 mg, toprol 50 mg, losartan 25 mg, and spironolactone 25 mg    Paroxysmal atrial fibrillation  Patient with Paroxysmal (<7 days) atrial fibrillation which is controlled currently with Beta Blocker. Patient is currently in sinus rhythm.[id number] Score: 1.  Anticoagulation indicated. Anticoagulation done with eliquis 5mg .      VTE Risk Mitigation (From admission, onward)           Ordered     apixaban tablet 5 mg  2 times daily         12/30/23 1242     IP VTE HIGH RISK PATIENT  Once         12/30/23 1206     Reason for No Pharmacological VTE Prophylaxis  Once        Question:  Reasons:  Answer:  Already adequately anticoagulated on oral Anticoagulants    12/30/23 1206                         On 12/30/2023, patient should be placed in hospital observation services under my care in collaboration with Dr. Sarah Hrat.           Amie Tripathi PA-C  Department of Hospital Medicine  [facility] - Emergency Dept

## 2023-12-30 NOTE — HPI
Dionicio Bess is a 59 yo M with PMHx of pAfib (on eliquis), HTN, CVA with residual L sided deficits, CAD s/p PCI, HLD, HFrEF (EF 25-30%) who presented to ED for swelling and pain to his L ankle after a ground level mechanical fall. Patient reports he tripped on a step. Now c/o 9/10 pain to L ankle with inability to bear weight. Denies LOC or head trauma. Patient also endorses intermittent lower midsternal chest pain for the past 2-3 days. Denies chest pain currently.  Also admits cramping epigastric abdominal pain with associated nausea that developed 45 mins ago. On chart review, patient had a stress test performed last month that was nondiagnostic though abnormal.  It does not appear that this was ever followed up on by cardiology as an outpatient.  The patient does have socioeconomic issues and resides in a homeless shelter ([facility]). Endorses frequent alcohol use with 2-3 beers daily. States he did have some vodka and beers yesterday before the fall. Denies fever, chills, palpitations, SOB, cough, v/d, dysuria, numbness, paresthesias, headaches.     In ED: Afebrile. HDS. Satting well on 2L NC. CBC without leukocytosis and with stable chronic anemia. CMP unremarkable. BNP 84. Trop 0.026. serum ETOH 277.  CXR with cardiomegaly and mild perihilar edema. CT ankle with trimalleolar fracture. Ortho consulted; recommending NWB to LLE and to keep iced and elevated. Too swollen for fixation at this time. CT cervical spine with degenerative changes, but without acute fracture or subluxation. CT/ MRI brain Small foci of acute to early subacute ischemia noted in the right corona radiata/basal ganglia/internal capsule, adjoining remote large MCA territory infarct.  Equivocal punctate right parietal cortical infarct margin the posterior aspect of the infarct territory. Vascular neurology consulted; recommend continuing eliquis and statin. Given IV morphine 4 mg,  dilaudid 1 mg , IV toradol, and IV zofran. Placed in  observation.

## 2023-12-30 NOTE — ASSESSMENT & PLAN NOTE
- normotensive on admit, states compliant with home meds  - continue amlodipine 10 mg, lasix 40 mg, toprol 50 mg, losartan 25 mg, and spironolactone 25 mg

## 2023-12-30 NOTE — SUBJECTIVE & OBJECTIVE
Past Medical History:   Diagnosis Date    CAD (coronary artery disease)     HLD (hyperlipidemia)     HTN (hypertension)     Stroke     R MCA 2/2023     No past surgical history on file.  Social History     Tobacco Use    Smoking status: Never    Smokeless tobacco: Never   Substance Use Topics    Alcohol use: Yes     Comment: occasional    Drug use: No     Review of patient's allergies indicates:  No Known Allergies    Medications: I have reviewed the current medication administration record.    (Not in a hospital admission)      Review of Systems   Constitutional:  Negative for diaphoresis and fever.   HENT:  Negative for nosebleeds and rhinorrhea.    Eyes:  Negative for redness and itching.   Respiratory:  Negative for cough and stridor.    Cardiovascular:  Negative for chest pain and leg swelling.   Gastrointestinal:  Negative for abdominal distention and vomiting.   Genitourinary:  Negative for difficulty urinating and hematuria.   Musculoskeletal:  Positive for myalgias (L side s/p fall). Negative for neck stiffness.   Neurological:  Positive for weakness (L side, chronic). Negative for facial asymmetry and speech difficulty.   Psychiatric/Behavioral:  Negative for agitation and confusion.      Objective:     Vital Signs (Most Recent):  Temp: 99.1 °F (37.3 °C) (12/30/23 1015)  Pulse: 79 (12/30/23 1030)  Resp: 18 (12/30/23 1030)  BP: 119/77 (12/30/23 1030)  SpO2: (!) 94 % (12/30/23 1030)    Vital Signs Range (Last 24H):  Temp:  [98.4 °F (36.9 °C)-99.1 °F (37.3 °C)]   Pulse:  [59-79]   Resp:  [10-23]   BP: (113-138)/(63-97)   SpO2:  [94 %-100 %]        Physical Exam  Vitals and nursing note reviewed.   Constitutional:       General: He is not in acute distress.  HENT:      Head: Normocephalic and atraumatic.      Nose: Nose normal. No congestion.   Eyes:      Extraocular Movements: Extraocular movements intact.      Conjunctiva/sclera: Conjunctivae normal.   Cardiovascular:      Rate and Rhythm: Normal rate.    Pulmonary:      Effort: Pulmonary effort is normal. No respiratory distress.   Musculoskeletal:      Comments: Cast and dressing in place over distal L leg   Skin:     General: Skin is warm and dry.   Neurological:      Mental Status: He is alert.          Neurological Exam:   LOC: alert  Attention Span: Good   Language: No aphasia  Articulation: Dysarthria - baseline per patient  Orientation: Person, Place, Time   EOM (CN III, IV, VI): Full/intact  Facial Sensation (CN V): Normal  Facial Movement (CN VII): Lower facial weakness on the Left - baseline per patient  Motor: Arm left  4/5 shoulder abduction, elbow flexion/extension; 1/5 finger extension  Leg left  5/5 proximally  Arm right  Normal 5/5  Leg right Normal 5/5  Cerebellum: No evidence of appendicular or axial ataxia  Sensation: intact to light touch in all extremities      Laboratory:  CMP:   Recent Labs   Lab 12/29/23  2211   CALCIUM 9.3   ALBUMIN 4.3   PROT 8.5*      K 4.0   CO2 27   CL 98   BUN 14   CREATININE 0.9   ALKPHOS 56   ALT 10   AST 29   BILITOT 0.4     CBC:   Recent Labs   Lab 12/29/23  2211   WBC 5.70   RBC 4.13*   HGB 13.2*   HCT 38.5*      MCV 93   MCH 32.0*   MCHC 34.3       Diagnostic Results:  Brain imaging:  CT Head w/o contrast 12/30/23  Large area of hypoattenuation through the right MCA territory. Some of this likely corresponds to previously reported area of infarct on outside imaging report dated 05/07/2023 (images not available for comparison), but some regions are concerning for more recent component of infarct (there is edema with loss of normal sulcation).  Recommend correlation with outside images and further evaluation with MRI as clinically warranted to evaluate for potential acute on chronic infarct.     MRI Brain w/wo contrast 12/30/23      Vessel Imaging:  N/A    Cardiac Evaluation:   TTE 11/6/23    Left Ventricle: The left ventricle is normal in size. Normal wall thickness. Global hypokinesis present. There  is severely reduced systolic function with a visually estimated ejection fraction of 25 - 30%. Grade I diastolic dysfunction.    Right Ventricle: Mild right ventricular enlargement. Wall thickness is normal. Right ventricle wall motion  is normal. Systolic function is mildly reduced.    Biatrial enlargement    Pulmonary Artery: The estimated pulmonary artery systolic pressure is 19 mmHg.    IVC/SVC: Normal venous pressure at 3 mmHg.

## 2023-12-30 NOTE — ASSESSMENT & PLAN NOTE
58 year old man with Afib on Eliquis, HTN, HLD, CAD, prior R MCA infarct 2/2023 with residual LSW and dysarthria who presented after fall from standing 12/29 PM. Found to have left ankle fracture. CTH with hypodensity, MRI showed small infarcts in R MCA territory. Believe this is an incidental finding given patient reports his weakness and dysarthria are at baseline with no recent change.     Etiology of these infarcts likely cardioembolic from known Afib. No further stroke workup indicated at this time. Recommend continuing anticoagulation with Eliquis 5 mg BID. If plan for surgery, then resume AC when safe postop from surgical perspective. Med list includes ASA 81 mg, which is not indicated from a stroke perspective given he is anticoagulated.    Antithrombotics for secondary stroke prevention: Anticoagulants: continue Apixaban 5 mg BID     Statins for secondary stroke prevention and hyperlipidemia, if present: Statins: continue Atorvastatin- 80 mg daily    Aggressive risk factor modification: HTN, HLD, Diet, Exercise, A-Fib, CAD     Rehab efforts: The patient has been evaluated by a stroke team provider and the therapy needs have been fully considered based off the presenting complaints and exam findings. Does not require therapy eval from stroke perspective given no new or worse weakness/dysarthria.    Diagnostics ordered/pending: None

## 2023-12-31 PROBLEM — E87.6 HYPOKALEMIA: Status: ACTIVE | Noted: 2023-12-31

## 2023-12-31 PROBLEM — I69.30 HISTORY OF ISCHEMIC CEREBROVASCULAR ACCIDENT (CVA) WITH RESIDUAL DEFICIT: Chronic | Status: RESOLVED | Noted: 2023-05-18 | Resolved: 2023-12-31

## 2023-12-31 PROBLEM — R07.89 NON-CARDIAC CHEST PAIN: Status: ACTIVE | Noted: 2023-12-30

## 2023-12-31 LAB
ANION GAP SERPL CALC-SCNC: 7 MMOL/L (ref 8–16)
BUN SERPL-MCNC: 14 MG/DL (ref 6–20)
CALCIUM SERPL-MCNC: 9.1 MG/DL (ref 8.7–10.5)
CHLORIDE SERPL-SCNC: 98 MMOL/L (ref 95–110)
CO2 SERPL-SCNC: 30 MMOL/L (ref 23–29)
CREAT SERPL-MCNC: 1 MG/DL (ref 0.5–1.4)
ERYTHROCYTE [DISTWIDTH] IN BLOOD BY AUTOMATED COUNT: 13.5 % (ref 11.5–14.5)
EST. GFR  (NO RACE VARIABLE): >60 ML/MIN/1.73 M^2
GLUCOSE SERPL-MCNC: 101 MG/DL (ref 70–110)
HCT VFR BLD AUTO: 36.6 % (ref 40–54)
HGB BLD-MCNC: 12.7 G/DL (ref 14–18)
MAGNESIUM SERPL-MCNC: 1.8 MG/DL (ref 1.6–2.6)
MCH RBC QN AUTO: 31.8 PG (ref 27–31)
MCHC RBC AUTO-ENTMCNC: 34.7 G/DL (ref 32–36)
MCV RBC AUTO: 92 FL (ref 82–98)
PLATELET # BLD AUTO: 194 K/UL (ref 150–450)
PMV BLD AUTO: 9.8 FL (ref 9.2–12.9)
POTASSIUM SERPL-SCNC: 3.4 MMOL/L (ref 3.5–5.1)
RBC # BLD AUTO: 3.99 M/UL (ref 4.6–6.2)
SODIUM SERPL-SCNC: 135 MMOL/L (ref 136–145)
WBC # BLD AUTO: 4.99 K/UL (ref 3.9–12.7)

## 2023-12-31 PROCEDURE — 83735 ASSAY OF MAGNESIUM: CPT

## 2023-12-31 PROCEDURE — 25000003 PHARM REV CODE 250: Performed by: PHYSICIAN ASSISTANT

## 2023-12-31 PROCEDURE — 85027 COMPLETE CBC AUTOMATED: CPT

## 2023-12-31 PROCEDURE — 25000003 PHARM REV CODE 250: Performed by: INTERNAL MEDICINE

## 2023-12-31 PROCEDURE — 94761 N-INVAS EAR/PLS OXIMETRY MLT: CPT

## 2023-12-31 PROCEDURE — 21400001 HC TELEMETRY ROOM

## 2023-12-31 PROCEDURE — 80048 BASIC METABOLIC PNL TOTAL CA: CPT

## 2023-12-31 PROCEDURE — 25000003 PHARM REV CODE 250

## 2023-12-31 PROCEDURE — 36415 COLL VENOUS BLD VENIPUNCTURE: CPT

## 2023-12-31 RX ORDER — METHOCARBAMOL 750 MG/1
750 TABLET, FILM COATED ORAL 4 TIMES DAILY
Status: DISCONTINUED | OUTPATIENT
Start: 2023-12-31 | End: 2024-01-01

## 2023-12-31 RX ORDER — POTASSIUM CHLORIDE 20 MEQ/1
40 TABLET, EXTENDED RELEASE ORAL ONCE
Status: COMPLETED | OUTPATIENT
Start: 2023-12-31 | End: 2023-12-31

## 2023-12-31 RX ADMIN — FUROSEMIDE 40 MG: 40 TABLET ORAL at 09:12

## 2023-12-31 RX ADMIN — THIAMINE HCL TAB 100 MG 200 MG: 100 TAB at 09:12

## 2023-12-31 RX ADMIN — POTASSIUM CHLORIDE 40 MEQ: 1500 TABLET, EXTENDED RELEASE ORAL at 09:12

## 2023-12-31 RX ADMIN — OXYCODONE HYDROCHLORIDE 10 MG: 10 TABLET ORAL at 02:12

## 2023-12-31 RX ADMIN — AMLODIPINE BESYLATE 10 MG: 5 TABLET ORAL at 09:12

## 2023-12-31 RX ADMIN — FLUOXETINE HYDROCHLORIDE 20 MG: 20 CAPSULE ORAL at 09:12

## 2023-12-31 RX ADMIN — LOSARTAN POTASSIUM 25 MG: 25 TABLET, FILM COATED ORAL at 09:12

## 2023-12-31 RX ADMIN — PANTOPRAZOLE SODIUM 20 MG: 20 TABLET, DELAYED RELEASE ORAL at 06:12

## 2023-12-31 RX ADMIN — APIXABAN 5 MG: 5 TABLET, FILM COATED ORAL at 08:12

## 2023-12-31 RX ADMIN — Medication 6 MG: at 08:12

## 2023-12-31 RX ADMIN — METHOCARBAMOL 500 MG: 500 TABLET ORAL at 09:12

## 2023-12-31 RX ADMIN — FOLIC ACID 1 MG: 1 TABLET ORAL at 09:12

## 2023-12-31 RX ADMIN — METHOCARBAMOL 750 MG: 750 TABLET ORAL at 05:12

## 2023-12-31 RX ADMIN — DIPHENHYDRAMINE HYDROCHLORIDE 25 MG: 25 CAPSULE ORAL at 08:12

## 2023-12-31 RX ADMIN — ACETAMINOPHEN 1000 MG: 500 TABLET ORAL at 06:12

## 2023-12-31 RX ADMIN — APIXABAN 5 MG: 5 TABLET, FILM COATED ORAL at 09:12

## 2023-12-31 RX ADMIN — ASPIRIN 81 MG CHEWABLE TABLET 81 MG: 81 TABLET CHEWABLE at 09:12

## 2023-12-31 RX ADMIN — ATORVASTATIN CALCIUM 80 MG: 40 TABLET, FILM COATED ORAL at 08:12

## 2023-12-31 RX ADMIN — OXYCODONE HYDROCHLORIDE 10 MG: 10 TABLET ORAL at 08:12

## 2023-12-31 RX ADMIN — METHOCARBAMOL 750 MG: 750 TABLET ORAL at 08:12

## 2023-12-31 RX ADMIN — METHOCARBAMOL 750 MG: 750 TABLET ORAL at 02:12

## 2023-12-31 RX ADMIN — ACETAMINOPHEN 1000 MG: 500 TABLET ORAL at 02:12

## 2023-12-31 RX ADMIN — SPIRONOLACTONE 25 MG: 25 TABLET ORAL at 09:12

## 2023-12-31 RX ADMIN — OXYCODONE HYDROCHLORIDE 10 MG: 10 TABLET ORAL at 06:12

## 2023-12-31 RX ADMIN — THERA TABS 1 TABLET: TAB at 09:12

## 2023-12-31 RX ADMIN — METOPROLOL SUCCINATE 50 MG: 50 TABLET, EXTENDED RELEASE ORAL at 09:12

## 2023-12-31 NOTE — NURSING
Nurses Note -- 4 Eyes      12/31/2023   1:01 AM      Skin assessed during: Q Shift Change      [x] No Altered Skin Integrity Present    []Prevention Measures Documented      [] Yes- Altered Skin Integrity Present or Discovered   [] LDA Added if Not in Epic (Describe Wound)   [] New Altered Skin Integrity was Present on Admit and Documented in LDA   [] Wound Image Taken    Wound Care Consulted? No    Attending Nurse:  Scooter Connors RN/Staff Member:   Beatriz

## 2023-12-31 NOTE — PROGRESS NOTES
[facility] Medicine  Progress Note    Patient Name: Dionicio Bess  MRN: [id number]  Patient Class: OP- Observation   Admission Date: 12/29/2023  Length of Stay: 0 days  Attending Physician: Sarah Hart MD  Primary Care Provider: Isela, Primary Doctor        Subjective:     Principal Problem:Closed trimalleolar fracture of left ankle        HPI:  Dionicio Bess is a 57 yo M with PMHx of pAfib (on eliquis), HTN, CVA with residual L sided deficits, CAD s/p PCI, HLD, HFrEF (EF 25-30%) who presented to ED for swelling and pain to his L ankle after a ground level mechanical fall. Patient reports he tripped on a step. Now c/o 9/10 pain to L ankle with inability to bear weight. Denies LOC or head trauma. Patient also endorses intermittent lower midsternal chest pain for the past 2-3 days. Denies chest pain currently.  Also admits cramping epigastric abdominal pain with associated nausea that developed 45 mins ago. On chart review, patient had a stress test performed last month that was nondiagnostic though abnormal.  It does not appear that this was ever followed up on by cardiology as an outpatient.  The patient does have socioeconomic issues and resides in a homeless shelter ([facility]). Endorses frequent alcohol use with 2-3 beers daily. States he did have some vodka and beers yesterday before the fall. Denies fever, chills, palpitations, SOB, cough, v/d, dysuria, numbness, paresthesias, headaches.     In ED: Afebrile. HDS. Satting well on 2L NC. CBC without leukocytosis and with stable chronic anemia. CMP unremarkable. BNP 84. Trop 0.026. serum ETOH 277.  CXR with cardiomegaly and mild perihilar edema. CT ankle with trimalleolar fracture. Ortho consulted; recommending NWB to LLE and to keep iced and elevated. Too swollen for fixation at this time. CT cervical spine with degenerative changes, but without acute fracture or subluxation. CT/ MRI brain Small foci of acute to early subacute ischemia noted  in the right corona radiata/basal ganglia/internal capsule, adjoining remote large MCA territory infarct.  Equivocal punctate right parietal cortical infarct margin the posterior aspect of the infarct territory. Vascular neurology consulted; recommend continuing eliquis and statin. Given IV morphine 4 mg,  dilaudid 1 mg , IV toradol, and IV zofran. Placed in observation.     Overview/Hospital Course:  Patient seen and evaluated by Vascular Neurology on ED as CT scan and MRI of brain consistent with acute right sided CVA. Neurology stated patient with no new symptoms related to this stroke and located in previous area of old CVA. CVA cardio embolic in nature as patient wih known atrial fibrillation and recommended to continue home Apixiban 5 mg po BID and Lipitor for stroke prevention and no further work-up or evaluation needed. Orthopedic surgery consulted concerning left ankle fracture and splinted fracture in the ED. CT scan of left ankle done and showed trimalleolar fracture. Patient placed non weight bearing to left lower extremity. Patient to rest, elevate and ice left leg as will require surgical repair of fracture but needs improvement in soft tissue swelling prior to proceeding. Ortho stated potentially could do surgery as outpatient but patient lives at [facility] so hard for him to get around with fractured ankle and would need to be independent at the shelter as they are unable to provide assistance to patient so will need to remain in hospital until definitve fixation surgery can be done for his left ankle. Pain controlled to left ankle.     Interval History: Patient seen and evaluated by Vascular Neurology on ED yesterday as CT scan and MRI of brain consistent with acute right sided CVA in basal ganglia area. Neurology stated patient with no new symptoms related to this stroke and located in previous area of old right MCA CVA. CVA cardio embolic in nature as patient wih known atrial  fibrillation and recommended to continue home Apixiban 5 mg po BID and Lipitor for stroke prevention and no further work-up or evaluation needed. Patient continued on home Apixiban in hospital. Orthopedic surgery consulted concerning left ankle fracture found on admit and splinted fracture in the ED. CT scan of left ankle done and showed trimalleolar fracture. Patient placed non weight bearing to left lower extremity. Patient to rest, elevate and ice left leg as will require surgical repair of fracture but needs improvement in soft tissue swelling prior to proceeding. Ortho stated potentially could do surgery as outpatient but patient lives at [facility] so hard for him to get around with fractured ankle and would need to be independent at the shelter as they are unable to provide assistance to patient so will need to remain in hospital until definitve fixation surgery can be done for his left ankle. Patient complaining of 8/10 pain to left ankle this am. Patient on scheduled Tylenol and Robaxin with Oxycodone IR prn for breakthrough pain. Will increase Robaxin dosing today to help with pain. Patient also acute intoxicated when came to ED yesterday with elevated alcohol level but not showing any signs of intoxication this am. Nursing monitoring CIWA. Patient with no signs of alcohol withdrawal. Labs reviewed. Potassium 3.4 this am and will replace with oral potassium. Magnesium level normal. Hgb stable at 12.7. Patient denies any chest pain or SOB.     Review of Systems   Constitutional:  Negative for fever.   Respiratory:  Negative for cough and shortness of breath.    Cardiovascular:  Negative for chest pain.   Gastrointestinal:  Negative for abdominal pain and nausea.   Musculoskeletal:  Positive for arthralgias (Left ankle).   Neurological:  Negative for dizziness and light-headedness.   Psychiatric/Behavioral:  Negative for agitation and confusion.      Objective:     Vital Signs (Most  Recent):  Temp: 97.9 °F (36.6 °C) (12/31/23 0903)  Pulse: 61 (12/31/23 0903)  Resp: 18 (12/31/23 0903)  BP: 119/79 (12/31/23 0903)  SpO2: 95 % (12/31/23 0903) on room air Vital Signs (24h Range):  Temp:  [96.7 °F (35.9 °C)-99.2 °F (37.3 °C)] 97.9 °F (36.6 °C)  Pulse:  [48-84] 61  Resp:  [16-18] 18  SpO2:  [92 %-97 %] 95 %  BP: (119-137)/(79-96) 119/79     Weight: 92 kg (202 lb 13.2 oz)  Body mass index is 27.51 kg/m².    Intake/Output Summary (Last 24 hours) at 12/31/2023 1117  Last data filed at 12/31/2023 0512  Gross per 24 hour   Intake 600 ml   Output 1800 ml   Net -1200 ml         Physical Exam  Vitals and nursing note reviewed.   Constitutional:       General: He is awake. He is not in acute distress.     Appearance: Normal appearance. He is well-developed and normal weight. He is not ill-appearing.   Cardiovascular:      Rate and Rhythm: Normal rate and regular rhythm.      Heart sounds: Normal heart sounds. No murmur heard.  Pulmonary:      Effort: Pulmonary effort is normal. No respiratory distress.      Breath sounds: Normal breath sounds. No wheezing.   Abdominal:      General: Abdomen is flat. Bowel sounds are normal. There is no distension.      Palpations: Abdomen is soft.      Tenderness: There is no abdominal tenderness.   Musculoskeletal:      Comments: Left leg in splint and elevated in bed.    Skin:     Findings: No erythema or rash.   Neurological:      Mental Status: He is alert and oriented to person, place, and time.   Psychiatric:         Mood and Affect: Mood normal.         Behavior: Behavior normal. Behavior is cooperative.         Thought Content: Thought content normal.         Judgment: Judgment normal.             Significant Labs: CBC:   Recent Labs   Lab 12/29/23 2211 12/31/23  0539   WBC 5.70 4.99   HGB 13.2* 12.7*   HCT 38.5* 36.6*    194     CMP:   Recent Labs   Lab 12/29/23 2211 12/31/23  0539    135*   K 4.0 3.4*   CL 98 98   CO2 27 30*    101   BUN 14 14    CREATININE 0.9 1.0   CALCIUM 9.3 9.1   PROT 8.5*  --    ALBUMIN 4.3  --    BILITOT 0.4  --    ALKPHOS 56  --    AST 29  --    ALT 10  --    ANIONGAP 14 7*     Magnesium:   Recent Labs   Lab 12/31/23  0539   MG 1.8       Significant Imaging: I have reviewed all pertinent imaging results/findings within the past 24 hours.    Assessment/Plan:      * Closed trimalleolar fracture of left ankle  - Patient with imaging in ED that showed closed trimalleolar ankle fracture sustained after a ground level fall related to alcohol consumption.   - Ortho consulted for fracture and reduced and splinted in ED.  - Per Ortho at this time patient is too swollen for definitive fixation and plan is non-weight bearing to left lower extremity and keep left ankle iced and elevated. Orthopedics will continue to follow while patient in hospital. From Orthopedic perspective, patient could follow up outpatient for definitive fixation of ankle but patient is homeless and lives at [facility] and they are unable o provide any assistance to patient and with his non weight bearing status will be difficult for him to get around so will need to remain in hospital until definitive fixation surgery can be done.   - Patient placed on Tylenol 1000 mg po TID + Robaxin 750 mg po 4 times daily + Oxycodone IR 5-10 mg po every 4 hours prn for breakthrough pain.   - Patient on his home Apixiban for atrial fibrillation so fully anticoagulated so no DVT prophylaxis needed.     Stroke due to embolism of right middle cerebral artery  - Patient with known prior right cardio embolic MCA stroke in 2/2023 with residual left sided weakness and dysarthria.   - Vascular neuro consulted as CT scan of head with new hypodensity; MRI of brain done and MRI showed acute small infarcts in right MCA territory. Vascular Neurology noted this is likely incidental finding given patient reports his weakness and dysarthria are at baseline with no recent change.  Etiology of these infarcts likely cardioembolic from known Afib. No further stroke workup indicated at this time as per Vascular Neurology.   - Neurology recommends to continue anticoagulation with Apixiban 5 mg po BID. If plan for surgery, then resume anticoagulation when safe postop from surgical perspective. Med list includes Aspirin 81 mg, which is not indicated from a stroke perspective given he is anticoagulated with Apixiban as per Neurology.  - Continue Lipitor 80 mg po daily for stroke prevention.  - Patient with no new neurologic symptoms.     Hypokalemia  Patient has hypokalemia which is Acute and currently controlled. Most recent potassium levels reviewed-   Lab Results   Component Value Date    K 3.4 (L) 12/31/2023   Will continue potassium replacement per protocol and recheck repeat levels after replacement completed.     Paroxysmal atrial fibrillation  Patient with Paroxysmal (<7 days) atrial fibrillation which is controlled currently with Toprol XL 50 mg po daily and will continue. Patient is currently in sinus rhythm.[id number] Score: 4.  Anticoagulation indicated. Anticoagulation done with Apixiban 5 mg po BID.    Alcohol dependence with uncomplicated intoxication  Blood alcohol level of 240 mg/100 ml or more   - serum ETOH 277 on admit.  - Patient denies any history of alcohol withdrawal or seizures.  - Continue MVI, Thiamine an Folate for vitamin replacement as patient with long standing history of alcohol abuse.   - Last drink was 12/30.   - No signs of acute withdrawal so far in hospital.  - Monitor CIWA every 4 hours.   - Ativan 1 mg IV prn for anxiety, tremulousness, HR >110, CIWA >8.    Non-cardiac chest pain  - Chest pain resolved on 12/31.   - Patient reports 2-3 days of intermittent chest pain. Episodes lasting for a few minutes. Troponin levels negative. Chest pain non cardiac.     Recent Labs     12/29/23  2211 12/30/23  1512   TROPONINI 0.026 0.010       - EKG without acute ischemic  changes.  - Monitor on telemetry.     Chronic combined systolic and diastolic congestive heart failure  Patient is identified as having Combined Systolic and Diastolic heart failure that is Chronic. CHF is currently controlled. Latest ECHO performed and demonstrates- Results for orders placed during the hospital encounter of 11/05/23    Echo    Interpretation Summary    Left Ventricle: The left ventricle is normal in size. Normal wall thickness. Global hypokinesis present. There is severely reduced systolic function with a visually estimated ejection fraction of 25 - 30%. Grade I diastolic dysfunction.    Right Ventricle: Mild right ventricular enlargement. Wall thickness is normal. Right ventricle wall motion  is normal. Systolic function is mildly reduced.    Biatrial enlargement    Pulmonary Artery: The estimated pulmonary artery systolic pressure is 19 mmHg.    IVC/SVC: Normal venous pressure at 3 mmHg.  Continue home Losartan, Lasix and Toprol XL to treat. Monitor clinical status closely. Monitor on telemetry. Patient is off CHF pathway.  Monitor strict Is&Os and daily weights. Place on fluid restriction of 1.5 L. Cardiology has not been consulted. Continue to stress to patient importance of self efficacy and  on diet for CHF. Last BNP reviewed- and noted below   Recent Labs   Lab 12/29/23  2211   BNP 84       Essential hypertension  Chronic, controlled. Latest blood pressure and vitals reviewed-     Temp:  [96.7 °F (35.9 °C)-98.1 °F (36.7 °C)]   Pulse:  [48-84]   Resp:  [16-18]   BP: (115-137)/(79-96)   SpO2:  [92 %-98 %] .   Home meds for hypertension were reviewed and noted below.   Hypertension Medications               amLODIPine (NORVASC) 10 MG tablet Take 1 tablet (10 mg total) by mouth once daily.    furosemide (LASIX) 40 MG tablet Take 1 tablet (40 mg total) by mouth once daily.    losartan (COZAAR) 25 MG tablet Take 1 tablet (25 mg total) by mouth once daily.    metoprolol succinate (TOPROL-XL)  50 MG 24 hr tablet Take 1 tablet (50 mg total) by mouth once daily.    spironolactone (ALDACTONE) 25 MG tablet Take 1 tablet (25 mg total) by mouth once daily.            While in the hospital, will manage blood pressure as follows; Continue home antihypertensive regimen    Will utilize p.r.n. blood pressure medication only if patient's blood pressure greater than 180/110 and he develops symptoms such as worsening chest pain or shortness of breath.    Coronary artery disease involving native coronary artery with angina pectoris  Patient with known CAD s/p stent placement, which is controlled. Will continue Toprol XL, Losartan and Lipitor to treat and monitor for S/Sx of angina/ACS. Continue to monitor on telemetry.     Pure hypercholesterolemia  Chronic and controlled. Continue home Lipitor to treat.       VTE Risk Mitigation (From admission, onward)           Ordered     apixaban tablet 5 mg  2 times daily         12/30/23 1242     IP VTE HIGH RISK PATIENT  Once         12/30/23 1206     Reason for No Pharmacological VTE Prophylaxis  Once        Question:  Reasons:  Answer:  Already adequately anticoagulated on oral Anticoagulants    12/30/23 1206                    Discharge Planning   ERWIN: 1/5/2024     Code Status: Full Code   Is the patient medically ready for discharge?: No    Reason for patient still in hospital (select all that apply): Patient trending condition             Sarah Hart MD  Department of Hospital Medicine   [facility] - Surgery

## 2023-12-31 NOTE — ASSESSMENT & PLAN NOTE
Patient with known CAD s/p stent placement, which is controlled. Will continue Toprol XL, Losartan and Lipitor to treat and monitor for S/Sx of angina/ACS. Continue to monitor on telemetry.

## 2023-12-31 NOTE — ASSESSMENT & PLAN NOTE
- Patient with imaging in ED that showed closed trimalleolar ankle fracture sustained after a ground level fall related to alcohol consumption.   - Ortho consulted for fracture and reduced and splinted in ED.  - Per Ortho at this time patient is too swollen for definitive fixation and plan is non-weight bearing to left lower extremity and keep left ankle iced and elevated. Orthopedics will continue to follow while patient in hospital. From Orthopedic perspective, patient could follow up outpatient for definitive fixation of ankle but patient is homeless and lives at [facility] and they are unable o provide any assistance to patient and with his non weight bearing status will be difficult for him to get around so will need to remain in hospital until definitive fixation surgery can be done.   - Patient placed on Tylenol 1000 mg po TID + Robaxin 750 mg po 4 times daily + Oxycodone IR 5-10 mg po every 4 hours prn for breakthrough pain.   - Patient on his home Apixiban for atrial fibrillation so fully anticoagulated so no DVT prophylaxis needed.

## 2023-12-31 NOTE — HOSPITAL COURSE
Patient seen and evaluated by Vascular Neurology on ED as CT scan and MRI of brain consistent with acute right sided CVA. Neurology stated patient with no new symptoms related to this stroke and located in previous area of old CVA. CVA cardio embolic in nature as patient wih known atrial fibrillation and recommended to continue home Apixiban 5 mg po BID and Lipitor for stroke prevention and no further work-up or evaluation needed. Orthopedic surgery consulted concerning left ankle fracture and splinted fracture in the ED. CT scan of left ankle done and showed trimalleolar fracture. Patient placed non weight bearing to left lower extremity. Patient to rest, elevate and ice left leg as will require surgical repair of fracture but needs improvement in soft tissue swelling prior to proceeding. Ortho stated potentially could do surgery as outpatient but patient lives at [facility] shelter so hard for him to get around with fractured ankle and would need to be independent at the shelter as they are unable to provide assistance to patient so will need to remain in hospital until definitve fixation surgery can be done for his left ankle. Pain controlled to left ankle.

## 2023-12-31 NOTE — ASSESSMENT & PLAN NOTE
Patient with Paroxysmal (<7 days) atrial fibrillation which is controlled currently with Toprol XL 50 mg po daily and will continue. Patient is currently in sinus rhythm.[id number] Score: 4.  Anticoagulation indicated. Anticoagulation done with Apixiban 5 mg po BID.

## 2023-12-31 NOTE — NURSING
Patient AAOX4, VSS, C/O itching during rounds, MD notified new orders placed for benadryl PRN. Scheduled meds administered as ordered and tolerated well. Bed in lowest position, call light in reach along with personal items. Will continue to round as scheduled and as needed.

## 2023-12-31 NOTE — ASSESSMENT & PLAN NOTE
- Chest pain resolved on 12/31.   - Patient reports 2-3 days of intermittent chest pain. Episodes lasting for a few minutes. Troponin levels negative. Chest pain non cardiac.     Recent Labs     12/29/23  2211 12/30/23  1512   TROPONINI 0.026 0.010       - EKG without acute ischemic changes.  - Monitor on telemetry.

## 2023-12-31 NOTE — SUBJECTIVE & OBJECTIVE
Interval History: Patient seen and evaluated by Vascular Neurology on ED yesterday as CT scan and MRI of brain consistent with acute right sided CVA in basal ganglia area. Neurology stated patient with no new symptoms related to this stroke and located in previous area of old right MCA CVA. CVA cardio embolic in nature as patient wih known atrial fibrillation and recommended to continue home Apixiban 5 mg po BID and Lipitor for stroke prevention and no further work-up or evaluation needed. Patient continued on home Apixiban in hospital. Orthopedic surgery consulted concerning left ankle fracture found on admit and splinted fracture in the ED. CT scan of left ankle done and showed trimalleolar fracture. Patient placed non weight bearing to left lower extremity. Patient to rest, elevate and ice left leg as will require surgical repair of fracture but needs improvement in soft tissue swelling prior to proceeding. Ortho stated potentially could do surgery as outpatient but patient lives at [facility] so hard for him to get around with fractured ankle and would need to be independent at the shelter as they are unable to provide assistance to patient so will need to remain in hospital until definitve fixation surgery can be done for his left ankle. Patient complaining of 8/10 pain to left ankle this am. Patient on scheduled Tylenol and Robaxin with Oxycodone IR prn for breakthrough pain. Will increase Robaxin dosing today to help with pain. Patient also acute intoxicated when came to ED yesterday with elevated alcohol level but not showing any signs of intoxication this am. Nursing monitoring CIWA. Patient with no signs of alcohol withdrawal. Labs reviewed. Potassium 3.4 this am and will replace with oral potassium. Magnesium level normal. Hgb stable at 12.7. Patient denies any chest pain or SOB.     Review of Systems   Constitutional:  Negative for fever.   Respiratory:  Negative for cough and  shortness of breath.    Cardiovascular:  Negative for chest pain.   Gastrointestinal:  Negative for abdominal pain and nausea.   Musculoskeletal:  Positive for arthralgias (Left ankle).   Neurological:  Negative for dizziness and light-headedness.   Psychiatric/Behavioral:  Negative for agitation and confusion.      Objective:     Vital Signs (Most Recent):  Temp: 97.9 °F (36.6 °C) (12/31/23 0903)  Pulse: 61 (12/31/23 0903)  Resp: 18 (12/31/23 0903)  BP: 119/79 (12/31/23 0903)  SpO2: 95 % (12/31/23 0903) on room air Vital Signs (24h Range):  Temp:  [96.7 °F (35.9 °C)-99.2 °F (37.3 °C)] 97.9 °F (36.6 °C)  Pulse:  [48-84] 61  Resp:  [16-18] 18  SpO2:  [92 %-97 %] 95 %  BP: (119-137)/(79-96) 119/79     Weight: 92 kg (202 lb 13.2 oz)  Body mass index is 27.51 kg/m².    Intake/Output Summary (Last 24 hours) at 12/31/2023 1117  Last data filed at 12/31/2023 0512  Gross per 24 hour   Intake 600 ml   Output 1800 ml   Net -1200 ml         Physical Exam  Vitals and nursing note reviewed.   Constitutional:       General: He is awake. He is not in acute distress.     Appearance: Normal appearance. He is well-developed and normal weight. He is not ill-appearing.   Cardiovascular:      Rate and Rhythm: Normal rate and regular rhythm.      Heart sounds: Normal heart sounds. No murmur heard.  Pulmonary:      Effort: Pulmonary effort is normal. No respiratory distress.      Breath sounds: Normal breath sounds. No wheezing.   Abdominal:      General: Abdomen is flat. Bowel sounds are normal. There is no distension.      Palpations: Abdomen is soft.      Tenderness: There is no abdominal tenderness.   Musculoskeletal:      Comments: Left leg in splint and elevated in bed.    Skin:     Findings: No erythema or rash.   Neurological:      Mental Status: He is alert and oriented to person, place, and time.   Psychiatric:         Mood and Affect: Mood normal.         Behavior: Behavior normal. Behavior is cooperative.         Thought  Content: Thought content normal.         Judgment: Judgment normal.             Significant Labs: CBC:   Recent Labs   Lab 12/29/23 2211 12/31/23  0539   WBC 5.70 4.99   HGB 13.2* 12.7*   HCT 38.5* 36.6*    194     CMP:   Recent Labs   Lab 12/29/23 2211 12/31/23  0539    135*   K 4.0 3.4*   CL 98 98   CO2 27 30*    101   BUN 14 14   CREATININE 0.9 1.0   CALCIUM 9.3 9.1   PROT 8.5*  --    ALBUMIN 4.3  --    BILITOT 0.4  --    ALKPHOS 56  --    AST 29  --    ALT 10  --    ANIONGAP 14 7*     Magnesium:   Recent Labs   Lab 12/31/23  0539   MG 1.8       Significant Imaging: I have reviewed all pertinent imaging results/findings within the past 24 hours.

## 2023-12-31 NOTE — ASSESSMENT & PLAN NOTE
Patient has hypokalemia which is Acute and currently controlled. Most recent potassium levels reviewed-   Lab Results   Component Value Date    K 3.4 (L) 12/31/2023   Will continue potassium replacement per protocol and recheck repeat levels after replacement completed.

## 2023-12-31 NOTE — ASSESSMENT & PLAN NOTE
Chronic, controlled. Latest blood pressure and vitals reviewed-     Temp:  [96.7 °F (35.9 °C)-98.1 °F (36.7 °C)]   Pulse:  [48-84]   Resp:  [16-18]   BP: (115-137)/(79-96)   SpO2:  [92 %-98 %] .   Home meds for hypertension were reviewed and noted below.   Hypertension Medications               amLODIPine (NORVASC) 10 MG tablet Take 1 tablet (10 mg total) by mouth once daily.    furosemide (LASIX) 40 MG tablet Take 1 tablet (40 mg total) by mouth once daily.    losartan (COZAAR) 25 MG tablet Take 1 tablet (25 mg total) by mouth once daily.    metoprolol succinate (TOPROL-XL) 50 MG 24 hr tablet Take 1 tablet (50 mg total) by mouth once daily.    spironolactone (ALDACTONE) 25 MG tablet Take 1 tablet (25 mg total) by mouth once daily.            While in the hospital, will manage blood pressure as follows; Continue home antihypertensive regimen    Will utilize p.r.n. blood pressure medication only if patient's blood pressure greater than 180/110 and he develops symptoms such as worsening chest pain or shortness of breath.

## 2023-12-31 NOTE — ASSESSMENT & PLAN NOTE
Patient is identified as having Combined Systolic and Diastolic heart failure that is Chronic. CHF is currently controlled. Latest ECHO performed and demonstrates- Results for orders placed during the hospital encounter of 11/05/23    Echo    Interpretation Summary    Left Ventricle: The left ventricle is normal in size. Normal wall thickness. Global hypokinesis present. There is severely reduced systolic function with a visually estimated ejection fraction of 25 - 30%. Grade I diastolic dysfunction.    Right Ventricle: Mild right ventricular enlargement. Wall thickness is normal. Right ventricle wall motion  is normal. Systolic function is mildly reduced.    Biatrial enlargement    Pulmonary Artery: The estimated pulmonary artery systolic pressure is 19 mmHg.    IVC/SVC: Normal venous pressure at 3 mmHg.  Continue home Losartan, Lasix and Toprol XL to treat. Monitor clinical status closely. Monitor on telemetry. Patient is off CHF pathway.  Monitor strict Is&Os and daily weights. Place on fluid restriction of 1.5 L. Cardiology has not been consulted. Continue to stress to patient importance of self efficacy and  on diet for CHF. Last BNP reviewed- and noted below   Recent Labs   Lab 12/29/23  2211   BNP 84

## 2023-12-31 NOTE — ASSESSMENT & PLAN NOTE
- Patient with known prior right cardio embolic MCA stroke in 2/2023 with residual left sided weakness and dysarthria.   - Vascular neuro consulted as CT scan of head with new hypodensity; MRI of brain done and MRI showed acute small infarcts in right MCA territory. Vascular Neurology noted this is likely incidental finding given patient reports his weakness and dysarthria are at baseline with no recent change. Etiology of these infarcts likely cardioembolic from known Afib. No further stroke workup indicated at this time as per Vascular Neurology.   - Neurology recommends to continue anticoagulation with Apixiban 5 mg po BID. If plan for surgery, then resume anticoagulation when safe postop from surgical perspective. Med list includes Aspirin 81 mg, which is not indicated from a stroke perspective given he is anticoagulated with Apixiban as per Neurology.  - Continue Lipitor 80 mg po daily for stroke prevention.  - Patient with no new neurologic symptoms.

## 2023-12-31 NOTE — ASSESSMENT & PLAN NOTE
Blood alcohol level of 240 mg/100 ml or more   - serum ETOH 277 on admit.  - Patient denies any history of alcohol withdrawal or seizures.  - Continue MVI, Thiamine an Folate for vitamin replacement as patient with long standing history of alcohol abuse.   - Last drink was 12/30.   - No signs of acute withdrawal so far in hospital.  - Monitor CIWA every 4 hours.   - Ativan 1 mg IV prn for anxiety, tremulousness, HR >110, CIWA >8.

## 2024-01-01 PROBLEM — R07.89 NON-CARDIAC CHEST PAIN: Status: RESOLVED | Noted: 2023-12-30 | Resolved: 2024-01-01

## 2024-01-01 LAB
ANION GAP SERPL CALC-SCNC: 12 MMOL/L (ref 8–16)
BUN SERPL-MCNC: 18 MG/DL (ref 6–20)
CALCIUM SERPL-MCNC: 9.4 MG/DL (ref 8.7–10.5)
CHLORIDE SERPL-SCNC: 98 MMOL/L (ref 95–110)
CO2 SERPL-SCNC: 27 MMOL/L (ref 23–29)
CREAT SERPL-MCNC: 1.1 MG/DL (ref 0.5–1.4)
ERYTHROCYTE [DISTWIDTH] IN BLOOD BY AUTOMATED COUNT: 13.3 % (ref 11.5–14.5)
EST. GFR  (NO RACE VARIABLE): >60 ML/MIN/1.73 M^2
GLUCOSE SERPL-MCNC: 96 MG/DL (ref 70–110)
HCT VFR BLD AUTO: 35.6 % (ref 40–54)
HGB BLD-MCNC: 11.9 G/DL (ref 14–18)
MAGNESIUM SERPL-MCNC: 1.9 MG/DL (ref 1.6–2.6)
MCH RBC QN AUTO: 32.5 PG (ref 27–31)
MCHC RBC AUTO-ENTMCNC: 33.4 G/DL (ref 32–36)
MCV RBC AUTO: 97 FL (ref 82–98)
PLATELET # BLD AUTO: 204 K/UL (ref 150–450)
PMV BLD AUTO: 10.7 FL (ref 9.2–12.9)
POTASSIUM SERPL-SCNC: 4.3 MMOL/L (ref 3.5–5.1)
RBC # BLD AUTO: 3.66 M/UL (ref 4.6–6.2)
SODIUM SERPL-SCNC: 137 MMOL/L (ref 136–145)
WBC # BLD AUTO: 5.65 K/UL (ref 3.9–12.7)

## 2024-01-01 PROCEDURE — 25000003 PHARM REV CODE 250: Performed by: INTERNAL MEDICINE

## 2024-01-01 PROCEDURE — 63600175 PHARM REV CODE 636 W HCPCS

## 2024-01-01 PROCEDURE — 85027 COMPLETE CBC AUTOMATED: CPT

## 2024-01-01 PROCEDURE — 83735 ASSAY OF MAGNESIUM: CPT

## 2024-01-01 PROCEDURE — 80048 BASIC METABOLIC PNL TOTAL CA: CPT

## 2024-01-01 PROCEDURE — 25000003 PHARM REV CODE 250

## 2024-01-01 PROCEDURE — 21400001 HC TELEMETRY ROOM

## 2024-01-01 PROCEDURE — 25000003 PHARM REV CODE 250: Performed by: PHYSICIAN ASSISTANT

## 2024-01-01 PROCEDURE — 36415 COLL VENOUS BLD VENIPUNCTURE: CPT

## 2024-01-01 RX ORDER — METHOCARBAMOL 500 MG/1
1000 TABLET, FILM COATED ORAL 4 TIMES DAILY
Status: DISCONTINUED | OUTPATIENT
Start: 2024-01-01 | End: 2024-01-08 | Stop reason: HOSPADM

## 2024-01-01 RX ORDER — PREGABALIN 75 MG/1
75 CAPSULE ORAL 2 TIMES DAILY
Status: DISCONTINUED | OUTPATIENT
Start: 2024-01-01 | End: 2024-01-08 | Stop reason: HOSPADM

## 2024-01-01 RX ADMIN — METHOCARBAMOL 1000 MG: 500 TABLET ORAL at 04:01

## 2024-01-01 RX ADMIN — PANTOPRAZOLE SODIUM 20 MG: 20 TABLET, DELAYED RELEASE ORAL at 06:01

## 2024-01-01 RX ADMIN — OXYCODONE HYDROCHLORIDE 10 MG: 10 TABLET ORAL at 09:01

## 2024-01-01 RX ADMIN — FUROSEMIDE 40 MG: 40 TABLET ORAL at 09:01

## 2024-01-01 RX ADMIN — OXYCODONE HYDROCHLORIDE 10 MG: 10 TABLET ORAL at 03:01

## 2024-01-01 RX ADMIN — APIXABAN 5 MG: 5 TABLET, FILM COATED ORAL at 09:01

## 2024-01-01 RX ADMIN — LORAZEPAM 1 MG: 2 INJECTION INTRAMUSCULAR; INTRAVENOUS at 10:01

## 2024-01-01 RX ADMIN — ACETAMINOPHEN 1000 MG: 500 TABLET ORAL at 08:01

## 2024-01-01 RX ADMIN — DIPHENHYDRAMINE HYDROCHLORIDE 25 MG: 25 CAPSULE ORAL at 08:01

## 2024-01-01 RX ADMIN — ATORVASTATIN CALCIUM 80 MG: 40 TABLET, FILM COATED ORAL at 08:01

## 2024-01-01 RX ADMIN — FOLIC ACID 1 MG: 1 TABLET ORAL at 09:01

## 2024-01-01 RX ADMIN — AMLODIPINE BESYLATE 10 MG: 5 TABLET ORAL at 09:01

## 2024-01-01 RX ADMIN — METOPROLOL SUCCINATE 50 MG: 50 TABLET, EXTENDED RELEASE ORAL at 09:01

## 2024-01-01 RX ADMIN — METHOCARBAMOL 750 MG: 750 TABLET ORAL at 09:01

## 2024-01-01 RX ADMIN — FLUOXETINE HYDROCHLORIDE 20 MG: 20 CAPSULE ORAL at 09:01

## 2024-01-01 RX ADMIN — ACETAMINOPHEN 1000 MG: 500 TABLET ORAL at 01:01

## 2024-01-01 RX ADMIN — PREGABALIN 75 MG: 75 CAPSULE ORAL at 08:01

## 2024-01-01 RX ADMIN — METHOCARBAMOL 1000 MG: 500 TABLET ORAL at 08:01

## 2024-01-01 RX ADMIN — Medication 6 MG: at 08:01

## 2024-01-01 RX ADMIN — LOSARTAN POTASSIUM 25 MG: 25 TABLET, FILM COATED ORAL at 09:01

## 2024-01-01 RX ADMIN — THIAMINE HCL TAB 100 MG 200 MG: 100 TAB at 09:01

## 2024-01-01 RX ADMIN — ACETAMINOPHEN 1000 MG: 500 TABLET ORAL at 06:01

## 2024-01-01 RX ADMIN — METHOCARBAMOL 1000 MG: 500 TABLET ORAL at 01:01

## 2024-01-01 RX ADMIN — APIXABAN 5 MG: 5 TABLET, FILM COATED ORAL at 08:01

## 2024-01-01 RX ADMIN — PREGABALIN 75 MG: 75 CAPSULE ORAL at 11:01

## 2024-01-01 RX ADMIN — THERA TABS 1 TABLET: TAB at 09:01

## 2024-01-01 RX ADMIN — ASPIRIN 81 MG CHEWABLE TABLET 81 MG: 81 TABLET CHEWABLE at 09:01

## 2024-01-01 RX ADMIN — SPIRONOLACTONE 25 MG: 25 TABLET ORAL at 09:01

## 2024-01-01 NOTE — ASSESSMENT & PLAN NOTE
Patient is identified as having Combined Systolic and Diastolic heart failure that is Chronic. CHF is currently controlled. Latest ECHO performed and demonstrates- Results for orders placed during the hospital encounter of 11/05/23    Echo    Interpretation Summary    Left Ventricle: The left ventricle is normal in size. Normal wall thickness. Global hypokinesis present. There is severely reduced systolic function with a visually estimated ejection fraction of 25 - 30%. Grade I diastolic dysfunction.    Right Ventricle: Mild right ventricular enlargement. Wall thickness is normal. Right ventricle wall motion  is normal. Systolic function is mildly reduced.    Biatrial enlargement    Pulmonary Artery: The estimated pulmonary artery systolic pressure is 19 mmHg.    IVC/SVC: Normal venous pressure at 3 mmHg.  Continue home Losartan, Lasix and Toprol XL to treat. Monitor clinical status closely. Monitor on telemetry. Patient is off CHF pathway.  Monitor strict Is&Os and daily weights. Place on fluid restriction of 1.5 L. Cardiology has not been consulted. Continue to stress to patient importance of self efficacy and  on diet for CHF. Last BNP reviewed- and noted below   Recent Labs   Lab 12/29/23  2211   BNP 84

## 2024-01-01 NOTE — ASSESSMENT & PLAN NOTE
Resolved on 1/1 after oral replacement on 12/31. Patient has hypokalemia which is Acute and currently controlled. Most recent potassium levels reviewed-   Lab Results   Component Value Date    K 4.3 01/01/2024   Will continue potassium replacement per protocol and recheck repeat levels after replacement completed.

## 2024-01-01 NOTE — PROGRESS NOTES
[facility] Hospital Medicine  Progress Note    Patient Name: Dionicio Bess  MRN: [id number]  Patient Class: OP- Observation   Admission Date: 12/29/2023  Length of Stay: 0 days  Attending Physician: Sarah Hart MD  Primary Care Provider: Isela, Primary Doctor        Subjective:     Principal Problem:Closed trimalleolar fracture of left ankle        HPI:  Dionicio Bess is a 59 yo M with PMHx of pAfib (on eliquis), HTN, CVA with residual L sided deficits, CAD s/p PCI, HLD, HFrEF (EF 25-30%) who presented to ED for swelling and pain to his L ankle after a ground level mechanical fall. Patient reports he tripped on a step. Now c/o 9/10 pain to L ankle with inability to bear weight. Denies LOC or head trauma. Patient also endorses intermittent lower midsternal chest pain for the past 2-3 days. Denies chest pain currently.  Also admits cramping epigastric abdominal pain with associated nausea that developed 45 mins ago. On chart review, patient had a stress test performed last month that was nondiagnostic though abnormal.  It does not appear that this was ever followed up on by cardiology as an outpatient.  The patient does have socioeconomic issues and resides in a homeless shelter ([facility]). Endorses frequent alcohol use with 2-3 beers daily. States he did have some vodka and beers yesterday before the fall. Denies fever, chills, palpitations, SOB, cough, v/d, dysuria, numbness, paresthesias, headaches.     In ED: Afebrile. HDS. Satting well on 2L NC. CBC without leukocytosis and with stable chronic anemia. CMP unremarkable. BNP 84. Trop 0.026. serum ETOH 277.  CXR with cardiomegaly and mild perihilar edema. CT ankle with trimalleolar fracture. Ortho consulted; recommending NWB to LLE and to keep iced and elevated. Too swollen for fixation at this time. CT cervical spine with degenerative changes, but without acute fracture or subluxation. CT/ MRI brain Small foci of acute to early subacute ischemia noted  in the right corona radiata/basal ganglia/internal capsule, adjoining remote large MCA territory infarct.  Equivocal punctate right parietal cortical infarct margin the posterior aspect of the infarct territory. Vascular neurology consulted; recommend continuing eliquis and statin. Given IV morphine 4 mg,  dilaudid 1 mg , IV toradol, and IV zofran. Placed in observation.     Overview/Hospital Course:  Patient seen and evaluated by Vascular Neurology on ED as CT scan and MRI of brain consistent with acute right sided CVA. Neurology stated patient with no new symptoms related to this stroke and located in previous area of old CVA. CVA cardio embolic in nature as patient wih known atrial fibrillation and recommended to continue home Apixiban 5 mg po BID and Lipitor for stroke prevention and no further work-up or evaluation needed. Orthopedic surgery consulted concerning left ankle fracture and splinted fracture in the ED. CT scan of left ankle done and showed trimalleolar fracture. Patient placed non weight bearing to left lower extremity. Patient to rest, elevate and ice left leg as will require surgical repair of fracture but needs improvement in soft tissue swelling prior to proceeding. Ortho stated potentially could do surgery as outpatient but patient lives at [facility] so hard for him to get around with fractured ankle and would need to be independent at the shelter as they are unable to provide assistance to patient so will need to remain in hospital until definitve fixation surgery can be done for his left ankle. Pain controlled to left ankle.     Interval History: Patient asking for double portions for food so changed diet in EPIC. Patient reports having 8/10 pain to left ankle this am. Patient has been keeping left leg elevated as much as possible. Plan to increase Robaxin from 750 to 1000 mg po 4 times daily and add Lyrica 75 mg po BID and continue scheduled Tylenol and Oxycodone IR prn  for breakthrough pain to help with pain management to left ankle. Labs reviewed and stable. Potassium increased to 4.3 this am from 3.4 yesterday after oral replacement yesterday.     Review of Systems   Constitutional:  Negative for fever.   Respiratory:  Negative for cough and shortness of breath.    Cardiovascular:  Negative for chest pain.   Gastrointestinal:  Negative for abdominal pain and nausea.   Musculoskeletal:  Positive for arthralgias (Left ankle).   Neurological:  Negative for dizziness and light-headedness.   Psychiatric/Behavioral:  Negative for agitation and confusion.      Objective:     Vital Signs (Most Recent):  Temp: 98.3 °F (36.8 °C) (01/01/24 0814)  Pulse: 66 (01/01/24 0814)  Resp: 18 (01/01/24 0900)  BP: 123/80 (01/01/24 0814)  SpO2: 96 % (01/01/24 0814) on room air Vital Signs (24h Range):  Temp:  [97.4 °F (36.3 °C)-98.7 °F (37.1 °C)] 98.3 °F (36.8 °C)  Pulse:  [59-68] 66  Resp:  [15-18] 18  SpO2:  [95 %-98 %] 96 %  BP: (102-123)/(71-90) 123/80     Weight: 92 kg (202 lb 13.2 oz)  Body mass index is 27.51 kg/m².    Intake/Output Summary (Last 24 hours) at 1/1/2024 1024  Last data filed at 1/1/2024 0311  Gross per 24 hour   Intake 410 ml   Output 595 ml   Net -185 ml         Physical Exam  Vitals and nursing note reviewed.   Constitutional:       General: He is awake. He is not in acute distress.     Appearance: Normal appearance. He is well-developed and normal weight. He is not ill-appearing.   Cardiovascular:      Rate and Rhythm: Normal rate and regular rhythm.      Heart sounds: Normal heart sounds. No murmur heard.  Pulmonary:      Effort: Pulmonary effort is normal. No respiratory distress.      Breath sounds: Normal breath sounds. No wheezing.   Abdominal:      General: Abdomen is flat. Bowel sounds are normal. There is no distension.      Palpations: Abdomen is soft.      Tenderness: There is no abdominal tenderness.   Musculoskeletal:      Comments: Left leg in splint and elevated  in bed.    Skin:     Findings: No erythema or rash.   Neurological:      Mental Status: He is alert and oriented to person, place, and time.   Psychiatric:         Mood and Affect: Mood normal.         Behavior: Behavior normal. Behavior is cooperative.         Thought Content: Thought content normal.         Judgment: Judgment normal.             Significant Labs: CBC:   Recent Labs   Lab 12/31/23  0539 01/01/24  0508   WBC 4.99 5.65   HGB 12.7* 11.9*   HCT 36.6* 35.6*    204     CMP:   Recent Labs   Lab 12/31/23  0539 01/01/24  0508   * 137   K 3.4* 4.3   CL 98 98   CO2 30* 27    96   BUN 14 18   CREATININE 1.0 1.1   CALCIUM 9.1 9.4   ANIONGAP 7* 12     Magnesium:   Recent Labs   Lab 12/31/23  0539 01/01/24  0508   MG 1.8 1.9       Significant Imaging: I have reviewed all pertinent imaging results/findings within the past 24 hours.    Assessment/Plan:      * Closed trimalleolar fracture of left ankle  - Patient with imaging in ED that showed closed trimalleolar ankle fracture sustained after a ground level fall related to alcohol consumption.   - Ortho consulted for fracture and reduced and splinted in ED.  - Per Ortho at this time patient is too swollen for definitive fixation and plan is non-weight bearing to left lower extremity and keep left ankle iced and elevated to help with soft tissue swelling.   - Orthopedics will continue to follow while patient in hospital. From Orthopedic perspective, patient could follow up outpatient for definitive fixation of ankle but patient is homeless and lives at [facility] and they are unable o provide any assistance to patient and with his non weight bearing status will be difficult for him to get around so will need to remain in hospital until definitive fixation surgery can be done.   - Pain not controlled. Patient on Tylenol 1000 mg po TID for pain and will continue but will increase Robaxin from 750 to 1000 mg po 4 times daily and  add Lyrica 75 mg po BID and continue with Oxycodone IR 5-10 mg po every 4 hours prn for breakthrough pain.   - Patient on his home Apixiban for atrial fibrillation so fully anticoagulated so no DVT prophylaxis needed.     Stroke due to embolism of right middle cerebral artery  - Patient with known prior right cardio embolic MCA stroke in 2/2023 with residual left sided weakness and dysarthria.   - Vascular neuro consulted as CT scan of head with new hypodensity; MRI of brain done and MRI showed acute small infarcts in right MCA territory. Vascular Neurology noted this is likely incidental finding given patient reports his weakness and dysarthria are at baseline with no recent change. Etiology of these infarcts likely cardioembolic from known Afib. No further stroke workup indicated at this time as per Vascular Neurology.   - Neurology recommends to continue anticoagulation with Apixiban 5 mg po BID. If plan for surgery, then resume anticoagulation when safe postop from surgical perspective. Med list includes Aspirin 81 mg, which is not indicated from a stroke perspective given he is anticoagulated with Apixiban as per Neurology.  - Continue Lipitor 80 mg po daily for stroke prevention.  - Patient with no new neurologic symptoms.     Hypokalemia  Resolved on 1/1 after oral replacement on 12/31. Patient has hypokalemia which is Acute and currently controlled. Most recent potassium levels reviewed-   Lab Results   Component Value Date    K 4.3 01/01/2024   Will continue potassium replacement per protocol and recheck repeat levels after replacement completed.     Paroxysmal atrial fibrillation  Patient with Paroxysmal (<7 days) atrial fibrillation which is controlled currently with Toprol XL 50 mg po daily and will continue. Patient is currently in sinus rhythm.[id number] Score: 4.  Anticoagulation indicated. Anticoagulation done with Apixiban 5 mg po BID.    Alcohol dependence with uncomplicated intoxication  Blood  alcohol level of 240 mg/100 ml or more   - serum ETOH 277 on admit.  - Patient denies any history of alcohol withdrawal or seizures.  - Continue MVI, Thiamine an Folate for vitamin replacement as patient with long standing history of alcohol abuse.   - Last drink was 12/30.   - No signs of acute withdrawal so far in hospital.  - Monitor CIWA every 4 hours.   - Ativan 1 mg IV prn for anxiety, tremulousness, HR >110, CIWA >8.    Chronic combined systolic and diastolic congestive heart failure  Patient is identified as having Combined Systolic and Diastolic heart failure that is Chronic. CHF is currently controlled. Latest ECHO performed and demonstrates- Results for orders placed during the hospital encounter of 11/05/23    Echo    Interpretation Summary    Left Ventricle: The left ventricle is normal in size. Normal wall thickness. Global hypokinesis present. There is severely reduced systolic function with a visually estimated ejection fraction of 25 - 30%. Grade I diastolic dysfunction.    Right Ventricle: Mild right ventricular enlargement. Wall thickness is normal. Right ventricle wall motion  is normal. Systolic function is mildly reduced.    Biatrial enlargement    Pulmonary Artery: The estimated pulmonary artery systolic pressure is 19 mmHg.    IVC/SVC: Normal venous pressure at 3 mmHg.  Continue home Losartan, Lasix and Toprol XL to treat. Monitor clinical status closely. Monitor on telemetry. Patient is off CHF pathway.  Monitor strict Is&Os and daily weights. Place on fluid restriction of 1.5 L. Cardiology has not been consulted. Continue to stress to patient importance of self efficacy and  on diet for CHF. Last BNP reviewed- and noted below   Recent Labs   Lab 12/29/23  2211   BNP 84         Essential hypertension  Chronic, controlled. Latest blood pressure and vitals reviewed-     Temp:  [97.4 °F (36.3 °C)-98.7 °F (37.1 °C)]   Pulse:  [59-68]   Resp:  [15-18]   BP: (102-123)/(71-90)   SpO2:  [95  %-98 %] .   Home meds for hypertension were reviewed and noted below.   Hypertension Medications               amLODIPine (NORVASC) 10 MG tablet Take 1 tablet (10 mg total) by mouth once daily.    furosemide (LASIX) 40 MG tablet Take 1 tablet (40 mg total) by mouth once daily.    losartan (COZAAR) 25 MG tablet Take 1 tablet (25 mg total) by mouth once daily.    metoprolol succinate (TOPROL-XL) 50 MG 24 hr tablet Take 1 tablet (50 mg total) by mouth once daily.    spironolactone (ALDACTONE) 25 MG tablet Take 1 tablet (25 mg total) by mouth once daily.            While in the hospital, will manage blood pressure as follows; Continue home antihypertensive regimen    Will utilize p.r.n. blood pressure medication only if patient's blood pressure greater than 180/110 and he develops symptoms such as worsening chest pain or shortness of breath.    Coronary artery disease involving native coronary artery with angina pectoris  Patient with known CAD s/p stent placement, which is controlled. Will continue Toprol XL, Losartan and Lipitor to treat and monitor for S/Sx of angina/ACS. Continue to monitor on telemetry.     Pure hypercholesterolemia  Chronic and controlled. Continue home Lipitor to treat.       VTE Risk Mitigation (From admission, onward)           Ordered     apixaban tablet 5 mg  2 times daily         12/30/23 1242     IP VTE HIGH RISK PATIENT  Once         12/30/23 1206     Reason for No Pharmacological VTE Prophylaxis  Once        Question:  Reasons:  Answer:  Already adequately anticoagulated on oral Anticoagulants    12/30/23 1206                    Discharge Planning   ERWIN: 1/5/2024     Code Status: Full Code   Is the patient medically ready for discharge?: No    Reason for patient still in hospital (select all that apply): Patient trending condition             Sarah Hart MD  Department of Hospital Medicine   [facility] - Surgery

## 2024-01-01 NOTE — ASSESSMENT & PLAN NOTE
Chronic, controlled. Latest blood pressure and vitals reviewed-     Temp:  [97.4 °F (36.3 °C)-98.7 °F (37.1 °C)]   Pulse:  [59-68]   Resp:  [15-18]   BP: (102-123)/(71-90)   SpO2:  [95 %-98 %] .   Home meds for hypertension were reviewed and noted below.   Hypertension Medications               amLODIPine (NORVASC) 10 MG tablet Take 1 tablet (10 mg total) by mouth once daily.    furosemide (LASIX) 40 MG tablet Take 1 tablet (40 mg total) by mouth once daily.    losartan (COZAAR) 25 MG tablet Take 1 tablet (25 mg total) by mouth once daily.    metoprolol succinate (TOPROL-XL) 50 MG 24 hr tablet Take 1 tablet (50 mg total) by mouth once daily.    spironolactone (ALDACTONE) 25 MG tablet Take 1 tablet (25 mg total) by mouth once daily.            While in the hospital, will manage blood pressure as follows; Continue home antihypertensive regimen    Will utilize p.r.n. blood pressure medication only if patient's blood pressure greater than 180/110 and he develops symptoms such as worsening chest pain or shortness of breath.

## 2024-01-01 NOTE — PLAN OF CARE
Problem: Adult Inpatient Plan of Care  Goal: Plan of Care Review  Outcome: Ongoing, Progressing  Goal: Patient-Specific Goal (Individualized)  Outcome: Ongoing, Progressing  Goal: Absence of Hospital-Acquired Illness or Injury  Outcome: Ongoing, Progressing  Goal: Optimal Comfort and Wellbeing  Outcome: Ongoing, Progressing  Goal: Readiness for Transition of Care  Outcome: Ongoing, Progressing     Problem: Adult Inpatient Plan of Care  Goal: Plan of Care Review  Outcome: Ongoing, Progressing     Problem: Adult Inpatient Plan of Care  Goal: Patient-Specific Goal (Individualized)  Outcome: Ongoing, Progressing     Problem: Adult Inpatient Plan of Care  Goal: Absence of Hospital-Acquired Illness or Injury  Outcome: Ongoing, Progressing     Problem: Adult Inpatient Plan of Care  Goal: Readiness for Transition of Care  Outcome: Ongoing, Progressing     Problem: Infection  Goal: Absence of Infection Signs and Symptoms  Outcome: Ongoing, Progressing     Problem: Infection  Goal: Absence of Infection Signs and Symptoms  Outcome: Ongoing, Progressing     Problem: Skin Injury Risk Increased  Goal: Skin Health and Integrity  Outcome: Ongoing, Progressing     Remain on telemetry monitor. PRN medication effective for pain however pt requested IV pain med . Explained plan of care,  and pain schedule and timing, updated whiteboard ; verbalized understanding. Educated pt .on new medicine . No injury during shift, Side rails up x 2, call light by bedside.  Leg remained elevated

## 2024-01-01 NOTE — ASSESSMENT & PLAN NOTE
- Patient with imaging in ED that showed closed trimalleolar ankle fracture sustained after a ground level fall related to alcohol consumption.   - Ortho consulted for fracture and reduced and splinted in ED.  - Per Ortho at this time patient is too swollen for definitive fixation and plan is non-weight bearing to left lower extremity and keep left ankle iced and elevated to help with soft tissue swelling.   - Orthopedics will continue to follow while patient in hospital. From Orthopedic perspective, patient could follow up outpatient for definitive fixation of ankle but patient is homeless and lives at [facility] and they are unable o provide any assistance to patient and with his non weight bearing status will be difficult for him to get around so will need to remain in hospital until definitive fixation surgery can be done.   - Pain not controlled. Patient on Tylenol 1000 mg po TID for pain and will continue but will increase Robaxin from 750 to 1000 mg po 4 times daily and add Lyrica 75 mg po BID and continue with Oxycodone IR 5-10 mg po every 4 hours prn for breakthrough pain.   - Patient on his home Apixiban for atrial fibrillation so fully anticoagulated so no DVT prophylaxis needed.

## 2024-01-01 NOTE — PLAN OF CARE
Problem: Adult Inpatient Plan of Care  Goal: Plan of Care Review  Outcome: Ongoing, Progressing  Flowsheets (Taken 12/31/2023 1934)  Plan of Care Reviewed With: patient  Goal: Absence of Hospital-Acquired Illness or Injury  Outcome: Ongoing, Progressing  Goal: Optimal Comfort and Wellbeing  Outcome: Ongoing, Progressing  Intervention: Monitor Pain and Promote Comfort  Flowsheets (Taken 12/31/2023 1934)  Pain Management Interventions: pain management plan reviewed with patient/caregiver     Problem: Adult Inpatient Plan of Care  Goal: Plan of Care Review  Outcome: Ongoing, Progressing  Flowsheets (Taken 12/31/2023 1934)  Plan of Care Reviewed With: patient     Problem: Infection  Goal: Absence of Infection Signs and Symptoms  Outcome: Ongoing, Progressing  Intervention: Prevent or Manage Infection  Flowsheets (Taken 12/31/2023 1934)  Fever Reduction/Comfort Measures:   lightweight clothing   lightweight bedding  Infection Management: aseptic technique maintained     Problem: Skin Injury Risk Increased  Goal: Skin Health and Integrity  Outcome: Ongoing, Progressing  Intervention: Optimize Skin Protection  Flowsheets (Taken 12/31/2023 1934)  Pressure Reduction Devices: heel offloading device utilized  Head of Bed (HOB) Positioning: HOB at 30-45 degrees

## 2024-01-01 NOTE — SUBJECTIVE & OBJECTIVE
Interval History: Patient asking for double portions for food so changed diet in [facility]. Patient reports having 8/10 pain to left ankle this am. Patient has been keeping left leg elevated as much as possible. Plan to increase Robaxin from 750 to 1000 mg po 4 times daily and add Lyrica 75 mg po BID and continue scheduled Tylenol and Oxycodone IR prn for breakthrough pain to help with pain management to left ankle. Labs reviewed and stable. Potassium increased to 4.3 this am from 3.4 yesterday after oral replacement yesterday.     Review of Systems   Constitutional:  Negative for fever.   Respiratory:  Negative for cough and shortness of breath.    Cardiovascular:  Negative for chest pain.   Gastrointestinal:  Negative for abdominal pain and nausea.   Musculoskeletal:  Positive for arthralgias (Left ankle).   Neurological:  Negative for dizziness and light-headedness.   Psychiatric/Behavioral:  Negative for agitation and confusion.      Objective:     Vital Signs (Most Recent):  Temp: 98.3 °F (36.8 °C) (01/01/24 0814)  Pulse: 66 (01/01/24 0814)  Resp: 18 (01/01/24 0900)  BP: 123/80 (01/01/24 0814)  SpO2: 96 % (01/01/24 0814) on room air Vital Signs (24h Range):  Temp:  [97.4 °F (36.3 °C)-98.7 °F (37.1 °C)] 98.3 °F (36.8 °C)  Pulse:  [59-68] 66  Resp:  [15-18] 18  SpO2:  [95 %-98 %] 96 %  BP: (102-123)/(71-90) 123/80     Weight: 92 kg (202 lb 13.2 oz)  Body mass index is 27.51 kg/m².    Intake/Output Summary (Last 24 hours) at 1/1/2024 1024  Last data filed at 1/1/2024 0311  Gross per 24 hour   Intake 410 ml   Output 595 ml   Net -185 ml         Physical Exam  Vitals and nursing note reviewed.   Constitutional:       General: He is awake. He is not in acute distress.     Appearance: Normal appearance. He is well-developed and normal weight. He is not ill-appearing.   Cardiovascular:      Rate and Rhythm: Normal rate and regular rhythm.      Heart sounds: Normal heart sounds. No murmur heard.  Pulmonary:      Effort:  Pulmonary effort is normal. No respiratory distress.      Breath sounds: Normal breath sounds. No wheezing.   Abdominal:      General: Abdomen is flat. Bowel sounds are normal. There is no distension.      Palpations: Abdomen is soft.      Tenderness: There is no abdominal tenderness.   Musculoskeletal:      Comments: Left leg in splint and elevated in bed.    Skin:     Findings: No erythema or rash.   Neurological:      Mental Status: He is alert and oriented to person, place, and time.   Psychiatric:         Mood and Affect: Mood normal.         Behavior: Behavior normal. Behavior is cooperative.         Thought Content: Thought content normal.         Judgment: Judgment normal.             Significant Labs: CBC:   Recent Labs   Lab 12/31/23  0539 01/01/24  0508   WBC 4.99 5.65   HGB 12.7* 11.9*   HCT 36.6* 35.6*    204     CMP:   Recent Labs   Lab 12/31/23  0539 01/01/24  0508   * 137   K 3.4* 4.3   CL 98 98   CO2 30* 27    96   BUN 14 18   CREATININE 1.0 1.1   CALCIUM 9.1 9.4   ANIONGAP 7* 12     Magnesium:   Recent Labs   Lab 12/31/23  0539 01/01/24  0508   MG 1.8 1.9       Significant Imaging: I have reviewed all pertinent imaging results/findings within the past 24 hours.

## 2024-01-01 NOTE — ASSESSMENT & PLAN NOTE
Patient with Paroxysmal (<7 days) atrial fibrillation which is controlled currently with Toprol XL 50 mg po daily and will continue. Patient is currently in sinus rhythm.[id number] Score: 4.  Anticoagulation indicated. Anticoagulation done with Apixiban 5 mg po BID.
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
## 2024-01-02 PROBLEM — E87.6 HYPOKALEMIA: Status: RESOLVED | Noted: 2023-12-31 | Resolved: 2024-01-02

## 2024-01-02 LAB
ANION GAP SERPL CALC-SCNC: 9 MMOL/L (ref 8–16)
BUN SERPL-MCNC: 22 MG/DL (ref 6–20)
CALCIUM SERPL-MCNC: 9.3 MG/DL (ref 8.7–10.5)
CHLORIDE SERPL-SCNC: 98 MMOL/L (ref 95–110)
CO2 SERPL-SCNC: 30 MMOL/L (ref 23–29)
CREAT SERPL-MCNC: 1.1 MG/DL (ref 0.5–1.4)
ERYTHROCYTE [DISTWIDTH] IN BLOOD BY AUTOMATED COUNT: 13.2 % (ref 11.5–14.5)
EST. GFR  (NO RACE VARIABLE): >60 ML/MIN/1.73 M^2
GLUCOSE SERPL-MCNC: 92 MG/DL (ref 70–110)
HCT VFR BLD AUTO: 36.3 % (ref 40–54)
HGB BLD-MCNC: 12 G/DL (ref 14–18)
MAGNESIUM SERPL-MCNC: 1.9 MG/DL (ref 1.6–2.6)
MCH RBC QN AUTO: 32.3 PG (ref 27–31)
MCHC RBC AUTO-ENTMCNC: 33.1 G/DL (ref 32–36)
MCV RBC AUTO: 98 FL (ref 82–98)
PLATELET # BLD AUTO: 221 K/UL (ref 150–450)
PMV BLD AUTO: 10.8 FL (ref 9.2–12.9)
POTASSIUM SERPL-SCNC: 4.2 MMOL/L (ref 3.5–5.1)
RBC # BLD AUTO: 3.72 M/UL (ref 4.6–6.2)
SODIUM SERPL-SCNC: 137 MMOL/L (ref 136–145)
WBC # BLD AUTO: 4.68 K/UL (ref 3.9–12.7)

## 2024-01-02 PROCEDURE — 25000003 PHARM REV CODE 250: Performed by: PHYSICIAN ASSISTANT

## 2024-01-02 PROCEDURE — 83735 ASSAY OF MAGNESIUM: CPT

## 2024-01-02 PROCEDURE — 25000003 PHARM REV CODE 250: Performed by: INTERNAL MEDICINE

## 2024-01-02 PROCEDURE — 36415 COLL VENOUS BLD VENIPUNCTURE: CPT

## 2024-01-02 PROCEDURE — 80048 BASIC METABOLIC PNL TOTAL CA: CPT

## 2024-01-02 PROCEDURE — 85027 COMPLETE CBC AUTOMATED: CPT

## 2024-01-02 PROCEDURE — 21400001 HC TELEMETRY ROOM

## 2024-01-02 PROCEDURE — 25000003 PHARM REV CODE 250

## 2024-01-02 PROCEDURE — 94761 N-INVAS EAR/PLS OXIMETRY MLT: CPT

## 2024-01-02 RX ORDER — POLYETHYLENE GLYCOL 3350 17 G/17G
17 POWDER, FOR SOLUTION ORAL DAILY
Status: DISCONTINUED | OUTPATIENT
Start: 2024-01-02 | End: 2024-01-08 | Stop reason: HOSPADM

## 2024-01-02 RX ORDER — SENNOSIDES 8.6 MG/1
8.6 TABLET ORAL DAILY
Status: DISCONTINUED | OUTPATIENT
Start: 2024-01-02 | End: 2024-01-08 | Stop reason: HOSPADM

## 2024-01-02 RX ADMIN — THERA TABS 1 TABLET: TAB at 08:01

## 2024-01-02 RX ADMIN — APIXABAN 5 MG: 5 TABLET, FILM COATED ORAL at 09:01

## 2024-01-02 RX ADMIN — ACETAMINOPHEN 1000 MG: 500 TABLET ORAL at 05:01

## 2024-01-02 RX ADMIN — METHOCARBAMOL 1000 MG: 500 TABLET ORAL at 02:01

## 2024-01-02 RX ADMIN — METHOCARBAMOL 1000 MG: 500 TABLET ORAL at 08:01

## 2024-01-02 RX ADMIN — FOLIC ACID 1 MG: 1 TABLET ORAL at 08:01

## 2024-01-02 RX ADMIN — DIPHENHYDRAMINE HYDROCHLORIDE 25 MG: 25 CAPSULE ORAL at 09:01

## 2024-01-02 RX ADMIN — METHOCARBAMOL 1000 MG: 500 TABLET ORAL at 06:01

## 2024-01-02 RX ADMIN — LOSARTAN POTASSIUM 25 MG: 25 TABLET, FILM COATED ORAL at 08:01

## 2024-01-02 RX ADMIN — ACETAMINOPHEN 1000 MG: 500 TABLET ORAL at 02:01

## 2024-01-02 RX ADMIN — AMLODIPINE BESYLATE 10 MG: 5 TABLET ORAL at 08:01

## 2024-01-02 RX ADMIN — METOPROLOL SUCCINATE 50 MG: 50 TABLET, EXTENDED RELEASE ORAL at 08:01

## 2024-01-02 RX ADMIN — METHOCARBAMOL 1000 MG: 500 TABLET ORAL at 09:01

## 2024-01-02 RX ADMIN — OXYCODONE HYDROCHLORIDE 10 MG: 10 TABLET ORAL at 05:01

## 2024-01-02 RX ADMIN — PANTOPRAZOLE SODIUM 20 MG: 20 TABLET, DELAYED RELEASE ORAL at 05:01

## 2024-01-02 RX ADMIN — APIXABAN 5 MG: 5 TABLET, FILM COATED ORAL at 08:01

## 2024-01-02 RX ADMIN — PREGABALIN 75 MG: 75 CAPSULE ORAL at 09:01

## 2024-01-02 RX ADMIN — FLUOXETINE HYDROCHLORIDE 20 MG: 20 CAPSULE ORAL at 08:01

## 2024-01-02 RX ADMIN — ACETAMINOPHEN 1000 MG: 500 TABLET ORAL at 09:01

## 2024-01-02 RX ADMIN — THIAMINE HCL TAB 100 MG 200 MG: 100 TAB at 08:01

## 2024-01-02 RX ADMIN — SPIRONOLACTONE 25 MG: 25 TABLET ORAL at 08:01

## 2024-01-02 RX ADMIN — ASPIRIN 81 MG CHEWABLE TABLET 81 MG: 81 TABLET CHEWABLE at 08:01

## 2024-01-02 RX ADMIN — ATORVASTATIN CALCIUM 80 MG: 40 TABLET, FILM COATED ORAL at 09:01

## 2024-01-02 RX ADMIN — Medication 6 MG: at 09:01

## 2024-01-02 RX ADMIN — PREGABALIN 75 MG: 75 CAPSULE ORAL at 08:01

## 2024-01-02 RX ADMIN — FUROSEMIDE 40 MG: 40 TABLET ORAL at 08:01

## 2024-01-02 NOTE — PLAN OF CARE
Problem: Adult Inpatient Plan of Care  Goal: Plan of Care Review  Flowsheets (Taken 1/2/2024 0337)  Plan of Care Reviewed With: patient  Goal: Optimal Comfort and Wellbeing  Intervention: Monitor Pain and Promote Comfort  Flowsheets (Taken 1/2/2024 0337)  Pain Management Interventions:   quiet environment facilitated   relaxation techniques promoted     Problem: Adult Inpatient Plan of Care  Goal: Plan of Care Review  Flowsheets (Taken 1/2/2024 0337)  Plan of Care Reviewed With: patient     Problem: Adult Inpatient Plan of Care  Goal: Optimal Comfort and Wellbeing  Intervention: Monitor Pain and Promote Comfort  Flowsheets (Taken 1/2/2024 0337)  Pain Management Interventions:   quiet environment facilitated   relaxation techniques promoted     Problem: Adult Inpatient Plan of Care  Goal: Optimal Comfort and Wellbeing  Intervention: Monitor Pain and Promote Comfort  Flowsheets (Taken 1/2/2024 0337)  Pain Management Interventions:   quiet environment facilitated   relaxation techniques promoted      Remain SR and Sinus stefania at times as low as 58  on telemetry monitor. PRN medication effective for anxiety. Pt stated he  slept well after PRN.  Explained plan of care, verbalized understanding. Educated pt .on new medicine . No injury during shift, Side rails up x 2, call light by bedside.

## 2024-01-02 NOTE — PROGRESS NOTES
"[facility] - Surgery  Orthopedics  Progress Note    Patient Name: Dionicio Bess  MRN: [id number]  Admission Date: 12/29/2023  Hospital Length of Stay: 0 days  Attending Provider: Sarah Hart MD  Primary Care Provider: Isela, Primary Doctor    Subjective:     Principal Problem:Closed trimalleolar fracture of left ankle    Principal Orthopedic Problem: same    Interval History: NAEON. VSS. AF. Patient states that pain is well controlled. Voiding spontaneously. Dressing CDI. "Splint windowed this morning, and his ankle is still moderately swollen. Remain NWB to LLE. Currently admitted for work up of small CVA without new neurologic deficit.     Plan for f/u outpatient to manage ankle.       Review of patient's allergies indicates:  No Known Allergies    Current Facility-Administered Medications   Medication    acetaminophen tablet 1,000 mg    albuterol-ipratropium 2.5 mg-0.5 mg/3 mL nebulizer solution 3 mL    aluminum-magnesium hydroxide-simethicone 200-200-20 mg/5 mL suspension 30 mL    amLODIPine tablet 10 mg    apixaban tablet 5 mg    aspirin chewable tablet 81 mg    atorvastatin tablet 80 mg    bisacodyL suppository 10 mg    diphenhydrAMINE capsule 25 mg    FLUoxetine capsule 20 mg    folic acid tablet 1 mg    furosemide tablet 40 mg    LORazepam injection 1 mg    losartan tablet 25 mg    melatonin tablet 6 mg    methocarbamoL tablet 1,000 mg    metoprolol succinate (TOPROL-XL) 24 hr tablet 50 mg    multivitamin tablet    naloxone 0.4 mg/mL injection 0.02 mg    ondansetron disintegrating tablet 8 mg    oxyCODONE immediate release tablet 5 mg    oxyCODONE immediate release tablet Tab 10 mg    pantoprazole EC tablet 20 mg    polyethylene glycol packet 17 g    pregabalin capsule 75 mg    prochlorperazine injection Soln 5 mg    simethicone chewable tablet 80 mg    sodium chloride 0.9% flush 5 mL    spironolactone tablet 25 mg    thiamine tablet 200 mg     Objective:     Vital Signs (Most Recent):  Temp: 98 °F (36.7 °C) " (01/02/24 0454)  Pulse: 80 (01/02/24 0454)  Resp: 15 (01/02/24 0554)  BP: 129/82 (01/02/24 0454)  SpO2: 99 % (01/02/24 0454) Vital Signs (24h Range):  Temp:  [97.9 °F (36.6 °C)-98.8 °F (37.1 °C)] 98 °F (36.7 °C)  Pulse:  [59-80] 80  Resp:  [15-18] 15  SpO2:  [91 %-99 %] 99 %  BP: (108-129)/(61-82) 129/82     Weight: 92 kg (202 lb 13.2 oz)  Height: 6' (182.9 cm)  Body mass index is 27.51 kg/m².      Intake/Output Summary (Last 24 hours) at 1/2/2024 0616  Last data filed at 1/2/2024 0510  Gross per 24 hour   Intake 240 ml   Output 700 ml   Net -460 ml        Ortho/SPM Exam     Left Lower Extremity  Inspection  - Skin intact throughout, no open wounds  - Significant swelling about the ankle, ecchymosis over the dorsum of the ankle  - No erythema, or signs of cellulitis  Palpation  - TTP throughout the ankle, nontender otherwise through LLE  Range of motion  - AROM and PROM of the hip, knee, and foot intact  Stability  - Ankle appears moderately dislocated laterally  Neurovascular  - TA/EHL/Gastroc/FHL assessed in isolation without deficit  - SILT throughout  - Compartments soft  - DP palpated   - Capillary Refill <3s  - Negative Log roll  - Muscle tone normal    Significant Labs: CBC:   Recent Labs   Lab 01/01/24  0508   WBC 5.65   HGB 11.9*   HCT 35.6*        CMP:   Recent Labs   Lab 01/01/24  0508      K 4.3   CL 98   CO2 27   GLU 96   BUN 18   CREATININE 1.1   CALCIUM 9.4   ANIONGAP 12     All pertinent labs within the past 24 hours have been reviewed.    Significant Imaging: I have reviewed and interpreted all pertinent imaging results/findings.  Assessment/Plan:     * Closed trimalleolar fracture of left ankle  Dionicio Bess is a 58 y.o. male with multiple medical comorbidities, an elevated troponin, elevated ETOH, new CT head findings concerning for infarct, and a left sided functional bimalleolar ankle fracture sustained after a ground level fall yesterday, closed and at neurovascular baseline for the  LLE. Reduced and splinted in the ED, see procedure note below for further details.     - Patient admitted to medicine    - At this time patient is too swollen for definitive fixation  - NWB to LLE, keep iced and elevated    From orthopedic perspective, patient could follow up outpatient for definitive fixation of ankle.           MUNIRA MOSLEY MD  Orthopedics  [facility] - Surgery

## 2024-01-02 NOTE — SUBJECTIVE & OBJECTIVE
Interval History: Discussed with Ortho this am via secure chat. They stated patient could go home and return and do ankle fracture surgery as outpatient. I explained issue is patient staying at homeless shelter but during daytime he is on the streets and at night stays at the [facility], [place] [employer] and has no support. Patient is non weight bearing to his left ankle due to his fracture and has had recent and remote strokes and has left sided weakness at baseline and thus it is difficult to get around especially now since he cannot bear weight to his left ankle. I explained if they did surgery while inpatient then post-op likely can get him placed in a rehab facility to recover after surgery and they can work on his mobility. Patient states he has no family to help him out so we need to try and get him back to some level of independence prior to releasing him safely from the hospital. , Merry was in on secure chat with Ortho and myself and helping out with disposition for the patient. Patient reports 6/10 pain to left ankle. Labs reviewed and stable.     Review of Systems   Constitutional:  Negative for fever.   Respiratory:  Negative for cough.    Cardiovascular:  Negative for chest pain.   Gastrointestinal:  Negative for abdominal pain and nausea.   Musculoskeletal:  Positive for arthralgias (Left ankle).   Neurological:  Positive for speech difficulty (Dysarthria at baseline from previous CVA) and weakness (Left sided from previous CVA). Negative for dizziness.   Psychiatric/Behavioral:  Negative for agitation.      Objective:     Vital Signs (Most Recent):  Temp: 98.1 °F (36.7 °C) (01/02/24 0728)  Pulse: 65 (01/02/24 1105)  Resp: 18 (01/02/24 0728)  BP: 119/75 (01/02/24 0728)  SpO2: 97 % (01/02/24 0728) on room air  Vital Signs (24h Range):  Temp:  [97.9 °F (36.6 °C)-98.8 °F (37.1 °C)] 98.1 °F (36.7 °C)  Pulse:  [59-80] 65  Resp:  [15-18] 18  SpO2:  [91 %-99 %] 97 %  BP: (108-129)/(61-82) 119/75     Weight:  92 kg (202 lb 13.2 oz)  Body mass index is 27.51 kg/m².    Intake/Output Summary (Last 24 hours) at 1/2/2024 1132  Last data filed at 1/2/2024 1045  Gross per 24 hour   Intake 480 ml   Output 700 ml   Net -220 ml         Physical Exam  Vitals and nursing note reviewed.   Constitutional:       General: He is awake. He is not in acute distress.     Appearance: Normal appearance. He is well-developed and normal weight. He is not ill-appearing.   Cardiovascular:      Rate and Rhythm: Normal rate and regular rhythm.      Heart sounds: Normal heart sounds. No murmur heard.  Pulmonary:      Effort: Pulmonary effort is normal. No respiratory distress.      Breath sounds: Normal breath sounds. No wheezing.   Abdominal:      General: Abdomen is flat. Bowel sounds are normal. There is no distension.      Palpations: Abdomen is soft.      Tenderness: There is no abdominal tenderness.   Musculoskeletal:      Comments: Left leg in splint and elevated in bed.    Skin:     Findings: No erythema or rash.   Neurological:      Mental Status: He is alert and oriented to person, place, and time.   Psychiatric:         Mood and Affect: Mood normal.         Behavior: Behavior normal. Behavior is cooperative.         Thought Content: Thought content normal.         Judgment: Judgment normal.             Significant Labs: CBC:   Recent Labs   Lab 01/01/24  0508 01/02/24  0504   WBC 5.65 4.68   HGB 11.9* 12.0*   HCT 35.6* 36.3*    221     CMP:   Recent Labs   Lab 01/01/24  0508 01/02/24  0504    137   K 4.3 4.2   CL 98 98   CO2 27 30*   GLU 96 92   BUN 18 22*   CREATININE 1.1 1.1   CALCIUM 9.4 9.3   ANIONGAP 12 9       Significant Imaging: I have reviewed all pertinent imaging results/findings within the past 24 hours.

## 2024-01-02 NOTE — PLAN OF CARE
Problem: Adult Inpatient Plan of Care  Goal: Plan of Care Review  Outcome: Ongoing, Progressing  Goal: Patient-Specific Goal (Individualized)  Outcome: Ongoing, Progressing  Goal: Absence of Hospital-Acquired Illness or Injury  Outcome: Ongoing, Progressing  Goal: Optimal Comfort and Wellbeing  Outcome: Ongoing, Progressing  Goal: Readiness for Transition of Care  Outcome: Ongoing, Progressing     Problem: Infection  Goal: Absence of Infection Signs and Symptoms  Outcome: Ongoing, Progressing     Problem: Skin Injury Risk Increased  Goal: Skin Health and Integrity  Outcome: Ongoing, Progressing     Pt A&Ox4 , free from falls/injury, and able to make needs known during shift. VSS. Pt had no c/o pain during shift. Telemetry monitoring continued, visible and audible on monitor at nurses' station, no acute distress noted, . Bed locked and in lowest position. Bedside table and call light. Within reach.

## 2024-01-02 NOTE — PLAN OF CARE
[facility] - Surgery  Initial Discharge Assessment       Primary Care Provider: No, Primary Doctor    Admission Diagnosis: Foot injury [S99.929A]  Leg injury [S89.90XA]  Fall [W19.XXXA]  Chest pain [R07.9]  Ankle injury, initial encounter [G84.057O]    Admission Date: 12/29/2023  Expected Discharge Date: 1/5/2024         Payor: MEDICAID / Plan: [facility] / Product Type: Managed Medicaid /     Extended Emergency Contact Information  Primary Emergency Contact: ShahriarKeyona  Mobile Phone: [phone]  Relation: Relative    Discharge Plan A: Rehab  Discharge Plan B: Rehab      [facility]  [address], [address]  [facility] [address]  Phone: [phone] Fax: [phone]      Initial Assessment (most recent)       Adult Discharge Assessment - 01/02/24 1234          Discharge Assessment    Assessment Type Discharge Planning Brief Assessment     Confirmed/corrected address, phone number and insurance Yes     Confirmed Demographics Correct on Facesheet     Source of Information patient     Does patient/caregiver understand observation status Yes     Communicated ERWIN with patient/caregiver Yes     People in Home alone     Facility Arrived From: [facility]     Do you expect to return to your current living situation? No     Do you have help at home or someone to help you manage your care at home? No     Prior to hospitilization cognitive status: Alert/Oriented     Current cognitive status: Alert/Oriented     Walking or Climbing Stairs Difficulty no     Dressing/Bathing Difficulty no     Equipment Currently Used at Home cane, quad     Readmission within 30 days? No     Patient currently being followed by outpatient case management? No     Do you currently have service(s) that help you manage your care at home? No     Do you take prescription medications? Yes     Do you have prescription coverage? Yes     Do you have any problems affording any of your  prescribed medications? No     Is the patient taking medications as prescribed? yes     Who is going to help you get home at discharge? Rehab anticipated     How do you get to doctors appointments? other (see comments)     Are you on dialysis? No     Do you take coumadin? No     Discharge Plan A Rehab     Discharge Plan B Rehab        Physical Activity    On average, how many days per week do you engage in moderate to strenuous exercise (like a brisk walk)? 0 days     On average, how many minutes do you engage in exercise at this level? 0 min        Financial Resource Strain    How hard is it for you to pay for the very basics like food, housing, medical care, and heating? Not very hard        Housing Stability    In the last 12 months, was there a time when you were not able to pay the mortgage or rent on time? No     In the last 12 months, how many places have you lived? 2     In the last 12 months, was there a time when you did not have a steady place to sleep or slept in a shelter (including now)? Yes        Transportation Needs    In the past 12 months, has lack of transportation kept you from medical appointments or from getting medications? No     In the past 12 months, has lack of transportation kept you from meetings, work, or from getting things needed for daily living? No        Food Insecurity    Within the past 12 months, you worried that your food would run out before you got the money to buy more. Never true     Within the past 12 months, the food you bought just didn't last and you didn't have money to get more. Never true        Stress    Do you feel stress - tense, restless, nervous, or anxious, or unable to sleep at night because your mind is troubled all the time - these days? Not at all        Social Connections    In a typical week, how many times do you talk on the phone with family, friends, or neighbors? Once a week     How often do you get together with friends or relatives? Never     How  often do you attend [employer] or Mosque services? Never     Do you belong to any clubs or organizations such as [employer] groups, unions, fraternal or athletic groups, or school groups? No     How often do you attend meetings of the clubs or organizations you belong to? Never     Are you , , , , never , or living with a partner?         Alcohol Use    Q1: How often do you have a drink containing alcohol? 4 or more times a week     Q2: How many drinks containing alcohol do you have on a typical day when you are drinking? 5 or 6     Q3: How often do you have six or more drinks on one occasion? Daily or almost daily                   Spoke with patient at bedside to complete d/c planning assessment. Patient was living at the [facility] and is the process of filing disability and trying to obtain permanent housing. He ambulates with a cane normally. Ortho plan remains pending on definitive fixation of ankle. Anticipate rehab at d/c then patient can return to [facility] once rehab completed. Patient provide his nephew (Keyona) as a family contact if needed. Discharge Plan A and Plan B have been determined by review of patient's clinical status, future medical and therapeutic needs, and coverage/benefits for post-acute care in coordination with multidisciplinary team members.        Akilah Gipson RNCM  Case Management  [facility]  [phone]

## 2024-01-02 NOTE — ASSESSMENT & PLAN NOTE
Chronic, controlled. Latest blood pressure and vitals reviewed-     Temp:  [97.9 °F (36.6 °C)-98.8 °F (37.1 °C)]   Pulse:  [59-80]   Resp:  [15-18]   BP: (109-129)/(61-82)   SpO2:  [91 %-99 %] .   Home meds for hypertension were reviewed and noted below.   Hypertension Medications               amLODIPine (NORVASC) 10 MG tablet Take 1 tablet (10 mg total) by mouth once daily.    furosemide (LASIX) 40 MG tablet Take 1 tablet (40 mg total) by mouth once daily.    losartan (COZAAR) 25 MG tablet Take 1 tablet (25 mg total) by mouth once daily.    metoprolol succinate (TOPROL-XL) 50 MG 24 hr tablet Take 1 tablet (50 mg total) by mouth once daily.    spironolactone (ALDACTONE) 25 MG tablet Take 1 tablet (25 mg total) by mouth once daily.            While in the hospital, will manage blood pressure as follows; Continue home antihypertensive regimen    Will utilize p.r.n. blood pressure medication only if patient's blood pressure greater than 180/110 and he develops symptoms such as worsening chest pain or shortness of breath.

## 2024-01-02 NOTE — ASSESSMENT & PLAN NOTE
Dionicio Bess is a 58 y.o. male with multiple medical comorbidities, an elevated troponin, elevated ETOH, new CT head findings concerning for infarct, and a left sided functional bimalleolar ankle fracture sustained after a ground level fall yesterday, closed and at neurovascular baseline for the LLE. Reduced and splinted in the ED, see procedure note below for further details.     - Patient admitted to medicine    - At this time patient is too swollen for definitive fixation  - NWB to LLE, keep iced and elevated    From orthopedic perspective, patient could follow up outpatient for definitive fixation of ankle.

## 2024-01-02 NOTE — ASSESSMENT & PLAN NOTE
Patient with Paroxysmal (<7 days) atrial fibrillation which is controlled currently with Toprol XL 50 mg po daily and will continue. Patient is currently in sinus rhythm.[id number] Score: 4.  Anticoagulation indicated. Anticoagulation done with Apixiban 5 mg po BID.

## 2024-01-02 NOTE — PROGRESS NOTES
[facility]  Progress Note    Patient Name: Dionicio Bess  MRN: [id number]  Patient Class: IP- Inpatient   Admission Date: 12/29/2023  Length of Stay: 0 days  Attending Physician: Sarah Hart MD  Primary Care Provider: Isela, Primary Doctor        Subjective:     Principal Problem:Closed trimalleolar fracture of left ankle        HPI:  Dionicio Bess is a 57 yo M with PMHx of pAfib (on eliquis), HTN, CVA with residual L sided deficits, CAD s/p PCI, HLD, HFrEF (EF 25-30%) who presented to ED for swelling and pain to his L ankle after a ground level mechanical fall. Patient reports he tripped on a step. Now c/o 9/10 pain to L ankle with inability to bear weight. Denies LOC or head trauma. Patient also endorses intermittent lower midsternal chest pain for the past 2-3 days. Denies chest pain currently.  Also admits cramping epigastric abdominal pain with associated nausea that developed 45 mins ago. On chart review, patient had a stress test performed last month that was nondiagnostic though abnormal.  It does not appear that this was ever followed up on by cardiology as an outpatient.  The patient does have socioeconomic issues and resides in a homeless shelter ([facility]). Endorses frequent alcohol use with 2-3 beers daily. States he did have some vodka and beers yesterday before the fall. Denies fever, chills, palpitations, SOB, cough, v/d, dysuria, numbness, paresthesias, headaches.     In ED: Afebrile. HDS. Satting well on 2L NC. CBC without leukocytosis and with stable chronic anemia. CMP unremarkable. BNP 84. Trop 0.026. serum ETOH 277.  CXR with cardiomegaly and mild perihilar edema. CT ankle with trimalleolar fracture. Ortho consulted; recommending NWB to LLE and to keep iced and elevated. Too swollen for fixation at this time. CT cervical spine with degenerative changes, but without acute fracture or subluxation. CT/ MRI brain Small foci of acute to early subacute ischemia noted  in the right corona radiata/basal ganglia/internal capsule, adjoining remote large MCA territory infarct.  Equivocal punctate right parietal cortical infarct margin the posterior aspect of the infarct territory. Vascular neurology consulted; recommend continuing eliquis and statin. Given IV morphine 4 mg,  dilaudid 1 mg , IV toradol, and IV zofran. Placed in observation.     Overview/Hospital Course:  Patient seen and evaluated by Vascular Neurology on ED as CT scan and MRI of brain consistent with acute right sided CVA. Neurology stated patient with no new symptoms related to this stroke and located in previous area of old CVA. CVA cardio embolic in nature as patient wih known atrial fibrillation and recommended to continue home Apixiban 5 mg po BID and Lipitor for stroke prevention and no further work-up or evaluation needed. Orthopedic surgery consulted concerning left ankle fracture and splinted fracture in the ED. CT scan of left ankle done and showed trimalleolar fracture. Patient placed non weight bearing to left lower extremity. Patient to rest, elevate and ice left leg as will require surgical repair of fracture but needs improvement in soft tissue swelling prior to proceeding. Ortho stated potentially could do surgery as outpatient but patient lives at [facility] shelter so hard for him to get around with fractured ankle and would need to be independent at the shelter as they are unable to provide assistance to patient so will need to remain in hospital until definitve fixation surgery can be done for his left ankle. Pain controlled to left ankle.     Interval History: Discussed with Ortho this am via secure chat. They stated patient could go home and return and do ankle fracture surgery as outpatient. I explained issue is patient staying at homeless shelter but during daytime he is on the streets and at night stays at the [facility] and has no support. Patient is non weight bearing to his  left ankle due to his fracture and has had recent and remote strokes and has left sided weakness at baseline and thus it is difficult to get around especially now since he cannot bear weight to his left ankle. I explained if they did surgery while inpatient then post-op likely can get him placed in a rehab facility to recover after surgery and they can work on his mobility. Patient states he has no family to help him out so we need to try and get him back to some level of independence prior to releasing him safely from the hospital. , Merry was in on secure chat with Ortho and myself and helping out with disposition for the patient. Patient reports 6/10 pain to left ankle. Labs reviewed and stable.     Review of Systems   Constitutional:  Negative for fever.   Respiratory:  Negative for cough.    Cardiovascular:  Negative for chest pain.   Gastrointestinal:  Negative for abdominal pain and nausea.   Musculoskeletal:  Positive for arthralgias (Left ankle).   Neurological:  Positive for speech difficulty (Dysarthria at baseline from previous CVA) and weakness (Left sided from previous CVA). Negative for dizziness.   Psychiatric/Behavioral:  Negative for agitation.      Objective:     Vital Signs (Most Recent):  Temp: 98.1 °F (36.7 °C) (01/02/24 0728)  Pulse: 65 (01/02/24 1105)  Resp: 18 (01/02/24 0728)  BP: 119/75 (01/02/24 0728)  SpO2: 97 % (01/02/24 0728) on room air  Vital Signs (24h Range):  Temp:  [97.9 °F (36.6 °C)-98.8 °F (37.1 °C)] 98.1 °F (36.7 °C)  Pulse:  [59-80] 65  Resp:  [15-18] 18  SpO2:  [91 %-99 %] 97 %  BP: (108-129)/(61-82) 119/75     Weight: 92 kg (202 lb 13.2 oz)  Body mass index is 27.51 kg/m².    Intake/Output Summary (Last 24 hours) at 1/2/2024 1132  Last data filed at 1/2/2024 1045  Gross per 24 hour   Intake 480 ml   Output 700 ml   Net -220 ml         Physical Exam  Vitals and nursing note reviewed.   Constitutional:       General: He is awake. He is not in acute distress.      Appearance: Normal appearance. He is well-developed and normal weight. He is not ill-appearing.   Cardiovascular:      Rate and Rhythm: Normal rate and regular rhythm.      Heart sounds: Normal heart sounds. No murmur heard.  Pulmonary:      Effort: Pulmonary effort is normal. No respiratory distress.      Breath sounds: Normal breath sounds. No wheezing.   Abdominal:      General: Abdomen is flat. Bowel sounds are normal. There is no distension.      Palpations: Abdomen is soft.      Tenderness: There is no abdominal tenderness.   Musculoskeletal:      Comments: Left leg in splint and elevated in bed.    Skin:     Findings: No erythema or rash.   Neurological:      Mental Status: He is alert and oriented to person, place, and time.   Psychiatric:         Mood and Affect: Mood normal.         Behavior: Behavior normal. Behavior is cooperative.         Thought Content: Thought content normal.         Judgment: Judgment normal.             Significant Labs: CBC:   Recent Labs   Lab 01/01/24  0508 01/02/24  0504   WBC 5.65 4.68   HGB 11.9* 12.0*   HCT 35.6* 36.3*    221     CMP:   Recent Labs   Lab 01/01/24  0508 01/02/24  0504    137   K 4.3 4.2   CL 98 98   CO2 27 30*   GLU 96 92   BUN 18 22*   CREATININE 1.1 1.1   CALCIUM 9.4 9.3   ANIONGAP 12 9       Significant Imaging: I have reviewed all pertinent imaging results/findings within the past 24 hours.    Assessment/Plan:      * Closed trimalleolar fracture of left ankle  - Patient with imaging in ED that showed closed trimalleolar ankle fracture sustained after a ground level fall related to alcohol consumption.   - Ortho consulted for fracture and reduced and splinted in ED.  - Per Ortho at this time patient is too swollen for definitive fixation and plan is non-weight bearing to left lower extremity and keep left ankle iced and elevated to help with soft tissue swelling.   - Orthopedics will continue to follow while patient in hospital. From  Orthopedic perspective, patient could follow up outpatient for definitive fixation of ankle but patient is homeless and lives at [facility] and they are unable to provide any assistance to patient and with his non weight bearing status will be difficult for him to get around so will need to remain in hospital until definitive fixation surgery can be done. Ortho aware and discussed and trying to come up with a plan.   - Pain controlled. Patient on Tylenol 1000 mg po TID + Robaxin 1000 mg po 4 times daily + Lyrica 75 mg po BID and will continue and continue with Oxycodone IR 5-10 mg po every 4 hours prn for breakthrough pain.   - Patient on his home Apixiban for atrial fibrillation so fully anticoagulated so no DVT prophylaxis needed.     Stroke due to embolism of right middle cerebral artery  - Patient with known prior right cardio embolic MCA stroke in 2/2023 with residual left sided weakness and dysarthria.   - Vascular neuro consulted as CT scan of head with new hypodensity; MRI of brain done and MRI showed acute small infarcts in right MCA territory. Vascular Neurology noted this is likely incidental finding given patient reports his weakness and dysarthria are at baseline with no recent change. Etiology of these infarcts likely cardioembolic from known Afib. No further stroke workup indicated at this time as per Vascular Neurology.   - Neurology recommends to continue anticoagulation with Apixiban 5 mg po BID. If plan for surgery, then resume anticoagulation when safe postop from surgical perspective. Med list includes Aspirin 81 mg, which is not indicated from a stroke perspective given he is anticoagulated with Apixiban as per Neurology.  - Continue Lipitor 80 mg po daily for stroke prevention.  - Patient with no new neurologic symptoms.     Paroxysmal atrial fibrillation  Patient with Paroxysmal (<7 days) atrial fibrillation which is controlled currently with Toprol XL 50 mg po daily and  will continue. Patient is currently in sinus rhythm.[id number] Score: 4.  Anticoagulation indicated. Anticoagulation done with Apixiban 5 mg po BID.    Alcohol dependence with uncomplicated intoxication  - Serum ETOH 277 on admit.  - Patient denies any history of alcohol withdrawal or seizures.  - Continue MVI, Thiamine an Folate for vitamin replacement as patient with long standing history of alcohol abuse.   - Last drink was 12/30.   - No signs of acute withdrawal so far in hospital.  - Monitor CIWA every 4 hours.   - Ativan 1 mg IV prn for anxiety, tremulousness, HR >110, CIWA >8.    Chronic combined systolic and diastolic congestive heart failure  Patient is identified as having Combined Systolic and Diastolic heart failure that is Chronic. CHF is currently controlled. Latest ECHO performed and demonstrates- Results for orders placed during the hospital encounter of 11/05/23    Echo    Interpretation Summary    Left Ventricle: The left ventricle is normal in size. Normal wall thickness. Global hypokinesis present. There is severely reduced systolic function with a visually estimated ejection fraction of 25 - 30%. Grade I diastolic dysfunction.    Right Ventricle: Mild right ventricular enlargement. Wall thickness is normal. Right ventricle wall motion  is normal. Systolic function is mildly reduced.    Biatrial enlargement    Pulmonary Artery: The estimated pulmonary artery systolic pressure is 19 mmHg.    IVC/SVC: Normal venous pressure at 3 mmHg.  Continue home Losartan, Lasix and Toprol XL to treat. Monitor clinical status closely. Monitor on telemetry. Patient is off CHF pathway.  Monitor strict Is&Os and daily weights. Place on fluid restriction of 1.5 L. Cardiology has not been consulted. Continue to stress to patient importance of self efficacy and  on diet for CHF. Last BNP reviewed- and noted below   Recent Labs   Lab 12/29/23  2211   BNP 84         Essential hypertension  Chronic, controlled.  Latest blood pressure and vitals reviewed-     Temp:  [97.9 °F (36.6 °C)-98.8 °F (37.1 °C)]   Pulse:  [59-80]   Resp:  [15-18]   BP: (109-129)/(61-82)   SpO2:  [91 %-99 %] .   Home meds for hypertension were reviewed and noted below.   Hypertension Medications               amLODIPine (NORVASC) 10 MG tablet Take 1 tablet (10 mg total) by mouth once daily.    furosemide (LASIX) 40 MG tablet Take 1 tablet (40 mg total) by mouth once daily.    losartan (COZAAR) 25 MG tablet Take 1 tablet (25 mg total) by mouth once daily.    metoprolol succinate (TOPROL-XL) 50 MG 24 hr tablet Take 1 tablet (50 mg total) by mouth once daily.    spironolactone (ALDACTONE) 25 MG tablet Take 1 tablet (25 mg total) by mouth once daily.            While in the hospital, will manage blood pressure as follows; Continue home antihypertensive regimen    Will utilize p.r.n. blood pressure medication only if patient's blood pressure greater than 180/110 and he develops symptoms such as worsening chest pain or shortness of breath.    Coronary artery disease involving native coronary artery with angina pectoris  Patient with known CAD s/p stent placement, which is controlled. Will continue Toprol XL, Losartan and Lipitor to treat and monitor for S/Sx of angina/ACS. Continue to monitor on telemetry.     Pure hypercholesterolemia  Chronic and controlled. Continue home Lipitor to treat.       VTE Risk Mitigation (From admission, onward)           Ordered     apixaban tablet 5 mg  2 times daily         12/30/23 1242     IP VTE HIGH RISK PATIENT  Once         12/30/23 1206     Reason for No Pharmacological VTE Prophylaxis  Once        Question:  Reasons:  Answer:  Already adequately anticoagulated on oral Anticoagulants    12/30/23 1206                    Discharge Planning   ERWIN: 1/5/2024     Code Status: Full Code   Is the patient medically ready for discharge?: No    Reason for patient still in hospital (select all that apply): Patient trending  condition             Sarah Hart MD  Department of [facility]   [facility] - Surgery

## 2024-01-02 NOTE — ASSESSMENT & PLAN NOTE
- Serum ETOH 277 on admit.  - Patient denies any history of alcohol withdrawal or seizures.  - Continue MVI, Thiamine an Folate for vitamin replacement as patient with long standing history of alcohol abuse.   - Last drink was 12/30.   - No signs of acute withdrawal so far in hospital.  - Monitor CIWA every 4 hours.   - Ativan 1 mg IV prn for anxiety, tremulousness, HR >110, CIWA >8.

## 2024-01-02 NOTE — ASSESSMENT & PLAN NOTE
Resolved on 1/1 after oral replacement on 12/31. Patient has hypokalemia which is Acute and currently controlled. Most recent potassium levels reviewed-   Lab Results   Component Value Date    K 4.2 01/02/2024   Will continue potassium replacement per protocol and recheck repeat levels after replacement completed.

## 2024-01-02 NOTE — PLAN OF CARE
Problem: Adult Inpatient Plan of Care  Goal: Plan of Care Review  Outcome: Ongoing, Progressing  Flowsheets (Taken 1/1/2024 1803)  Plan of Care Reviewed With: patient  Goal: Absence of Hospital-Acquired Illness or Injury  Outcome: Ongoing, Progressing  Intervention: Identify and Manage Fall Risk  Flowsheets (Taken 1/1/2024 1803)  Safety Promotion/Fall Prevention:   assistive device/personal item within reach   Fall Risk reviewed with patient/family   Fall Risk signage in place   nonskid shoes/socks when out of bed   room near unit station   side rails raised x 2   instructed to call staff for mobility  Intervention: Prevent Skin Injury  Flowsheets (Taken 1/1/2024 1803)  Body Position: position changed independently  Skin Protection: adhesive use limited  Intervention: Prevent and Manage VTE (Venous Thromboembolism) Risk  Flowsheets (Taken 1/1/2024 1803)  Activity Management:   Sitting at edge of bed - L2   Rolling - L1  VTE Prevention/Management:   remove, assess skin, and reapply sequential compression device   bleeding precations maintained  Range of Motion: ROM (range of motion) performed  Intervention: Prevent Infection  Flowsheets (Taken 1/1/2024 1803)  Infection Prevention:   single patient room provided   personal protective equipment utilized   hand hygiene promoted     Problem: Infection  Goal: Absence of Infection Signs and Symptoms  Outcome: Ongoing, Progressing  Intervention: Prevent or Manage Infection  Flowsheets (Taken 1/1/2024 1803)  Fever Reduction/Comfort Measures:   lightweight clothing   lightweight bedding  Infection Management: aseptic technique maintained  Pt lying in bed watching tv, alert and oriented x 4,  no prn pain medication needed for CIWA precautions. Pt c/o pain medicine not helping enough, physician adjusted medications. Pt reported pregabalin helped improve his pain enough to perform his ADLs.

## 2024-01-02 NOTE — ASSESSMENT & PLAN NOTE
- Patient with imaging in ED that showed closed trimalleolar ankle fracture sustained after a ground level fall related to alcohol consumption.   - Ortho consulted for fracture and reduced and splinted in ED.  - Per Ortho at this time patient is too swollen for definitive fixation and plan is non-weight bearing to left lower extremity and keep left ankle iced and elevated to help with soft tissue swelling.   - Orthopedics will continue to follow while patient in hospital. From Orthopedic perspective, patient could follow up outpatient for definitive fixation of ankle but patient is homeless and lives at [facility] and they are unable to provide any assistance to patient and with his non weight bearing status will be difficult for him to get around so will need to remain in hospital until definitive fixation surgery can be done. Ortho aware and discussed and trying to come up with a plan.   - Pain controlled. Patient on Tylenol 1000 mg po TID + Robaxin 1000 mg po 4 times daily + Lyrica 75 mg po BID and will continue and continue with Oxycodone IR 5-10 mg po every 4 hours prn for breakthrough pain.   - Patient on his home Apixiban for atrial fibrillation so fully anticoagulated so no DVT prophylaxis needed.

## 2024-01-02 NOTE — NURSING
"Pt states he "can't keep still" states he saw a rat in the room: pt has removed tel;e and sweating. Administered PRN. Pt stated he heard a baby cry  "

## 2024-01-02 NOTE — SUBJECTIVE & OBJECTIVE
"Principal Problem:Closed trimalleolar fracture of left ankle    Principal Orthopedic Problem: same    Interval History: NAEON. VSS. AF. Patient states that pain is well controlled. Voiding spontaneously. Dressing CDI. "Splint windowed this morning, and his ankle is still moderately swollen. Remain NWB to LLE. Currently admitted for work up of small CVA without new neurologic deficit.     Plan for f/u outpatient to manage ankle.       Review of patient's allergies indicates:  No Known Allergies    Current Facility-Administered Medications   Medication    acetaminophen tablet 1,000 mg    albuterol-ipratropium 2.5 mg-0.5 mg/3 mL nebulizer solution 3 mL    aluminum-magnesium hydroxide-simethicone 200-200-20 mg/5 mL suspension 30 mL    amLODIPine tablet 10 mg    apixaban tablet 5 mg    aspirin chewable tablet 81 mg    atorvastatin tablet 80 mg    bisacodyL suppository 10 mg    diphenhydrAMINE capsule 25 mg    FLUoxetine capsule 20 mg    folic acid tablet 1 mg    furosemide tablet 40 mg    LORazepam injection 1 mg    losartan tablet 25 mg    melatonin tablet 6 mg    methocarbamoL tablet 1,000 mg    metoprolol succinate (TOPROL-XL) 24 hr tablet 50 mg    multivitamin tablet    naloxone 0.4 mg/mL injection 0.02 mg    ondansetron disintegrating tablet 8 mg    oxyCODONE immediate release tablet 5 mg    oxyCODONE immediate release tablet Tab 10 mg    pantoprazole EC tablet 20 mg    polyethylene glycol packet 17 g    pregabalin capsule 75 mg    prochlorperazine injection Soln 5 mg    simethicone chewable tablet 80 mg    sodium chloride 0.9% flush 5 mL    spironolactone tablet 25 mg    thiamine tablet 200 mg     Objective:     Vital Signs (Most Recent):  Temp: 98 °F (36.7 °C) (01/02/24 0454)  Pulse: 80 (01/02/24 0454)  Resp: 15 (01/02/24 0554)  BP: 129/82 (01/02/24 0454)  SpO2: 99 % (01/02/24 0454) Vital Signs (24h Range):  Temp:  [97.9 °F (36.6 °C)-98.8 °F (37.1 °C)] 98 °F (36.7 °C)  Pulse:  [59-80] 80  Resp:  [15-18] 15  SpO2:  " [91 %-99 %] 99 %  BP: (108-129)/(61-82) 129/82     Weight: 92 kg (202 lb 13.2 oz)  Height: 6' (182.9 cm)  Body mass index is 27.51 kg/m².      Intake/Output Summary (Last 24 hours) at 1/2/2024 0616  Last data filed at 1/2/2024 0510  Gross per 24 hour   Intake 240 ml   Output 700 ml   Net -460 ml        Ortho/SPM Exam     Left Lower Extremity  Inspection  - Skin intact throughout, no open wounds  - Significant swelling about the ankle, ecchymosis over the dorsum of the ankle  - No erythema, or signs of cellulitis  Palpation  - TTP throughout the ankle, nontender otherwise through LLE  Range of motion  - AROM and PROM of the hip, knee, and foot intact  Stability  - Ankle appears moderately dislocated laterally  Neurovascular  - TA/EHL/Gastroc/FHL assessed in isolation without deficit  - SILT throughout  - Compartments soft  - DP palpated   - Capillary Refill <3s  - Negative Log roll  - Muscle tone normal    Significant Labs: CBC:   Recent Labs   Lab 01/01/24  0508   WBC 5.65   HGB 11.9*   HCT 35.6*        CMP:   Recent Labs   Lab 01/01/24  0508      K 4.3   CL 98   CO2 27   GLU 96   BUN 18   CREATININE 1.1   CALCIUM 9.4   ANIONGAP 12     All pertinent labs within the past 24 hours have been reviewed.    Significant Imaging: I have reviewed and interpreted all pertinent imaging results/findings.

## 2024-01-03 PROCEDURE — 25000003 PHARM REV CODE 250

## 2024-01-03 PROCEDURE — 21400001 HC TELEMETRY ROOM

## 2024-01-03 PROCEDURE — 25000003 PHARM REV CODE 250: Performed by: INTERNAL MEDICINE

## 2024-01-03 PROCEDURE — 97165 OT EVAL LOW COMPLEX 30 MIN: CPT

## 2024-01-03 PROCEDURE — 97535 SELF CARE MNGMENT TRAINING: CPT

## 2024-01-03 PROCEDURE — 25000003 PHARM REV CODE 250: Performed by: PHYSICIAN ASSISTANT

## 2024-01-03 PROCEDURE — 97116 GAIT TRAINING THERAPY: CPT

## 2024-01-03 PROCEDURE — 97162 PT EVAL MOD COMPLEX 30 MIN: CPT

## 2024-01-03 RX ADMIN — FLUOXETINE HYDROCHLORIDE 20 MG: 20 CAPSULE ORAL at 09:01

## 2024-01-03 RX ADMIN — SPIRONOLACTONE 25 MG: 25 TABLET ORAL at 09:01

## 2024-01-03 RX ADMIN — APIXABAN 5 MG: 5 TABLET, FILM COATED ORAL at 09:01

## 2024-01-03 RX ADMIN — DIPHENHYDRAMINE HYDROCHLORIDE 25 MG: 25 CAPSULE ORAL at 10:01

## 2024-01-03 RX ADMIN — FOLIC ACID 1 MG: 1 TABLET ORAL at 09:01

## 2024-01-03 RX ADMIN — METHOCARBAMOL 1000 MG: 500 TABLET ORAL at 09:01

## 2024-01-03 RX ADMIN — ACETAMINOPHEN 1000 MG: 500 TABLET ORAL at 10:01

## 2024-01-03 RX ADMIN — FUROSEMIDE 40 MG: 40 TABLET ORAL at 09:01

## 2024-01-03 RX ADMIN — SENNOSIDES 8.6 MG: 8.6 TABLET, FILM COATED ORAL at 09:01

## 2024-01-03 RX ADMIN — PREGABALIN 75 MG: 75 CAPSULE ORAL at 10:01

## 2024-01-03 RX ADMIN — APIXABAN 5 MG: 5 TABLET, FILM COATED ORAL at 10:01

## 2024-01-03 RX ADMIN — THIAMINE HCL TAB 100 MG 200 MG: 100 TAB at 09:01

## 2024-01-03 RX ADMIN — METHOCARBAMOL 1000 MG: 500 TABLET ORAL at 01:01

## 2024-01-03 RX ADMIN — THERA TABS 1 TABLET: TAB at 09:01

## 2024-01-03 RX ADMIN — PANTOPRAZOLE SODIUM 20 MG: 20 TABLET, DELAYED RELEASE ORAL at 06:01

## 2024-01-03 RX ADMIN — OXYCODONE HYDROCHLORIDE 10 MG: 10 TABLET ORAL at 12:01

## 2024-01-03 RX ADMIN — ACETAMINOPHEN 1000 MG: 500 TABLET ORAL at 01:01

## 2024-01-03 RX ADMIN — ACETAMINOPHEN 1000 MG: 500 TABLET ORAL at 06:01

## 2024-01-03 RX ADMIN — OXYCODONE HYDROCHLORIDE 10 MG: 10 TABLET ORAL at 05:01

## 2024-01-03 RX ADMIN — METHOCARBAMOL 1000 MG: 500 TABLET ORAL at 06:01

## 2024-01-03 RX ADMIN — Medication 6 MG: at 10:01

## 2024-01-03 RX ADMIN — ATORVASTATIN CALCIUM 80 MG: 40 TABLET, FILM COATED ORAL at 10:01

## 2024-01-03 RX ADMIN — AMLODIPINE BESYLATE 10 MG: 5 TABLET ORAL at 09:01

## 2024-01-03 RX ADMIN — LOSARTAN POTASSIUM 25 MG: 25 TABLET, FILM COATED ORAL at 09:01

## 2024-01-03 RX ADMIN — ASPIRIN 81 MG CHEWABLE TABLET 81 MG: 81 TABLET CHEWABLE at 09:01

## 2024-01-03 RX ADMIN — METHOCARBAMOL 1000 MG: 500 TABLET ORAL at 10:01

## 2024-01-03 RX ADMIN — PREGABALIN 75 MG: 75 CAPSULE ORAL at 09:01

## 2024-01-03 RX ADMIN — METOPROLOL SUCCINATE 50 MG: 50 TABLET, EXTENDED RELEASE ORAL at 09:01

## 2024-01-03 NOTE — PLAN OF CARE
Patient tolerated PT session fair due to inability to maintain L LE NWB.     Problem: Physical Therapy  Goal: Physical Therapy Goal  Description: Goals to be met by: 2/3/2024    Patient will increase functional independence with mobility by performin. Sit to stand transfer with Stand-by Assistance while maintaining L LE NWB   2. Bed to chair transfer with Stand-by Assistance using left platform walker while maintaining L LE NWB   3. Gait x50 feet with Stand-by Assistance using left platform walker while maintaining L LE NWB     Outcome: Ongoing, Progressing

## 2024-01-03 NOTE — ASSESSMENT & PLAN NOTE
- Patient with imaging in ED that showed closed trimalleolar ankle fracture sustained after a ground level fall related to alcohol consumption.   - Ortho consulted for fracture and reduced and splinted in ED.  - Per Ortho at this time patient is too swollen for definitive fixation and plan is non-weight bearing to left lower extremity and keep left ankle iced and elevated to help with soft tissue swelling.   - Orthopedics will continue to follow while patient in hospital. From Orthopedic perspective, patient could follow up outpatient for definitive fixation of ankle but patient is homeless and stays at [facility] and they are unable to provide any assistance to patient and with his non weight bearing status will be difficult for him to get around so will need to remain in hospital until definitive fixation surgery can be done. Ortho aware and discussed and trying to come up with a plan.   - PT/OT consulted to work with patient while awaiting surgery.   - Pain controlled. Patient on Tylenol 1000 mg po TID + Robaxin 1000 mg po 4 times daily + Lyrica 75 mg po BID and will continue and continue with Oxycodone IR 5-10 mg po every 4 hours prn for breakthrough pain.   - Patient on his home Apixiban for atrial fibrillation so fully anticoagulated so no other DVT prophylaxis needed.

## 2024-01-03 NOTE — ASSESSMENT & PLAN NOTE
Chronic, controlled. Latest blood pressure and vitals reviewed-     Temp:  [97.6 °F (36.4 °C)-98.2 °F (36.8 °C)]   Pulse:  [64-84]   Resp:  [15-18]   BP: (108-129)/(57-79)   SpO2:  [95 %-100 %] .   Home meds for hypertension were reviewed and noted below.   Hypertension Medications               amLODIPine (NORVASC) 10 MG tablet Take 1 tablet (10 mg total) by mouth once daily.    furosemide (LASIX) 40 MG tablet Take 1 tablet (40 mg total) by mouth once daily.    losartan (COZAAR) 25 MG tablet Take 1 tablet (25 mg total) by mouth once daily.    metoprolol succinate (TOPROL-XL) 50 MG 24 hr tablet Take 1 tablet (50 mg total) by mouth once daily.    spironolactone (ALDACTONE) 25 MG tablet Take 1 tablet (25 mg total) by mouth once daily.            While in the hospital, will manage blood pressure as follows; Continue home antihypertensive regimen    Will utilize p.r.n. blood pressure medication only if patient's blood pressure greater than 180/110 and he develops symptoms such as worsening chest pain or shortness of breath.

## 2024-01-03 NOTE — SUBJECTIVE & OBJECTIVE
Principal Problem:Closed trimalleolar fracture of left ankle    Principal Orthopedic Problem: same    Interval History: NAEON. VSS. AF. Patient states that pain is well controlled. Voiding spontaneously. Dressing CDI. Splint windowed this yesterday, and his ankle is still moderately swollen. Remain NWB to LLE. Currently admitted for work up of small CVA without new neurologic deficit. Patient iced and elevated.    Review of patient's allergies indicates:  No Known Allergies    Current Facility-Administered Medications   Medication    acetaminophen tablet 1,000 mg    albuterol-ipratropium 2.5 mg-0.5 mg/3 mL nebulizer solution 3 mL    aluminum-magnesium hydroxide-simethicone 200-200-20 mg/5 mL suspension 30 mL    amLODIPine tablet 10 mg    apixaban tablet 5 mg    aspirin chewable tablet 81 mg    atorvastatin tablet 80 mg    bisacodyL suppository 10 mg    diphenhydrAMINE capsule 25 mg    FLUoxetine capsule 20 mg    folic acid tablet 1 mg    furosemide tablet 40 mg    LORazepam injection 1 mg    losartan tablet 25 mg    melatonin tablet 6 mg    methocarbamoL tablet 1,000 mg    metoprolol succinate (TOPROL-XL) 24 hr tablet 50 mg    multivitamin tablet    naloxone 0.4 mg/mL injection 0.02 mg    ondansetron disintegrating tablet 8 mg    oxyCODONE immediate release tablet 5 mg    oxyCODONE immediate release tablet Tab 10 mg    pantoprazole EC tablet 20 mg    polyethylene glycol packet 17 g    pregabalin capsule 75 mg    prochlorperazine injection Soln 5 mg    senna tablet 8.6 mg    simethicone chewable tablet 80 mg    sodium chloride 0.9% flush 5 mL    spironolactone tablet 25 mg    thiamine tablet 200 mg     Objective:     Vital Signs (Most Recent):  Temp: 97.6 °F (36.4 °C) (01/03/24 0749)  Pulse: 64 (01/03/24 0749)  Resp: 18 (01/03/24 0749)  BP: 122/69 (01/03/24 0749)  SpO2: 97 % (01/03/24 0749) Vital Signs (24h Range):  Temp:  [97.6 °F (36.4 °C)-98.2 °F (36.8 °C)] 97.6 °F (36.4 °C)  Pulse:  [64-84] 64  Resp:  [15-18]  18  SpO2:  [95 %-100 %] 97 %  BP: (108-129)/(57-79) 122/69     Weight: 92 kg (202 lb 13.2 oz)  Height: 6' (182.9 cm)  Body mass index is 27.51 kg/m².      Intake/Output Summary (Last 24 hours) at 1/3/2024 0810  Last data filed at 1/3/2024 0050  Gross per 24 hour   Intake 690 ml   Output 1450 ml   Net -760 ml          Ortho/SPM Exam     Left Upper Extremity  Inspection  - Skin intact throughout, no open wounds  - No swelling  - No ecchymosis, erythema, or signs of cellulitis  Palpation  - NonTTP throughout, no palpable abnormality   Range of motion  - AROM of the shoulder, elbow, wrist, and fingers limited due to prior stroke, flexion contracture at the wrist that is passively correctable.   Stability  - No evidence of joint dislocation or abnormal laxity   Neurovascular  - SILT throughout  - Compartments soft  - Radial artery palpated  - Capillary Refill <3s  - Muscle tone normal    Left Lower Extremity  Inspection  - Skin intact throughout, no open wounds  - Significant swelling about the ankle, ecchymosis over the dorsum of the ankle  - No erythema, or signs of cellulitis  Palpation  - TTP throughout the ankle, nontender otherwise through LLE  Range of motion  - AROM and PROM of the hip, knee, and foot intact  Stability  - Ankle appears moderately dislocated laterally  Neurovascular  - TA/EHL/Gastroc/FHL assessed in isolation without deficit  - SILT throughout  - Compartments soft  - DP palpated   - Capillary Refill <3s  - Negative Log roll  - Muscle tone normal    Significant Labs: CBC:   Recent Labs   Lab 01/02/24  0504   WBC 4.68   HGB 12.0*   HCT 36.3*          CMP:   Recent Labs   Lab 01/02/24  0504      K 4.2   CL 98   CO2 30*   GLU 92   BUN 22*   CREATININE 1.1   CALCIUM 9.3   ANIONGAP 9       All pertinent labs within the past 24 hours have been reviewed.    Significant Imaging: I have reviewed and interpreted all pertinent imaging results/findings.

## 2024-01-03 NOTE — SUBJECTIVE & OBJECTIVE
Interval History: PT/OT worked with patient this am and walked 4 steps forward an back but stated unsafe for hospital discharge so patient will need to remain in hospital until definitive fixation surgery can be done by Ortho for his left ankle fracture. His mobility is compromised by his left sided weakness due to stroke as well as non weight bearing status to left lower leg. Discussed with patient who agrees. Ortho notified. Patient reported to therapy he hopped to bathroom this am on 1 leg but when I spoke to his bedside nurse she was not aware but stated she would make sure he is not getting out of bed as not safe. Patient reports pain in left ankle controlled. Patient's left ankle elevated and iced in bed this am. Patient awaiting improvement in soft tissue swelling to left ankle prior to Ortho being able to proceed with ankle fixation surgery. Patient with no signs of alcohol withdrawal. According to case management the [facility] where he was staying will no longer provide full time shelter to patient because of his alcohol abuse. They will allow him to do daily drop in's and stay from 4P-7A but rest of the time he will be on the street. Plan is if he can get his ankle fixed surgically then he could do inpatient rehab afterwards and get to point he can return back to the streets safely. No new labs.     Review of Systems   Constitutional:  Negative for fever.   Respiratory:  Negative for cough.    Cardiovascular:  Negative for chest pain.   Gastrointestinal:  Negative for abdominal pain and nausea.   Musculoskeletal:  Positive for arthralgias (Left ankle).   Neurological:  Positive for speech difficulty (Dysarthria at baseline from previous CVA) and weakness (Left sided from previous CVA). Negative for dizziness.   Psychiatric/Behavioral:  Negative for agitation.      Objective:     Vital Signs (Most Recent):  Temp: 97.6 °F (36.4 °C) (01/03/24 0749)  Pulse: 70 (01/03/24 1117)  Resp: 18 (01/03/24  0749)  BP: 122/69 (01/03/24 0749)  SpO2: 97 % (01/03/24 0749) on room air  Vital Signs (24h Range):  Temp:  [97.6 °F (36.4 °C)-98.2 °F (36.8 °C)] 97.6 °F (36.4 °C)  Pulse:  [64-84] 70  Resp:  [15-18] 18  SpO2:  [95 %-100 %] 97 %  BP: (108-129)/(57-79) 122/69     Weight: 92 kg (202 lb 13.2 oz)  Body mass index is 27.51 kg/m².    Intake/Output Summary (Last 24 hours) at 1/3/2024 1229  Last data filed at 1/3/2024 1224  Gross per 24 hour   Intake 450 ml   Output 1925 ml   Net -1475 ml         Physical Exam  Vitals and nursing note reviewed.   Constitutional:       General: He is awake. He is not in acute distress.     Appearance: Normal appearance. He is well-developed and normal weight. He is not ill-appearing.   Cardiovascular:      Rate and Rhythm: Normal rate and regular rhythm.      Heart sounds: Normal heart sounds. No murmur heard.  Pulmonary:      Effort: Pulmonary effort is normal. No respiratory distress.      Breath sounds: Normal breath sounds. No wheezing.   Abdominal:      General: Abdomen is flat. Bowel sounds are normal. There is no distension.      Palpations: Abdomen is soft.      Tenderness: There is no abdominal tenderness.   Musculoskeletal:      Comments: Left leg in splint and elevated in bed.    Skin:     Findings: No erythema or rash.   Neurological:      Mental Status: He is alert and oriented to person, place, and time.   Psychiatric:         Mood and Affect: Mood normal.         Behavior: Behavior normal. Behavior is cooperative.         Thought Content: Thought content normal.         Judgment: Judgment normal.             Significant Labs: All pertinent labs within the past 24 hours have been reviewed.    Significant Imaging: I have reviewed all pertinent imaging results/findings within the past 24 hours.

## 2024-01-03 NOTE — NURSING
Problem: Adult Inpatient Plan of Care  Goal: Plan of Care Review  Flowsheets (Taken 1/2/2024 0337)  Plan of Care Reviewed With: patient  Goal: Optimal Comfort and Wellbeing  Intervention: Monitor Pain and Promote Comfort  Flowsheets (Taken 1/2/2024 0337)  Pain Management Interventions:   quiet environment facilitated   relaxation techniques promoted     Problem: Adult Inpatient Plan of Care  Goal: Plan of Care Review  Flowsheets (Taken 1/2/2024 0337)  Plan of Care Reviewed With: patient     Problem: Adult Inpatient Plan of Care  Goal: Optimal Comfort and Wellbeing  Intervention: Monitor Pain and Promote Comfort  Flowsheets (Taken 1/2/2024 0337)  Pain Management Interventions:   quiet environment facilitated   relaxation techniques promoted     Problem: Adult Inpatient Plan of Care  Goal: Optimal Comfort and Wellbeing  Intervention: Monitor Pain and Promote Comfort  Flowsheets (Taken 1/2/2024 0337)  Pain Management Interventions:   quiet environment facilitated   relaxation techniques promoted        Remain SR and PRN medication effective for pain . Pt stated he slept well after PRN.  Explained plan of care, verbalized understanding. Educated pt .on new medicine . No injury during shift, Side rails up x 2, call light by bedside.

## 2024-01-03 NOTE — PROGRESS NOTES
[facility] - [facility]  Orthopedics  Progress Note    Patient Name: Dionicio Bess  MRN: [id number]  Admission Date: 12/29/2023  Hospital Length of Stay: 1 days  Attending Provider: Sarah Hart MD  Primary Care Provider: Isela, Primary Doctor    Subjective:     Principal Problem:Closed trimalleolar fracture of left ankle    Principal Orthopedic Problem: same    Interval History: NAEON. VSS. AF. Patient states that pain is well controlled. Voiding spontaneously. Dressing CDI. Splint windowed this yesterday, and his ankle is still moderately swollen. Remain NWB to LLE. Currently admitted for work up of small CVA without new neurologic deficit. Patient iced and elevated.    Review of patient's allergies indicates:  No Known Allergies    Current Facility-Administered Medications   Medication    acetaminophen tablet 1,000 mg    albuterol-ipratropium 2.5 mg-0.5 mg/3 mL nebulizer solution 3 mL    aluminum-magnesium hydroxide-simethicone 200-200-20 mg/5 mL suspension 30 mL    amLODIPine tablet 10 mg    apixaban tablet 5 mg    aspirin chewable tablet 81 mg    atorvastatin tablet 80 mg    bisacodyL suppository 10 mg    diphenhydrAMINE capsule 25 mg    FLUoxetine capsule 20 mg    folic acid tablet 1 mg    furosemide tablet 40 mg    LORazepam injection 1 mg    losartan tablet 25 mg    melatonin tablet 6 mg    methocarbamoL tablet 1,000 mg    metoprolol succinate (TOPROL-XL) 24 hr tablet 50 mg    multivitamin tablet    naloxone 0.4 mg/mL injection 0.02 mg    ondansetron disintegrating tablet 8 mg    oxyCODONE immediate release tablet 5 mg    oxyCODONE immediate release tablet Tab 10 mg    pantoprazole EC tablet 20 mg    polyethylene glycol packet 17 g    pregabalin capsule 75 mg    prochlorperazine injection Soln 5 mg    senna tablet 8.6 mg    simethicone chewable tablet 80 mg    sodium chloride 0.9% flush 5 mL    spironolactone tablet 25 mg    thiamine tablet 200 mg     Objective:     Vital Signs (Most Recent):  Temp: 97.6 °F (36.4  °C) (01/03/24 0749)  Pulse: 64 (01/03/24 0749)  Resp: 18 (01/03/24 0749)  BP: 122/69 (01/03/24 0749)  SpO2: 97 % (01/03/24 0749) Vital Signs (24h Range):  Temp:  [97.6 °F (36.4 °C)-98.2 °F (36.8 °C)] 97.6 °F (36.4 °C)  Pulse:  [64-84] 64  Resp:  [15-18] 18  SpO2:  [95 %-100 %] 97 %  BP: (108-129)/(57-79) 122/69     Weight: 92 kg (202 lb 13.2 oz)  Height: 6' (182.9 cm)  Body mass index is 27.51 kg/m².      Intake/Output Summary (Last 24 hours) at 1/3/2024 0810  Last data filed at 1/3/2024 0050  Gross per 24 hour   Intake 690 ml   Output 1450 ml   Net -760 ml         Ortho/SPM Exam     Left Upper Extremity  Inspection  - Skin intact throughout, no open wounds  - No swelling  - No ecchymosis, erythema, or signs of cellulitis  Palpation  - NonTTP throughout, no palpable abnormality   Range of motion  - AROM of the shoulder, elbow, wrist, and fingers limited due to prior stroke, flexion contracture at the wrist that is passively correctable.   Stability  - No evidence of joint dislocation or abnormal laxity   Neurovascular  - SILT throughout  - Compartments soft  - Radial artery palpated  - Capillary Refill <3s  - Muscle tone normal    Left Lower Extremity  Inspection  - Skin intact throughout, no open wounds  - Significant swelling about the ankle, ecchymosis over the dorsum of the ankle  - No erythema, or signs of cellulitis  Palpation  - TTP throughout the ankle, nontender otherwise through LLE  Range of motion  - AROM and PROM of the hip, knee, and foot intact  Stability  - Ankle appears moderately dislocated laterally  Neurovascular  - TA/EHL/Gastroc/FHL assessed in isolation without deficit  - SILT throughout  - Compartments soft  - DP palpated   - Capillary Refill <3s  - Negative Log roll  - Muscle tone normal    Significant Labs: CBC:   Recent Labs   Lab 01/02/24  0504   WBC 4.68   HGB 12.0*   HCT 36.3*          CMP:   Recent Labs   Lab 01/02/24  0504      K 4.2   CL 98   CO2 30*   GLU 92   BUN 22*    CREATININE 1.1   CALCIUM 9.3   ANIONGAP 9       All pertinent labs within the past 24 hours have been reviewed.    Significant Imaging: I have reviewed and interpreted all pertinent imaging results/findings.  Assessment/Plan:     * Closed trimalleolar fracture of left ankle  Dionicio Bess is a 58 y.o. male with multiple medical comorbidities, an elevated troponin, elevated ETOH, new CT head findings concerning for infarct, and a left sided functional bimalleolar ankle fracture sustained after a ground level fall yesterday, closed and at neurovascular baseline for the LLE. Reduced and splinted in the ED, see procedure note below for further details.     - Patient admitted to medicine    - At this time patient is too swollen for definitive fixation  - NWB to LLE, keep iced and elevated    From orthopedic perspective, patient could follow up outpatient for definitive fixation of ankle.           NORMA Hinton MD  Orthopedics  [facility] - [facility]

## 2024-01-03 NOTE — PLAN OF CARE
Pt. Evaluated and goals established   Problem: Occupational Therapy  Goal: Occupational Therapy Goal  Description: Goals to be met by: 2/3/24     Patient will increase functional independence with ADLs by performing:    LE Dressing with Stand-by Assistance.  Grooming while standing at sink with Stand-by Assistance.  Toileting from bedside commode with Modified [place] for hygiene and clothing management.   Supine to sit with [place].  Stand pivot transfers with Modified [place].  Toilet transfer to bedside commode with Modified [place].    Outcome: Ongoing, Progressing     Problem: Occupational Therapy  Goal: Occupational Therapy Goal  Description: Goals to be met by: 2/3/24     Patient will increase functional independence with ADLs by performing:    LE Dressing with Stand-by Assistance.  Grooming while standing at sink with Stand-by Assistance.  Toileting from bedside commode with Modified [place] for hygiene and clothing management.   Supine to sit with [place].  Stand pivot transfers with Modified [place].  Toilet transfer to bedside commode with Modified [place].    Outcome: Ongoing, Progressing

## 2024-01-03 NOTE — PROGRESS NOTES
[facility], An Acute Care Hospital Medicine  Progress Note    Patient Name: Dionicio Bess  MRN: [id number]  Patient Class: IP- Inpatient   Admission Date: 12/29/2023  Length of Stay: 1 days  Attending Physician: Sarah Hart MD  Primary Care Provider: Isela, Primary Doctor        Subjective:     Principal Problem:Closed trimalleolar fracture of left ankle        HPI:  Dionicio Bess is a 59 yo M with PMHx of pAfib (on eliquis), HTN, CVA with residual L sided deficits, CAD s/p PCI, HLD, HFrEF (EF 25-30%) who presented to ED for swelling and pain to his L ankle after a ground level mechanical fall. Patient reports he tripped on a step. Now c/o 9/10 pain to L ankle with inability to bear weight. Denies LOC or head trauma. Patient also endorses intermittent lower midsternal chest pain for the past 2-3 days. Denies chest pain currently.  Also admits cramping epigastric abdominal pain with associated nausea that developed 45 mins ago. On chart review, patient had a stress test performed last month that was nondiagnostic though abnormal.  It does not appear that this was ever followed up on by cardiology as an outpatient.  The patient does have socioeconomic issues and resides in a homeless shelter ([facility]). Endorses frequent alcohol use with 2-3 beers daily. States he did have some vodka and beers yesterday before the fall. Denies fever, chills, palpitations, SOB, cough, v/d, dysuria, numbness, paresthesias, headaches.     In ED: Afebrile. HDS. Satting well on 2L NC. CBC without leukocytosis and with stable chronic anemia. CMP unremarkable. BNP 84. Trop 0.026. serum ETOH 277.  CXR with cardiomegaly and mild perihilar edema. CT ankle with trimalleolar fracture. Ortho consulted; recommending NWB to LLE and to keep iced and elevated. Too swollen for fixation at this time. CT cervical spine with degenerative changes, but without acute fracture or subluxation. CT/ MRI brain Small foci of acute to early subacute ischemia noted  in the right corona radiata/basal ganglia/internal capsule, adjoining remote large MCA territory infarct.  Equivocal punctate right parietal cortical infarct margin the posterior aspect of the infarct territory. Vascular neurology consulted; recommend continuing eliquis and statin. Given IV morphine 4 mg,  dilaudid 1 mg , IV toradol, and IV zofran. Placed in observation.     Overview/Hospital Course:  Patient seen and evaluated by Vascular Neurology on ED as CT scan and MRI of brain consistent with acute right sided CVA. Neurology stated patient with no new symptoms related to this stroke and located in previous area of old CVA. CVA cardio embolic in nature as patient wih known atrial fibrillation and recommended to continue home Apixiban 5 mg po BID and Lipitor for stroke prevention and no further work-up or evaluation needed. Orthopedic surgery consulted concerning left ankle fracture and splinted fracture in the ED. CT scan of left ankle done and showed trimalleolar fracture. Patient placed non weight bearing to left lower extremity. Patient to rest, elevate and ice left leg as will require surgical repair of fracture but needs improvement in soft tissue swelling prior to proceeding. Ortho stated potentially could do surgery as outpatient but patient lives at [facility] so hard for him to get around with fractured ankle and would need to be independent at the shelter as they are unable to provide assistance to patient so will need to remain in hospital until definitve fixation surgery can be done for his left ankle. Pain controlled to left ankle.     Interval History: PT/OT worked with patient this am and walked 4 steps forward an back but stated unsafe for hospital discharge so patient will need to remain in hospital until definitive fixation surgery can be done by Ortho for his left ankle fracture. His mobility is compromised by his left sided weakness due to stroke as well as non weight  bearing status to left lower leg. Discussed with patient who agrees. Ortho notified. Patient reported to therapy he hopped to bathroom this am on 1 leg but when I spoke to his bedside nurse she was not aware but stated she would make sure he is not getting out of bed as not safe. Patient reports pain in left ankle controlled. Patient's left ankle elevated and iced in bed this am. Patient awaiting improvement in soft tissue swelling to left ankle prior to Ortho being able to proceed with ankle fixation surgery. Patient with no signs of alcohol withdrawal. According to case management the [facility] where he was staying will no longer provide full time shelter to patient because of his alcohol abuse. They will allow him to do daily drop in's and stay from 4P-7A but rest of the time he will be on the street. Plan is if he can get his ankle fixed surgically then he could do inpatient rehab afterwards and get to point he can return back to the streets safely. No new labs.     Review of Systems   Constitutional:  Negative for fever.   Respiratory:  Negative for cough.    Cardiovascular:  Negative for chest pain.   Gastrointestinal:  Negative for abdominal pain and nausea.   Musculoskeletal:  Positive for arthralgias (Left ankle).   Neurological:  Positive for speech difficulty (Dysarthria at baseline from previous CVA) and weakness (Left sided from previous CVA). Negative for dizziness.   Psychiatric/Behavioral:  Negative for agitation.      Objective:     Vital Signs (Most Recent):  Temp: 97.6 °F (36.4 °C) (01/03/24 0749)  Pulse: 70 (01/03/24 1117)  Resp: 18 (01/03/24 0749)  BP: 122/69 (01/03/24 0749)  SpO2: 97 % (01/03/24 0749) on room air  Vital Signs (24h Range):  Temp:  [97.6 °F (36.4 °C)-98.2 °F (36.8 °C)] 97.6 °F (36.4 °C)  Pulse:  [64-84] 70  Resp:  [15-18] 18  SpO2:  [95 %-100 %] 97 %  BP: (108-129)/(57-79) 122/69     Weight: 92 kg (202 lb 13.2 oz)  Body mass index is 27.51 kg/m².    Intake/Output Summary  (Last 24 hours) at 1/3/2024 1229  Last data filed at 1/3/2024 1224  Gross per 24 hour   Intake 450 ml   Output 1925 ml   Net -1475 ml         Physical Exam  Vitals and nursing note reviewed.   Constitutional:       General: He is awake. He is not in acute distress.     Appearance: Normal appearance. He is well-developed and normal weight. He is not ill-appearing.   Cardiovascular:      Rate and Rhythm: Normal rate and regular rhythm.      Heart sounds: Normal heart sounds. No murmur heard.  Pulmonary:      Effort: Pulmonary effort is normal. No respiratory distress.      Breath sounds: Normal breath sounds. No wheezing.   Abdominal:      General: Abdomen is flat. Bowel sounds are normal. There is no distension.      Palpations: Abdomen is soft.      Tenderness: There is no abdominal tenderness.   Musculoskeletal:      Comments: Left leg in splint and elevated in bed.    Skin:     Findings: No erythema or rash.   Neurological:      Mental Status: He is alert and oriented to person, place, and time.   Psychiatric:         Mood and Affect: Mood normal.         Behavior: Behavior normal. Behavior is cooperative.         Thought Content: Thought content normal.         Judgment: Judgment normal.             Significant Labs: All pertinent labs within the past 24 hours have been reviewed.    Significant Imaging: I have reviewed all pertinent imaging results/findings within the past 24 hours.    Assessment/Plan:      * Closed trimalleolar fracture of left ankle  - Patient with imaging in ED that showed closed trimalleolar ankle fracture sustained after a ground level fall related to alcohol consumption.   - Ortho consulted for fracture and reduced and splinted in ED.  - Per Ortho at this time patient is too swollen for definitive fixation and plan is non-weight bearing to left lower extremity and keep left ankle iced and elevated to help with soft tissue swelling.   - Orthopedics will continue to follow while patient in  hospital. From Orthopedic perspective, patient could follow up outpatient for definitive fixation of ankle but patient is homeless and stays at [facility] and they are unable to provide any assistance to patient and with his non weight bearing status will be difficult for him to get around so will need to remain in hospital until definitive fixation surgery can be done. Ortho aware and discussed and trying to come up with a plan.   - PT/OT consulted to work with patient while awaiting surgery.   - Pain controlled. Patient on Tylenol 1000 mg po TID + Robaxin 1000 mg po 4 times daily + Lyrica 75 mg po BID and will continue and continue with Oxycodone IR 5-10 mg po every 4 hours prn for breakthrough pain.   - Patient on his home Apixiban for atrial fibrillation so fully anticoagulated so no other DVT prophylaxis needed.     Stroke due to embolism of right middle cerebral artery  - Patient with known prior right cardio embolic MCA stroke in 2/2023 with residual left sided weakness involving both his arm and leg and dysarthria.   - Vascular neuro consulted as CT scan of head with new hypodensity; MRI of brain done and MRI showed acute small infarcts in right MCA territory. Vascular Neurology noted this is likely incidental finding given patient reports his weakness and dysarthria are at baseline with no recent change. Etiology of these infarcts likely cardioembolic from known Afib. No further stroke workup indicated at this time as per Vascular Neurology.   - Neurology recommends to continue anticoagulation with Apixiban 5 mg po BID. If plan for surgery, then resume anticoagulation when safe postop from surgical perspective. Med list includes Aspirin 81 mg, which is not indicated from a stroke perspective given he is anticoagulated with Apixiban as per Neurology.  - Continue Lipitor 80 mg po daily for stroke prevention.  - Patient with no new neurologic symptoms.   - PT/OT to work with patient in  hospital.     Paroxysmal atrial fibrillation  Patient with Paroxysmal (<7 days) atrial fibrillation which is controlled currently with Toprol XL 50 mg po daily and will continue. Patient is currently in sinus rhythm.[id number] Score: 4.  Anticoagulation indicated. Anticoagulation done with Apixiban 5 mg po BID.    Alcohol dependence with uncomplicated intoxication  - Serum ETOH 277 on admit.  - Patient denies any history of alcohol withdrawal or seizures.  - Continue MVI, Thiamine an Folate for vitamin replacement as patient with long standing history of alcohol abuse.   - Last drink was 12/30.   - No signs of acute withdrawal so far in hospital.  - Monitor CIWA every 4 hours.   - Ativan 1 mg IV prn for anxiety, tremulousness, HR >110, CIWA >8.    Chronic combined systolic and diastolic congestive heart failure  Patient is identified as having Combined Systolic and Diastolic heart failure that is Chronic. CHF is currently controlled. Latest ECHO performed and demonstrates- Results for orders placed during the hospital encounter of 11/05/23    Echo    Interpretation Summary    Left Ventricle: The left ventricle is normal in size. Normal wall thickness. Global hypokinesis present. There is severely reduced systolic function with a visually estimated ejection fraction of 25 - 30%. Grade I diastolic dysfunction.    Right Ventricle: Mild right ventricular enlargement. Wall thickness is normal. Right ventricle wall motion  is normal. Systolic function is mildly reduced.    Biatrial enlargement    Pulmonary Artery: The estimated pulmonary artery systolic pressure is 19 mmHg.    IVC/SVC: Normal venous pressure at 3 mmHg.  Continue home Losartan, Lasix and Toprol XL to treat. Monitor clinical status closely. Monitor on telemetry. Patient is off CHF pathway.  Monitor strict Is&Os and daily weights. Place on fluid restriction of 1.5 L. Cardiology has not been consulted. Continue to stress to patient importance of self efficacy  and  on diet for CHF. Last BNP reviewed- and noted below   Recent Labs   Lab 12/29/23  2211   BNP 84         Essential hypertension  Chronic, controlled. Latest blood pressure and vitals reviewed-     Temp:  [97.6 °F (36.4 °C)-98.2 °F (36.8 °C)]   Pulse:  [64-84]   Resp:  [15-18]   BP: (108-129)/(57-79)   SpO2:  [95 %-100 %] .   Home meds for hypertension were reviewed and noted below.   Hypertension Medications               amLODIPine (NORVASC) 10 MG tablet Take 1 tablet (10 mg total) by mouth once daily.    furosemide (LASIX) 40 MG tablet Take 1 tablet (40 mg total) by mouth once daily.    losartan (COZAAR) 25 MG tablet Take 1 tablet (25 mg total) by mouth once daily.    metoprolol succinate (TOPROL-XL) 50 MG 24 hr tablet Take 1 tablet (50 mg total) by mouth once daily.    spironolactone (ALDACTONE) 25 MG tablet Take 1 tablet (25 mg total) by mouth once daily.            While in the hospital, will manage blood pressure as follows; Continue home antihypertensive regimen    Will utilize p.r.n. blood pressure medication only if patient's blood pressure greater than 180/110 and he develops symptoms such as worsening chest pain or shortness of breath.    Coronary artery disease involving native coronary artery with angina pectoris  Patient with known CAD s/p stent placement, which is controlled. Will continue Toprol XL, Losartan and Lipitor to treat and monitor for S/Sx of angina/ACS. Continue to monitor on telemetry.     Pure hypercholesterolemia  Chronic and controlled. Continue home Lipitor to treat.       VTE Risk Mitigation (From admission, onward)           Ordered     apixaban tablet 5 mg  2 times daily         12/30/23 1242     IP VTE HIGH RISK PATIENT  Once         12/30/23 1206     Reason for No Pharmacological VTE Prophylaxis  Once        Question:  Reasons:  Answer:  Already adequately anticoagulated on oral Anticoagulants    12/30/23 1206                    Discharge Planning   ERWIN: 1/8/2024      Code Status: Full Code   Is the patient medically ready for discharge?: No    Reason for patient still in hospital (select all that apply): Patient trending condition  Discharge Plan A: Rehab; Needs surgical fixation of left ankle prior to discharging to a rehab. Awaiting improvement in soft tissue swelling prior to Ortho being able to do surgery on left ankle.          Sarah Hart MD  Department of Hospital Medicine   [facility] - Surgery

## 2024-01-03 NOTE — ASSESSMENT & PLAN NOTE
- Patient with known prior right cardio embolic MCA stroke in 2/2023 with residual left sided weakness involving both his arm and leg and dysarthria.   - Vascular neuro consulted as CT scan of head with new hypodensity; MRI of brain done and MRI showed acute small infarcts in right MCA territory. Vascular Neurology noted this is likely incidental finding given patient reports his weakness and dysarthria are at baseline with no recent change. Etiology of these infarcts likely cardioembolic from known Afib. No further stroke workup indicated at this time as per Vascular Neurology.   - Neurology recommends to continue anticoagulation with Apixiban 5 mg po BID. If plan for surgery, then resume anticoagulation when safe postop from surgical perspective. Med list includes Aspirin 81 mg, which is not indicated from a stroke perspective given he is anticoagulated with Apixiban as per Neurology.  - Continue Lipitor 80 mg po daily for stroke prevention.  - Patient with no new neurologic symptoms.   - PT/OT to work with patient in hospital.

## 2024-01-03 NOTE — PT/OT/SLP EVAL
Physical Therapy Evaluation and Treatment    Patient Name: Dionicio Bess   MRN: [id number]  Recent Surgery: * No surgery found *      Recommendations:     Discharge Recommendations: High Intensity Therapy   Discharge Equipment Recommendations: platform, walker, rolling   Barriers to discharge:  unable to maintain L LE NWB     Assessment:     Dionicio Bess is a 58 y.o. male admitted with a medical diagnosis of Closed trimalleolar fracture of left ankle. He presents with the following impairments/functional limitations: weakness, impaired endurance, impaired functional mobility, gait instability, impaired balance, decreased lower extremity function, decreased safety awareness, orthopedic precautions. Patient tolerated PT session fair due to inability to maintain L LE NWB. He ambulated 4ft forward/backward with left platform RW and moderate assistance, but he was unable to maintain L LE NWB. Per patient, he has already hopped to the bathroom in his room with a rolling walker, but on PT assessment he was unable to grasp rolling walker with left hand due to deficits from CVA. RN and attending MD notified that patient unsafe to ambulate to bathroom so PT/OT retrieved a bedside commode and instructed patient on safe transfer while maintaining NWB on L LE. Patient in agreement with plan to use bedside commode and not ambulate to bathroom. Patient presents with good participation and motivation to return to prior level of function with high intensity therapy. The patient demonstrates appropriate strength to participate in up to 3 hours or 15hrs of combined therapy post acute.     Rehab Prognosis: Good; patient would benefit from acute PT services to address these deficits and reach maximum level of function.    Plan:     During this hospitalization, patient to be seen 3 x/week to address the above listed problems via gait training, therapeutic activities, therapeutic exercises    Plan of Care Expires: 02/03/24    Subjective  "    Subjective: "I have already hopped to bathroom with rolling walker."  Patient Comments/Goals: To get left ankle to heal.   Pain/Comfort:  Pain Rating 1: 0/10    Social History:  Living Environment: Patient lives in a shelter. He had a fall prior to coming into the hospital.   Prior Level of Function: Prior to admission, patient was modified independent with ADLs using straight cane for mobility  Equipment Used at Home: cane, straight  Assistance Upon Discharge:  ?    Objective:     Communicated with RN prior to session. Patient found HOB elevated withL LE elevated and telemetry, cryotherapy upon PT entry to room.    General Precautions: Standard, fall   Orthopedic Precautions: LLE non weight bearing   Braces:  (ace bandage intact to L LE)    Respiratory Status: Room air    Exams:  RLE ROM: WFL  RLE Strength: WFL  LLE ROM:  WFL at hip and knee but did not assess ankle  LLE Strength:  appears WFL at hip and knee but did not formally assess  Cognitive: Patient is oriented to Person, Place, Time, Situation    Functional Mobility:  Gait belt applied - Yes  Bed Mobility  Scooting: modified independence  Supine to Sit: modified independence   Sit to Supine: modified independence   Transfers  Sit to Stand: contact guard assistance with left platform rolling walker and verbal/tactile cues to maintain NWB on L LE  Toilet Transfer (bed to bedside commode and back): minimum assistance with  bedrail using Stand Pivot  Gait  Patient ambulated 4ft forward/backward with left platform rolling walker and moderate assistance. He was unable to maintain NWB on L LE despite verbal/tactile cues. Patient required use of platform walker because he was unable to grasp walker with left hand due to deficits from CVA.    Therapeutic Activities and Exercises:  Patient educated in:  -PT role and POC  -safety with transfers including hand placement  -gait sequencing and left platform RW management  -L LE elevation with ice intact to assist " with edema management     AM-PAC 6 CLICK MOBILITY  Total Score:16    Patient left  L LE elevated with ice on  with all lines intact, call button in reach, and RN notified.    GOALS:   Multidisciplinary Problems       Physical Therapy Goals          Problem: Physical Therapy    Goal Priority Disciplines Outcome Goal Variances Interventions   Physical Therapy Goal     PT, PT/OT Ongoing, Progressing     Description: Goals to be met by: 2/3/2024    Patient will increase functional independence with mobility by performin. Sit to stand transfer with Stand-by Assistance while maintaining L LE NWB   2. Bed to chair transfer with Stand-by Assistance using left platform walker while maintaining L LE NWB   3. Gait x50 feet with Stand-by Assistance using left platform walker while maintaining L LE NWB                          History:     Past Medical History:   Diagnosis Date    CAD (coronary artery disease)     HLD (hyperlipidemia)     HTN (hypertension)     Stroke     R MCA 2023       No past surgical history on file.    Time Tracking:     PT Received On: 24  PT Start Time: 1057  PT Stop Time: 1118  PT Total Time (min): 21 min     Billable Minutes: Evaluation 10 and Gait Training 11    Co-treatment performed for this visit due to patient need for two skilled therapists to ensure patient and staff safety and to accommodate for patient activity tolerance/pain management.    1/3/2024

## 2024-01-03 NOTE — ASSESSMENT & PLAN NOTE
Patient with Paroxysmal (<7 days) atrial fibrillation which is controlled currently with Toprol XL 50 mg po daily and will continue. Patient is currently in sinus rhythm.[id number] Score: 4.  Anticoagulation indicated. Anticoagulation done with Apixiban 5 mg po BID.

## 2024-01-03 NOTE — PT/OT/SLP EVAL
Occupational Therapy   Co-Evaluation/Tx    Name: Dionicio Bess  MRN: [id number]  Admitting Diagnosis: Closed trimalleolar fracture of left ankle  Recent Surgery: * No surgery found *      Recommendations:     Discharge Recommendations: High Intensity Therapy  Discharge Equipment Recommendations:  cane, straight  Barriers to discharge:  Decreased caregiver support, Inaccessible home environment    Assessment:     Dionicio Bess is a 58 y.o. male with a medical diagnosis of Closed trimalleolar fracture of left ankle.  He presents with no c/o pain however tolerated BSC t/f and sit to stand with platform RW. Performance deficits affecting function: weakness, impaired endurance, impaired functional mobility, impaired self care skills.      Rehab Prognosis: Good; patient would benefit from acute skilled OT services to address these deficits and reach maximum level of function.       Plan:     Patient to be seen 3 x/week to address the above listed problems via self-care/home management, neuromuscular re-education, therapeutic activities, therapeutic exercises  Plan of Care Expires: 02/02/24  Plan of Care Reviewed with: patient    Subjective     Chief Complaint:   Patient/Family Comments/goals: Pt.reports he been getting to the bathroom    Occupational Profile:  Living Environment: Without resides, resides at [facility]   Previous level of function: Ind in ADLs and Mod Ind with SPC for Mobility   Equipment Used at Home: cane, quad  Assistance upon Discharge: None     Pain/Comfort:  Pain Rating 1: 0/10    Patients cultural, spiritual, Amish conflicts given the current situation: no    Objective:     Communicated with: RN prior to session.  Patient found HOB elevated with telemetry upon OT entry to room.    General Precautions: Standard, fall  Orthopedic Precautions: LLE non weight bearing  Braces: N/A  Respiratory Status: Room air    Occupational Performance:    Bed Mobility:    Patient completed Supine to Sit with  modified independence  Patient completed Sit to Supine with modified independence    Functional Mobility/Transfers:  Patient completed Sit <> Stand Transfer with contact guard assistance  with  platform walker   Patient completed BSC Transfer Stand Pivot technique with minimum assistance with  no AD 1 trial  Functional Mobility: ~2-4 hops forward/backward with platform walker, fair adherence to WB, moderate verbal cues to maintain     Activities of Daily Living:  Toileting: set up (A) no pericare required    Cognitive/Visual Perceptual:  Cognitive/Psychosocial Skills:     -       Oriented to: Person, Place, and Situation   -       Follows Commands/attention:Follows multistep  commands  -       Communication: clear/fluent  -       Memory: No Deficits noted  -       Safety awareness/insight to disability: impaired   -       Mood/Affect/Coping skills/emotional control: Appropriate to situation    Physical Exam:  Balance:  Static Sitting: Ind  Static Standing: CGA  Dynamic Standing: Min A  Dominant hand:    -       R hand  Upper Extremity Range of Motion:     -       Right Upper Extremity: WFL  -       Left Upper Extremity: Deficits: hemiplegia/paresis limited ROM distal to elbow  Upper Extremity Strength:    -       Right Upper Extremity: WFL  -       Left Upper Extremity: Deficits: hemiplegia/paresis    Strength:    -       Right Upper Extremity: WFL  -       Left Upper Extremity: Deficits: hemiplegia/paresis   Gross motor coordination:   hemiplegia/paresis LUE    AMPAC 6 Click ADL:  AMPAC Total Score: 18    Treatment & Education:  Pt.educated and reviewed WB precautions  Pt.educated on benefits and use of DME prior to d/c.   Pt.educated educated on edema management techniques  Pt educated on role of occupational therapy, POC, and safety during ADLs and functional mobility. Pt and OT discussed importance of safe, continued mobility to optimize daily living skills. Pt verbalized understanding. Pt given instruction  to call for medical staff/nurse for assistance.   PT present for coeval due to pt's multiple medical comorbidities and functional/cognition deficits requiring two skilled therapists to appropriately progress pt's musculoskeletal strength, neuromuscular control, and endurance while taking into consideration medical acuity and pt safety.         Patient left HOB elevated with all lines intact, call button in reach, and nurse notified    GOALS:   Multidisciplinary Problems       Occupational Therapy Goals          Problem: Occupational Therapy    Goal Priority Disciplines Outcome Interventions   Occupational Therapy Goal     OT, PT/OT Ongoing, Progressing    Description: Goals to be met by: 2/3/24     Patient will increase functional independence with ADLs by performing:    LE Dressing with Stand-by Assistance.  Grooming while standing at sink with Stand-by Assistance.  Toileting from bedside commode with Modified [place] for hygiene and clothing management.   Supine to sit with [place].  Stand pivot transfers with Modified [place].  Toilet transfer to bedside commode with Modified [place].                         History:     Past Medical History:   Diagnosis Date    CAD (coronary artery disease)     HLD (hyperlipidemia)     HTN (hypertension)     Stroke     R MCA 2/2023       No past surgical history on file.    Time Tracking:     OT Date of Treatment: 01/03/24  OT Start Time: 1057  OT Stop Time: 1118  OT Total Time (min): 21 min    Billable Minutes:Evaluation 10  Self Care/Home Management 11    1/3/2024

## 2024-01-04 PROCEDURE — 97530 THERAPEUTIC ACTIVITIES: CPT

## 2024-01-04 PROCEDURE — 25000003 PHARM REV CODE 250

## 2024-01-04 PROCEDURE — 97535 SELF CARE MNGMENT TRAINING: CPT

## 2024-01-04 PROCEDURE — 25000003 PHARM REV CODE 250: Performed by: INTERNAL MEDICINE

## 2024-01-04 PROCEDURE — 25000003 PHARM REV CODE 250: Performed by: PHYSICIAN ASSISTANT

## 2024-01-04 PROCEDURE — 94761 N-INVAS EAR/PLS OXIMETRY MLT: CPT

## 2024-01-04 PROCEDURE — 21400001 HC TELEMETRY ROOM

## 2024-01-04 RX ADMIN — Medication 6 MG: at 09:01

## 2024-01-04 RX ADMIN — ACETAMINOPHEN 1000 MG: 500 TABLET ORAL at 02:01

## 2024-01-04 RX ADMIN — ACETAMINOPHEN 1000 MG: 500 TABLET ORAL at 09:01

## 2024-01-04 RX ADMIN — METHOCARBAMOL 1000 MG: 500 TABLET ORAL at 09:01

## 2024-01-04 RX ADMIN — ASPIRIN 81 MG CHEWABLE TABLET 81 MG: 81 TABLET CHEWABLE at 09:01

## 2024-01-04 RX ADMIN — ACETAMINOPHEN 1000 MG: 500 TABLET ORAL at 06:01

## 2024-01-04 RX ADMIN — THIAMINE HCL TAB 100 MG 200 MG: 100 TAB at 09:01

## 2024-01-04 RX ADMIN — OXYCODONE HYDROCHLORIDE 5 MG: 5 TABLET ORAL at 09:01

## 2024-01-04 RX ADMIN — METHOCARBAMOL 1000 MG: 500 TABLET ORAL at 05:01

## 2024-01-04 RX ADMIN — ATORVASTATIN CALCIUM 80 MG: 40 TABLET, FILM COATED ORAL at 09:01

## 2024-01-04 RX ADMIN — METHOCARBAMOL 1000 MG: 500 TABLET ORAL at 12:01

## 2024-01-04 RX ADMIN — APIXABAN 5 MG: 5 TABLET, FILM COATED ORAL at 09:01

## 2024-01-04 RX ADMIN — FOLIC ACID 1 MG: 1 TABLET ORAL at 09:01

## 2024-01-04 RX ADMIN — PREGABALIN 75 MG: 75 CAPSULE ORAL at 09:01

## 2024-01-04 RX ADMIN — SENNOSIDES 8.6 MG: 8.6 TABLET, FILM COATED ORAL at 09:01

## 2024-01-04 RX ADMIN — FUROSEMIDE 40 MG: 40 TABLET ORAL at 09:01

## 2024-01-04 RX ADMIN — PANTOPRAZOLE SODIUM 20 MG: 20 TABLET, DELAYED RELEASE ORAL at 06:01

## 2024-01-04 RX ADMIN — FLUOXETINE HYDROCHLORIDE 20 MG: 20 CAPSULE ORAL at 09:01

## 2024-01-04 RX ADMIN — POLYETHYLENE GLYCOL 3350 17 G: 17 POWDER, FOR SOLUTION ORAL at 09:01

## 2024-01-04 RX ADMIN — THERA TABS 1 TABLET: TAB at 09:01

## 2024-01-04 NOTE — PROGRESS NOTES
[facility] Medicine  Progress Note    Patient Name: Dionicio Bess  MRN: [id number]  Patient Class: IP- Inpatient   Admission Date: 12/29/2023  Length of Stay: 2 days  Attending Physician: Ashlyn Ramos MD  Primary Care Provider: Isela, Primary Doctor        Subjective:     Principal Problem:Closed trimalleolar fracture of left ankle        HPI:  Dionicio Bess is a 59 yo M with PMHx of pAfib (on eliquis), HTN, CVA with residual L sided deficits, CAD s/p PCI, HLD, HFrEF (EF 25-30%) who presented to ED for swelling and pain to his L ankle after a ground level mechanical fall. Patient reports he tripped on a step. Now c/o 9/10 pain to L ankle with inability to bear weight. Denies LOC or head trauma. Patient also endorses intermittent lower midsternal chest pain for the past 2-3 days. Denies chest pain currently.  Also admits cramping epigastric abdominal pain with associated nausea that developed 45 mins ago. On chart review, patient had a stress test performed last month that was nondiagnostic though abnormal.  It does not appear that this was ever followed up on by cardiology as an outpatient.  The patient does have socioeconomic issues and resides in a homeless shelter ([facility]). Endorses frequent alcohol use with 2-3 beers daily. States he did have some vodka and beers yesterday before the fall. Denies fever, chills, palpitations, SOB, cough, v/d, dysuria, numbness, paresthesias, headaches.     In ED: Afebrile. HDS. Satting well on 2L NC. CBC without leukocytosis and with stable chronic anemia. CMP unremarkable. BNP 84. Trop 0.026. serum ETOH 277.  CXR with cardiomegaly and mild perihilar edema. CT ankle with trimalleolar fracture. Ortho consulted; recommending NWB to LLE and to keep iced and elevated. Too swollen for fixation at this time. CT cervical spine with degenerative changes, but without acute fracture or subluxation. CT/ MRI brain Small foci of acute to early subacute ischemia noted  in the right corona radiata/basal ganglia/internal capsule, adjoining remote large MCA territory infarct.  Equivocal punctate right parietal cortical infarct margin the posterior aspect of the infarct territory. Vascular neurology consulted; recommend continuing eliquis and statin. Given IV morphine 4 mg,  dilaudid 1 mg , IV toradol, and IV zofran. Placed in observation.     Overview/Hospital Course:  Patient seen and evaluated by Vascular Neurology on ED as CT scan and MRI of brain consistent with acute right sided CVA. Neurology stated patient with no new symptoms related to this stroke and located in previous area of old CVA. CVA cardio embolic in nature as patient wih known atrial fibrillation and recommended to continue home Apixiban 5 mg po BID and Lipitor for stroke prevention and no further work-up or evaluation needed. Orthopedic surgery consulted concerning left ankle fracture and splinted fracture in the ED. CT scan of left ankle done and showed trimalleolar fracture. Patient placed non weight bearing to left lower extremity. Patient to rest, elevate and ice left leg as will require surgical repair of fracture but needs improvement in soft tissue swelling prior to proceeding. Ortho stated potentially could do surgery as outpatient but patient lives at [facility] so hard for him to get around with fractured ankle and would need to be independent at the shelter as they are unable to provide assistance to patient so will need to remain in hospital until definitve fixation surgery can be done for his left ankle. Pain controlled to left ankle.     Interval History: he reports that he is feeling well this morning and feels like the swelling has been improving subjectively. Ortho toldh I'm this morning that he may eb ready for surgery tomorrow and will be NPO at midnight as he was upset lst night as he didn't know he was NPO and didn't have time to prepare for it but he knows about it for  tonight so he can prepare by eating a snack before midnight in preparation for this. Discussed in the past that he had a lot of trouble opening up the pill containers due to contracture in left hand from stroke so had had some noncompliance with eliquis in the past but is getting pill packs now he can open prior to this admit and was eliquis compliant and discussed this for logn term to continue for stroke prevention for afib and he is aware and plans to continue with pill packs. Labs stable on last check and recheck tomorrow. Denies new complaints this morning, is very happy with the care here and in good mood.    Review of Systems   Constitutional:  Negative for fever.   Respiratory:  Negative for cough.    Cardiovascular:  Negative for chest pain.   Gastrointestinal:  Negative for abdominal pain and nausea.   Musculoskeletal:  Positive for arthralgias (Left ankle).   Neurological:  Positive for speech difficulty (Dysarthria at baseline from previous CVA) and weakness (Left sided from previous CVA). Negative for dizziness.   Psychiatric/Behavioral:  Negative for agitation.      Objective:     Vital Signs (Most Recent):  Temp: 97.9 °F (36.6 °C) (01/04/24 0801)  Pulse: 64 (01/04/24 0801)  Resp: 18 (01/04/24 0801)  BP: 113/79 (01/04/24 0801)  SpO2: 99 % (01/04/24 0801) on room air  Vital Signs (24h Range):  Temp:  [97.4 °F (36.3 °C)-98.8 °F (37.1 °C)] 97.9 °F (36.6 °C)  Pulse:  [61-72] 64  Resp:  [15-18] 18  SpO2:  [97 %-100 %] 99 %  BP: (113-127)/(71-84) 113/79     Weight: 92 kg (202 lb 13.2 oz)  Body mass index is 27.51 kg/m².    Intake/Output Summary (Last 24 hours) at 1/4/2024 0972  Last data filed at 1/3/2024 1739  Gross per 24 hour   Intake --   Output 1050 ml   Net -1050 ml           Physical Exam  Vitals and nursing note reviewed.   Constitutional:       General: He is awake. He is not in acute distress.     Appearance: Normal appearance. He is well-developed and normal weight. He is not ill-appearing.    Cardiovascular:      Rate and Rhythm: Normal rate and regular rhythm.      Heart sounds: Normal heart sounds. No murmur heard.  Pulmonary:      Effort: Pulmonary effort is normal. No respiratory distress.      Breath sounds: Normal breath sounds. No wheezing.   Abdominal:      General: Abdomen is flat. Bowel sounds are normal. There is no distension.      Palpations: Abdomen is soft.      Tenderness: There is no abdominal tenderness.   Musculoskeletal:      Comments: Left leg in splint and elevated in bed.    Skin:     Findings: No erythema or rash.   Neurological:      Mental Status: He is alert and oriented to person, place, and time.   Psychiatric:         Mood and Affect: Mood normal.         Behavior: Behavior normal. Behavior is cooperative.         Thought Content: Thought content normal.         Judgment: Judgment normal.             Significant Labs: All pertinent labs within the past 24 hours have been reviewed.    Significant Imaging: I have reviewed all pertinent imaging results/findings within the past 24 hours.    Assessment/Plan:      * Closed trimalleolar fracture of left ankle  - Patient with imaging in ED that showed closed trimalleolar ankle fracture sustained after a ground level fall related to alcohol consumption.   - Ortho consulted for fracture and reduced and splinted in ED.  - Per Ortho at this time patient is too swollen for definitive fixation and plan is non-weight bearing to left lower extremity and keep left ankle iced and elevated to help with soft tissue swelling.   - Orthopedics will continue to follow while patient in hospital. From Orthopedic perspective, patient could follow up outpatient for definitive fixation of ankle but patient is homeless and stays at [facility] shelter and they are unable to provide any assistance to patient and with his non weight bearing status will be difficult for him to get around so will need to remain in hospital until definitive  fixation surgery can be done. Ortho aware and discussed and trying to come up with a plan.   - PT/OT consulted to work with patient while awaiting surgery.   - Pain controlled. Patient on Tylenol 1000 mg po TID + Robaxin 1000 mg po 4 times daily + Lyrica 75 mg po BID and will continue and continue with Oxycodone IR 5-10 mg po every 4 hours prn for breakthrough pain.   - Patient on his home Apixiban for atrial fibrillation so fully anticoagulated so no other DVT prophylaxis needed.   Possible OR on 1/5, NPO MN.    Alcohol dependence with uncomplicated intoxication  - Serum ETOH 277 on admit.  - Patient denies any history of alcohol withdrawal or seizures.  - Continue MVI, Thiamine an Folate for vitamin replacement as patient with long standing history of alcohol abuse.   - Last drink was 12/30.   - No signs of acute withdrawal so far in hospital.  - Monitor CIWA every 4 hours.   - Ativan 1 mg IV prn for anxiety, tremulousness, HR >110, CIWA >8.    Stroke due to embolism of right middle cerebral artery  - Patient with known prior right cardio embolic MCA stroke in 2/2023 with residual left sided weakness involving both his arm and leg and dysarthria.   - Vascular neuro consulted as CT scan of head with new hypodensity; MRI of brain done and MRI showed acute small infarcts in right MCA territory. Vascular Neurology noted this is likely incidental finding given patient reports his weakness and dysarthria are at baseline with no recent change. Etiology of these infarcts likely cardioembolic from known Afib. No further stroke workup indicated at this time as per Vascular Neurology.   - Neurology recommends to continue anticoagulation with Apixiban 5 mg po BID. If plan for surgery, then resume anticoagulation when safe postop from surgical perspective. Med list includes Aspirin 81 mg, which is not indicated from a stroke perspective given he is anticoagulated with Apixiban as per Neurology.  - Continue Lipitor 80 mg po  daily for stroke prevention.  - Patient with no new neurologic symptoms.   - PT/OT to work with patient in hospital.     Pure hypercholesterolemia  Chronic and controlled. Continue home Lipitor to treat.     Coronary artery disease involving native coronary artery with angina pectoris  Patient with known CAD s/p stent placement, which is controlled. Will continue Toprol XL, Losartan and Lipitor to treat and monitor for S/Sx of angina/ACS. Continue to monitor on telemetry.     Chronic combined systolic and diastolic congestive heart failure  Patient is identified as having Combined Systolic and Diastolic heart failure that is Chronic. CHF is currently controlled. Latest ECHO performed and demonstrates- Results for orders placed during the hospital encounter of 11/05/23    Echo    Interpretation Summary    Left Ventricle: The left ventricle is normal in size. Normal wall thickness. Global hypokinesis present. There is severely reduced systolic function with a visually estimated ejection fraction of 25 - 30%. Grade I diastolic dysfunction.    Right Ventricle: Mild right ventricular enlargement. Wall thickness is normal. Right ventricle wall motion  is normal. Systolic function is mildly reduced.    Biatrial enlargement    Pulmonary Artery: The estimated pulmonary artery systolic pressure is 19 mmHg.    IVC/SVC: Normal venous pressure at 3 mmHg.  Continue home Losartan, Lasix and Toprol XL to treat. Monitor clinical status closely. Monitor on telemetry. Patient is off CHF pathway.  Monitor strict Is&Os and daily weights. Place on fluid restriction of 1.5 L. Cardiology has not been consulted. Continue to stress to patient importance of self efficacy and  on diet for CHF. Last BNP reviewed- and noted below   Recent Labs   Lab 12/29/23  2211   BNP 84         Essential hypertension  Chronic, controlled. Latest blood pressure and vitals reviewed-     Temp:  [97.6 °F (36.4 °C)-98.2 °F (36.8 °C)]   Pulse:  [64-84]    Resp:  [15-18]   BP: (108-129)/(57-79)   SpO2:  [95 %-100 %] .   Home meds for hypertension were reviewed and noted below.   Hypertension Medications               amLODIPine (NORVASC) 10 MG tablet Take 1 tablet (10 mg total) by mouth once daily.    furosemide (LASIX) 40 MG tablet Take 1 tablet (40 mg total) by mouth once daily.    losartan (COZAAR) 25 MG tablet Take 1 tablet (25 mg total) by mouth once daily.    metoprolol succinate (TOPROL-XL) 50 MG 24 hr tablet Take 1 tablet (50 mg total) by mouth once daily.    spironolactone (ALDACTONE) 25 MG tablet Take 1 tablet (25 mg total) by mouth once daily.            While in the hospital, will manage blood pressure as follows; Continue home antihypertensive regimen    Will utilize p.r.n. blood pressure medication only if patient's blood pressure greater than 180/110 and he develops symptoms such as worsening chest pain or shortness of breath.    Paroxysmal atrial fibrillation  Patient with Paroxysmal (<7 days) atrial fibrillation which is controlled currently with Toprol XL 50 mg po daily and will continue. Patient is currently in sinus rhythm.[id number] Score: 4.  Anticoagulation indicated. Anticoagulation done with Apixiban 5 mg po BID.  Reports needs to cont pill packs he currently receives on discharge as had trouble opening pill bottles in the past leading to a remote hx of eliquis noncompliance but was compliant on admit as was doing pill packs      VTE Risk Mitigation (From admission, onward)           Ordered     apixaban tablet 5 mg  2 times daily         12/30/23 1242     IP VTE HIGH RISK PATIENT  Once         12/30/23 1206     Reason for No Pharmacological VTE Prophylaxis  Once        Question:  Reasons:  Answer:  Already adequately anticoagulated on oral Anticoagulants    12/30/23 1206                    Discharge Planning   ERWIN: 1/8/2024     Code Status: Full Code   Is the patient medically ready for discharge?: No    Reason for patient still in hospital  (select all that apply): Patient trending condition  Discharge Plan A: Rehab                  Ashlyn Ramos MD  Department of Hospital Medicine   [facility]

## 2024-01-04 NOTE — SUBJECTIVE & OBJECTIVE
Interval History: he reports that he is feeling well this morning and feels like the swelling has been improving subjectively. Ortho toldh I'm this morning that he may eb ready for surgery tomorrow and will be NPO at midnight as he was upset lst night as he didn't know he was NPO and didn't have time to prepare for it but he knows about it for tonight so he can prepare by eating a snack before midnight in preparation for this. Discussed in the past that he had a lot of trouble opening up the pill containers due to contracture in left hand from stroke so had had some noncompliance with eliquis in the past but is getting pill packs now he can open prior to this admit and was eliquis compliant and discussed this for logn term to continue for stroke prevention for afib and he is aware and plans to continue with pill packs. Labs stable on last check and recheck tomorrow. Denies new complaints this morning, is very happy with the care here and in good mood.    Review of Systems   Constitutional:  Negative for fever.   Respiratory:  Negative for cough.    Cardiovascular:  Negative for chest pain.   Gastrointestinal:  Negative for abdominal pain and nausea.   Musculoskeletal:  Positive for arthralgias (Left ankle).   Neurological:  Positive for speech difficulty (Dysarthria at baseline from previous CVA) and weakness (Left sided from previous CVA). Negative for dizziness.   Psychiatric/Behavioral:  Negative for agitation.      Objective:     Vital Signs (Most Recent):  Temp: 97.9 °F (36.6 °C) (01/04/24 0801)  Pulse: 64 (01/04/24 0801)  Resp: 18 (01/04/24 0801)  BP: 113/79 (01/04/24 0801)  SpO2: 99 % (01/04/24 0801) on room air  Vital Signs (24h Range):  Temp:  [97.4 °F (36.3 °C)-98.8 °F (37.1 °C)] 97.9 °F (36.6 °C)  Pulse:  [61-72] 64  Resp:  [15-18] 18  SpO2:  [97 %-100 %] 99 %  BP: (113-127)/(71-84) 113/79     Weight: 92 kg (202 lb 13.2 oz)  Body mass index is 27.51 kg/m².    Intake/Output Summary (Last 24 hours) at  1/4/2024 0925  Last data filed at 1/3/2024 1739  Gross per 24 hour   Intake --   Output 1050 ml   Net -1050 ml           Physical Exam  Vitals and nursing note reviewed.   Constitutional:       General: He is awake. He is not in acute distress.     Appearance: Normal appearance. He is well-developed and normal weight. He is not ill-appearing.   Cardiovascular:      Rate and Rhythm: Normal rate and regular rhythm.      Heart sounds: Normal heart sounds. No murmur heard.  Pulmonary:      Effort: Pulmonary effort is normal. No respiratory distress.      Breath sounds: Normal breath sounds. No wheezing.   Abdominal:      General: Abdomen is flat. Bowel sounds are normal. There is no distension.      Palpations: Abdomen is soft.      Tenderness: There is no abdominal tenderness.   Musculoskeletal:      Comments: Left leg in splint and elevated in bed.    Skin:     Findings: No erythema or rash.   Neurological:      Mental Status: He is alert and oriented to person, place, and time.   Psychiatric:         Mood and Affect: Mood normal.         Behavior: Behavior normal. Behavior is cooperative.         Thought Content: Thought content normal.         Judgment: Judgment normal.             Significant Labs: All pertinent labs within the past 24 hours have been reviewed.    Significant Imaging: I have reviewed all pertinent imaging results/findings within the past 24 hours.

## 2024-01-04 NOTE — ASSESSMENT & PLAN NOTE
Patient with Paroxysmal (<7 days) atrial fibrillation which is controlled currently with Toprol XL 50 mg po daily and will continue. Patient is currently in sinus rhythm.[id number] Score: 4.  Anticoagulation indicated. Anticoagulation done with Apixiban 5 mg po BID.  Reports needs to cont pill packs he currently receives on discharge as had trouble opening pill bottles in the past leading to a remote hx of eliquis noncompliance but was compliant on admit as was doing pill packs

## 2024-01-04 NOTE — SUBJECTIVE & OBJECTIVE
Principal Problem:Closed trimalleolar fracture of left ankle    Principal Orthopedic Problem: same    Interval History: NAEON. VSS. AF. Patient states that pain is well controlled. Voiding spontaneously. Dressing CDI. Patient with continued swelling in LLE. Will recheck tomorrow for possible OR tomorrow, will consent/rae today. NPO at midnight.     Review of patient's allergies indicates:  No Known Allergies    Current Facility-Administered Medications   Medication    acetaminophen tablet 1,000 mg    albuterol-ipratropium 2.5 mg-0.5 mg/3 mL nebulizer solution 3 mL    aluminum-magnesium hydroxide-simethicone 200-200-20 mg/5 mL suspension 30 mL    amLODIPine tablet 10 mg    apixaban tablet 5 mg    aspirin chewable tablet 81 mg    atorvastatin tablet 80 mg    bisacodyL suppository 10 mg    diphenhydrAMINE capsule 25 mg    FLUoxetine capsule 20 mg    folic acid tablet 1 mg    furosemide tablet 40 mg    LORazepam injection 1 mg    losartan tablet 25 mg    melatonin tablet 6 mg    methocarbamoL tablet 1,000 mg    metoprolol succinate (TOPROL-XL) 24 hr tablet 50 mg    multivitamin tablet    naloxone 0.4 mg/mL injection 0.02 mg    ondansetron disintegrating tablet 8 mg    oxyCODONE immediate release tablet 5 mg    oxyCODONE immediate release tablet Tab 10 mg    pantoprazole EC tablet 20 mg    polyethylene glycol packet 17 g    pregabalin capsule 75 mg    prochlorperazine injection Soln 5 mg    senna tablet 8.6 mg    simethicone chewable tablet 80 mg    sodium chloride 0.9% flush 5 mL    spironolactone tablet 25 mg    thiamine tablet 200 mg     Objective:     Vital Signs (Most Recent):  Temp: 97.9 °F (36.6 °C) (01/04/24 0801)  Pulse: 64 (01/04/24 0801)  Resp: 18 (01/04/24 0801)  BP: 113/79 (01/04/24 0801)  SpO2: 99 % (01/04/24 0801) Vital Signs (24h Range):  Temp:  [97.4 °F (36.3 °C)-98.8 °F (37.1 °C)] 97.9 °F (36.6 °C)  Pulse:  [61-72] 64  Resp:  [15-18] 18  SpO2:  [97 %-100 %] 99 %  BP: (113-127)/(71-84) 113/36      Weight: 92 kg (202 lb 13.2 oz)  Height: 6' (182.9 cm)  Body mass index is 27.51 kg/m².      Intake/Output Summary (Last 24 hours) at 1/4/2024 0827  Last data filed at 1/3/2024 1739  Gross per 24 hour   Intake --   Output 1050 ml   Net -1050 ml          Ortho/SPM Exam     LUE  AROM of shoulder, elbow, wrist, and fingers limited from prior stroke, flexion contracture at the wrist and fingers, passively correctable   Inspection  SILT throughout  Radial artery palpated  Capillary Refill <3s    LLE:  In splint, windowed to check swelling  SILT  Able to wiggle toes  Cap refill <2s  AROM, PROM of hip, knee and foot intact      Significant Labs:   All pertinent labs within the past 24 hours have been reviewed.    Significant Imaging: I have reviewed and interpreted all pertinent imaging results/findings.

## 2024-01-04 NOTE — ASSESSMENT & PLAN NOTE
Dionicio Bess is a 58 y.o. male with multiple medical comorbidities, an elevated troponin, elevated ETOH, new CT head findings concerning for infarct, and a left sided functional bimalleolar ankle fracture sustained after a ground level fall yesterday, closed and at neurovascular baseline for the LLE.     - Ankle too swollen for definitive fixation today, patient to remain inpatient given housing status and inability to maintain WB precautions from previous stroke and LUE contractions  - NWB to LLE, keep iced and elevated  - Will recheck swelling in AM to see if amenable for surgery  - NPO at midnight as precaution  - Will rae and consent today

## 2024-01-04 NOTE — PLAN OF CARE
Problem: Adult Inpatient Plan of Care  Goal: Plan of Care Review  Outcome: Ongoing, Progressing  Goal: Patient-Specific Goal (Individualized)  Outcome: Ongoing, Progressing  Goal: Absence of Hospital-Acquired Illness or Injury  Outcome: Ongoing, Progressing  Goal: Optimal Comfort and Wellbeing  Outcome: Ongoing, Progressing     Problem: Adult Inpatient Plan of Care  Goal: Plan of Care Review  Outcome: Ongoing, Progressing     Problem: Adult Inpatient Plan of Care  Goal: Patient-Specific Goal (Individualized)  Outcome: Ongoing, Progressing     Problem: Adult Inpatient Plan of Care  Goal: Absence of Hospital-Acquired Illness or Injury  Outcome: Ongoing, Progressing     Problem: Adult Inpatient Plan of Care  Goal: Optimal Comfort and Wellbeing  Outcome: Ongoing, Progressing   Remain SR on telemetry monitor. PRN medication effective for pain. Explained plan of care, and to remain NPO at midnight  verbalized understanding however became upset he will starve and wanted to do both eat and repair ankle  Educated pt on need to remain  NPO for anesthesia and upcoming procedure . No injury during shift, Side rails up x 2, call light by bedside.  Left Leg remained elevated with ice

## 2024-01-04 NOTE — PLAN OF CARE
01/04/24 0852   Post-Acute Status   Post-Acute Authorization Placement   Post-Acute Placement Status Referrals Sent   Discharge Plan   Discharge Plan A Rehab     PT/OT recommending High intensity of d/c need. IDT to proceed with rehab admission pending surgery tentatively planned for 1/5/24. PMR consult placed. Referrals sent to [facility], [facility], [facility] [place] and [facility]. ERWIN 1/8/24 at this time.    Akilah Gipson RNCM  Case Management  [facility]  [phone]

## 2024-01-04 NOTE — CONSULTS
Inpatient consult to Physical Medicine Rehab  Consult performed by: Erica Mayer NP  Consult ordered by: Ashlyn Ramos MD  Reason for consult: Assess rehab needs      Reviewed patient history and current admission.  Rehab team following.  Full consult to follow.    LE Fung, FNP-C  Physical Medicine & Rehabilitation   01/04/2024

## 2024-01-04 NOTE — PROGRESS NOTES
[facility] - Surgery  Orthopedics  Progress Note    Patient Name: Dionicio Bess  MRN: [id number]  Admission Date: 12/29/2023  Hospital Length of Stay: 2 days  Attending Provider: Ashlyn Ramos MD  Primary Care Provider: Isela, Primary Doctor    Subjective:     Principal Problem:Closed trimalleolar fracture of left ankle    Principal Orthopedic Problem: same    Interval History: NAEON. VSS. AF. Patient states that pain is well controlled. Voiding spontaneously. Dressing CDI. Patient with continued swelling in LLE. Will recheck tomorrow for possible OR tomorrow, will consent/rae today. NPO at midnight.     Review of patient's allergies indicates:  No Known Allergies    Current Facility-Administered Medications   Medication    acetaminophen tablet 1,000 mg    albuterol-ipratropium 2.5 mg-0.5 mg/3 mL nebulizer solution 3 mL    aluminum-magnesium hydroxide-simethicone 200-200-20 mg/5 mL suspension 30 mL    amLODIPine tablet 10 mg    apixaban tablet 5 mg    aspirin chewable tablet 81 mg    atorvastatin tablet 80 mg    bisacodyL suppository 10 mg    diphenhydrAMINE capsule 25 mg    FLUoxetine capsule 20 mg    folic acid tablet 1 mg    furosemide tablet 40 mg    LORazepam injection 1 mg    losartan tablet 25 mg    melatonin tablet 6 mg    methocarbamoL tablet 1,000 mg    metoprolol succinate (TOPROL-XL) 24 hr tablet 50 mg    multivitamin tablet    naloxone 0.4 mg/mL injection 0.02 mg    ondansetron disintegrating tablet 8 mg    oxyCODONE immediate release tablet 5 mg    oxyCODONE immediate release tablet Tab 10 mg    pantoprazole EC tablet 20 mg    polyethylene glycol packet 17 g    pregabalin capsule 75 mg    prochlorperazine injection Soln 5 mg    senna tablet 8.6 mg    simethicone chewable tablet 80 mg    sodium chloride 0.9% flush 5 mL    spironolactone tablet 25 mg    thiamine tablet 200 mg     Objective:     Vital Signs (Most Recent):  Temp: 97.9 °F (36.6 °C) (01/04/24 0801)  Pulse: 64 (01/04/24 0801)  Resp: 18  (01/04/24 0801)  BP: 113/79 (01/04/24 0801)  SpO2: 99 % (01/04/24 0801) Vital Signs (24h Range):  Temp:  [97.4 °F (36.3 °C)-98.8 °F (37.1 °C)] 97.9 °F (36.6 °C)  Pulse:  [61-72] 64  Resp:  [15-18] 18  SpO2:  [97 %-100 %] 99 %  BP: (113-127)/(71-84) 113/79     Weight: 92 kg (202 lb 13.2 oz)  Height: 6' (182.9 cm)  Body mass index is 27.51 kg/m².      Intake/Output Summary (Last 24 hours) at 1/4/2024 0827  Last data filed at 1/3/2024 1739  Gross per 24 hour   Intake --   Output 1050 ml   Net -1050 ml         Ortho/SPM Exam     LUE  AROM of shoulder, elbow, wrist, and fingers limited from prior stroke, flexion contracture at the wrist and fingers, passively correctable   Inspection  SILT throughout  Radial artery palpated  Capillary Refill <3s    LLE:  In splint, windowed to check swelling  SILT  Able to wiggle toes  Cap refill <2s  AROM, PROM of hip, knee and foot intact      Significant Labs:   All pertinent labs within the past 24 hours have been reviewed.    Significant Imaging: I have reviewed and interpreted all pertinent imaging results/findings.  Assessment/Plan:     * Closed trimalleolar fracture of left ankle  Dionicio Bess is a 58 y.o. male with multiple medical comorbidities, an elevated troponin, elevated ETOH, new CT head findings concerning for infarct, and a left sided functional bimalleolar ankle fracture sustained after a ground level fall yesterday, closed and at neurovascular baseline for the LLE.     - Ankle too swollen for definitive fixation today, patient to remain inpatient given housing status and inability to maintain WB precautions from previous stroke and LUE contractions  - NWB to LLE, keep iced and elevated  - Will recheck swelling in AM to see if amenable for surgery  - NPO at midnight as precaution  - Will rae and consent today        NORMA Hinton MD  Orthopedics  [facility] - Surgery

## 2024-01-04 NOTE — ASSESSMENT & PLAN NOTE
- Patient with imaging in ED that showed closed trimalleolar ankle fracture sustained after a ground level fall related to alcohol consumption.   - Ortho consulted for fracture and reduced and splinted in ED.  - Per Ortho at this time patient is too swollen for definitive fixation and plan is non-weight bearing to left lower extremity and keep left ankle iced and elevated to help with soft tissue swelling.   - Orthopedics will continue to follow while patient in hospital. From Orthopedic perspective, patient could follow up outpatient for definitive fixation of ankle but patient is homeless and stays at [facility] and they are unable to provide any assistance to patient and with his non weight bearing status will be difficult for him to get around so will need to remain in hospital until definitive fixation surgery can be done. Ortho aware and discussed and trying to come up with a plan.   - PT/OT consulted to work with patient while awaiting surgery.   - Pain controlled. Patient on Tylenol 1000 mg po TID + Robaxin 1000 mg po 4 times daily + Lyrica 75 mg po BID and will continue and continue with Oxycodone IR 5-10 mg po every 4 hours prn for breakthrough pain.   - Patient on his home Apixiban for atrial fibrillation so fully anticoagulated so no other DVT prophylaxis needed.   Possible OR on 1/5, NPO MN.

## 2024-01-04 NOTE — PT/OT/SLP PROGRESS
"Occupational Therapy   Treatment    Name: Dionicio Bess  MRN: [id number]  Admitting Diagnosis:  Closed trimalleolar fracture of left ankle       Recommendations:     Discharge Recommendations: High Intensity Therapy  Discharge Equipment Recommendations:  walker, rolling, platform  Barriers to discharge:  Decreased caregiver support, Inaccessible home environment    Assessment:     Dionicio Bess is a 58 y.o. male with a medical diagnosis of Closed trimalleolar fracture of left ankle.  He presents with L trimalleolar ankle fx. Performance deficits affecting function are impaired self care skills, impaired functional mobility, weakness, impaired endurance, impaired balance, orthopedic precautions. Pt was able to perform supine/sit, sit/stand, and stand pivot to chair c CGA/min A and platform walker.  Pt had difficulty maintaining WB status.  Able to perform UB dressing c min A.  Pt is progressing well.    Rehab Prognosis:  Good; patient would benefit from acute skilled OT services to address these deficits and reach maximum level of function.       Plan:     Patient to be seen 3 x/week to address the above listed problems via self-care/home management, therapeutic activities, therapeutic exercises  Plan of Care Expires: 02/02/24  Plan of Care Reviewed with: patient    Subjective     Chief Complaint: L trimalleolar ankle fx and is NWB L LE  Patient/Family Comments/goals: "I want a yang cracker when we're done."  Pain/Comfort:  Pain Rating 1: 0/10    Objective:     Communicated with: RN prior to session.  Patient found supine with cryotherapy, telemetry upon OT entry to room.    General Precautions: Standard, fall    Orthopedic Precautions:LLE weight bearing as tolerated  Braces:  (Splint to L LE)  Respiratory Status: Room air     Occupational Performance:     Bed Mobility:    Patient completed Supine to Sit with contact guard assistance     Functional Mobility/Transfers:  Patient completed Sit <> Stand Transfer with contact " guard assistance  with  platform walker   Patient completed Bed <> Chair Transfer using Stand Pivot technique with minimum assistance with platform walker  Functional Mobility: Pt was only able to perform stand pivot at this time as pt was unable to lift his R LE to take steps at this time.    Activities of Daily Living:  Upper Body Dressing: minimum assistance to don hospital gown.  Lower Body Dressing: total assistance to don/doff R sock while sitting up in chair.      [facility] 6 Click ADL: 15    Patient left up in chair with all lines intact, call button in reach, and RN notified    GOALS:   Multidisciplinary Problems       Occupational Therapy Goals          Problem: Occupational Therapy    Goal Priority Disciplines Outcome Interventions   Occupational Therapy Goal     OT, PT/OT Ongoing, Progressing    Description: Goals to be met by: 2/3/24     Patient will increase functional independence with ADLs by performing:    LE Dressing with Stand-by Assistance.  Grooming while standing at sink with Stand-by Assistance.  Toileting from bedside commode with Modified [place] for hygiene and clothing management.   Supine to sit with [place].  Stand pivot transfers with Modified [place].  Toilet transfer to bedside commode with Modified [place].                         Time Tracking:     OT Date of Treatment: 01/04/24  OT Start Time: 1015  OT Stop Time: 1045  OT Total Time (min): 30 min    Billable Minutes:Self Care/Home Management 15  Therapeutic Activity 15               1/4/2024

## 2024-01-05 ENCOUNTER — ANESTHESIA (OUTPATIENT)
Dept: SURGERY | Facility: HOSPITAL | Age: 59
DRG: 492 | End: 2024-01-05
Payer: MEDICAID

## 2024-01-05 ENCOUNTER — ANESTHESIA EVENT (OUTPATIENT)
Dept: SURGERY | Facility: HOSPITAL | Age: 59
DRG: 492 | End: 2024-01-05
Payer: MEDICAID

## 2024-01-05 PROBLEM — S93.432A ANKLE SYNDESMOSIS DISRUPTION, LEFT, INITIAL ENCOUNTER: Status: ACTIVE | Noted: 2024-01-05

## 2024-01-05 LAB
ANION GAP SERPL CALC-SCNC: 10 MMOL/L (ref 8–16)
BASOPHILS # BLD AUTO: 0.04 K/UL (ref 0–0.2)
BASOPHILS NFR BLD: 0.7 % (ref 0–1.9)
BUN SERPL-MCNC: 21 MG/DL (ref 6–20)
CALCIUM SERPL-MCNC: 9.7 MG/DL (ref 8.7–10.5)
CHLORIDE SERPL-SCNC: 100 MMOL/L (ref 95–110)
CO2 SERPL-SCNC: 29 MMOL/L (ref 23–29)
CREAT SERPL-MCNC: 1.1 MG/DL (ref 0.5–1.4)
DIFFERENTIAL METHOD BLD: ABNORMAL
EOSINOPHIL # BLD AUTO: 0.2 K/UL (ref 0–0.5)
EOSINOPHIL NFR BLD: 2.9 % (ref 0–8)
ERYTHROCYTE [DISTWIDTH] IN BLOOD BY AUTOMATED COUNT: 13.3 % (ref 11.5–14.5)
EST. GFR  (NO RACE VARIABLE): >60 ML/MIN/1.73 M^2
GLUCOSE SERPL-MCNC: 101 MG/DL (ref 70–110)
HCT VFR BLD AUTO: 33.7 % (ref 40–54)
HGB BLD-MCNC: 11.7 G/DL (ref 14–18)
IMM GRANULOCYTES # BLD AUTO: 0.05 K/UL (ref 0–0.04)
IMM GRANULOCYTES NFR BLD AUTO: 0.9 % (ref 0–0.5)
LYMPHOCYTES # BLD AUTO: 1.7 K/UL (ref 1–4.8)
LYMPHOCYTES NFR BLD: 29.2 % (ref 18–48)
MCH RBC QN AUTO: 31.5 PG (ref 27–31)
MCHC RBC AUTO-ENTMCNC: 34.7 G/DL (ref 32–36)
MCV RBC AUTO: 91 FL (ref 82–98)
MONOCYTES # BLD AUTO: 0.8 K/UL (ref 0.3–1)
MONOCYTES NFR BLD: 12.9 % (ref 4–15)
NEUTROPHILS # BLD AUTO: 3.1 K/UL (ref 1.8–7.7)
NEUTROPHILS NFR BLD: 53.4 % (ref 38–73)
NRBC BLD-RTO: 0 /100 WBC
PLATELET # BLD AUTO: 254 K/UL (ref 150–450)
PMV BLD AUTO: 10 FL (ref 9.2–12.9)
POTASSIUM SERPL-SCNC: 4 MMOL/L (ref 3.5–5.1)
RBC # BLD AUTO: 3.71 M/UL (ref 4.6–6.2)
SODIUM SERPL-SCNC: 139 MMOL/L (ref 136–145)
WBC # BLD AUTO: 5.82 K/UL (ref 3.9–12.7)

## 2024-01-05 PROCEDURE — 63600175 PHARM REV CODE 636 W HCPCS

## 2024-01-05 PROCEDURE — 85025 COMPLETE CBC W/AUTO DIFF WBC: CPT | Performed by: HOSPITALIST

## 2024-01-05 PROCEDURE — 27201423 OPTIME MED/SURG SUP & DEVICES STERILE SUPPLY: Performed by: ORTHOPAEDIC SURGERY

## 2024-01-05 PROCEDURE — 94761 N-INVAS EAR/PLS OXIMETRY MLT: CPT

## 2024-01-05 PROCEDURE — 36000709 HC OR TIME LEV III EA ADD 15 MIN: Performed by: ORTHOPAEDIC SURGERY

## 2024-01-05 PROCEDURE — 37000009 HC ANESTHESIA EA ADD 15 MINS: Performed by: ORTHOPAEDIC SURGERY

## 2024-01-05 PROCEDURE — 25000003 PHARM REV CODE 250

## 2024-01-05 PROCEDURE — 21400001 HC TELEMETRY ROOM

## 2024-01-05 PROCEDURE — 25000003 PHARM REV CODE 250: Performed by: ANESTHESIOLOGY

## 2024-01-05 PROCEDURE — 27822 TREATMENT OF ANKLE FRACTURE: CPT | Mod: LT,,, | Performed by: ORTHOPAEDIC SURGERY

## 2024-01-05 PROCEDURE — 63600175 PHARM REV CODE 636 W HCPCS: Performed by: ORTHOPAEDIC SURGERY

## 2024-01-05 PROCEDURE — 27829 TREAT LOWER LEG JOINT: CPT | Mod: 51,LT,, | Performed by: ORTHOPAEDIC SURGERY

## 2024-01-05 PROCEDURE — 64450 NJX AA&/STRD OTHER PN/BRANCH: CPT | Mod: 59,LT,, | Performed by: ANESTHESIOLOGY

## 2024-01-05 PROCEDURE — 71000033 HC RECOVERY, INTIAL HOUR: Performed by: ORTHOPAEDIC SURGERY

## 2024-01-05 PROCEDURE — 99233 SBSQ HOSP IP/OBS HIGH 50: CPT | Mod: 57,,, | Performed by: ORTHOPAEDIC SURGERY

## 2024-01-05 PROCEDURE — D9220A PRA ANESTHESIA: Mod: CRNA,,, | Performed by: NURSE ANESTHETIST, CERTIFIED REGISTERED

## 2024-01-05 PROCEDURE — 0QSH04Z REPOSITION LEFT TIBIA WITH INTERNAL FIXATION DEVICE, OPEN APPROACH: ICD-10-PCS | Performed by: ORTHOPAEDIC SURGERY

## 2024-01-05 PROCEDURE — 64447 NJX AA&/STRD FEMORAL NRV IMG: CPT | Mod: 59,LT,, | Performed by: ANESTHESIOLOGY

## 2024-01-05 PROCEDURE — 37000008 HC ANESTHESIA 1ST 15 MINUTES: Performed by: ORTHOPAEDIC SURGERY

## 2024-01-05 PROCEDURE — C1713 ANCHOR/SCREW BN/BN,TIS/BN: HCPCS | Performed by: ORTHOPAEDIC SURGERY

## 2024-01-05 PROCEDURE — 63600175 PHARM REV CODE 636 W HCPCS: Performed by: ANESTHESIOLOGY

## 2024-01-05 PROCEDURE — 64445 NJX AA&/STRD SCIATIC NRV IMG: CPT | Performed by: STUDENT IN AN ORGANIZED HEALTH CARE EDUCATION/TRAINING PROGRAM

## 2024-01-05 PROCEDURE — D9220A PRA ANESTHESIA: Mod: ANES,,, | Performed by: ANESTHESIOLOGY

## 2024-01-05 PROCEDURE — 99900035 HC TECH TIME PER 15 MIN (STAT)

## 2024-01-05 PROCEDURE — C1769 GUIDE WIRE: HCPCS | Performed by: ORTHOPAEDIC SURGERY

## 2024-01-05 PROCEDURE — 71000015 HC POSTOP RECOV 1ST HR: Performed by: ORTHOPAEDIC SURGERY

## 2024-01-05 PROCEDURE — 36000708 HC OR TIME LEV III 1ST 15 MIN: Performed by: ORTHOPAEDIC SURGERY

## 2024-01-05 PROCEDURE — 25000003 PHARM REV CODE 250: Performed by: INTERNAL MEDICINE

## 2024-01-05 PROCEDURE — 80048 BASIC METABOLIC PNL TOTAL CA: CPT | Performed by: HOSPITALIST

## 2024-01-05 PROCEDURE — 64447 NJX AA&/STRD FEMORAL NRV IMG: CPT | Performed by: STUDENT IN AN ORGANIZED HEALTH CARE EDUCATION/TRAINING PROGRAM

## 2024-01-05 PROCEDURE — 36415 COLL VENOUS BLD VENIPUNCTURE: CPT | Performed by: HOSPITALIST

## 2024-01-05 PROCEDURE — 0QSK04Z REPOSITION LEFT FIBULA WITH INTERNAL FIXATION DEVICE, OPEN APPROACH: ICD-10-PCS | Performed by: ORTHOPAEDIC SURGERY

## 2024-01-05 PROCEDURE — 71000016 HC POSTOP RECOV ADDL HR: Performed by: ORTHOPAEDIC SURGERY

## 2024-01-05 DEVICE — IMPLANTABLE DEVICE: Type: IMPLANTABLE DEVICE | Site: ANKLE | Status: FUNCTIONAL

## 2024-01-05 DEVICE — SCREW CRTX LOW PROFILE H 56MM: Type: IMPLANTABLE DEVICE | Site: ANKLE | Status: FUNCTIONAL

## 2024-01-05 DEVICE — SCREW STRDRV REC T8 2.7X12 SS: Type: IMPLANTABLE DEVICE | Site: ANKLE | Status: FUNCTIONAL

## 2024-01-05 DEVICE — SCREW 2.7X16MM: Type: IMPLANTABLE DEVICE | Site: ANKLE | Status: FUNCTIONAL

## 2024-01-05 DEVICE — SCREW 2.7X14MM: Type: IMPLANTABLE DEVICE | Site: ANKLE | Status: FUNCTIONAL

## 2024-01-05 DEVICE — SCREW CORTEX 2.7 X 20.: Type: IMPLANTABLE DEVICE | Site: ANKLE | Status: FUNCTIONAL

## 2024-01-05 DEVICE — SCREW CORTEX 2.7 X 22: Type: IMPLANTABLE DEVICE | Site: ANKLE | Status: FUNCTIONAL

## 2024-01-05 DEVICE — PLATE BONE 7H 2.7/3.5X125MM L: Type: IMPLANTABLE DEVICE | Site: ANKLE | Status: FUNCTIONAL

## 2024-01-05 RX ORDER — MIDAZOLAM HYDROCHLORIDE 1 MG/ML
INJECTION, SOLUTION INTRAMUSCULAR; INTRAVENOUS
Status: DISCONTINUED | OUTPATIENT
Start: 2024-01-05 | End: 2024-01-05

## 2024-01-05 RX ORDER — FENTANYL CITRATE 50 UG/ML
INJECTION, SOLUTION INTRAMUSCULAR; INTRAVENOUS
Status: DISCONTINUED | OUTPATIENT
Start: 2024-01-05 | End: 2024-01-05

## 2024-01-05 RX ORDER — VANCOMYCIN HYDROCHLORIDE 1 G/20ML
INJECTION, POWDER, LYOPHILIZED, FOR SOLUTION INTRAVENOUS
Status: DISCONTINUED | OUTPATIENT
Start: 2024-01-05 | End: 2024-01-05 | Stop reason: HOSPADM

## 2024-01-05 RX ORDER — LIDOCAINE HYDROCHLORIDE 20 MG/ML
INJECTION INTRAVENOUS
Status: DISCONTINUED | OUTPATIENT
Start: 2024-01-05 | End: 2024-01-05

## 2024-01-05 RX ORDER — SODIUM CHLORIDE 0.9 % (FLUSH) 0.9 %
10 SYRINGE (ML) INJECTION
Status: DISCONTINUED | OUTPATIENT
Start: 2024-01-05 | End: 2024-01-05 | Stop reason: HOSPADM

## 2024-01-05 RX ORDER — KETAMINE HCL IN 0.9 % NACL 50 MG/5 ML
SYRINGE (ML) INTRAVENOUS
Status: DISCONTINUED | OUTPATIENT
Start: 2024-01-05 | End: 2024-01-05

## 2024-01-05 RX ORDER — ROPIVACAINE HYDROCHLORIDE 5 MG/ML
INJECTION, SOLUTION EPIDURAL; INFILTRATION; PERINEURAL
Status: COMPLETED | OUTPATIENT
Start: 2024-01-05 | End: 2024-01-05

## 2024-01-05 RX ORDER — PHENYLEPHRINE HCL IN 0.9% NACL 1 MG/10 ML
SYRINGE (ML) INTRAVENOUS
Status: DISCONTINUED | OUTPATIENT
Start: 2024-01-05 | End: 2024-01-05

## 2024-01-05 RX ORDER — DEXMEDETOMIDINE HYDROCHLORIDE 100 UG/ML
INJECTION, SOLUTION INTRAVENOUS
Status: DISCONTINUED | OUTPATIENT
Start: 2024-01-05 | End: 2024-01-05

## 2024-01-05 RX ORDER — MUPIROCIN 20 MG/G
OINTMENT TOPICAL
Status: DISCONTINUED | OUTPATIENT
Start: 2024-01-05 | End: 2024-01-05 | Stop reason: HOSPADM

## 2024-01-05 RX ORDER — PROPOFOL 10 MG/ML
VIAL (ML) INTRAVENOUS
Status: DISCONTINUED | OUTPATIENT
Start: 2024-01-05 | End: 2024-01-05

## 2024-01-05 RX ORDER — ONDANSETRON 2 MG/ML
4 INJECTION INTRAMUSCULAR; INTRAVENOUS DAILY PRN
Status: DISCONTINUED | OUTPATIENT
Start: 2024-01-05 | End: 2024-01-05 | Stop reason: HOSPADM

## 2024-01-05 RX ORDER — FENTANYL CITRATE 50 UG/ML
25 INJECTION, SOLUTION INTRAMUSCULAR; INTRAVENOUS EVERY 5 MIN PRN
Status: DISCONTINUED | OUTPATIENT
Start: 2024-01-05 | End: 2024-01-05 | Stop reason: HOSPADM

## 2024-01-05 RX ORDER — CLONIDINE 100 UG/ML
INJECTION, SOLUTION EPIDURAL
Status: COMPLETED | OUTPATIENT
Start: 2024-01-05 | End: 2024-01-05

## 2024-01-05 RX ORDER — DEXAMETHASONE SODIUM PHOSPHATE 10 MG/ML
INJECTION INTRAMUSCULAR; INTRAVENOUS
Status: COMPLETED | OUTPATIENT
Start: 2024-01-05 | End: 2024-01-05

## 2024-01-05 RX ORDER — CLINDAMYCIN PHOSPHATE 900 MG/50ML
INJECTION, SOLUTION INTRAVENOUS
Status: DISCONTINUED | OUTPATIENT
Start: 2024-01-05 | End: 2024-01-05

## 2024-01-05 RX ADMIN — METHOCARBAMOL 1000 MG: 500 TABLET ORAL at 04:01

## 2024-01-05 RX ADMIN — AMLODIPINE BESYLATE 10 MG: 5 TABLET ORAL at 09:01

## 2024-01-05 RX ADMIN — FENTANYL CITRATE 25 MCG: 50 INJECTION, SOLUTION INTRAMUSCULAR; INTRAVENOUS at 02:01

## 2024-01-05 RX ADMIN — Medication 10 MG: at 02:01

## 2024-01-05 RX ADMIN — Medication 10 MG: at 12:01

## 2024-01-05 RX ADMIN — MUPIROCIN: 20 OINTMENT TOPICAL at 11:01

## 2024-01-05 RX ADMIN — SPIRONOLACTONE 25 MG: 25 TABLET ORAL at 09:01

## 2024-01-05 RX ADMIN — CLONIDINE HYDROCHLORIDE 100 MCG: 100 INJECTION, SOLUTION INTRAVENOUS at 11:01

## 2024-01-05 RX ADMIN — CEFAZOLIN 2 G: 2 INJECTION, POWDER, FOR SOLUTION INTRAMUSCULAR; INTRAVENOUS at 12:01

## 2024-01-05 RX ADMIN — ACETAMINOPHEN 1000 MG: 500 TABLET ORAL at 03:01

## 2024-01-05 RX ADMIN — ATORVASTATIN CALCIUM 80 MG: 40 TABLET, FILM COATED ORAL at 09:01

## 2024-01-05 RX ADMIN — LIDOCAINE HYDROCHLORIDE 40 MG: 20 INJECTION INTRAVENOUS at 11:01

## 2024-01-05 RX ADMIN — LIDOCAINE HYDROCHLORIDE 40 MG: 20 INJECTION INTRAVENOUS at 12:01

## 2024-01-05 RX ADMIN — OXYCODONE HYDROCHLORIDE 5 MG: 5 TABLET ORAL at 09:01

## 2024-01-05 RX ADMIN — SODIUM CHLORIDE: 0.9 INJECTION, SOLUTION INTRAVENOUS at 12:01

## 2024-01-05 RX ADMIN — DEXAMETHASONE SODIUM PHOSPHATE 1 MG: 10 INJECTION, SOLUTION INTRAMUSCULAR; INTRAVENOUS at 11:01

## 2024-01-05 RX ADMIN — ROPIVACAINE HYDROCHLORIDE 45 ML: 5 INJECTION EPIDURAL; INFILTRATION; PERINEURAL at 11:01

## 2024-01-05 RX ADMIN — ACETAMINOPHEN 1000 MG: 500 TABLET ORAL at 09:01

## 2024-01-05 RX ADMIN — PROPOFOL 30 MG: 10 INJECTION, EMULSION INTRAVENOUS at 02:01

## 2024-01-05 RX ADMIN — Medication 200 MCG: at 01:01

## 2024-01-05 RX ADMIN — VANCOMYCIN HYDROCHLORIDE 1500 MG: 1.5 INJECTION, POWDER, LYOPHILIZED, FOR SOLUTION INTRAVENOUS at 11:01

## 2024-01-05 RX ADMIN — ASPIRIN 81 MG CHEWABLE TABLET 81 MG: 81 TABLET CHEWABLE at 09:01

## 2024-01-05 RX ADMIN — OXYCODONE HYDROCHLORIDE 5 MG: 5 TABLET ORAL at 02:01

## 2024-01-05 RX ADMIN — Medication 6 MG: at 09:01

## 2024-01-05 RX ADMIN — PROPOFOL 50 MG: 10 INJECTION, EMULSION INTRAVENOUS at 12:01

## 2024-01-05 RX ADMIN — PROPOFOL 30 MG: 10 INJECTION, EMULSION INTRAVENOUS at 11:01

## 2024-01-05 RX ADMIN — Medication 20 MG: at 12:01

## 2024-01-05 RX ADMIN — APIXABAN 5 MG: 5 TABLET, FILM COATED ORAL at 09:01

## 2024-01-05 RX ADMIN — ACETAMINOPHEN 1000 MG: 500 TABLET ORAL at 06:01

## 2024-01-05 RX ADMIN — FENTANYL CITRATE 25 MCG: 50 INJECTION INTRAMUSCULAR; INTRAVENOUS at 02:01

## 2024-01-05 RX ADMIN — Medication 100 MCG: at 01:01

## 2024-01-05 RX ADMIN — FENTANYL CITRATE 50 MCG: 50 INJECTION, SOLUTION INTRAMUSCULAR; INTRAVENOUS at 12:01

## 2024-01-05 RX ADMIN — FLUOXETINE HYDROCHLORIDE 20 MG: 20 CAPSULE ORAL at 09:01

## 2024-01-05 RX ADMIN — OXYCODONE HYDROCHLORIDE 10 MG: 10 TABLET ORAL at 04:01

## 2024-01-05 RX ADMIN — METOPROLOL SUCCINATE 50 MG: 50 TABLET, EXTENDED RELEASE ORAL at 09:01

## 2024-01-05 RX ADMIN — PANTOPRAZOLE SODIUM 20 MG: 20 TABLET, DELAYED RELEASE ORAL at 06:01

## 2024-01-05 RX ADMIN — MIDAZOLAM HYDROCHLORIDE 2 MG: 1 INJECTION, SOLUTION INTRAMUSCULAR; INTRAVENOUS at 11:01

## 2024-01-05 RX ADMIN — CLINDAMYCIN IN 5 PERCENT DEXTROSE 900 MG: 18 INJECTION, SOLUTION INTRAVENOUS at 12:01

## 2024-01-05 RX ADMIN — DEXMEDETOMIDINE 8 MCG: 100 INJECTION, SOLUTION, CONCENTRATE INTRAVENOUS at 02:01

## 2024-01-05 RX ADMIN — METHOCARBAMOL 1000 MG: 500 TABLET ORAL at 09:01

## 2024-01-05 RX ADMIN — FUROSEMIDE 40 MG: 40 TABLET ORAL at 09:01

## 2024-01-05 RX ADMIN — PREGABALIN 75 MG: 75 CAPSULE ORAL at 09:01

## 2024-01-05 RX ADMIN — FENTANYL CITRATE 25 MCG: 50 INJECTION INTRAMUSCULAR; INTRAVENOUS at 03:01

## 2024-01-05 RX ADMIN — LOSARTAN POTASSIUM 25 MG: 25 TABLET, FILM COATED ORAL at 09:01

## 2024-01-05 NOTE — SUBJECTIVE & OBJECTIVE
Interval History: he has leg elevated with ice on it and reports pain has been controlled below a 5/10. Ortho eddie is ready for surgery today and scheduled him for today for OR and he is on board and happy with this plan. Plan for further PT/OT this weekend post op and regular diet post op. Plan for rehab next week after monitoring over weekend for pain control and further therapy post op and will f/u ortho recs further. He prefers [facility] as first choice. He asksed if we can help set him up with a pcp at [facility] and will see if CM can assist. He denies other new issues and is in good spirts. Labs reviewed and stable.    Review of Systems   Constitutional:  Negative for fever.   Respiratory:  Negative for cough.    Cardiovascular:  Negative for chest pain.   Gastrointestinal:  Negative for abdominal pain and nausea.   Musculoskeletal:  Positive for arthralgias (Left ankle).   Neurological:  Positive for speech difficulty (Dysarthria at baseline from previous CVA) and weakness (Left sided from previous CVA). Negative for dizziness.   Psychiatric/Behavioral:  Negative for agitation.      Objective:     Vital Signs (Most Recent):  Temp: 98 °F (36.7 °C) (01/05/24 0812)  Pulse: 66 (01/05/24 0812)  Resp: 18 (01/05/24 0812)  BP: (!) 139/94 (01/05/24 0812)  SpO2: 98 % (01/05/24 0812) on room air  Vital Signs (24h Range):  Temp:  [97.7 °F (36.5 °C)-98.7 °F (37.1 °C)] 98 °F (36.7 °C)  Pulse:  [63-75] 66  Resp:  [18-20] 18  SpO2:  [93 %-100 %] 98 %  BP: (118-139)/(80-94) 139/94     Weight: 92 kg (202 lb 13.2 oz)  Body mass index is 27.51 kg/m².  No intake or output data in the 24 hours ending 01/05/24 0943        Physical Exam  Vitals and nursing note reviewed.   Constitutional:       General: He is awake. He is not in acute distress.     Appearance: Normal appearance. He is well-developed and normal weight. He is not ill-appearing.   Cardiovascular:      Rate and Rhythm: Normal rate and regular rhythm.      Heart  sounds: Normal heart sounds. No murmur heard.  Pulmonary:      Effort: Pulmonary effort is normal. No respiratory distress.      Breath sounds: Normal breath sounds. No wheezing.   Abdominal:      General: Abdomen is flat. Bowel sounds are normal. There is no distension.      Palpations: Abdomen is soft.      Tenderness: There is no abdominal tenderness.   Musculoskeletal:      Comments: Left leg in splint and elevated in bed with ice on it    Skin:     Findings: No erythema or rash.   Neurological:      Mental Status: He is alert and oriented to person, place, and time.   Psychiatric:         Mood and Affect: Mood normal.         Behavior: Behavior normal. Behavior is cooperative.         Thought Content: Thought content normal.         Judgment: Judgment normal.             Significant Labs: All pertinent labs within the past 24 hours have been reviewed.    Significant Imaging: I have reviewed all pertinent imaging results/findings within the past 24 hours.

## 2024-01-05 NOTE — ANESTHESIA POSTPROCEDURE EVALUATION
Anesthesia Post Evaluation    Patient: Dionicio Bess    Procedure(s) Performed: Procedure(s) (LRB):  ORIF, ANKLE - LEFT (Left)  FIXATION, SYNDESMOSIS, ANKLE (Left)    Final Anesthesia Type: general      Patient location during evaluation: PACU  Patient participation: Yes- Able to Participate  Level of consciousness: awake and alert  Post-procedure vital signs: reviewed and stable  Pain management: adequate  Airway patency: patent    PONV status at discharge: No PONV  Anesthetic complications: no      Cardiovascular status: blood pressure returned to baseline and hemodynamically stable  Respiratory status: spontaneous ventilation  Hydration status: euvolemic  Follow-up not needed.              Vitals Value Taken Time   /63 01/05/24 1547   Temp 36.6 °C (97.8 °F) 01/05/24 1432   Pulse 59 01/05/24 1554   Resp 11 01/05/24 1554   SpO2 98 % 01/05/24 1554   Vitals shown include unvalidated device data.      No case tracking events are documented in the log.      Pain/Sheila Score: Pain Rating Prior to Med Admin: 7 (1/5/2024  3:00 PM)  Pain Rating Post Med Admin: 2 (1/5/2024  5:43 AM)  Sheila Score: 10 (1/5/2024  2:45 PM)

## 2024-01-05 NOTE — ANESTHESIA PROCEDURE NOTES
Popliteal SS    Patient location during procedure: pre-op   Block not for primary anesthetic.  Reason for block: at surgeon's request and post-op pain management   Post-op Pain Location: Left Ankle Pain   Start time: 1/5/2024 11:45 AM  Timeout: 1/5/2024 11:45 AM   End time: 1/5/2024 11:55 AM    Staffing  Authorizing Provider: Nabeel Zepeda MD  Performing Provider: Luis F Styles DO    Staffing  Performed by: Luis F Styles DO  Authorized by: Nabeel Zepeda MD    Preanesthetic Checklist  Completed: patient identified, IV checked, site marked, risks and benefits discussed, surgical consent, monitors and equipment checked, pre-op evaluation and timeout performed  Peripheral Block  Patient position: supine  Prep: ChloraPrep  Patient monitoring: heart rate, cardiac monitor, continuous pulse ox, continuous capnometry and frequent blood pressure checks  Block type: popliteal  Laterality: left  Injection technique: single shot  Needle  Needle type: Stimuplex   Needle gauge: 21 G  Needle length: 4 in  Needle localization: anatomical landmarks and ultrasound guidance   -ultrasound image captured on disc.  Assessment  Injection assessment: negative aspiration, negative parasthesia and local visualized surrounding nerve  Paresthesia pain: none  Heart rate change: no  Slow fractionated injection: yes    Medications:    Medications: dexAMETHasone sodium phos (PF) injection 10 mg/mL - Other   1 mg - 1/5/2024 11:55:00 AM  ropivacaine (NAROPIN) injection 0.5% - Perineural   45 mL - 1/5/2024 11:55:00 AM  cloNIDine injection 100 mcg/mL (epidural) - Perineural   100 mcg - 1/5/2024 11:55:00 AM    Additional Notes  VSS.  [facility] RN monitoring vitals throughout procedure.  Patient tolerated procedure well.

## 2024-01-05 NOTE — NURSING TRANSFER
Nursing Transfer Note      1/5/2024   3:56 PM    Reason patient is being transferred: Post    Transfer {TRANSFER TO/FROM:79715}: ***    Transfer via {TRANSFER VIA:59935}    Transfer with {TRANSFER WITH:96798}    Transported by ***    Transfer Vital Signs:  Blood Pressure:***  Heart Rate:***  O2:***  Temperature:***  Respirations:***    Telemetry: {TELEMETRY:01607}  Order for Tele Monitor? {YES/NO:20267}    Additional Lines: {Additional Lines:70709}    4eyes on Skin: {YES/NO:20292}    Medicines sent: ***    Any special needs or follow-up needed: ***    Patient belongings transferred with patient: {YES/NO:20267}    Chart send with patient: {YES (DEF)/NO:84501}    Notified: {NOTIFIED:23457}    Patient reassessed at: *** (date, time)  1  Upon arrival to floor: {IP NSG TRANSFER ARRIVAL O[facility]:67725}

## 2024-01-05 NOTE — PT/OT/SLP PROGRESS
Physical Therapy      Patient Name:  Dionicio Bess   MRN:  [id number]    Patient not seen today secondary to patient being off the floor in OR. Will follow-up tomorrow once new physical therapy orders are received, including weightbearing status.    Ernestina Buckley, PT, DPT  1/5/2024

## 2024-01-05 NOTE — TRANSFER OF CARE
Anesthesia Transfer of Care Note    Patient: Dionicio Bess    Procedure(s) Performed: Procedure(s) (LRB):  ORIF, ANKLE - LEFT (Left)  FIXATION, SYNDESMOSIS, ANKLE (Left)    Patient location: PACU    Anesthesia Type: general    Transport from OR: Transported from OR on 6-10 L/min O2 by face mask with adequate spontaneous ventilation    Post pain: adequate analgesia    Post assessment: no apparent anesthetic complications    Post vital signs: stable    Level of consciousness: awake    Nausea/Vomiting: no nausea/vomiting    Complications: none    Transfer of care protocol was followed      Last vitals: Visit Vitals  /87 (BP Location: Right arm, Patient Position: Lying)   Pulse 68   Temp 36.6 °C (97.9 °F) (Oral)   Resp 20   Ht 6' (1.829 m)   Wt 91.6 kg (202 lb)   SpO2 98%   BMI 27.40 kg/m²

## 2024-01-05 NOTE — ANESTHESIA PREPROCEDURE EVALUATION
01/05/2024  Dionicio Bess is a 58 y.o., male.    Procedure: ORIF, ANKLE - LEFT, synthes (Left: Ankle) Anesthesia type: Choice Diagnosis: Closed trimalleolar fracture of left ankle, initial encounter [S89.915Z]     Slip fall to ground,  no LOC, no other significant injuries, left arm hurts    Pre-operative evaluation for Procedure(s) (LRB):  ORIF, ANKLE - LEFT, synthes (Left)    @[id number]@@    Encounter Diagnoses   Name Primary?    Fall     Foot injury     Ankle injury, initial encounter     Leg injury     Chest pain     Closed trimalleolar fracture of left ankle, initial encounter Yes       Review of patient's allergies indicates:  No Known Allergies    Medications Prior to Admission   Medication Sig Dispense Refill Last Dose    amLODIPine (NORVASC) 10 MG tablet Take 1 tablet (10 mg total) by mouth once daily. 30 tablet 2     apixaban (ELIQUIS) 5 mg Tab Take 1 tablet (5 mg total) by mouth 2 (two) times daily. 60 tablet 2     aspirin 81 MG Chew Chew and swallow 1 (1 mg total) by mouth once daily. 90 tablet 0     atorvastatin (LIPITOR) 80 MG tablet Take 1 tablet (80 mg total) by mouth every evening. 90 tablet 0     FLUoxetine 20 MG capsule Take 1 capsule (20 mg total) by mouth once daily. 30 capsule 2     furosemide (LASIX) 40 MG tablet Take 1 tablet (40 mg total) by mouth once daily. 30 tablet 2     JARDIANCE 10 mg tablet Take 1 tablet (10 mg total) by mouth once daily. 30 tablet 2     losartan (COZAAR) 25 MG tablet Take 1 tablet (25 mg total) by mouth once daily. 90 tablet 0     metoprolol succinate (TOPROL-XL) 50 MG 24 hr tablet Take 1 tablet (50 mg total) by mouth once daily. 30 tablet 2     pantoprazole (PROTONIX) 20 MG tablet Take 1 tablet (20 mg total) by mouth every morning. 30 tablet 2     spironolactone (ALDACTONE) 25 MG tablet Take 1 tablet (25 mg total) by mouth once daily. 90 tablet 0      thiamine 100 MG tablet Take 2 tablets (200 mg total) by mouth once daily. 60 tablet 2             No current facility-administered medications on file prior to encounter.     Current Outpatient Medications on File Prior to Encounter   Medication Sig Dispense Refill    amLODIPine (NORVASC) 10 MG tablet Take 1 tablet (10 mg total) by mouth once daily. 30 tablet 2    apixaban (ELIQUIS) 5 mg Tab Take 1 tablet (5 mg total) by mouth 2 (two) times daily. 60 tablet 2    aspirin 81 MG Chew Chew and swallow 1 (1 mg total) by mouth once daily. 90 tablet 0    atorvastatin (LIPITOR) 80 MG tablet Take 1 tablet (80 mg total) by mouth every evening. 90 tablet 0    FLUoxetine 20 MG capsule Take 1 capsule (20 mg total) by mouth once daily. 30 capsule 2    furosemide (LASIX) 40 MG tablet Take 1 tablet (40 mg total) by mouth once daily. 30 tablet 2    JARDIANCE 10 mg tablet Take 1 tablet (10 mg total) by mouth once daily. 30 tablet 2    losartan (COZAAR) 25 MG tablet Take 1 tablet (25 mg total) by mouth once daily. 90 tablet 0    metoprolol succinate (TOPROL-XL) 50 MG 24 hr tablet Take 1 tablet (50 mg total) by mouth once daily. 30 tablet 2    pantoprazole (PROTONIX) 20 MG tablet Take 1 tablet (20 mg total) by mouth every morning. 30 tablet 2    spironolactone (ALDACTONE) 25 MG tablet Take 1 tablet (25 mg total) by mouth once daily. 90 tablet 0    thiamine 100 MG tablet Take 2 tablets (200 mg total) by mouth once daily. 60 tablet 2       Past Medical History:  No date: CAD (coronary artery disease)  No date: HLD (hyperlipidemia)  No date: HTN (hypertension)  No date: Stroke      Comment:  [facility] 2/2023    History reviewed. No pertinent surgical history.    Social History     Tobacco Use   Smoking Status Never   Smokeless Tobacco Never       Social History     Substance and Sexual Activity   Alcohol Use Yes    Comment: occasional       Physical Activity: Inactive (1/2/2024)    Exercise Vital Sign     Days of Exercise per Week: 0 days      "Minutes of Exercise per Session: 0 min         Recent Labs     01/05/24 0245   HCT 33.7*     Recent Labs     01/05/24  0245        Recent Labs     01/05/24 0245   K 4.0     Recent Labs     01/05/24  0245   CREATININE 1.1     Recent Labs     01/05/24  0245        No results for input(s): "PT" in the last 72 hours.                        Pre-op Assessment          Review of Systems  Anesthesia Hx:  No problems with previous Anesthesia                Hematology/Oncology:    Oncology Normal                Hematology Comments: On eliquis, got last night                Oncology Comments: Got eliquis at 9pm last night     Cardiovascular:     Hypertension, well controlled   Denies MI. CAD   CABG/stent (stent 2/2023)   Angina CHF        BNP   Cardiovascular Symptoms: Angina       Coronary Artery Disease:               Congestive Heart Failure (CHF)                Hypertension     Atrial Fibrillation     Pulmonary:    Denies COPD.  Denies Asthma.  Shortness of breath   Half block TOBAR               Renal/:   Denies Chronic Renal Disease.                Hepatic/GI:      Denies Liver Disease.            Neurological:  Denies TIA. CVA (r MCA 2023, LEFT hemiparesis), residual symptoms    Denies Seizures.                   CVA - Cerebrovasular Accident                 Endocrine:  Denies Diabetes.               Physical Exam  General: Well nourished, Cooperative, Alert and Oriented    Airway:  Mallampati: II   Mouth Opening: Normal  TM Distance: Normal  Tongue: Normal  Neck ROM: Normal ROM    Dental:  No teeth      Anesthesia Plan  Type of Anesthesia, risks & benefits discussed:    Anesthesia Type: Gen Natural Airway, Regional  Intra-op Monitoring Plan: Standard ASA Monitors  Post Op Pain Control Plan: IV/PO Opioids PRN  Induction:  IV  Informed Consent: Informed consent signed with the Patient and all parties understand the risks and agree with anesthesia plan.  All questions answered.   ASA Score: 2  Day of " Surgery Review of History & Physical: H&P Update referred to the surgeon/provider.    Ready For Surgery From Anesthesia Perspective.     .  11/2023    Left Ventricle: The left ventricle is normal in size. Normal wall thickness. Global hypokinesis present. There is severely reduced systolic function with a visually estimated ejection fraction of 25 - 30%. Grade I diastolic dysfunction.    Right Ventricle: Mild right ventricular enlargement. Wall thickness is normal. Right ventricle wall motion  is normal. Systolic function is mildly reduced.    Biatrial enlargement    Pulmonary Artery: The estimated pulmonary artery systolic pressure is 19 mmHg.    IVC/SVC: Normal venous pressure at 3 mmHg.        Date/Time Temp Temp #2 Pulse Resp BP Patient Position MAP (mmHg) SpO2 Weight Flow (L/min) Oxygen Concentration (%) Device (Oxygen Therapy) Arterial Line BP Arterial Line BP 2 Temp #2 [facility]   01/05/24 0812 36.7 °C (98 °F) -- 66 18 139/94 Abnormal  Lying 113 98 % -- -- -- -- -- -- --    01/05/24 0443 -- -- -- 18 -- -- -- -- -- -- -- -- -- -- --    01/05/24 0440 36.5 °C (97.7 °F) -- 71 18 136/92 Abnormal  Lying 109 99 % -- -- -- -- -- -- --    01/05/24 0304 -- -- 65 -- -- -- -- -- -- -- -- -- -- -- -- DENNY   01/05/24 0259 -- -- -- -- -- -- -- 100 % -- -- -- -- -- -- --    01/05/24 0003 36.7 °C (98.1 °F) -- 75 18 118/80 Lying 95 100 % -- -- -- -- -- -- --

## 2024-01-05 NOTE — ASSESSMENT & PLAN NOTE
Dionicio Bess is a 58 y.o. male with multiple medical comorbidities, an elevated troponin, elevated ETOH, new CT head findings concerning for infarct, and a left sided trimalleolar ankle fracture sustained after a ground level fall yesterday, closed and at neurovascular baseline for the LLE.     - Plan for ORIF left ankle today  - NWB to LLE, keep iced and elevated  - Patient marked and consented  - Remain NPO

## 2024-01-05 NOTE — PROGRESS NOTES
[facility]  Progress Note    Patient Name: Dionicio Bess  MRN: [id number]  Patient Class: IP- Inpatient   Admission Date: 12/29/2023  Length of Stay: 3 days  Attending Physician: Ashlyn Raoms MD  Primary Care Provider: Isela, Primary Doctor        Subjective:     Principal Problem:Closed trimalleolar fracture of left ankle        HPI:  Dionicio Bess is a 59 yo M with PMHx of pAfib (on eliquis), HTN, CVA with residual L sided deficits, CAD s/p PCI, HLD, HFrEF (EF 25-30%) who presented to ED for swelling and pain to his L ankle after a ground level mechanical fall. Patient reports he tripped on a step. Now c/o 9/10 pain to L ankle with inability to bear weight. Denies LOC or head trauma. Patient also endorses intermittent lower midsternal chest pain for the past 2-3 days. Denies chest pain currently.  Also admits cramping epigastric abdominal pain with associated nausea that developed 45 mins ago. On chart review, patient had a stress test performed last month that was nondiagnostic though abnormal.  It does not appear that this was ever followed up on by cardiology as an outpatient.  The patient does have socioeconomic issues and resides in a homeless shelter ([facility] Endorses frequent alcohol use with 2-3 beers daily. States he did have some vodka and beers yesterday before the fall. Denies fever, chills, palpitations, SOB, cough, v/d, dysuria, numbness, paresthesias, headaches.     In ED: Afebrile. HDS. Satting well on 2L NC. CBC without leukocytosis and with stable chronic anemia. CMP unremarkable. BNP 84. Trop 0.026. serum ETOH 277.  CXR with cardiomegaly and mild perihilar edema. CT ankle with trimalleolar fracture. Ortho consulted; recommending NWB to LLE and to keep iced and elevated. Too swollen for fixation at this time. CT cervical spine with degenerative changes, but without acute fracture or subluxation. CT/ MRI brain Small foci of acute to early subacute ischemia noted  in the right corona radiata/basal ganglia/internal capsule, adjoining remote large MCA territory infarct.  Equivocal punctate right parietal cortical infarct margin the posterior aspect of the infarct territory. Vascular neurology consulted; recommend continuing eliquis and statin. Given IV morphine 4 mg,  dilaudid 1 mg , IV toradol, and IV zofran. Placed in observation.     Overview/Hospital Course:  Patient seen and evaluated by Vascular Neurology on ED as CT scan and MRI of brain consistent with acute right sided CVA. Neurology stated patient with no new symptoms related to this stroke and located in previous area of old CVA. CVA cardio embolic in nature as patient wih known atrial fibrillation and recommended to continue home Apixiban 5 mg po BID and Lipitor for stroke prevention and no further work-up or evaluation needed. Orthopedic surgery consulted concerning left ankle fracture and splinted fracture in the ED. CT scan of left ankle done and showed trimalleolar fracture. Patient placed non weight bearing to left lower extremity. Patient to rest, elevate and ice left leg as will require surgical repair of fracture but needs improvement in soft tissue swelling prior to proceeding. Ortho stated potentially could do surgery as outpatient but patient lives at [facility] so hard for him to get around with fractured ankle and would need to be independent at the shelter as they are unable to provide assistance to patient so will need to remain in hospital until definitve fixation surgery can be done for his left ankle. Pain controlled to left ankle.     Interval History: he has leg elevated with ice on it and reports pain has been controlled below a 5/10. Ortho reports is ready for surgery today and scheduled him for today for OR and he is on board and happy with this plan. Plan for further PT/OT this weekend post op and regular diet post op. Plan for rehab next week after monitoring over  weekend for pain control and further therapy post op and will f/u ortho recs further. He prefers [facility] as first choice. He asksed if we can help set him up with a pcp at [facility] and will see if CM can assist. He denies other new issues and is in good spirts. Labs reviewed and stable.    Review of Systems   Constitutional:  Negative for fever.   Respiratory:  Negative for cough.    Cardiovascular:  Negative for chest pain.   Gastrointestinal:  Negative for abdominal pain and nausea.   Musculoskeletal:  Positive for arthralgias (Left ankle).   Neurological:  Positive for speech difficulty (Dysarthria at baseline from previous CVA) and weakness (Left sided from previous CVA). Negative for dizziness.   Psychiatric/Behavioral:  Negative for agitation.      Objective:     Vital Signs (Most Recent):  Temp: 98 °F (36.7 °C) (01/05/24 0812)  Pulse: 66 (01/05/24 0812)  Resp: 18 (01/05/24 0812)  BP: (!) 139/94 (01/05/24 0812)  SpO2: 98 % (01/05/24 0812) on room air  Vital Signs (24h Range):  Temp:  [97.7 °F (36.5 °C)-98.7 °F (37.1 °C)] 98 °F (36.7 °C)  Pulse:  [63-75] 66  Resp:  [18-20] 18  SpO2:  [93 %-100 %] 98 %  BP: (118-139)/(80-94) 139/94     Weight: 92 kg (202 lb 13.2 oz)  Body mass index is 27.51 kg/m².  No intake or output data in the 24 hours ending 01/05/24 0943        Physical Exam  Vitals and nursing note reviewed.   Constitutional:       General: He is awake. He is not in acute distress.     Appearance: Normal appearance. He is well-developed and normal weight. He is not ill-appearing.   Cardiovascular:      Rate and Rhythm: Normal rate and regular rhythm.      Heart sounds: Normal heart sounds. No murmur heard.  Pulmonary:      Effort: Pulmonary effort is normal. No respiratory distress.      Breath sounds: Normal breath sounds. No wheezing.   Abdominal:      General: Abdomen is flat. Bowel sounds are normal. There is no distension.      Palpations: Abdomen is soft.      Tenderness: There is no abdominal  tenderness.   Musculoskeletal:      Comments: Left leg in splint and elevated in bed with ice on it    Skin:     Findings: No erythema or rash.   Neurological:      Mental Status: He is alert and oriented to person, place, and time.   Psychiatric:         Mood and Affect: Mood normal.         Behavior: Behavior normal. Behavior is cooperative.         Thought Content: Thought content normal.         Judgment: Judgment normal.             Significant Labs: All pertinent labs within the past 24 hours have been reviewed.    Significant Imaging: I have reviewed all pertinent imaging results/findings within the past 24 hours.    Assessment/Plan:      * Closed trimalleolar fracture of left ankle  - Patient with imaging in ED that showed closed trimalleolar ankle fracture sustained after a ground level fall related to alcohol consumption.   - Ortho consulted for fracture and reduced and splinted in ED.  - Per Ortho at this time patient is too swollen for definitive fixation and plan is non-weight bearing to left lower extremity and keep left ankle iced and elevated to help with soft tissue swelling.   - Orthopedics will continue to follow while patient in hospital. From Orthopedic perspective, patient could follow up outpatient for definitive fixation of ankle but patient is homeless and stays at [facility] and they are unable to provide any assistance to patient and with his non weight bearing status will be difficult for him to get around so will need to remain in hospital until definitive fixation surgery can be done. Ortho aware and discussed and trying to come up with a plan.   - PT/OT consulted to work with patient while awaiting surgery.   - Pain controlled. Patient on Tylenol 1000 mg po TID + Robaxin 1000 mg po 4 times daily + Lyrica 75 mg po BID and will continue and continue with Oxycodone IR 5-10 mg po every 4 hours prn for breakthrough pain.   - Patient on his home Apixiban for atrial  fibrillation so fully anticoagulated so no other DVT prophylaxis needed.   OR on 1/5 and will f/u ortho recs post op. Plan for rehab next week, prefers [facility] as top choice    Alcohol dependence with uncomplicated intoxication  - Serum ETOH 277 on admit.  - Patient denies any history of alcohol withdrawal or seizures.  - Continue MVI, Thiamine an Folate for vitamin replacement as patient with long standing history of alcohol abuse.   - Last drink was 12/30.   - No signs of acute withdrawal so far in hospital.  - Monitor CIWA every 4 hours.   - Ativan 1 mg IV prn for anxiety, tremulousness, HR >110, CIWA >8.    Stroke due to embolism of right middle cerebral artery  - Patient with known prior right cardio embolic MCA stroke in 2/2023 with residual left sided weakness involving both his arm and leg and dysarthria.   - Vascular neuro consulted as CT scan of head with new hypodensity; MRI of brain done and MRI showed acute small infarcts in right MCA territory. Vascular Neurology noted this is likely incidental finding given patient reports his weakness and dysarthria are at baseline with no recent change. Etiology of these infarcts likely cardioembolic from known Afib. No further stroke workup indicated at this time as per Vascular Neurology.   - Neurology recommends to continue anticoagulation with Apixiban 5 mg po BID. If plan for surgery, then resume anticoagulation when safe postop from surgical perspective. Med list includes Aspirin 81 mg, which is not indicated from a stroke perspective given he is anticoagulated with Apixiban as per Neurology.  - Continue Lipitor 80 mg po daily for stroke prevention.  - Patient with no new neurologic symptoms.   - PT/OT to work with patient in hospital.     Pure hypercholesterolemia  Chronic and controlled. Continue home Lipitor to treat.     Coronary artery disease involving native coronary artery with angina pectoris  Patient with known CAD s/p stent placement, which is  controlled. Will continue Toprol XL, Losartan and Lipitor to treat and monitor for S/Sx of angina/ACS. Continue to monitor on telemetry.     Chronic combined systolic and diastolic congestive heart failure  Patient is identified as having Combined Systolic and Diastolic heart failure that is Chronic. CHF is currently controlled. Latest ECHO performed and demonstrates- Results for orders placed during the hospital encounter of 11/05/23    Echo    Interpretation Summary    Left Ventricle: The left ventricle is normal in size. Normal wall thickness. Global hypokinesis present. There is severely reduced systolic function with a visually estimated ejection fraction of 25 - 30%. Grade I diastolic dysfunction.    Right Ventricle: Mild right ventricular enlargement. Wall thickness is normal. Right ventricle wall motion  is normal. Systolic function is mildly reduced.    Biatrial enlargement    Pulmonary Artery: The estimated pulmonary artery systolic pressure is 19 mmHg.    IVC/SVC: Normal venous pressure at 3 mmHg.  Continue home Losartan, Lasix and Toprol XL to treat. Monitor clinical status closely. Monitor on telemetry. Patient is off CHF pathway.  Monitor strict Is&Os and daily weights. Place on fluid restriction of 1.5 L. Cardiology has not been consulted. Continue to stress to patient importance of self efficacy and  on diet for CHF. Last BNP reviewed- and noted below   Recent Labs   Lab 12/29/23  2211   BNP 84         Essential hypertension  Chronic, controlled. Latest blood pressure and vitals reviewed-     Temp:  [97.6 °F (36.4 °C)-98.2 °F (36.8 °C)]   Pulse:  [64-84]   Resp:  [15-18]   BP: (108-129)/(57-79)   SpO2:  [95 %-100 %] .   Home meds for hypertension were reviewed and noted below.   Hypertension Medications               amLODIPine (NORVASC) 10 MG tablet Take 1 tablet (10 mg total) by mouth once daily.    furosemide (LASIX) 40 MG tablet Take 1 tablet (40 mg total) by mouth once daily.    losartan  (COZAAR) 25 MG tablet Take 1 tablet (25 mg total) by mouth once daily.    metoprolol succinate (TOPROL-XL) 50 MG 24 hr tablet Take 1 tablet (50 mg total) by mouth once daily.    spironolactone (ALDACTONE) 25 MG tablet Take 1 tablet (25 mg total) by mouth once daily.            While in the hospital, will manage blood pressure as follows; Continue home antihypertensive regimen    Will utilize p.r.n. blood pressure medication only if patient's blood pressure greater than 180/110 and he develops symptoms such as worsening chest pain or shortness of breath.    Paroxysmal atrial fibrillation  Patient with Paroxysmal (<7 days) atrial fibrillation which is controlled currently with Toprol XL 50 mg po daily and will continue. Patient is currently in sinus rhythm.[id number] Score: 4.  Anticoagulation indicated. Anticoagulation done with Apixiban 5 mg po BID.  Reports needs to cont pill packs he currently receives on discharge as had trouble opening pill bottles in the past leading to a remote hx of eliquis noncompliance but was compliant on admit as was doing pill packs      VTE Risk Mitigation (From admission, onward)           Ordered     apixaban tablet 5 mg  2 times daily         12/30/23 1242     IP VTE HIGH RISK PATIENT  Once         12/30/23 1206     Reason for No Pharmacological VTE Prophylaxis  Once        Question:  Reasons:  Answer:  Already adequately anticoagulated on oral Anticoagulants    12/30/23 1206                    Discharge Planning   ERWIN: 1/8/2024     Code Status: Full Code   Is the patient medically ready for discharge?: No    Reason for patient still in hospital (select all that apply): Patient trending condition  Discharge Plan A: Rehab                  Ashlyn Ramos MD  Department of Hospital Medicine   [facility]

## 2024-01-05 NOTE — NURSING
Nurses Note -- 4 Eyes      1/5/2024   12:06 AM      Skin assessed during: Q Shift Change      [x] No Altered Skin Integrity Present    []Prevention Measures Documented      [] Yes- Altered Skin Integrity Present or Discovered   [] LDA Added if Not in Epic (Describe Wound)   [] New Altered Skin Integrity was Present on Admit and Documented in LDA   [] Wound Image Taken    Wound Care Consulted? No    Attending Nurse:  Eleanor Connors RN/Staff Member:   IBRAHIMA Self

## 2024-01-05 NOTE — PROGRESS NOTES
[facility]  Orthopedics  Progress Note    Attg Note:  Patient seen and examined.  I agree with the resident's assessment and plan.  Patient's soft tissue envelope now amenable to ORIF.  Left technically trimalleolar ankle fracture with syndesmotic injury.  The medial fracture is very small anteromedial avulsion and the posterior mouth fracture is very small.  Plan for fibular fixation with syndesmotic stabilization/fixation.    The risks, benefits and alternatives to surgery were discussed with the patient at great length.  These include bleeding, infection, vessel/nerve damage, pain, numbness, tingling, complex regional pain syndrome, hardware/surgical failure, stiffness, need for further surgery, malunion, nonunion, DVT, PE, arthritis and death.  We discussed the fact that he is homeless and we will do everything with the power to resource and postoperatively but that this is an increased risk for perioperative complications such as infection fracture fixation failure should he be unable to properly care for the injury.  Patient states an understanding and wishes to proceed with surgery.   All questions were answered.  No guarantees were implied or stated.  Informed consent was obtained.        Rogerio Hu MD      Patient Name: Dionicio Bess  MRN: [id number]  Admission Date: 12/29/2023  Hospital Length of Stay: 3 days  Attending Provider: Ashlyn Ramos MD  Primary Care Provider: Isela, Primary Doctor  Follow-up For: Procedure(s) (LRB):  ORIF, ANKLE - LEFT, synthes (Left)    Post-Operative Day:    Subjective:     Principal Problem:Closed trimalleolar fracture of left ankle    Principal Orthopedic Problem: same    Interval History: NAEON. VSS. AF. Patient states that pain is well controlled. Voiding spontaneously. Dressing CDI. Hgb 11.7. Swelling has decreased. Plan for ORIF left ankle today. Patient has been NPO since midnight.    Review of patient's allergies indicates:  No Known Allergies    Current  Facility-Administered Medications   Medication    acetaminophen tablet 1,000 mg    albuterol-ipratropium 2.5 mg-0.5 mg/3 mL nebulizer solution 3 mL    aluminum-magnesium hydroxide-simethicone 200-200-20 mg/5 mL suspension 30 mL    amLODIPine tablet 10 mg    apixaban tablet 5 mg    aspirin chewable tablet 81 mg    atorvastatin tablet 80 mg    bisacodyL suppository 10 mg    diphenhydrAMINE capsule 25 mg    FLUoxetine capsule 20 mg    folic acid tablet 1 mg    furosemide tablet 40 mg    LORazepam injection 1 mg    losartan tablet 25 mg    melatonin tablet 6 mg    methocarbamoL tablet 1,000 mg    metoprolol succinate (TOPROL-XL) 24 hr tablet 50 mg    multivitamin tablet    naloxone 0.4 mg/mL injection 0.02 mg    ondansetron disintegrating tablet 8 mg    oxyCODONE immediate release tablet 5 mg    oxyCODONE immediate release tablet Tab 10 mg    pantoprazole EC tablet 20 mg    polyethylene glycol packet 17 g    pregabalin capsule 75 mg    prochlorperazine injection Soln 5 mg    senna tablet 8.6 mg    simethicone chewable tablet 80 mg    sodium chloride 0.9% flush 5 mL    spironolactone tablet 25 mg    thiamine tablet 200 mg     Objective:     Vital Signs (Most Recent):  Temp: 97.7 °F (36.5 °C) (01/05/24 0440)  Pulse: 71 (01/05/24 0440)  Resp: 18 (01/05/24 0443)  BP: (!) 136/92 (01/05/24 0440)  SpO2: 99 % (01/05/24 0440) Vital Signs (24h Range):  Temp:  [97.7 °F (36.5 °C)-98.7 °F (37.1 °C)] 97.7 °F (36.5 °C)  Pulse:  [63-75] 71  Resp:  [18-20] 18  SpO2:  [93 %-100 %] 99 %  BP: (113-136)/(79-92) 136/92     Weight: 92 kg (202 lb 13.2 oz)  Height: 6' (182.9 cm)  Body mass index is 27.51 kg/m².    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    Significant Imaging: I have reviewed and interpreted all pertinent imaging results/findings.  Assessment/Plan:     * Closed trimalleolar fracture of left ankle  Dionicio Bess is a 58 y.o. male with multiple medical comorbidities, an elevated troponin, elevated ETOH, new CT head findings concerning for infarct, and a left sided trimalleolar ankle fracture sustained after a ground level fall yesterday, closed and at neurovascular baseline for the LLE.     - Plan for ORIF left ankle today  - NWB to LLE, keep iced and elevated  - Patient marked and consented  - Remain NPO            ROSIO Kearns MD  Orthopedics  [facility]

## 2024-01-05 NOTE — ASSESSMENT & PLAN NOTE
- Patient with imaging in ED that showed closed trimalleolar ankle fracture sustained after a ground level fall related to alcohol consumption.   - Ortho consulted for fracture and reduced and splinted in ED.  - Per Ortho at this time patient is too swollen for definitive fixation and plan is non-weight bearing to left lower extremity and keep left ankle iced and elevated to help with soft tissue swelling.   - Orthopedics will continue to follow while patient in hospital. From Orthopedic perspective, patient could follow up outpatient for definitive fixation of ankle but patient is homeless and stays at [facility] and they are unable to provide any assistance to patient and with his non weight bearing status will be difficult for him to get around so will need to remain in hospital until definitive fixation surgery can be done. Ortho aware and discussed and trying to come up with a plan.   - PT/OT consulted to work with patient while awaiting surgery.   - Pain controlled. Patient on Tylenol 1000 mg po TID + Robaxin 1000 mg po 4 times daily + Lyrica 75 mg po BID and will continue and continue with Oxycodone IR 5-10 mg po every 4 hours prn for breakthrough pain.   - Patient on his home Apixiban for atrial fibrillation so fully anticoagulated so no other DVT prophylaxis needed.   OR on 1/5 and will f/u ortho recs post op. Plan for rehab next week, [facility]

## 2024-01-05 NOTE — SUBJECTIVE & OBJECTIVE
Principal Problem:Closed trimalleolar fracture of left ankle    Principal Orthopedic Problem: same    Interval History: NAEON. VSS. AF. Patient states that pain is well controlled. Voiding spontaneously. Dressing CDI. Hgb 11.7. Swelling has decreased. Plan for ORIF left ankle today. Patient has been NPO since midnight.    Review of patient's allergies indicates:  No Known Allergies    Current Facility-Administered Medications   Medication    acetaminophen tablet 1,000 mg    albuterol-ipratropium 2.5 mg-0.5 mg/3 mL nebulizer solution 3 mL    aluminum-magnesium hydroxide-simethicone 200-200-20 mg/5 mL suspension 30 mL    amLODIPine tablet 10 mg    apixaban tablet 5 mg    aspirin chewable tablet 81 mg    atorvastatin tablet 80 mg    bisacodyL suppository 10 mg    diphenhydrAMINE capsule 25 mg    FLUoxetine capsule 20 mg    folic acid tablet 1 mg    furosemide tablet 40 mg    LORazepam injection 1 mg    losartan tablet 25 mg    melatonin tablet 6 mg    methocarbamoL tablet 1,000 mg    metoprolol succinate (TOPROL-XL) 24 hr tablet 50 mg    multivitamin tablet    naloxone 0.4 mg/mL injection 0.02 mg    ondansetron disintegrating tablet 8 mg    oxyCODONE immediate release tablet 5 mg    oxyCODONE immediate release tablet Tab 10 mg    pantoprazole EC tablet 20 mg    polyethylene glycol packet 17 g    pregabalin capsule 75 mg    prochlorperazine injection Soln 5 mg    senna tablet 8.6 mg    simethicone chewable tablet 80 mg    sodium chloride 0.9% flush 5 mL    spironolactone tablet 25 mg    thiamine tablet 200 mg     Objective:     Vital Signs (Most Recent):  Temp: 97.7 °F (36.5 °C) (01/05/24 0440)  Pulse: 71 (01/05/24 0440)  Resp: 18 (01/05/24 0443)  BP: (!) 136/92 (01/05/24 0440)  SpO2: 99 % (01/05/24 0440) Vital Signs (24h Range):  Temp:  [97.7 °F (36.5 °C)-98.7 °F (37.1 °C)] 97.7 °F (36.5 °C)  Pulse:  [63-75] 71  Resp:  [18-20] 18  SpO2:  [93 %-100 %] 99 %  BP: (113-136)/(79-92) 136/92     Weight: 92 kg (202 lb 13.2  oz)  Height: 6' (182.9 cm)  Body mass index is 27.51 kg/m².    No intake or output data in the 24 hours ending 01/05/24 0704       Ortho/SPM Exam     LUE  AROM of shoulder, elbow, wrist, and fingers limited from prior stroke, flexion contracture at the wrist and fingers, passively correctable   Inspection  SILT throughout  Radial artery palpated  Capillary Refill <3s    LLE:  In splint, windowed to check swelling  SILT  Able to wiggle toes  Cap refill <2s  AROM, PROM of hip, knee and foot intact      Significant Labs:   All pertinent labs within the past 24 hours have been reviewed.    Significant Imaging: I have reviewed and interpreted all pertinent imaging results/findings.

## 2024-01-05 NOTE — PT/OT/SLP PROGRESS
Occupational Therapy      Patient Name:  Dionicio Bess   MRN:  [id number]    Patient not seen today secondary to pt in OR. Will follow-up tomorrow once new OT orders are received including WB status.    1/5/2024

## 2024-01-05 NOTE — NURSING TRANSFER
Nursing Transfer Note      1/5/2024   4:03 PM    Reason patient is being transferred: Post procedure    Transfer To: [id number]    Transfer via bed    Transfer with cardiac monitoring    Transported by [facility]    Telemetry: Box Number [id number], Rate 64, Rhythm SR, and Telemetry  [facility]  Order for Tele Monitor? Yes    Any special needs or follow-up needed: Routine    Patient belongings transferred with patient: No    Chart send with patient: Yes    Notified: Brady    Patient reassessed at: 01/05/2024 @ 2679

## 2024-01-06 PROCEDURE — 25000003 PHARM REV CODE 250: Performed by: INTERNAL MEDICINE

## 2024-01-06 PROCEDURE — 25000003 PHARM REV CODE 250: Performed by: PHYSICIAN ASSISTANT

## 2024-01-06 PROCEDURE — 25000003 PHARM REV CODE 250

## 2024-01-06 PROCEDURE — 21400001 HC TELEMETRY ROOM

## 2024-01-06 RX ADMIN — OXYCODONE HYDROCHLORIDE 10 MG: 10 TABLET ORAL at 05:01

## 2024-01-06 RX ADMIN — FLUOXETINE HYDROCHLORIDE 20 MG: 20 CAPSULE ORAL at 09:01

## 2024-01-06 RX ADMIN — POLYETHYLENE GLYCOL 3350 17 G: 17 POWDER, FOR SOLUTION ORAL at 08:01

## 2024-01-06 RX ADMIN — METHOCARBAMOL 1000 MG: 500 TABLET ORAL at 09:01

## 2024-01-06 RX ADMIN — SENNOSIDES 8.6 MG: 8.6 TABLET, FILM COATED ORAL at 09:01

## 2024-01-06 RX ADMIN — METHOCARBAMOL 1000 MG: 500 TABLET ORAL at 05:01

## 2024-01-06 RX ADMIN — ATORVASTATIN CALCIUM 80 MG: 40 TABLET, FILM COATED ORAL at 09:01

## 2024-01-06 RX ADMIN — SPIRONOLACTONE 25 MG: 25 TABLET ORAL at 08:01

## 2024-01-06 RX ADMIN — AMLODIPINE BESYLATE 10 MG: 5 TABLET ORAL at 09:01

## 2024-01-06 RX ADMIN — APIXABAN 5 MG: 5 TABLET, FILM COATED ORAL at 09:01

## 2024-01-06 RX ADMIN — ACETAMINOPHEN 1000 MG: 500 TABLET ORAL at 02:01

## 2024-01-06 RX ADMIN — LOSARTAN POTASSIUM 25 MG: 25 TABLET, FILM COATED ORAL at 09:01

## 2024-01-06 RX ADMIN — ACETAMINOPHEN 1000 MG: 500 TABLET ORAL at 05:01

## 2024-01-06 RX ADMIN — PREGABALIN 75 MG: 75 CAPSULE ORAL at 09:01

## 2024-01-06 RX ADMIN — METOPROLOL SUCCINATE 50 MG: 50 TABLET, EXTENDED RELEASE ORAL at 09:01

## 2024-01-06 RX ADMIN — FOLIC ACID 1 MG: 1 TABLET ORAL at 09:01

## 2024-01-06 RX ADMIN — FUROSEMIDE 40 MG: 40 TABLET ORAL at 09:01

## 2024-01-06 RX ADMIN — THIAMINE HCL TAB 100 MG 200 MG: 100 TAB at 09:01

## 2024-01-06 RX ADMIN — ACETAMINOPHEN 1000 MG: 500 TABLET ORAL at 09:01

## 2024-01-06 RX ADMIN — THERA TABS 1 TABLET: TAB at 09:01

## 2024-01-06 RX ADMIN — ASPIRIN 81 MG CHEWABLE TABLET 81 MG: 81 TABLET CHEWABLE at 09:01

## 2024-01-06 RX ADMIN — METHOCARBAMOL 1000 MG: 500 TABLET ORAL at 02:01

## 2024-01-06 NOTE — PROGRESS NOTES
[facility] - Surgery  Orthopedics  Progress Note    Patient Name: Dionicio Bess  MRN: [id number]  Admission Date: 12/29/2023  Hospital Length of Stay: 4 days  Attending Provider: Ashlyn Ramos MD  Primary Care Provider: Isela Primary Doctor  Follow-up For: Procedure(s) (LRB):  ORIF, ANKLE - LEFT (Left)  FIXATION, SYNDESMOSIS, ANKLE (Left)    Post-Operative Day: 1 Day Post-Op  Subjective:     Principal Problem:Closed trimalleolar fracture of left ankle    Principal Orthopedic Problem: same    Interval History: NAEON. VSS. AF. Patient states that pain is well controlled. Voiding spontaneously. + BM. Dressing CDI. AM labs pending. Patient is homeless and is working with  for DC planning.     Review of patient's allergies indicates:  No Known Allergies    Current Facility-Administered Medications   Medication    acetaminophen tablet 1,000 mg    albuterol-ipratropium 2.5 mg-0.5 mg/3 mL nebulizer solution 3 mL    aluminum-magnesium hydroxide-simethicone 200-200-20 mg/5 mL suspension 30 mL    amLODIPine tablet 10 mg    apixaban tablet 5 mg    aspirin chewable tablet 81 mg    atorvastatin tablet 80 mg    bisacodyL suppository 10 mg    diphenhydrAMINE capsule 25 mg    FLUoxetine capsule 20 mg    folic acid tablet 1 mg    furosemide tablet 40 mg    LORazepam injection 1 mg    losartan tablet 25 mg    melatonin tablet 6 mg    methocarbamoL tablet 1,000 mg    metoprolol succinate (TOPROL-XL) 24 hr tablet 50 mg    multivitamin tablet    naloxone 0.4 mg/mL injection 0.02 mg    ondansetron disintegrating tablet 8 mg    oxyCODONE immediate release tablet 5 mg    oxyCODONE immediate release tablet Tab 10 mg    pantoprazole EC tablet 20 mg    polyethylene glycol packet 17 g    pregabalin capsule 75 mg    prochlorperazine injection Soln 5 mg    senna tablet 8.6 mg    simethicone chewable tablet 80 mg    sodium chloride 0.9% flush 5 mL    spironolactone tablet 25 mg    thiamine tablet 200 mg     Objective:     Vital Signs (Most  Recent):  Temp: 97.7 °F (36.5 °C) (01/06/24 0326)  Pulse: 63 (01/06/24 0334)  Resp: 18 (01/06/24 0508)  BP: 134/75 (01/06/24 0326)  SpO2: 98 % (01/06/24 0441) Vital Signs (24h Range):  Temp:  [97.5 °F (36.4 °C)-98.4 °F (36.9 °C)] 97.7 °F (36.5 °C)  Pulse:  [58-68] 63  Resp:  [14-20] 18  SpO2:  [94 %-99 %] 98 %  BP: ()/(63-94) 134/75     Weight: 91.6 kg (202 lb)  Height: 6' (182.9 cm)  Body mass index is 27.4 kg/m².      Intake/Output Summary (Last 24 hours) at 1/6/2024 0638  Last data filed at 1/6/2024 0448  Gross per 24 hour   Intake 768 ml   Output 1700 ml   Net -932 ml         Ortho/SPM Exam     LUE  AROM of shoulder, elbow, wrist, and fingers limited from prior stroke, flexion contracture at the wrist and fingers, passively correctable   SILT throughout    LLE:  In splint, c/d/i  SILT  Does not actively wiggle toes this am   Active plantar and dorsiflexion of the ankle  5/5 strength with hip flexion and knee flexion/extension   Cap refill <2s    Significant Labs:   All pertinent labs within the past 24 hours have been reviewed.    Significant Imaging: I have reviewed and interpreted all pertinent imaging results/findings.  Assessment/Plan:     * Closed trimalleolar fracture of left ankle  Dionicio Bess is a 58 y.o. male with multiple medical comorbidities, an elevated troponin, elevated ETOH, new CT head findings concerning for infarct, and a left sided trimalleolar ankle fracture sustained after a ground level fall yesterday, closed and at neurovascular baseline for the LLE.     - Okay for diet   - NWB LLE  - Eliquis 5 BID  - PT/OT   - SW for DC planning     Dispo: pending discharge recs and assistance by GEORGE/ERVIN Ferrara MD  Orthopedics  [facility] - Surgery

## 2024-01-06 NOTE — PROGRESS NOTES
[facility] Medicine  Progress Note    Patient Name: Dionicio Bess  MRN: [id number]  Patient Class: IP- Inpatient   Admission Date: 12/29/2023  Length of Stay: 4 days  Attending Physician: Ashlyn Ramos MD  Primary Care Provider: Isela, Primary Doctor        Subjective:     Principal Problem:Closed trimalleolar fracture of left ankle        HPI:  Dionicio Bess is a 59 yo M with PMHx of pAfib (on eliquis), HTN, CVA with residual L sided deficits, CAD s/p PCI, HLD, HFrEF (EF 25-30%) who presented to ED for swelling and pain to his L ankle after a ground level mechanical fall. Patient reports he tripped on a step. Now c/o 9/10 pain to L ankle with inability to bear weight. Denies LOC or head trauma. Patient also endorses intermittent lower midsternal chest pain for the past 2-3 days. Denies chest pain currently.  Also admits cramping epigastric abdominal pain with associated nausea that developed 45 mins ago. On chart review, patient had a stress test performed last month that was nondiagnostic though abnormal.  It does not appear that this was ever followed up on by cardiology as an outpatient.  The patient does have socioeconomic issues and resides in a homeless shelter ([facility]). Endorses frequent alcohol use with 2-3 beers daily. States he did have some vodka and beers yesterday before the fall. Denies fever, chills, palpitations, SOB, cough, v/d, dysuria, numbness, paresthesias, headaches.     In ED: Afebrile. HDS. Satting well on 2L NC. CBC without leukocytosis and with stable chronic anemia. CMP unremarkable. BNP 84. Trop 0.026. serum ETOH 277.  CXR with cardiomegaly and mild perihilar edema. CT ankle with trimalleolar fracture. Ortho consulted; recommending NWB to LLE and to keep iced and elevated. Too swollen for fixation at this time. CT cervical spine with degenerative changes, but without acute fracture or subluxation. CT/ MRI brain Small foci of acute to early subacute ischemia noted  in the right corona radiata/basal ganglia/internal capsule, adjoining remote large MCA territory infarct.  Equivocal punctate right parietal cortical infarct margin the posterior aspect of the infarct territory. Vascular neurology consulted; recommend continuing eliquis and statin. Given IV morphine 4 mg,  dilaudid 1 mg , IV toradol, and IV zofran. Placed in observation.     Overview/Hospital Course:  Patient seen and evaluated by Vascular Neurology on ED as CT scan and MRI of brain consistent with acute right sided CVA. Neurology stated patient with no new symptoms related to this stroke and located in previous area of old CVA. CVA cardio embolic in nature as patient wih known atrial fibrillation and recommended to continue home Apixiban 5 mg po BID and Lipitor for stroke prevention and no further work-up or evaluation needed. Orthopedic surgery consulted concerning left ankle fracture and splinted fracture in the ED. CT scan of left ankle done and showed trimalleolar fracture. Patient placed non weight bearing to left lower extremity. Patient to rest, elevate and ice left leg as will require surgical repair of fracture but needs improvement in soft tissue swelling prior to proceeding. Ortho stated potentially could do surgery as outpatient but patient lives at [facility] so hard for him to get around with fractured ankle and would need to be independent at the shelter as they are unable to provide assistance to patient so will need to remain in hospital until definitve fixation surgery can be done for his left ankle. Pain controlled to left ankle.     Interval History: he reports feeling well this morning post op and eating breakfast. Was hoping for more food as hungry and would like to ensure he has good protein to heal his surgical wounds and diet changed to double portions so he can maximize his intake for this. Labs stable yesterday and will repeat tomorrow. He reports pain controlled on  "regimen, he is going to have nursing write on the board what time the doses are due as that helped in previous days when he woke up so he knew if he was due or not for the meds. He is s/p ORIF of left trimal ankle fx and open tx of left ankle syndesmosis disruption and internal fixatino and plans for NWB fof 4-6 weeks and then wb in cam boot. He prefers [facility] as top choice. He reports has been working in the past about getting social security so he can get his bills paid and have them take the money out for housing so he can get housing long term and has an agent he has worked with. He does not have a unity  and said he didn't know if he could have one as he doesn't have any income but I told him it can't hurt to try to work with them again long term and while he's in rehab he will have a phone and can work on this and he agreed that it is a good idea as well and will try to see if can get some of these numbers for him to work with in next few weeks for long term, he stays at the [facility] in the interim is his plan for after rehab.  He is very happy with the care here and calls everyone"angels".    Review of Systems   Constitutional:  Negative for fever.   Respiratory:  Negative for cough.    Cardiovascular:  Negative for chest pain.   Gastrointestinal:  Negative for abdominal pain and nausea.   Musculoskeletal:  Positive for arthralgias (Left ankle).   Neurological:  Positive for speech difficulty (Dysarthria at baseline from previous CVA) and weakness (Left sided from previous CVA). Negative for dizziness.   Psychiatric/Behavioral:  Negative for agitation.      Objective:     Vital Signs (Most Recent):  Temp: 97.7 °F (36.5 °C) (01/06/24 0326)  Pulse: 62 (01/06/24 0859)  Resp: 18 (01/06/24 0859)  BP: 122/81 (01/06/24 0859)  SpO2: 100 % (01/06/24 0859) on room air  Vital Signs (24h Range):  Temp:  [97.5 °F (36.4 °C)-98.4 °F (36.9 °C)] 97.7 °F (36.5 °C)  Pulse:  [58-68] 62  Resp:  [14-20] 18  SpO2: "  [94 %-100 %] 100 %  BP: ()/(63-87) 122/81     Weight: 91.6 kg (202 lb)  Body mass index is 27.4 kg/m².    Intake/Output Summary (Last 24 hours) at 1/6/2024 0908  Last data filed at 1/6/2024 0906  Gross per 24 hour   Intake 886 ml   Output 1950 ml   Net -1064 ml           Physical Exam  Vitals and nursing note reviewed.   Constitutional:       General: He is awake. He is not in acute distress.     Appearance: Normal appearance. He is well-developed and normal weight. He is not ill-appearing.   Cardiovascular:      Rate and Rhythm: Normal rate and regular rhythm.      Heart sounds: Normal heart sounds. No murmur heard.  Pulmonary:      Effort: Pulmonary effort is normal. No respiratory distress.      Breath sounds: Normal breath sounds. No wheezing.   Abdominal:      General: Abdomen is flat. Bowel sounds are normal. There is no distension.      Palpations: Abdomen is soft.      Tenderness: There is no abdominal tenderness.   Musculoskeletal:      Comments: Left leg in splint and elevated in bed with ice on it    Skin:     Findings: No erythema or rash.   Neurological:      Mental Status: He is alert and oriented to person, place, and time.   Psychiatric:         Mood and Affect: Mood normal.         Behavior: Behavior normal. Behavior is cooperative.         Thought Content: Thought content normal.         Judgment: Judgment normal.             Significant Labs: All pertinent labs within the past 24 hours have been reviewed.    Significant Imaging: I have reviewed all pertinent imaging results/findings within the past 24 hours.    Assessment/Plan:      * Closed trimalleolar fracture of left ankle  - Patient with imaging in ED that showed closed trimalleolar ankle fracture sustained after a ground level fall related to alcohol consumption.   - Ortho consulted for fracture and reduced and splinted in ED.  - Per Ortho at this time patient is too swollen for definitive fixation and plan is non-weight bearing to  left lower extremity and keep left ankle iced and elevated to help with soft tissue swelling.   - Orthopedics will continue to follow while patient in hospital. From Orthopedic perspective, patient could follow up outpatient for definitive fixation of ankle but patient is homeless and stays at [facility] and they are unable to provide any assistance to patient and with his non weight bearing status will be difficult for him to get around so will need to remain in hospital until definitive fixation surgery can be done. Ortho aware and discussed and trying to come up with a plan.   - PT/OT consulted to work with patient while awaiting surgery.   - Pain controlled. Patient on Tylenol 1000 mg po TID + Robaxin 1000 mg po 4 times daily + Lyrica 75 mg po BID and will continue and continue with Oxycodone IR 5-10 mg po every 4 hours prn for breakthrough pain.   - Patient on his home Apixiban for atrial fibrillation so fully anticoagulated so no other DVT prophylaxis needed.   OR on 1/5 s/p ORIF left ankle trimal fx and open tx left ankle syndesmosis disruption c reduction and internal fixation, now NWB x 4-6 weeks with plan for WB in cam boot after that. In splint post op. Pain controlled. Will cont to work with PT/OT, and will likely sent for auth for rehab on Monday.    Alcohol dependence with uncomplicated intoxication  - Serum ETOH 277 on admit.  - Patient denies any history of alcohol withdrawal or seizures.  - Continue MVI, Thiamine an Folate for vitamin replacement as patient with long standing history of alcohol abuse.   - Last drink was 12/30.   - No signs of acute withdrawal so far in hospital.  - Monitor CIWA every 4 hours.   - Ativan 1 mg IV prn for anxiety, tremulousness, HR >110, CIWA >8.    Stroke due to embolism of right middle cerebral artery  - Patient with known prior right cardio embolic MCA stroke in 2/2023 with residual left sided weakness involving both his arm and leg and  dysarthria.   - Vascular neuro consulted as CT scan of head with new hypodensity; MRI of brain done and MRI showed acute small infarcts in right MCA territory. Vascular Neurology noted this is likely incidental finding given patient reports his weakness and dysarthria are at baseline with no recent change. Etiology of these infarcts likely cardioembolic from known Afib. No further stroke workup indicated at this time as per Vascular Neurology.   - Neurology recommends to continue anticoagulation with Apixiban 5 mg po BID. If plan for surgery, then resume anticoagulation when safe postop from surgical perspective. Med list includes Aspirin 81 mg, which is not indicated from a stroke perspective given he is anticoagulated with Apixiban as per Neurology.  - Continue Lipitor 80 mg po daily for stroke prevention.  - Patient with no new neurologic symptoms.   - PT/OT to work with patient in hospital.     Pure hypercholesterolemia  Chronic and controlled. Continue home Lipitor to treat.     Coronary artery disease involving native coronary artery with angina pectoris  Patient with known CAD s/p stent placement, which is controlled. Will continue Toprol XL, Losartan and Lipitor to treat and monitor for S/Sx of angina/ACS. Continue to monitor on telemetry.     Chronic combined systolic and diastolic congestive heart failure  Patient is identified as having Combined Systolic and Diastolic heart failure that is Chronic. CHF is currently controlled. Latest ECHO performed and demonstrates- Results for orders placed during the hospital encounter of 11/05/23    Echo    Interpretation Summary    Left Ventricle: The left ventricle is normal in size. Normal wall thickness. Global hypokinesis present. There is severely reduced systolic function with a visually estimated ejection fraction of 25 - 30%. Grade I diastolic dysfunction.    Right Ventricle: Mild right ventricular enlargement. Wall thickness is normal. Right ventricle wall  motion  is normal. Systolic function is mildly reduced.    Biatrial enlargement    Pulmonary Artery: The estimated pulmonary artery systolic pressure is 19 mmHg.    IVC/SVC: Normal venous pressure at 3 mmHg.  Continue home Losartan, Lasix and Toprol XL to treat. Monitor clinical status closely. Monitor on telemetry. Patient is off CHF pathway.  Monitor strict Is&Os and daily weights. Place on fluid restriction of 1.5 L. Cardiology has not been consulted. Continue to stress to patient importance of self efficacy and  on diet for CHF. Last BNP reviewed- and noted below   Recent Labs   Lab 12/29/23  2211   BNP 84         Essential hypertension  Chronic, controlled. Latest blood pressure and vitals reviewed-     Temp:  [97.6 °F (36.4 °C)-98.2 °F (36.8 °C)]   Pulse:  [64-84]   Resp:  [15-18]   BP: (108-129)/(57-79)   SpO2:  [95 %-100 %] .   Home meds for hypertension were reviewed and noted below.   Hypertension Medications               amLODIPine (NORVASC) 10 MG tablet Take 1 tablet (10 mg total) by mouth once daily.    furosemide (LASIX) 40 MG tablet Take 1 tablet (40 mg total) by mouth once daily.    losartan (COZAAR) 25 MG tablet Take 1 tablet (25 mg total) by mouth once daily.    metoprolol succinate (TOPROL-XL) 50 MG 24 hr tablet Take 1 tablet (50 mg total) by mouth once daily.    spironolactone (ALDACTONE) 25 MG tablet Take 1 tablet (25 mg total) by mouth once daily.            While in the hospital, will manage blood pressure as follows; Continue home antihypertensive regimen    Will utilize p.r.n. blood pressure medication only if patient's blood pressure greater than 180/110 and he develops symptoms such as worsening chest pain or shortness of breath.    Paroxysmal atrial fibrillation  Patient with Paroxysmal (<7 days) atrial fibrillation which is controlled currently with Toprol XL 50 mg po daily and will continue. Patient is currently in sinus rhythm.[id number] Score: 4.  Anticoagulation indicated.  Anticoagulation done with Apixiban 5 mg po BID.  Reports needs to cont pill packs he currently receives on discharge as had trouble opening pill bottles in the past leading to a remote hx of eliquis noncompliance but was compliant on admit as was doing pill packs      VTE Risk Mitigation (From admission, onward)           Ordered     apixaban tablet 5 mg  2 times daily         12/30/23 1242     IP VTE HIGH RISK PATIENT  Once         12/30/23 1206     Reason for No Pharmacological VTE Prophylaxis  Once        Question:  Reasons:  Answer:  Already adequately anticoagulated on oral Anticoagulants    12/30/23 1206                    Discharge Planning   ERWIN: 1/8/2024     Code Status: Full Code   Is the patient medically ready for discharge?: No    Reason for patient still in hospital (select all that apply): Patient trending condition  Discharge Plan A: Rehab                  Ashlyn Ramos MD  Department of Hospital Medicine   [facility] - Surgery

## 2024-01-06 NOTE — SUBJECTIVE & OBJECTIVE
Principal Problem:Closed trimalleolar fracture of left ankle    Principal Orthopedic Problem: same    Interval History: NAEON. VSS. AF. Patient states that pain is well controlled. Voiding spontaneously. + BM. Dressing CDI. AM labs pending. Patient is homeless and is working with  for DC planning.     Review of patient's allergies indicates:  No Known Allergies    Current Facility-Administered Medications   Medication    acetaminophen tablet 1,000 mg    albuterol-ipratropium 2.5 mg-0.5 mg/3 mL nebulizer solution 3 mL    aluminum-magnesium hydroxide-simethicone 200-200-20 mg/5 mL suspension 30 mL    amLODIPine tablet 10 mg    apixaban tablet 5 mg    aspirin chewable tablet 81 mg    atorvastatin tablet 80 mg    bisacodyL suppository 10 mg    diphenhydrAMINE capsule 25 mg    FLUoxetine capsule 20 mg    folic acid tablet 1 mg    furosemide tablet 40 mg    LORazepam injection 1 mg    losartan tablet 25 mg    melatonin tablet 6 mg    methocarbamoL tablet 1,000 mg    metoprolol succinate (TOPROL-XL) 24 hr tablet 50 mg    multivitamin tablet    naloxone 0.4 mg/mL injection 0.02 mg    ondansetron disintegrating tablet 8 mg    oxyCODONE immediate release tablet 5 mg    oxyCODONE immediate release tablet Tab 10 mg    pantoprazole EC tablet 20 mg    polyethylene glycol packet 17 g    pregabalin capsule 75 mg    prochlorperazine injection Soln 5 mg    senna tablet 8.6 mg    simethicone chewable tablet 80 mg    sodium chloride 0.9% flush 5 mL    spironolactone tablet 25 mg    thiamine tablet 200 mg     Objective:     Vital Signs (Most Recent):  Temp: 97.7 °F (36.5 °C) (01/06/24 0326)  Pulse: 63 (01/06/24 0334)  Resp: 18 (01/06/24 0508)  BP: 134/75 (01/06/24 0326)  SpO2: 98 % (01/06/24 0441) Vital Signs (24h Range):  Temp:  [97.5 °F (36.4 °C)-98.4 °F (36.9 °C)] 97.7 °F (36.5 °C)  Pulse:  [58-68] 63  Resp:  [14-20] 18  SpO2:  [94 %-99 %] 98 %  BP: ()/(63-94) 134/75     Weight: 91.6 kg (202 lb)  Height: 6' (182.9 cm)  Body  mass index is 27.4 kg/m².      Intake/Output Summary (Last 24 hours) at 1/6/2024 0638  Last data filed at 1/6/2024 0448  Gross per 24 hour   Intake 768 ml   Output 1700 ml   Net -932 ml          Ortho/SPM Exam     LUE  AROM of shoulder, elbow, wrist, and fingers limited from prior stroke, flexion contracture at the wrist and fingers, passively correctable   SILT throughout    LLE:  In splint, c/d/i  SILT  Does not actively wiggle toes this am   Active plantar and dorsiflexion of the ankle  5/5 strength with hip flexion and knee flexion/extension   Cap refill <2s    Significant Labs:   All pertinent labs within the past 24 hours have been reviewed.    Significant Imaging: I have reviewed and interpreted all pertinent imaging results/findings.

## 2024-01-06 NOTE — PT/OT/SLP PROGRESS
Occupational Therapy      Patient Name:  Dionicio Bess   MRN:  [id number]    Patient not seen today secondary to declining services 2/2 it being too late in the PM for therapy. Will follow-up 1/7/24.    1/6/2024

## 2024-01-06 NOTE — PT/OT/SLP PROGRESS
"Physical Therapy      Patient Name:  Dionicio Bess   MRN:  [id number]    Patient not seen today secondary to pt declining therapy services due stating he "just woke up" and it being too late in the afternoon for therapy. Will follow-up when appropriate.    1/6/2024      "

## 2024-01-06 NOTE — PLAN OF CARE
Patient AAOx4.VS stable, afrebrile. Pain managed. Neurovascular intact. Free from falls. Frequent rounds made for safety, pain and comfort. Bed at lowest position, call light within reach, side rails up x2.    Problem: Adult Inpatient Plan of Care  Goal: Plan of Care Review  Outcome: Ongoing, Progressing     Problem: Skin Injury Risk Increased  Goal: Skin Health and Integrity  Outcome: Ongoing, Progressing     Problem: Fall Injury Risk  Goal: Absence of Fall and Fall-Related Injury  Outcome: Ongoing, Progressing

## 2024-01-06 NOTE — ASSESSMENT & PLAN NOTE
- Patient with imaging in ED that showed closed trimalleolar ankle fracture sustained after a ground level fall related to alcohol consumption.   - Ortho consulted for fracture and reduced and splinted in ED.  - Per Ortho at this time patient is too swollen for definitive fixation and plan is non-weight bearing to left lower extremity and keep left ankle iced and elevated to help with soft tissue swelling.   - Orthopedics will continue to follow while patient in hospital. From Orthopedic perspective, patient could follow up outpatient for definitive fixation of ankle but patient is homeless and stays at [facility] and they are unable to provide any assistance to patient and with his non weight bearing status will be difficult for him to get around so will need to remain in hospital until definitive fixation surgery can be done. Ortho aware and discussed and trying to come up with a plan.   - PT/OT consulted to work with patient while awaiting surgery.   - Pain controlled. Patient on Tylenol 1000 mg po TID + Robaxin 1000 mg po 4 times daily + Lyrica 75 mg po BID and will continue and continue with Oxycodone IR 5-10 mg po every 4 hours prn for breakthrough pain.   - Patient on his home Apixiban for atrial fibrillation so fully anticoagulated so no other DVT prophylaxis needed.   OR on 1/5 s/p ORIF left ankle trimal fx and open tx left ankle syndesmosis disruption c reduction and internal fixation, now NWB x 4-6 weeks with plan for WB in cam boot after that. In splint post op. Pain controlled. Will cont to work with PT/OT, and will likely sent for auth for rehab on Monday.

## 2024-01-06 NOTE — SUBJECTIVE & OBJECTIVE
"Interval History: he reports feeling well this morning post op and eating breakfast. Was hoping for more food as hungry and would like to ensure he has good protein to heal his surgical wounds and diet changed to double portions so he can maximize his intake for this. Labs stable yesterday and will repeat tomorrow. He reports pain controlled on regimen, he is going to have nursing write on the board what time the doses are due as that helped in previous days when he woke up so he knew if he was due or not for the meds. He is s/p ORIF of left trimal ankle fx and open tx of left ankle syndesmosis disruption and internal fixatino and plans for NWB fof 4-6 weeks and then wb in [facility]. He prefers [facility] top choice. He reports has been working in the past about getting social security so he can get his bills paid and have them take the money out for housing so he can get housing long term and has an agent he has worked with. He does not have a [employer]  and said he didn't know if he could have one as he doesn't have any income but I told him it can't hurt to try to work with them again long term and while he's in rehab he will have a phone and can work on this and he agreed that it is a good idea as well and will try to see if can get some of these numbers for him to work with in next few weeks for long term, he stays at the [facility] in the interim is his plan for after rehab.  He is very happy with the care here and calls everyone"angels".    Review of Systems   Constitutional:  Negative for fever.   Respiratory:  Negative for cough.    Cardiovascular:  Negative for chest pain.   Gastrointestinal:  Negative for abdominal pain and nausea.   Musculoskeletal:  Positive for arthralgias (Left ankle).   Neurological:  Positive for speech difficulty (Dysarthria at baseline from previous CVA) and weakness (Left sided from previous CVA). Negative for dizziness.   Psychiatric/Behavioral:  Negative for " agitation.      Objective:     Vital Signs (Most Recent):  Temp: 97.7 °F (36.5 °C) (01/06/24 0326)  Pulse: 62 (01/06/24 0859)  Resp: 18 (01/06/24 0859)  BP: 122/81 (01/06/24 0859)  SpO2: 100 % (01/06/24 0859) on room air  Vital Signs (24h Range):  Temp:  [97.5 °F (36.4 °C)-98.4 °F (36.9 °C)] 97.7 °F (36.5 °C)  Pulse:  [58-68] 62  Resp:  [14-20] 18  SpO2:  [94 %-100 %] 100 %  BP: ()/(63-87) 122/81     Weight: 91.6 kg (202 lb)  Body mass index is 27.4 kg/m².    Intake/Output Summary (Last 24 hours) at 1/6/2024 0908  Last data filed at 1/6/2024 0906  Gross per 24 hour   Intake 886 ml   Output 1950 ml   Net -1064 ml           Physical Exam  Vitals and nursing note reviewed.   Constitutional:       General: He is awake. He is not in acute distress.     Appearance: Normal appearance. He is well-developed and normal weight. He is not ill-appearing.   Cardiovascular:      Rate and Rhythm: Normal rate and regular rhythm.      Heart sounds: Normal heart sounds. No murmur heard.  Pulmonary:      Effort: Pulmonary effort is normal. No respiratory distress.      Breath sounds: Normal breath sounds. No wheezing.   Abdominal:      General: Abdomen is flat. Bowel sounds are normal. There is no distension.      Palpations: Abdomen is soft.      Tenderness: There is no abdominal tenderness.   Musculoskeletal:      Comments: Left leg in splint and elevated in bed with ice on it    Skin:     Findings: No erythema or rash.   Neurological:      Mental Status: He is alert and oriented to person, place, and time.   Psychiatric:         Mood and Affect: Mood normal.         Behavior: Behavior normal. Behavior is cooperative.         Thought Content: Thought content normal.         Judgment: Judgment normal.             Significant Labs: All pertinent labs within the past 24 hours have been reviewed.    Significant Imaging: I have reviewed all pertinent imaging results/findings within the past 24 hours.

## 2024-01-06 NOTE — ASSESSMENT & PLAN NOTE
Dionicio Bess is a 58 y.o. male with multiple medical comorbidities, an elevated troponin, elevated ETOH, new CT head findings concerning for infarct, and a left sided trimalleolar ankle fracture sustained after a ground level fall yesterday, closed and at neurovascular baseline for the LLE.     - Okay for diet   - NWB LLE  - Eliquis 5 BID  - PT/OT   - SW for DC planning     Dispo: pending discharge recs and assistance by GEORGE/ERVIN

## 2024-01-07 LAB
ALBUMIN SERPL BCP-MCNC: 3.2 G/DL (ref 3.5–5.2)
ALP SERPL-CCNC: 59 U/L (ref 55–135)
ALT SERPL W/O P-5'-P-CCNC: 10 U/L (ref 10–44)
ANION GAP SERPL CALC-SCNC: 7 MMOL/L (ref 8–16)
AST SERPL-CCNC: 21 U/L (ref 10–40)
BASOPHILS # BLD AUTO: 0.04 K/UL (ref 0–0.2)
BASOPHILS NFR BLD: 0.6 % (ref 0–1.9)
BILIRUB SERPL-MCNC: 0.4 MG/DL (ref 0.1–1)
BUN SERPL-MCNC: 18 MG/DL (ref 6–20)
CALCIUM SERPL-MCNC: 8.8 MG/DL (ref 8.7–10.5)
CHLORIDE SERPL-SCNC: 102 MMOL/L (ref 95–110)
CO2 SERPL-SCNC: 27 MMOL/L (ref 23–29)
CREAT SERPL-MCNC: 0.9 MG/DL (ref 0.5–1.4)
DIFFERENTIAL METHOD BLD: ABNORMAL
EOSINOPHIL # BLD AUTO: 0.2 K/UL (ref 0–0.5)
EOSINOPHIL NFR BLD: 3.1 % (ref 0–8)
ERYTHROCYTE [DISTWIDTH] IN BLOOD BY AUTOMATED COUNT: 13.4 % (ref 11.5–14.5)
EST. GFR  (NO RACE VARIABLE): >60 ML/MIN/1.73 M^2
GLUCOSE SERPL-MCNC: 101 MG/DL (ref 70–110)
HCT VFR BLD AUTO: 32.3 % (ref 40–54)
HGB BLD-MCNC: 10.8 G/DL (ref 14–18)
IMM GRANULOCYTES # BLD AUTO: 0.1 K/UL (ref 0–0.04)
IMM GRANULOCYTES NFR BLD AUTO: 1.4 % (ref 0–0.5)
LYMPHOCYTES # BLD AUTO: 1.8 K/UL (ref 1–4.8)
LYMPHOCYTES NFR BLD: 25.5 % (ref 18–48)
MCH RBC QN AUTO: 32.1 PG (ref 27–31)
MCHC RBC AUTO-ENTMCNC: 33.4 G/DL (ref 32–36)
MCV RBC AUTO: 96 FL (ref 82–98)
MONOCYTES # BLD AUTO: 1.1 K/UL (ref 0.3–1)
MONOCYTES NFR BLD: 15 % (ref 4–15)
NEUTROPHILS # BLD AUTO: 3.8 K/UL (ref 1.8–7.7)
NEUTROPHILS NFR BLD: 54.4 % (ref 38–73)
NRBC BLD-RTO: 0 /100 WBC
PLATELET # BLD AUTO: 265 K/UL (ref 150–450)
PMV BLD AUTO: 10.4 FL (ref 9.2–12.9)
POTASSIUM SERPL-SCNC: 3.7 MMOL/L (ref 3.5–5.1)
PROT SERPL-MCNC: 6.9 G/DL (ref 6–8.4)
RBC # BLD AUTO: 3.36 M/UL (ref 4.6–6.2)
SODIUM SERPL-SCNC: 136 MMOL/L (ref 136–145)
WBC # BLD AUTO: 6.99 K/UL (ref 3.9–12.7)

## 2024-01-07 PROCEDURE — 36415 COLL VENOUS BLD VENIPUNCTURE: CPT | Performed by: HOSPITALIST

## 2024-01-07 PROCEDURE — 85025 COMPLETE CBC W/AUTO DIFF WBC: CPT | Performed by: HOSPITALIST

## 2024-01-07 PROCEDURE — 97116 GAIT TRAINING THERAPY: CPT

## 2024-01-07 PROCEDURE — 25000003 PHARM REV CODE 250

## 2024-01-07 PROCEDURE — 97165 OT EVAL LOW COMPLEX 30 MIN: CPT

## 2024-01-07 PROCEDURE — 25000003 PHARM REV CODE 250: Performed by: INTERNAL MEDICINE

## 2024-01-07 PROCEDURE — 97164 PT RE-EVAL EST PLAN CARE: CPT

## 2024-01-07 PROCEDURE — 21400001 HC TELEMETRY ROOM

## 2024-01-07 PROCEDURE — 97530 THERAPEUTIC ACTIVITIES: CPT

## 2024-01-07 PROCEDURE — 80053 COMPREHEN METABOLIC PANEL: CPT | Performed by: HOSPITALIST

## 2024-01-07 RX ORDER — POLYETHYLENE GLYCOL 3350 17 G/17G
17 POWDER, FOR SOLUTION ORAL DAILY
Refills: 0
Start: 2024-01-07

## 2024-01-07 RX ORDER — PREGABALIN 75 MG/1
75 CAPSULE ORAL 2 TIMES DAILY
Qty: 60 CAPSULE | Refills: 6
Start: 2024-01-07 | End: 2024-07-07

## 2024-01-07 RX ORDER — OXYCODONE HYDROCHLORIDE 10 MG/1
10 TABLET ORAL EVERY 6 HOURS PRN
Refills: 0
Start: 2024-01-07 | End: 2024-01-22

## 2024-01-07 RX ORDER — ONDANSETRON 8 MG/1
8 TABLET, ORALLY DISINTEGRATING ORAL EVERY 8 HOURS PRN
Start: 2024-01-07

## 2024-01-07 RX ORDER — DIPHENHYDRAMINE HCL 25 MG
25 CAPSULE ORAL EVERY 6 HOURS PRN
Refills: 0
Start: 2024-01-07

## 2024-01-07 RX ORDER — ACETAMINOPHEN 500 MG
1000 TABLET ORAL EVERY 8 HOURS
Refills: 0
Start: 2024-01-07

## 2024-01-07 RX ORDER — FOLIC ACID 1 MG/1
1 TABLET ORAL DAILY
Refills: 0
Start: 2024-01-07 | End: 2025-01-06

## 2024-01-07 RX ORDER — SENNOSIDES 8.6 MG/1
1 TABLET ORAL DAILY
Start: 2024-01-07

## 2024-01-07 RX ORDER — IPRATROPIUM BROMIDE AND ALBUTEROL SULFATE 2.5; .5 MG/3ML; MG/3ML
3 SOLUTION RESPIRATORY (INHALATION) EVERY 4 HOURS PRN
Qty: 75 ML | Refills: 0
Start: 2024-01-07 | End: 2025-01-06

## 2024-01-07 RX ORDER — METHOCARBAMOL 1000 MG/1
1000 TABLET, COATED ORAL 4 TIMES DAILY
Start: 2024-01-07

## 2024-01-07 RX ORDER — TALC
6 POWDER (GRAM) TOPICAL NIGHTLY PRN
Refills: 0
Start: 2024-01-07

## 2024-01-07 RX ORDER — OXYCODONE HYDROCHLORIDE 5 MG/1
5 TABLET ORAL EVERY 6 HOURS PRN
Refills: 0
Start: 2024-01-07 | End: 2024-01-22

## 2024-01-07 RX ADMIN — SPIRONOLACTONE 25 MG: 25 TABLET ORAL at 08:01

## 2024-01-07 RX ADMIN — METHOCARBAMOL 1000 MG: 500 TABLET ORAL at 08:01

## 2024-01-07 RX ADMIN — ASPIRIN 81 MG CHEWABLE TABLET 81 MG: 81 TABLET CHEWABLE at 08:01

## 2024-01-07 RX ADMIN — METHOCARBAMOL 1000 MG: 500 TABLET ORAL at 01:01

## 2024-01-07 RX ADMIN — FOLIC ACID 1 MG: 1 TABLET ORAL at 08:01

## 2024-01-07 RX ADMIN — ACETAMINOPHEN 1000 MG: 500 TABLET ORAL at 06:01

## 2024-01-07 RX ADMIN — OXYCODONE HYDROCHLORIDE 10 MG: 10 TABLET ORAL at 04:01

## 2024-01-07 RX ADMIN — THIAMINE HCL TAB 100 MG 200 MG: 100 TAB at 08:01

## 2024-01-07 RX ADMIN — ATORVASTATIN CALCIUM 80 MG: 40 TABLET, FILM COATED ORAL at 08:01

## 2024-01-07 RX ADMIN — THERA TABS 1 TABLET: TAB at 08:01

## 2024-01-07 RX ADMIN — ACETAMINOPHEN 1000 MG: 500 TABLET ORAL at 10:01

## 2024-01-07 RX ADMIN — METHOCARBAMOL 1000 MG: 500 TABLET ORAL at 04:01

## 2024-01-07 RX ADMIN — ACETAMINOPHEN 1000 MG: 500 TABLET ORAL at 01:01

## 2024-01-07 RX ADMIN — PREGABALIN 75 MG: 75 CAPSULE ORAL at 08:01

## 2024-01-07 RX ADMIN — APIXABAN 5 MG: 5 TABLET, FILM COATED ORAL at 08:01

## 2024-01-07 RX ADMIN — FLUOXETINE HYDROCHLORIDE 20 MG: 20 CAPSULE ORAL at 08:01

## 2024-01-07 RX ADMIN — PANTOPRAZOLE SODIUM 20 MG: 20 TABLET, DELAYED RELEASE ORAL at 06:01

## 2024-01-07 RX ADMIN — FUROSEMIDE 40 MG: 40 TABLET ORAL at 08:01

## 2024-01-07 RX ADMIN — OXYCODONE HYDROCHLORIDE 10 MG: 10 TABLET ORAL at 10:01

## 2024-01-07 RX ADMIN — METOPROLOL SUCCINATE 50 MG: 50 TABLET, EXTENDED RELEASE ORAL at 08:01

## 2024-01-07 NOTE — PLAN OF CARE
Dionicio Bess is a 58 y.o. male admitted to [facility] [place] on 12/29/2023 for Closed trimalleolar fracture of left ankle. Patient previously being seen by PT but orders discontinued secondary to going to the OR on 1/5/24 for L ankle ORIF. Dionicio Bess tolerated re-evaluation well today. Educated pt on his post-op orthopedic precautions (LLE NWB to ankle), verbalized understanding. Pt was very happy to participate this morning; PT had communicated with his RN (Alexa) prior to session to ensure he had been pre-medicated (pain) for therapy. Reports 4-5/10 L ankle pain throughout session. Demonstrates independence with bed mobility. I placed his bedside commode adjacent to his bed and he was able to squat pivot from bed <> commode using hands and RLE and stand-by assistance. Practiced ambulation trial with L platform walker (has a baseline R MCA from 2023 resulting in LUE weakness, unable to grasp regular rolling walker handle with L hand). Ambulates 15 ft with L platform rolling walker and CGA of therapist at gait belt. During initial 10 ft of trial, L foot (in splint) obviously touching floor but didn't appear to be any true weight being displaced to leg so allowed him to continue. During final 5 ft of ambulation it became obvious that he was displacing increased weight onto the L leg splint in standing so PT/OT stopped ambulation trial and had him sit down in his trailing bedside chair. Comfortable reclined in bedside chair with LLE elevated on pillows and ice re-applied. Patient presents with good participation and motivation to return to prior level of function with high intensity therapy. The patient demonstrates appropriate endurance and strength to participate in up to 3 hours or 15 hrs of combined therapy post acute. Discussed PT role, POC, and goals with patient; verbalized understanding. Dionicio Bess would benefit from acute PT services to promote mobility during this admission and improve return to PLOF.    Problem: Physical  Therapy  Goal: Physical Therapy Goal  Description: Goals to be met by: 2024    Patient will increase functional independence with mobility by performin. Sit to stand transfer with Supervision to L platform RW while maintaining L LE NWB - Not met  2. Gait x 50 feet with Stand-by Assistance using left platform walker while maintaining L LE NWB - Not met  Outcome: Ongoing, Progressing    Michael White, PT  2024

## 2024-01-07 NOTE — PROGRESS NOTES
[facility] - Surgery  Orthopedics  Progress Note    Patient Name: Dionicio Bess  MRN: [id number]  Admission Date: 12/29/2023  Hospital Length of Stay: 5 days  Attending Provider: Ashlyn Ramos MD  Primary Care Provider: Isela Primary Doctor  Follow-up For: Procedure(s) (LRB):  ORIF, ANKLE - LEFT (Left)  FIXATION, SYNDESMOSIS, ANKLE (Left)    Post-Operative Day: 2 Days Post-Op  Subjective:     Principal Problem:Closed trimalleolar fracture of left ankle    Principal Orthopedic Problem: same    Interval History: NAEON. VSS. AF. Tolerating PO. Having BM's. Able to mobilize with PT. Awaiting placement.     Review of patient's allergies indicates:  No Known Allergies    Current Facility-Administered Medications   Medication    acetaminophen tablet 1,000 mg    albuterol-ipratropium 2.5 mg-0.5 mg/3 mL nebulizer solution 3 mL    aluminum-magnesium hydroxide-simethicone 200-200-20 mg/5 mL suspension 30 mL    amLODIPine tablet 10 mg    apixaban tablet 5 mg    aspirin chewable tablet 81 mg    atorvastatin tablet 80 mg    bisacodyL suppository 10 mg    diphenhydrAMINE capsule 25 mg    FLUoxetine capsule 20 mg    folic acid tablet 1 mg    furosemide tablet 40 mg    LORazepam injection 1 mg    losartan tablet 25 mg    melatonin tablet 6 mg    methocarbamoL tablet 1,000 mg    metoprolol succinate (TOPROL-XL) 24 hr tablet 50 mg    multivitamin tablet    naloxone 0.4 mg/mL injection 0.02 mg    ondansetron disintegrating tablet 8 mg    oxyCODONE immediate release tablet 5 mg    oxyCODONE immediate release tablet Tab 10 mg    pantoprazole EC tablet 20 mg    polyethylene glycol packet 17 g    pregabalin capsule 75 mg    prochlorperazine injection Soln 5 mg    senna tablet 8.6 mg    simethicone chewable tablet 80 mg    sodium chloride 0.9% flush 5 mL    spironolactone tablet 25 mg    thiamine tablet 200 mg     Objective:     Vital Signs (Most Recent):  Temp: 96.9 °F (36.1 °C) (01/07/24 0833)  Pulse: 83 (01/07/24 0833)  Resp: 18 (01/07/24  1007)  BP: 126/89 (01/07/24 0833)  SpO2: 98 % (01/07/24 0833) Vital Signs (24h Range):  Temp:  [96.9 °F (36.1 °C)-98.3 °F (36.8 °C)] 96.9 °F (36.1 °C)  Pulse:  [] 83  Resp:  [16-18] 18  SpO2:  [98 %-100 %] 98 %  BP: (102-126)/(63-89) 126/89     Weight: 91.6 kg (202 lb)  Height: 6' (182.9 cm)  Body mass index is 27.4 kg/m².    No intake or output data in the 24 hours ending 01/07/24 1018      Ortho/SPM Exam     LUE  AROM of shoulder, elbow, wrist, and fingers limited from prior stroke, flexion contracture at the wrist and fingers, passively correctable   SILT throughout    LLE:  In splint, c/d/i  SILT  Actively wiggle toes    5/5 strength with hip flexion and knee flexion/extension   Cap refill <2s    Significant Labs:   All pertinent labs within the past 24 hours have been reviewed.    Significant Imaging: I have reviewed and interpreted all pertinent imaging results/findings.  Assessment/Plan:     * Closed trimalleolar fracture of left ankle  Dionicio Bess is a 58 y.o. male with multiple medical comorbidities, an elevated troponin, elevated ETOH, new CT head findings concerning for infarct, and a left sided trimalleolar ankle fracture sustained after a ground level fall yesterday, closed and at neurovascular baseline for the LLE.     - Okay for diet   - NWB LLE  - Eliquis 5 BID  - PT/OT   - SW for DC planning     Dispo: stable for dc from ortho perspective     TRACY Ferrara MD  Orthopedics  [facility] - Surgery

## 2024-01-07 NOTE — PT/OT/SLP RE-EVAL
Physical Therapy  To-Hp-Bcjmitxxts and Treatment    Dionicio Bess   [id number]    Time Tracking:     PT Received On: 01/07/24   PT Start Time: 1031   PT Stop Time: 1050   PT Total Time (min): 19 min    Billable Minutes: Re-eval 9 and Gait Training 10 minutes    *This session was completed as a [id number] with OT secondary to pt's first time practicing ambulation since surgery on 1/5/24*      Recommendations:     Therapy Intensity Recommendations at Discharge: High Intensity Therapy     Equipment Needed After Discharge: wheelchair, L platform rolling walker, bedside commode    Barriers to Discharge: Not ready to discharge back to his shelter from a mobility standpoint, needs further therapy.    Patient Information:     Recent Surgery: Procedure(s) (LRB):  ORIF, ANKLE - LEFT (Left)  FIXATION, SYNDESMOSIS, ANKLE (Left) 2 Days Post-Op    Diagnosis: Closed trimalleolar fracture of left ankle    Length of Stay: 5 days    General Precautions: Standard, fall  Orthopedic Precautions: LLE non weight bearing  Brace: N/A    Assessment:     Dionicio Bess is a 58 y.o. male admitted to [facility] on 12/29/2023 for Closed trimalleolar fracture of left ankle. Patient previously being seen by PT but orders discontinued secondary to going to the OR on 1/5/24 for L ankle ORIF. Dionicio Bess tolerated re-evaluation well today. Educated pt on his post-op orthopedic precautions (LLE NWB to ankle), verbalized understanding. Pt was very happy to participate this morning; PT had communicated with his RN (Alexa) prior to session to ensure he had been pre-medicated (pain) for therapy. Reports 4-5/10 L ankle pain throughout session. Demonstrates independence with bed mobility. I placed his bedside commode adjacent to his bed and he was able to squat pivot from bed <> commode using hands and RLE and stand-by assistance. Practiced ambulation trial with L platform walker (has a baseline R MCA from 2023 resulting in LUE weakness, unable to grasp regular  rolling walker handle with L hand). Ambulates 15 ft with L platform rolling walker and CGA of therapist at gait belt. During initial 10 ft of trial, L foot (in splint) obviously touching floor but didn't appear to be any true weight being displaced to leg so allowed him to continue. During final 5 ft of ambulation it became obvious that he was displacing increased weight onto the L leg splint in standing so PT/OT stopped ambulation trial and had him sit down in his trailing bedside chair. Comfortable reclined in bedside chair with LLE elevated on pillows and ice re-applied. Patient presents with good participation and motivation to return to prior level of function with high intensity therapy. The patient demonstrates appropriate endurance and strength to participate in up to 3 hours or 15hrs of combined therapy post acute. Discussed PT role, POC, and goals with patient; verbalized understanding. Dionicio Bess would benefit from acute PT services to promote mobility during this admission and improve return to PLOF.    Problem List: weakness, impaired endurance, impaired self care skills, impaired functional mobility, gait instability, impaired balance, pain, decreased lower extremity function, decreased upper extremity function, decreased ROM, abnormal tone, orthopedic precautions, impaired cardiopulmonary response to activity    Rehab Prognosis: Good; patient would benefit from acute skilled PT services to address these deficits and reach maximum level of function.    Plan:     Patient to be seen 5 x/week to address the above listed problems via gait training, therapeutic activities, therapeutic exercises, neuromuscular re-education, wheelchair management/training    Plan of Care Expires: 02/05/24  Plan of Care reviewed with: patient    Subjective:     Communicated with RN Tram prior to re-evaluation, RN was able to pre-medicate for pain, appropriate to see for re-evaluation.    Pt found supine in bed (HOB elevated)  "upon PT entry to room, agreeable to re-evaluation.    Patient commenting: "If you make me grin, you're in."    Does this patient have any cultural, spiritual, Hindu conflicts given the current situation? Patient has no barriers to learning. Patient verbalizes understanding of his/her program and goals and demonstrates them correctly. No cultural, spiritual, or educational needs identified.    Past Medical History:   Diagnosis Date    CAD (coronary artery disease)     HLD (hyperlipidemia)     HTN (hypertension)     Stroke     R MCA 2/2023    History reviewed. No pertinent surgical history.    Objective:     Patient found with: telemetry, cryotherapy    Pain:  Pain Rating 1: 5/10  Location - Side 1: Left  Location - Orientation 1: generalized  Location 1: ankle  Pain Addressed 1: Reposition, Distraction  Pain Rating Post-Intervention 1: 5/10    Cognitive Exam:  Patient is oriented to Person, Place, Time, and Situation.  Patient follows 100% of single-step commands.    Sensation:   Intact at BLE to light touch, intact at toes to L foot, good active range of L toes    Lower Extremity Range of Motion:  Right Lower Extremity: WFL actively  Left Lower Extremity: WFL actively except ankle NT    Lower Extremity Strength:  Right Lower Extremity: WFL  Left Lower Extremity: grossly 3/5 via MMT except ankle NT    Functional Mobility:    Bed Mobility:  Supine to Sitting: Modified Independent    Transfers:  Bed to Commode: Stand-By Assist from bed to adjacent commode with no AD via squat pivot towards R side x 1 trial  Commode to Bed: Stand-By Assist from commode to adjacent bed with no AD via squat pivot towards L side x 1 trial    Sit to Stand: Contact-Guard Assist from EOB with L platform RW x 1 trial(s)    Gait:  15 feet with L platform rolling walker and CGA of therapist at gait belt.  During initial 10 ft of trial, L foot (in splint) obviously touching floor but didn't appear to be any true weight being displaced to leg " so allowed him to continue.  During final 5 ft of ambulation it became obvious that he was displacing increased weight onto the L leg splint in standing so PT/OT stopped ambulation trial and had him sit down in his trailing bedside chair    Assist level: Contact-Guard Assist  Device:  L platform rolling walker    Balance:  Static Sit: Independent at EOB    Static Stand: Stand-By Assist with  L platform rolling walker; able to maintain LLE NWB in static stance with device    AM-PAC 6 CLICK MOBILITY  Turning over in bed (including adjusting bedclothes, sheets and blankets)?: 4  Sitting down on and standing up from a chair with arms (e.g., wheelchair, bedside commode, etc.): 4  Moving from lying on back to sitting on the side of the bed?: 4  Moving to and from a bed to a chair (including a wheelchair)?: 4  Need to walk in hospital room?: 3  Climbing 3-5 steps with a railing?: 1  Basic Mobility Total Score: 20    Patient was left reclined in bedside chair with all lines intact, call button in reach, and RN (Alexa) present.    GOALS:   Multidisciplinary Problems       Physical Therapy Goals          Problem: Physical Therapy    Goal Priority Disciplines Outcome Goal Variances Interventions   Physical Therapy Goal     PT, PT/OT Ongoing, Progressing     Description: Goals to be met by: 2024    Patient will increase functional independence with mobility by performin. Sit to stand transfer with Supervision to L platform RW while maintaining L LE NWB - Not met  2. Gait x 50 feet with Stand-by Assistance using left platform walker while maintaining L LE NWB - Not met                       Michael White, PT  2024

## 2024-01-07 NOTE — SUBJECTIVE & OBJECTIVE
Interval History: he was eating breakfast and portions better today. He reports that he was sleeping yesterday when therapy came and that likes to have advanced notice so he can premedicate with pain meds and prepare himself and was surprised and awakened from sleep yesterday but it happy to do therapy today. I let him know usually at rehab patients have told me that they write it on the baord the times that each thearpy is coming as its a more regimented environment as they know how many patients they have each day due to a limited number of beds. Labs stable on review this morning.     Review of Systems   Constitutional:  Negative for fever.   Respiratory:  Negative for cough.    Cardiovascular:  Negative for chest pain.   Gastrointestinal:  Negative for abdominal pain and nausea.   Musculoskeletal:  Positive for arthralgias (Left ankle).   Neurological:  Positive for speech difficulty (Dysarthria at baseline from previous CVA) and weakness (Left sided from previous CVA). Negative for dizziness.   Psychiatric/Behavioral:  Negative for agitation.      Objective:     Vital Signs (Most Recent):  Temp: 96.9 °F (36.1 °C) (01/07/24 0833)  Pulse: 83 (01/07/24 0833)  Resp: 16 (01/07/24 0503)  BP: 126/89 (01/07/24 0833)  SpO2: 98 % (01/07/24 0833) on room air  Vital Signs (24h Range):  Temp:  [96.9 °F (36.1 °C)-98.3 °F (36.8 °C)] 96.9 °F (36.1 °C)  Pulse:  [] 83  Resp:  [16-18] 16  SpO2:  [98 %-100 %] 98 %  BP: (102-126)/(63-89) 126/89     Weight: 91.6 kg (202 lb)  Body mass index is 27.4 kg/m².  No intake or output data in the 24 hours ending 01/07/24 0949        Physical Exam  Vitals and nursing note reviewed.   Constitutional:       General: He is awake. He is not in acute distress.     Appearance: Normal appearance. He is well-developed and normal weight. He is not ill-appearing.   Cardiovascular:      Rate and Rhythm: Normal rate and regular rhythm.      Heart sounds: Normal heart sounds. No murmur  heard.  Pulmonary:      Effort: Pulmonary effort is normal. No respiratory distress.      Breath sounds: Normal breath sounds. No wheezing.   Abdominal:      General: Abdomen is flat. Bowel sounds are normal. There is no distension.      Palpations: Abdomen is soft.      Tenderness: There is no abdominal tenderness.   Musculoskeletal:      Comments: Left leg in splint and elevated in bed with ice on it    Skin:     Findings: No erythema or rash.   Neurological:      Mental Status: He is alert and oriented to person, place, and time.   Psychiatric:         Mood and Affect: Mood normal.         Behavior: Behavior normal. Behavior is cooperative.         Thought Content: Thought content normal.         Judgment: Judgment normal.             Significant Labs: All pertinent labs within the past 24 hours have been reviewed.    Significant Imaging: I have reviewed all pertinent imaging results/findings within the past 24 hours.

## 2024-01-07 NOTE — PLAN OF CARE
[facility]    FACILITY TRANSFER ORDERS      Patient Name: Dionicio Bess  YOB: 1965    PCP: No, Primary Doctor   PCP Address: None  PCP Phone Number: None  PCP Fax: None    Encounter Date: 01/08/2024    Admit to: inpatient rehab    Vital Signs:  Routine    Diagnoses:   Active Hospital Problems    Diagnosis  POA    *Closed trimalleolar fracture of left ankle [S82.852A]  Yes    Syndesmotic disruption of left ankle [S93.432A]  Yes    Stroke due to embolism of right middle cerebral artery [I63.411]  Yes    Alcohol dependence with uncomplicated intoxication [F10.220]  Yes    Pure hypercholesterolemia [E78.00]  Yes    Coronary artery disease involving native coronary artery with angina pectoris [I25.119]  Yes     Chronic    Chronic combined systolic and diastolic congestive heart failure [I50.42]  Yes    Paroxysmal atrial fibrillation [I48.0]  Yes     Chronic    Essential hypertension [I10]  Yes     Chronic      Resolved Hospital Problems    Diagnosis Date Resolved POA    Hypokalemia [E87.6] 01/02/2024 No    Non-cardiac chest pain [R07.89] 01/01/2024 Yes    Blood alcohol level of 240 mg/100 ml or more [Y90.8] 01/02/2024 Yes       Allergies:Review of patient's allergies indicates:  No Known Allergies    Diet: regular diet    Activities: NWB to LLE for 4-6 weeks    Goals of Care Treatment Preferences:  Code Status: Full Code      Nursing: keep LLE in splint until orthopedics follow up. Continue to elevate and ice to help with swelling.       CONSULTS:    Physical Therapy to evaluate and treat.  and Occupational Therapy to evaluate and treat.    MISCELLANEOUS CARE:  Routine Skin for Bedridden Patients: Apply moisture barrier cream to all skin folds and wet areas in perineal area daily and after baths and all bowel movements.    WOUND CARE ORDERS  Keep LLE in splint until orthopedics follow up.    Medications: Review discharge medications with patient and family and provide education.      Current  Discharge Medication List        START taking these medications    Details   acetaminophen (TYLENOL) 500 MG tablet Take 2 tablets (1,000 mg total) by mouth every 8 (eight) hours.  Refills: 0      albuterol-ipratropium (DUO-NEB) 2.5 mg-0.5 mg/3 mL nebulizer solution Take 3 mLs by nebulization every 4 (four) hours as needed for Wheezing or Shortness of Breath. Rescue  Qty: 75 mL, Refills: 0      diphenhydrAMINE (BENADRYL) 25 mg capsule Take 1 capsule (25 mg total) by mouth every 6 (six) hours as needed for Itching.  Refills: 0      folic acid (FOLVITE) 1 MG tablet Take 1 tablet (1 mg total) by mouth once daily.  Refills: 0      melatonin (MELATIN) 3 mg tablet Take 2 tablets (6 mg total) by mouth nightly as needed for Insomnia.  Refills: 0      methocarbamoL 1,000 mg Tab Take 1,000 mg by mouth 4 (four) times daily.      multivitamin Tab Take 1 tablet by mouth once daily.      ondansetron (ZOFRAN-ODT) 8 MG TbDL Take 1 tablet (8 mg total) by mouth every 8 (eight) hours as needed (nausea).      !! oxyCODONE (ROXICODONE) 10 mg Tab immediate release tablet Take 1 tablet (10 mg total) by mouth every 6 (six) hours as needed (pain scale 7-10).  Refills: 0    Comments: Quantity prescribed more than 7 day supply? No      !! oxyCODONE (ROXICODONE) 5 MG immediate release tablet Take 1 tablet (5 mg total) by mouth every 6 (six) hours as needed (pain scale 4-6).  Refills: 0    Comments: Quantity prescribed more than 7 day supply? No      polyethylene glycol (GLYCOLAX) 17 gram PwPk Take 17 g by mouth once daily.  Refills: 0      pregabalin (LYRICA) 75 MG capsule Take 1 capsule (75 mg total) by mouth 2 (two) times daily.  Qty: 60 capsule, Refills: 6      senna (SENOKOT) 8.6 mg tablet Take 1 tablet by mouth once daily.       !! - Potential duplicate medications found. Please discuss with provider.        CONTINUE these medications which have NOT CHANGED    Details   metoprolol succinate (TOPROL-XL) 50 MG 24 hr tablet Take 1 tablet (50  mg total) by mouth once daily.  Qty: 30 tablet, Refills: 2    Comments: .      amLODIPine (NORVASC) 10 MG tablet Take 1 tablet (10 mg total) by mouth once daily.  Qty: 30 tablet, Refills: 2    Comments: .      apixaban (ELIQUIS) 5 mg Tab Take 1 tablet (5 mg total) by mouth 2 (two) times daily.  Qty: 60 tablet, Refills: 2      aspirin 81 MG Chew Chew and swallow 1 (1 mg total) by mouth once daily.  Qty: 90 tablet, Refills: 0      atorvastatin (LIPITOR) 80 MG tablet Take 1 tablet (80 mg total) by mouth every evening.  Qty: 90 tablet, Refills: 0      FLUoxetine 20 MG capsule Take 1 capsule (20 mg total) by mouth once daily.  Qty: 30 capsule, Refills: 2      furosemide (LASIX) 40 MG tablet Take 1 tablet (40 mg total) by mouth once daily.  Qty: 30 tablet, Refills: 2      JARDIANCE 10 mg tablet Take 1 tablet (10 mg total) by mouth once daily.  Qty: 30 tablet, Refills: 2      losartan (COZAAR) 25 MG tablet Take 1 tablet (25 mg total) by mouth once daily.  Qty: 90 tablet, Refills: 0    Comments: .      pantoprazole (PROTONIX) 20 MG tablet Take 1 tablet (20 mg total) by mouth every morning.  Qty: 30 tablet, Refills: 2      spironolactone (ALDACTONE) 25 MG tablet Take 1 tablet (25 mg total) by mouth once daily.  Qty: 90 tablet, Refills: 0    Comments: .      thiamine 100 MG tablet Take 2 tablets (200 mg total) by mouth once daily.  Qty: 60 tablet, Refills: 2                    _________________________________  Ashlyn Ramos MD  01/08/2024

## 2024-01-07 NOTE — SUBJECTIVE & OBJECTIVE
Principal Problem:Closed trimalleolar fracture of left ankle    Principal Orthopedic Problem: same    Interval History: NAEON. VSS. AF. Tolerating PO. Having BM's. Able to mobilize with PT. Awaiting placement.     Review of patient's allergies indicates:  No Known Allergies    Current Facility-Administered Medications   Medication    acetaminophen tablet 1,000 mg    albuterol-ipratropium 2.5 mg-0.5 mg/3 mL nebulizer solution 3 mL    aluminum-magnesium hydroxide-simethicone 200-200-20 mg/5 mL suspension 30 mL    amLODIPine tablet 10 mg    apixaban tablet 5 mg    aspirin chewable tablet 81 mg    atorvastatin tablet 80 mg    bisacodyL suppository 10 mg    diphenhydrAMINE capsule 25 mg    FLUoxetine capsule 20 mg    folic acid tablet 1 mg    furosemide tablet 40 mg    LORazepam injection 1 mg    losartan tablet 25 mg    melatonin tablet 6 mg    methocarbamoL tablet 1,000 mg    metoprolol succinate (TOPROL-XL) 24 hr tablet 50 mg    multivitamin tablet    naloxone 0.4 mg/mL injection 0.02 mg    ondansetron disintegrating tablet 8 mg    oxyCODONE immediate release tablet 5 mg    oxyCODONE immediate release tablet Tab 10 mg    pantoprazole EC tablet 20 mg    polyethylene glycol packet 17 g    pregabalin capsule 75 mg    prochlorperazine injection Soln 5 mg    senna tablet 8.6 mg    simethicone chewable tablet 80 mg    sodium chloride 0.9% flush 5 mL    spironolactone tablet 25 mg    thiamine tablet 200 mg     Objective:     Vital Signs (Most Recent):  Temp: 96.9 °F (36.1 °C) (01/07/24 0833)  Pulse: 83 (01/07/24 0833)  Resp: 18 (01/07/24 1007)  BP: 126/89 (01/07/24 0833)  SpO2: 98 % (01/07/24 0833) Vital Signs (24h Range):  Temp:  [96.9 °F (36.1 °C)-98.3 °F (36.8 °C)] 96.9 °F (36.1 °C)  Pulse:  [] 83  Resp:  [16-18] 18  SpO2:  [98 %-100 %] 98 %  BP: (102-126)/(63-89) 126/89     Weight: 91.6 kg (202 lb)  Height: 6' (182.9 cm)  Body mass index is 27.4 kg/m².    No intake or output data in the 24 hours ending 01/07/24  1018       Ortho/SPM Exam     LUE  AROM of shoulder, elbow, wrist, and fingers limited from prior stroke, flexion contracture at the wrist and fingers, passively correctable   SILT throughout    LLE:  In splint, c/d/i  SILT  Actively wiggle toes    5/5 strength with hip flexion and knee flexion/extension   Cap refill <2s    Significant Labs:   All pertinent labs within the past 24 hours have been reviewed.    Significant Imaging: I have reviewed and interpreted all pertinent imaging results/findings.

## 2024-01-08 VITALS
WEIGHT: 202 LBS | SYSTOLIC BLOOD PRESSURE: 128 MMHG | OXYGEN SATURATION: 98 % | HEIGHT: 72 IN | DIASTOLIC BLOOD PRESSURE: 63 MMHG | BODY MASS INDEX: 27.36 KG/M2 | HEART RATE: 73 BPM | TEMPERATURE: 98 F | RESPIRATION RATE: 77 BRPM

## 2024-01-08 LAB
CREAT UR-MCNC: 20 MG/DL (ref 23–375)
OSMOLALITY UR: 164 MOSM/KG (ref 50–1200)

## 2024-01-08 PROCEDURE — 99222 1ST HOSP IP/OBS MODERATE 55: CPT | Mod: ,,, | Performed by: NURSE PRACTITIONER

## 2024-01-08 PROCEDURE — 82570 ASSAY OF URINE CREATININE: CPT | Performed by: HOSPITALIST

## 2024-01-08 PROCEDURE — 97530 THERAPEUTIC ACTIVITIES: CPT

## 2024-01-08 PROCEDURE — 97112 NEUROMUSCULAR REEDUCATION: CPT

## 2024-01-08 PROCEDURE — 25000003 PHARM REV CODE 250: Performed by: PHYSICIAN ASSISTANT

## 2024-01-08 PROCEDURE — 97116 GAIT TRAINING THERAPY: CPT

## 2024-01-08 PROCEDURE — 97535 SELF CARE MNGMENT TRAINING: CPT

## 2024-01-08 PROCEDURE — 25000003 PHARM REV CODE 250

## 2024-01-08 PROCEDURE — 83935 ASSAY OF URINE OSMOLALITY: CPT | Performed by: HOSPITALIST

## 2024-01-08 PROCEDURE — 94761 N-INVAS EAR/PLS OXIMETRY MLT: CPT

## 2024-01-08 PROCEDURE — 25000003 PHARM REV CODE 250: Performed by: INTERNAL MEDICINE

## 2024-01-08 PROCEDURE — 99223 1ST HOSP IP/OBS HIGH 75: CPT | Mod: ,,, | Performed by: NURSE PRACTITIONER

## 2024-01-08 RX ADMIN — LOSARTAN POTASSIUM 25 MG: 25 TABLET, FILM COATED ORAL at 08:01

## 2024-01-08 RX ADMIN — APIXABAN 5 MG: 5 TABLET, FILM COATED ORAL at 08:01

## 2024-01-08 RX ADMIN — METHOCARBAMOL 1000 MG: 500 TABLET ORAL at 12:01

## 2024-01-08 RX ADMIN — METHOCARBAMOL 1000 MG: 500 TABLET ORAL at 08:01

## 2024-01-08 RX ADMIN — THERA TABS 1 TABLET: TAB at 08:01

## 2024-01-08 RX ADMIN — SENNOSIDES 8.6 MG: 8.6 TABLET, FILM COATED ORAL at 08:01

## 2024-01-08 RX ADMIN — ASPIRIN 81 MG CHEWABLE TABLET 81 MG: 81 TABLET CHEWABLE at 08:01

## 2024-01-08 RX ADMIN — ACETAMINOPHEN 1000 MG: 500 TABLET ORAL at 05:01

## 2024-01-08 RX ADMIN — PREGABALIN 75 MG: 75 CAPSULE ORAL at 08:01

## 2024-01-08 RX ADMIN — OXYCODONE HYDROCHLORIDE 10 MG: 10 TABLET ORAL at 12:01

## 2024-01-08 RX ADMIN — FLUOXETINE HYDROCHLORIDE 20 MG: 20 CAPSULE ORAL at 08:01

## 2024-01-08 RX ADMIN — POLYETHYLENE GLYCOL 3350 17 G: 17 POWDER, FOR SOLUTION ORAL at 09:01

## 2024-01-08 RX ADMIN — PANTOPRAZOLE SODIUM 20 MG: 20 TABLET, DELAYED RELEASE ORAL at 08:01

## 2024-01-08 RX ADMIN — AMLODIPINE BESYLATE 10 MG: 5 TABLET ORAL at 08:01

## 2024-01-08 RX ADMIN — FUROSEMIDE 40 MG: 40 TABLET ORAL at 08:01

## 2024-01-08 RX ADMIN — THIAMINE HCL TAB 100 MG 200 MG: 100 TAB at 08:01

## 2024-01-08 RX ADMIN — SPIRONOLACTONE 25 MG: 25 TABLET ORAL at 08:01

## 2024-01-08 RX ADMIN — METOPROLOL SUCCINATE 50 MG: 50 TABLET, EXTENDED RELEASE ORAL at 08:01

## 2024-01-08 RX ADMIN — FOLIC ACID 1 MG: 1 TABLET ORAL at 08:01

## 2024-01-08 NOTE — SUBJECTIVE & OBJECTIVE
Interval History: he's in good spirits today. Pain controlled. Had a good PT session yesterday with Michael and happy with his progress. Per PT not ready to be safely doing outpatient therapy/at [facility] and recommendation is still to do IP rehab first before this. He asked about pcp here and will see if CM is able to set up this. Will have ortho f/u in about a month.  Auth sent for rehab    Review of Systems   Constitutional:  Negative for fever.   Respiratory:  Negative for cough.    Cardiovascular:  Negative for chest pain.   Gastrointestinal:  Negative for abdominal pain and nausea.   Musculoskeletal:  Positive for arthralgias (Left ankle).   Neurological:  Positive for speech difficulty (Dysarthria at baseline from previous CVA) and weakness (Left sided from previous CVA). Negative for dizziness.   Psychiatric/Behavioral:  Negative for agitation.      Objective:     Vital Signs (Most Recent):  Temp: 98.2 °F (36.8 °C) (01/08/24 1111)  Pulse: 73 (01/08/24 1120)  Resp: 18 (01/08/24 1111)  BP: (!) 94/53 (01/08/24 1111)  SpO2: 98 % (01/08/24 1111) on room air  Vital Signs (24h Range):  Temp:  [97.6 °F (36.4 °C)-98.7 °F (37.1 °C)] 98.2 °F (36.8 °C)  Pulse:  [61-75] 73  Resp:  [16-18] 18  SpO2:  [94 %-100 %] 98 %  BP: ()/(53-91) 94/53     Weight: 91.6 kg (202 lb)  Body mass index is 27.4 kg/m².    Intake/Output Summary (Last 24 hours) at 1/8/2024 1148  Last data filed at 1/7/2024 1540  Gross per 24 hour   Intake --   Output 400 ml   Net -400 ml           Physical Exam  Vitals and nursing note reviewed.   Constitutional:       General: He is awake. He is not in acute distress.     Appearance: Normal appearance. He is well-developed and normal weight. He is not ill-appearing.   Cardiovascular:      Rate and Rhythm: Normal rate and regular rhythm.      Heart sounds: Normal heart sounds. No murmur heard.  Pulmonary:      Effort: Pulmonary effort is normal. No respiratory distress.      Breath sounds: Normal  breath sounds. No wheezing.   Abdominal:      General: Abdomen is flat. Bowel sounds are normal. There is no distension.      Palpations: Abdomen is soft.      Tenderness: There is no abdominal tenderness.   Musculoskeletal:      Comments: Left leg in splint and elevated in bed with ice on it    Skin:     Findings: No erythema or rash.   Neurological:      Mental Status: He is alert and oriented to person, place, and time.   Psychiatric:         Mood and Affect: Mood normal.         Behavior: Behavior normal. Behavior is cooperative.         Thought Content: Thought content normal.         Judgment: Judgment normal.             Significant Labs: All pertinent labs within the past 24 hours have been reviewed.    Significant Imaging: I have reviewed all pertinent imaging results/findings within the past 24 hours.

## 2024-01-08 NOTE — HPI
Dionicio Bess is a 58-year-old male with PMHx of pAfib (on eliquis), HTN, R MCA with residual L sided deficits 2/2023, CAD s/p PCI, HLD, HFrEF (EF 25-30%). Patient presented to [facility] on 12/29/23 for swelling and pain to his L ankle after a ground level mechanical fall. Patient reports he tripped on a step. Imaging revealed a left sided functional bimalleolar ankle fracture. CTH with hypodensity and MRI brain showed small infarcts in R MCA territory. [facility] reports an incidental finding given patient reports his weakness and dysarthria are at baseline with no recent change. Etiology of these infarcts likely cardioembolic from known Afib. No further stroke workup indicated at this time. Recommend continuing anticoagulation with Eliquis 5 mg BID. Ortho proceeded with fixation of Left trimalleolar ankle fracture on 1/5/24. Per Ortho nonweightbearing for 4-6 weeks pending fracture healing then begin weight-bearing as tolerated in a Cam boot  Physical occupational therapy to work on crutch walking.     Functional History: Patient lives at the [facility] and is homeless. Prior to admission, [place]. DME: .

## 2024-01-08 NOTE — HOSPITAL COURSE
1/7/24: Participated w/ PT & OT. 15 feet with L platform rolling walker and CGA of therapist at gait belt.  During initial 10 ft of trial, L foot (in splint) obviously touching floor but didn't appear to be any true weight being displaced to leg so allowed him to continue.  During final 5 ft of ambulation it became obvious that he was displacing increased weight onto the L leg splint in standing so PT/OT stopped ambulation trial and had him sit down in his trailing bedside chair  Assist level: Contact-Guard Assist  Device:  L platform rolling walker

## 2024-01-08 NOTE — ASSESSMENT & PLAN NOTE
- Ortho proceeded with fixation of Left trimalleolar ankle fracture on 1/5/24.   - Per Ortho nonweightbearing for 4-6 weeks pending fracture healing then begin weight-bearing as tolerated in a [facility] boot  Physical occupational therapy to work on crutch walking.     Recommendations  -  Encourage mobility, OOB in chair at least 3 hours per day, and early ambulation as appropriate  -  PT/OT evaluate and treat  -  Pain management  -  Monitor for and prevent skin breakdown and pressure ulcers  Early mobility, repositioning/weight shifting every 20-30 minutes when sitting, turn patient every 2 hours, proper mattress/overlay and chair cushioning, pressure relief/heel protector boots  -  DVT prophylaxis    -  Reviewed discharge options (IP rehab, SNF, HH therapy, and OP therapy)

## 2024-01-08 NOTE — PLAN OF CARE
[facility]  Discharge Final Note    Primary Care Provider: No, Primary Doctor    Expected Discharge Date: 1/8/2024    Final Discharge Note (most recent)       Final Note - 01/08/24 1450          Final Note    Assessment Type Final Discharge Note     Anticipated Discharge Disposition Rehab Facility     What phone number can be called within the next 1-3 days to see how you are doing after discharge? --   [phone]       Post-Acute Status    Post-Acute Authorization Placement     Post-Acute Placement Status Set-up Complete/Auth obtained                     Important Message from Medicare             Contact Info       [facility] [address]   Specialty: Orthopedics    [address]  [facility] [address]   Phone: [phone]       Next Steps: Follow up    Instructions: 2-4 weeks for follow up            Future Appointments   Date Time Provider Department Center   1/9/2024 11:00 AM Sami Glez MD [facility] [place]   1/19/2024  8:15 AM Rox Mercedes PA-C [facility]   2/19/2024 10:00 AM Rox Mercedes PA-C [facility]   3/26/2024 10:20 AM Britney Flores, OD [facility]     Patient discharged  to [facility] on 1/8/24.    Akilah Gipson RNCM  Case Management  [facility]  [phone]

## 2024-01-08 NOTE — PROGRESS NOTES
[facility] Medicine  Progress Note    Patient Name: Dionicio Bess  MRN: [id number]  Patient Class: IP- Inpatient   Admission Date: 12/29/2023  Length of Stay: 6 days  Attending Physician: Ashlyn Ramos MD  Primary Care Provider: Isela, Primary Doctor        Subjective:     Principal Problem:Closed trimalleolar fracture of left ankle        HPI:  Dionicio Bess is a 57 yo M with PMHx of pAfib (on eliquis), HTN, CVA with residual L sided deficits, CAD s/p PCI, HLD, HFrEF (EF 25-30%) who presented to ED for swelling and pain to his L ankle after a ground level mechanical fall. Patient reports he tripped on a step. Now c/o 9/10 pain to L ankle with inability to bear weight. Denies LOC or head trauma. Patient also endorses intermittent lower midsternal chest pain for the past 2-3 days. Denies chest pain currently.  Also admits cramping epigastric abdominal pain with associated nausea that developed 45 mins ago. On chart review, patient had a stress test performed last month that was nondiagnostic though abnormal.  It does not appear that this was ever followed up on by cardiology as an outpatient.  The patient does have socioeconomic issues and resides in a homeless shelter ([facility]). Endorses frequent alcohol use with 2-3 beers daily. States he did have some vodka and beers yesterday before the fall. Denies fever, chills, palpitations, SOB, cough, v/d, dysuria, numbness, paresthesias, headaches.     In ED: Afebrile. HDS. Satting well on 2L NC. CBC without leukocytosis and with stable chronic anemia. CMP unremarkable. BNP 84. Trop 0.026. serum ETOH 277.  CXR with cardiomegaly and mild perihilar edema. CT ankle with trimalleolar fracture. Ortho consulted; recommending NWB to LLE and to keep iced and elevated. Too swollen for fixation at this time. CT cervical spine with degenerative changes, but without acute fracture or subluxation. CT/ MRI brain Small foci of acute to early subacute ischemia noted  in the right corona radiata/basal ganglia/internal capsule, adjoining remote large MCA territory infarct.  Equivocal punctate right parietal cortical infarct margin the posterior aspect of the infarct territory. Vascular neurology consulted; recommend continuing eliquis and statin. Given IV morphine 4 mg,  dilaudid 1 mg , IV toradol, and IV zofran. Placed in observation.     Overview/Hospital Course:  Patient seen and evaluated by Vascular Neurology on ED as CT scan and MRI of brain consistent with acute right sided CVA. Neurology stated patient with no new symptoms related to this stroke and located in previous area of old CVA. CVA cardio embolic in nature as patient wih known atrial fibrillation and recommended to continue home Apixiban 5 mg po BID and Lipitor for stroke prevention and no further work-up or evaluation needed. Orthopedic surgery consulted concerning left ankle fracture and splinted fracture in the ED. CT scan of left ankle done and showed trimalleolar fracture. Patient placed non weight bearing to left lower extremity. Patient to rest, elevate and ice left leg as will require surgical repair of fracture but needs improvement in soft tissue swelling prior to proceeding. Ortho stated potentially could do surgery as outpatient but patient lives at [facility] shelter so hard for him to get around with fractured ankle and would need to be independent at the shelter as they are unable to provide assistance to patient so will need to remain in hospital until definitve fixation surgery can be done for his left ankle. Pain controlled to left ankle.     Interval History: he's in good spirits today. Pain controlled. Had a good PT session yesterday with Michael and happy with his progress. Per PT not ready to be safely doing outpatient therapy/at [facility] and recommendation is still to do IP rehab first before this. He asked about pcp here and will see if CM is able to set up this. Will have  ortho f/u in about a month.  Auth sent for rehab    Review of Systems   Constitutional:  Negative for fever.   Respiratory:  Negative for cough.    Cardiovascular:  Negative for chest pain.   Gastrointestinal:  Negative for abdominal pain and nausea.   Musculoskeletal:  Positive for arthralgias (Left ankle).   Neurological:  Positive for speech difficulty (Dysarthria at baseline from previous CVA) and weakness (Left sided from previous CVA). Negative for dizziness.   Psychiatric/Behavioral:  Negative for agitation.      Objective:     Vital Signs (Most Recent):  Temp: 98.2 °F (36.8 °C) (01/08/24 1111)  Pulse: 73 (01/08/24 1120)  Resp: 18 (01/08/24 1111)  BP: (!) 94/53 (01/08/24 1111)  SpO2: 98 % (01/08/24 1111) on room air  Vital Signs (24h Range):  Temp:  [97.6 °F (36.4 °C)-98.7 °F (37.1 °C)] 98.2 °F (36.8 °C)  Pulse:  [61-75] 73  Resp:  [16-18] 18  SpO2:  [94 %-100 %] 98 %  BP: ()/(53-91) 94/53     Weight: 91.6 kg (202 lb)  Body mass index is 27.4 kg/m².    Intake/Output Summary (Last 24 hours) at 1/8/2024 1148  Last data filed at 1/7/2024 1540  Gross per 24 hour   Intake --   Output 400 ml   Net -400 ml           Physical Exam  Vitals and nursing note reviewed.   Constitutional:       General: He is awake. He is not in acute distress.     Appearance: Normal appearance. He is well-developed and normal weight. He is not ill-appearing.   Cardiovascular:      Rate and Rhythm: Normal rate and regular rhythm.      Heart sounds: Normal heart sounds. No murmur heard.  Pulmonary:      Effort: Pulmonary effort is normal. No respiratory distress.      Breath sounds: Normal breath sounds. No wheezing.   Abdominal:      General: Abdomen is flat. Bowel sounds are normal. There is no distension.      Palpations: Abdomen is soft.      Tenderness: There is no abdominal tenderness.   Musculoskeletal:      Comments: Left leg in splint and elevated in bed with ice on it    Skin:     Findings: No erythema or rash.    Neurological:      Mental Status: He is alert and oriented to person, place, and time.   Psychiatric:         Mood and Affect: Mood normal.         Behavior: Behavior normal. Behavior is cooperative.         Thought Content: Thought content normal.         Judgment: Judgment normal.             Significant Labs: All pertinent labs within the past 24 hours have been reviewed.    Significant Imaging: I have reviewed all pertinent imaging results/findings within the past 24 hours.    Assessment/Plan:      * Closed trimalleolar fracture of left ankle  - Patient with imaging in ED that showed closed trimalleolar ankle fracture sustained after a ground level fall related to alcohol consumption.   - Ortho consulted for fracture and reduced and splinted in ED.  - Per Ortho at this time patient is too swollen for definitive fixation and plan is non-weight bearing to left lower extremity and keep left ankle iced and elevated to help with soft tissue swelling.   - Orthopedics will continue to follow while patient in hospital. From Orthopedic perspective, patient could follow up outpatient for definitive fixation of ankle but patient is homeless and stays at [facility] and they are unable to provide any assistance to patient and with his non weight bearing status will be difficult for him to get around so will need to remain in hospital until definitive fixation surgery can be done. Ortho aware and discussed and trying to come up with a plan.   - PT/OT consulted to work with patient while awaiting surgery.   - Pain controlled. Patient on Tylenol 1000 mg po TID + Robaxin 1000 mg po 4 times daily + Lyrica 75 mg po BID and will continue and continue with Oxycodone IR 5-10 mg po every 4 hours prn for breakthrough pain.   - Patient on his home Apixiban for atrial fibrillation so fully anticoagulated so no other DVT prophylaxis needed.   OR on 1/5 s/p ORIF left ankle trimal fx and open tx left ankle syndesmosis  disruption c reduction and internal fixation, now NWB x 4-6 weeks with plan for WB in cam boot after that. In splint post op. Pain controlled. Will cont to work with PT/OT, and will likely sent for auth for rehab on Monday.    Alcohol dependence with uncomplicated intoxication  - Serum ETOH 277 on admit.  - Patient denies any history of alcohol withdrawal or seizures.  - Continue MVI, Thiamine an Folate for vitamin replacement as patient with long standing history of alcohol abuse.   - Last drink was 12/30.   - No signs of acute withdrawal so far in hospital.  - Monitor CIWA every 4 hours.   - Ativan 1 mg IV prn for anxiety, tremulousness, HR >110, CIWA >8.    Stroke due to embolism of right middle cerebral artery  - Patient with known prior right cardio embolic MCA stroke in 2/2023 with residual left sided weakness involving both his arm and leg and dysarthria.   - Vascular neuro consulted as CT scan of head with new hypodensity; MRI of brain done and MRI showed acute small infarcts in right MCA territory. Vascular Neurology noted this is likely incidental finding given patient reports his weakness and dysarthria are at baseline with no recent change. Etiology of these infarcts likely cardioembolic from known Afib. No further stroke workup indicated at this time as per Vascular Neurology.   - Neurology recommends to continue anticoagulation with Apixiban 5 mg po BID. If plan for surgery, then resume anticoagulation when safe postop from surgical perspective. Med list includes Aspirin 81 mg, which is not indicated from a stroke perspective given he is anticoagulated with Apixiban as per Neurology.  - Continue Lipitor 80 mg po daily for stroke prevention.  - Patient with no new neurologic symptoms.   - PT/OT to work with patient in hospital.     Pure hypercholesterolemia  Chronic and controlled. Continue home Lipitor to treat.     Coronary artery disease involving native coronary artery with angina pectoris  Patient  with known CAD s/p stent placement, which is controlled. Will continue Toprol XL, Losartan and Lipitor to treat and monitor for S/Sx of angina/ACS. Continue to monitor on telemetry.     Chronic combined systolic and diastolic congestive heart failure  Patient is identified as having Combined Systolic and Diastolic heart failure that is Chronic. CHF is currently controlled. Latest ECHO performed and demonstrates- Results for orders placed during the hospital encounter of 11/05/23    Echo    Interpretation Summary    Left Ventricle: The left ventricle is normal in size. Normal wall thickness. Global hypokinesis present. There is severely reduced systolic function with a visually estimated ejection fraction of 25 - 30%. Grade I diastolic dysfunction.    Right Ventricle: Mild right ventricular enlargement. Wall thickness is normal. Right ventricle wall motion  is normal. Systolic function is mildly reduced.    Biatrial enlargement    Pulmonary Artery: The estimated pulmonary artery systolic pressure is 19 mmHg.    IVC/SVC: Normal venous pressure at 3 mmHg.  Continue home Losartan, Lasix and Toprol XL to treat. Monitor clinical status closely. Monitor on telemetry. Patient is off CHF pathway.  Monitor strict Is&Os and daily weights. Place on fluid restriction of 1.5 L. Cardiology has not been consulted. Continue to stress to patient importance of self efficacy and  on diet for CHF. Last BNP reviewed- and noted below   Recent Labs   Lab 12/29/23  2211   BNP 84         Essential hypertension  Chronic, controlled. Latest blood pressure and vitals reviewed-     Temp:  [97.6 °F (36.4 °C)-98.2 °F (36.8 °C)]   Pulse:  [64-84]   Resp:  [15-18]   BP: (108-129)/(57-79)   SpO2:  [95 %-100 %] .   Home meds for hypertension were reviewed and noted below.   Hypertension Medications               amLODIPine (NORVASC) 10 MG tablet Take 1 tablet (10 mg total) by mouth once daily.    furosemide (LASIX) 40 MG tablet Take 1 tablet  (40 mg total) by mouth once daily.    losartan (COZAAR) 25 MG tablet Take 1 tablet (25 mg total) by mouth once daily.    metoprolol succinate (TOPROL-XL) 50 MG 24 hr tablet Take 1 tablet (50 mg total) by mouth once daily.    spironolactone (ALDACTONE) 25 MG tablet Take 1 tablet (25 mg total) by mouth once daily.            While in the hospital, will manage blood pressure as follows; Continue home antihypertensive regimen    Will utilize p.r.n. blood pressure medication only if patient's blood pressure greater than 180/110 and he develops symptoms such as worsening chest pain or shortness of breath.    Paroxysmal atrial fibrillation  Patient with Paroxysmal (<7 days) atrial fibrillation which is controlled currently with Toprol XL 50 mg po daily and will continue. Patient is currently in sinus rhythm.[id number] Score: 4.  Anticoagulation indicated. Anticoagulation done with Apixiban 5 mg po BID.  Reports needs to cont pill packs he currently receives on discharge as had trouble opening pill bottles in the past leading to a remote hx of eliquis noncompliance but was compliant on admit as was doing pill packs      VTE Risk Mitigation (From admission, onward)           Ordered     apixaban tablet 5 mg  2 times daily         12/30/23 1242     IP VTE HIGH RISK PATIENT  Once         12/30/23 1206     Reason for No Pharmacological VTE Prophylaxis  Once        Question:  Reasons:  Answer:  Already adequately anticoagulated on oral Anticoagulants    12/30/23 1206                    Discharge Planning   ERWIN: 1/8/2024     Code Status: Full Code   Is the patient medically ready for discharge?: Yes    Reason for patient still in hospital (select all that apply): Patient trending condition  Discharge Plan A: Rehab                  Ashlyn Ramos MD  Department of Hospital Medicine   [facility] - Surgery

## 2024-01-08 NOTE — DISCHARGE SUMMARY
DISCHARGE SUMMARY  Hospital Medicine    [place] [facility] [place] [facility] [place]    Patient Name: Dionicio Bess  YOB: 1965    Admit Date: 12/29/2023    Discharge Date: 01/08/2024    Discharge Attending Physician: Ashlyn Ramos MD     Principal Diagnoses:  Active Hospital Problems    Diagnosis  POA    *Closed trimalleolar fracture of left ankle [S82.852A]  Yes    Syndesmotic disruption of left ankle [S93.432A]  Yes    Stroke due to embolism of right middle cerebral artery [I63.411]  Yes    Alcohol dependence with uncomplicated intoxication [F10.220]  Yes    Pure hypercholesterolemia [E78.00]  Yes    Coronary artery disease involving native coronary artery with angina pectoris [I25.119]  Yes     Chronic    Chronic combined systolic and diastolic congestive heart failure [I50.42]  Yes    Paroxysmal atrial fibrillation [I48.0]  Yes     Chronic    Essential hypertension [I10]  Yes     Chronic      Resolved Hospital Problems    Diagnosis Date Resolved POA    Hypokalemia [E87.6] 01/02/2024 No    Non-cardiac chest pain [R07.89] 01/01/2024 Yes    Blood alcohol level of 240 mg/100 ml or more [Y90.8] 01/02/2024 Yes       Discharged Condition:  improved    HOSPITAL COURSE:      Initial Presentation:    Dionicio Bess is a 57 yo M with PMHx of pAfib (on eliquis), HTN, CVA with residual L sided deficits, CAD s/p PCI, HLD, HFrEF (EF 25-30%) who presented to ED for swelling and pain to his L ankle after a ground level mechanical fall. Patient reports he tripped on a step. Now c/o 9/10 pain to L ankle with inability to bear weight. Denies LOC or head trauma. Patient also endorses intermittent lower midsternal chest pain for the past 2-3 days. Denies chest pain currently.  Also admits cramping epigastric abdominal pain with associated nausea that developed 45 mins ago. On chart review, patient had a stress test performed last month that was nondiagnostic though abnormal.  It does not appear that this was ever followed up on by  cardiology as an outpatient.  The patient does have socioeconomic issues and resides in a homeless shelter ([facility]). Endorses frequent alcohol use with 2-3 beers daily. States he did have some vodka and beers yesterday before the fall. Denies fever, chills, palpitations, SOB, cough, v/d, dysuria, numbness, paresthesias, headaches.      In ED: Afebrile. HDS. Satting well on 2L NC. CBC without leukocytosis and with stable chronic anemia. CMP unremarkable. BNP 84. Trop 0.026. serum ETOH 277.  CXR with cardiomegaly and mild perihilar edema. CT ankle with trimalleolar fracture. Ortho consulted; recommending NWB to LLE and to keep iced and elevated. Too swollen for fixation at this time. CT cervical spine with degenerative changes, but without acute fracture or subluxation. CT/ MRI brain Small foci of acute to early subacute ischemia noted in the right corona radiata/basal ganglia/internal capsule, adjoining remote large MCA territory infarct.  Equivocal punctate right parietal cortical infarct margin the posterior aspect of the infarct territory. Vascular neurology consulted; recommend continuing eliquis and statin. Given IV morphine 4 mg,  dilaudid 1 mg , IV toradol, and IV zofran. Placed in observation.     Course of Principle Problem for Admission:    Closed trimalleolar fracture of left ankle  - Patient with imaging in ED that showed closed trimalleolar ankle fracture sustained after a ground level fall related to alcohol consumption.   - Ortho consulted for fracture and reduced and splinted in ED.  - Per Ortho at this time patient is too swollen for definitive fixation and plan is non-weight bearing to left lower extremity and keep left ankle iced and elevated to help with soft tissue swelling.   - Orthopedics will continue to follow while patient in hospital. From Orthopedic perspective, patient could follow up outpatient for definitive fixation of ankle but patient is homeless and stays at [facility]  homeless shelter and they are unable to provide any assistance to patient and with his non weight bearing status will be difficult for him to get around so will need to remain in hospital until definitive fixation surgery can be done. Ortho aware and discussed and trying to come up with a plan.   - PT/OT consulted to work with patient while awaiting surgery.   - Pain controlled. Patient on Tylenol 1000 mg po TID + Robaxin 1000 mg po 4 times daily + Lyrica 75 mg po BID and will continue and continue with Oxycodone IR 5-10 mg po every 4 hours prn for breakthrough pain.   - Patient on his home Apixiban for atrial fibrillation so fully anticoagulated so no other DVT prophylaxis needed.   OR on 1/5 s/p ORIF left ankle trimal fx and open tx left ankle syndesmosis disruption c reduction and internal fixation, now NWB x 4-6 weeks with plan for WB in cam boot after that. In splint post op. Pain controlled. Will cont to work with PT/OT, and accepted to rehab for further therapies     Alcohol dependence with uncomplicated intoxication  - Serum ETOH 277 on admit.  - Patient denies any history of alcohol withdrawal or seizures.  - Continue MVI, Thiamine an Folate for vitamin replacement as patient with long standing history of alcohol abuse.   - Last drink was 12/30.   - No signs of acute withdrawal  Stroke due to embolism of right middle cerebral artery  - Patient with known prior right cardio embolic MCA stroke in 2/2023 with residual left sided weakness involving both his arm and leg and dysarthria.   - Vascular neuro consulted as CT scan of head with new hypodensity; MRI of brain done and MRI showed acute small infarcts in right MCA territory. Vascular Neurology noted this is likely incidental finding given patient reports his weakness and dysarthria are at baseline with no recent change. Etiology of these infarcts likely cardioembolic from known Afib. No further stroke workup indicated at this time as per Vascular  Neurology.   - Neurology recommends to continue anticoagulation with Apixiban 5 mg po BID. If plan for surgery, then resume anticoagulation when safe postop from surgical perspective. Med list includes Aspirin 81 mg, which is not indicated from a stroke perspective given he is anticoagulated with Apixiban as per Neurology.  - Continue Lipitor 80 mg po daily for stroke prevention.  - Patient with no new neurologic symptoms.   - PT/OT      Pure hypercholesterolemia  Chronic and controlled. Continue home Lipitor to treat.      Coronary artery disease involving native coronary artery with angina pectoris  Patient with known CAD s/p stent placement, which is controlled. Will continue Toprol XL, Losartan and Lipitor to treat and monitor for S/Sx of angina/ACS.      Chronic combined systolic and diastolic congestive heart failure  Patient is identified as having Combined Systolic and Diastolic heart failure that is Chronic. CHF is currently controlled. Latest ECHO performed and demonstrates- Results for orders placed during the hospital encounter of 11/05/23     Echo     Interpretation Summary    Left Ventricle: The left ventricle is normal in size. Normal wall thickness. Global hypokinesis present. There is severely reduced systolic function with a visually estimated ejection fraction of 25 - 30%. Grade I diastolic dysfunction.    Right Ventricle: Mild right ventricular enlargement. Wall thickness is normal. Right ventricle wall motion  is normal. Systolic function is mildly reduced.    Biatrial enlargement    Pulmonary Artery: The estimated pulmonary artery systolic pressure is 19 mmHg.    IVC/SVC: Normal venous pressure at 3 mmHg.  Continue home Losartan, Lasix and Toprol XL to treat.       Recent Labs   Lab 12/29/23  2211   BNP 84            Essential hypertension  Chronic, controlled. Latest blood pressure and vitals reviewed-      Temp:  [97.6 °F (36.4 °C)-98.2 °F (36.8 °C)]   Pulse:  [64-84]   Resp:  [15-18]   BP:  "(108-129)/(57-79)   SpO2:  [95 %-100 %] .   Home meds for hypertension were reviewed and noted below.   Hypertension Medications                    amLODIPine (NORVASC) 10 MG tablet Take 1 tablet (10 mg total) by mouth once daily.     furosemide (LASIX) 40 MG tablet Take 1 tablet (40 mg total) by mouth once daily.     losartan (COZAAR) 25 MG tablet Take 1 tablet (25 mg total) by mouth once daily.     metoprolol succinate (TOPROL-XL) 50 MG 24 hr tablet Take 1 tablet (50 mg total) by mouth once daily.     spironolactone (ALDACTONE) 25 MG tablet Take 1 tablet (25 mg total) by mouth once daily.                While in the hospital, will manage blood pressure as follows; Continue home antihypertensive regimen          Paroxysmal atrial fibrillation  Patient with Paroxysmal (<7 days) atrial fibrillation which is controlled currently with Toprol XL 50 mg po daily and will continue. Patient is currently in sinus rhythm.[id number] Score: 4.  Anticoagulation indicated. Anticoagulation done with Apixiban 5 mg po BID.  Reports needs to cont pill packs he currently receives on discharge as had trouble opening pill bottles in the past leading to a remote hx of eliquis noncompliance but was compliant on admit as was doing pill packs      Consults: ortho, neuro stroke team, PM and R    Last CBC/BMP:    CBC/Anemia Labs: Coags:    Recent Labs   Lab 01/02/24  0504 01/05/24  0245 01/07/24  0425   WBC 4.68 5.82 6.99   HGB 12.0* 11.7* 10.8*   HCT 36.3* 33.7* 32.3*    254 265   MCV 98 91 96   RDW 13.2 13.3 13.4    No results for input(s): "PT", "INR", "APTT" in the last 168 hours.     Chemistries:   Recent Labs   Lab 01/02/24  0504 01/05/24  0245 01/07/24  0425    139 136   K 4.2 4.0 3.7   CL 98 100 102   CO2 30* 29 27   BUN 22* 21* 18   CREATININE 1.1 1.1 0.9   CALCIUM 9.3 9.7 8.8   PROT  --   --  6.9   BILITOT  --   --  0.4   ALKPHOS  --   --  59   ALT  --   --  10   AST  --   --  21   MG 1.9  --   --               Special " Treatments/Procedures:   Procedure(s) (LRB):  ORIF, ANKLE - LEFT (Left)  FIXATION, SYNDESMOSIS, ANKLE (Left)     Disposition: Rehab Facility      Future Scheduled Appointments:  Future Appointments   Date Time Provider Department Center   1/9/2024 11:00 AM Sami Glez MD [facility] CARDIO [place]   1/19/2024  8:15 AM Rox Mercedes PA-C [facility] LISA [facility]   2/19/2024 10:00 AM Rox Mercedes PA-C [facility] LISA [place]   3/26/2024 10:20 AM Britney Flores OD [facility] OPTOMTY [facility]           Discharge Medication List:         Medication List        START taking these medications      acetaminophen 500 MG tablet  Commonly known as: TYLENOL  Take 2 tablets (1,000 mg total) by mouth every 8 (eight) hours.     albuterol-ipratropium 2.5 mg-0.5 mg/3 mL nebulizer solution  Commonly known as: DUO-NEB  Take 3 mLs by nebulization every 4 (four) hours as needed for Wheezing or Shortness of Breath. Rescue     diphenhydrAMINE 25 mg capsule  Commonly known as: BENADRYL  Take 1 capsule (25 mg total) by mouth every 6 (six) hours as needed for Itching.     folic acid 1 MG tablet  Commonly known as: FOLVITE  Take 1 tablet (1 mg total) by mouth once daily.     melatonin 3 mg tablet  Commonly known as: MELATIN  Take 2 tablets (6 mg total) by mouth nightly as needed for Insomnia.     methocarbamoL 1,000 mg Tab  Take 1,000 mg by mouth 4 (four) times daily.     multivitamin Tab  Take 1 tablet by mouth once daily.     ondansetron 8 MG Tbdl  Commonly known as: ZOFRAN-ODT  Take 1 tablet (8 mg total) by mouth every 8 (eight) hours as needed (nausea).     * oxyCODONE 5 MG immediate release tablet  Commonly known as: ROXICODONE  Take 1 tablet (5 mg total) by mouth every 6 (six) hours as needed (pain scale 4-6).     * oxyCODONE 10 mg Tab immediate release tablet  Commonly known as: ROXICODONE  Take 1 tablet (10 mg total) by mouth every 6 (six) hours as needed (pain scale 7-10).     polyethylene glycol 17 gram Pwpk  Commonly known as:  GLYCOLAX  Take 17 g by mouth once daily.     pregabalin 75 MG capsule  Commonly known as: LYRICA  Take 1 capsule (75 mg total) by mouth 2 (two) times daily.     senna 8.6 mg tablet  Commonly known as: SENOKOT  Take 1 tablet by mouth once daily.           * This list has 2 medication(s) that are the same as other medications prescribed for you. Read the directions carefully, and ask your doctor or other care provider to review them with you.                CONTINUE taking these medications      amLODIPine 10 MG tablet  Commonly known as: NORVASC  Take 1 tablet (10 mg total) by mouth once daily.     aspirin 81 MG Chew  Chew and swallow 1 (1 mg total) by mouth once daily.     atorvastatin 80 MG tablet  Commonly known as: LIPITOR  Take 1 tablet (80 mg total) by mouth every evening.     ELIQUIS 5 mg Tab  Generic drug: apixaban  Take 1 tablet (5 mg total) by mouth 2 (two) times daily.     FLUoxetine 20 MG capsule  Take 1 capsule (20 mg total) by mouth once daily.     furosemide 40 MG tablet  Commonly known as: LASIX  Take 1 tablet (40 mg total) by mouth once daily.     JARDIANCE 10 mg tablet  Generic drug: empagliflozin  Take 1 tablet (10 mg total) by mouth once daily.     losartan 25 MG tablet  Commonly known as: COZAAR  Take 1 tablet (25 mg total) by mouth once daily.     metoprolol succinate 50 MG 24 hr tablet  Commonly known as: TOPROL-XL  Take 1 tablet (50 mg total) by mouth once daily.     pantoprazole 20 MG tablet  Commonly known as: PROTONIX  Take 1 tablet (20 mg total) by mouth every morning.     spironolactone 25 MG tablet  Commonly known as: ALDACTONE  Take 1 tablet (25 mg total) by mouth once daily.     thiamine 100 MG tablet  Take 2 tablets (200 mg total) by mouth once daily.               Where to Get Your Medications        Information about where to get these medications is not yet available    Ask your nurse or doctor about these medications  acetaminophen 500 MG tablet  albuterol-ipratropium 2.5 mg-0.5  mg/3 mL nebulizer solution  diphenhydrAMINE 25 mg capsule  folic acid 1 MG tablet  melatonin 3 mg tablet  methocarbamoL 1,000 mg Tab  multivitamin Tab  ondansetron 8 MG Tbdl  oxyCODONE 10 mg Tab immediate release tablet  oxyCODONE 5 MG immediate release tablet  polyethylene glycol 17 gram Pwpk  pregabalin 75 MG capsule  senna 8.6 mg tablet         Patient Instructions:  No discharge procedures on file.    At the time of discharge patient was told to take all medications as prescribed, to keep all followup appointments, and to call their primary care physician or return to the emergency room if they have any worsening or concerning symptoms.    I spent 35 minutes preparing the discharge      Signing Physician:  Ashlyn Ramos MD

## 2024-01-08 NOTE — PLAN OF CARE
Patient medically stable for d/c today. Accepted to [facility] re[facility]. Liaison reports auth to be submitted at this time and that facility has plenty of available beds today.       Akilah Gipson RNCM  Case Management  [facility]  [phone]

## 2024-01-08 NOTE — PT/OT/SLP RE-EVAL
"Occupational Therapy   Re-evaluation    Name: Dionicio Bess  MRN: [id number]  Admitting Diagnosis:  Closed trimalleolar fracture of left ankle  Recent Surgery: Procedure(s) (LRB):  ORIF, ANKLE - LEFT (Left)  FIXATION, SYNDESMOSIS, ANKLE (Left) 2 Days Post-Op    Recommendations:     Discharge Recommendations: High Intensity Therapy  Discharge Equipment Recommendations: walker, rolling, platform  Barriers to discharge:   Impaired functional independence    Assessment:     Dionicio Bess is a 58 y.o. male with a medical diagnosis of Closed trimalleolar fracture of left ankle.  He presents with the following performance deficits affecting function are impaired self care skills, impaired functional mobility, weakness, impaired endurance, impaired balance, orthopedic precautions.      Rehab Prognosis:  Good; patient would benefit from acute skilled OT services to address these deficits and reach maximum level of function.       Plan:     Patient to be seen 3 x/week to address the above listed problems via self-care/home management, therapeutic activities, therapeutic exercises  Plan of Care Expires: 02/02/24  Plan of Care Reviewed with: patient    Subjective     Chief Complaint: "I like yal."  Patient/Family stated goals: Regain strength  Communicated with: Nursing prior to session.  Pain/Comfort:  Pain Rating 1: 5/10  Location Side 1: Left  Location - Orientation 1: generalized  Pain Addressed 1: reposition, distraction    Objective:     Communicated with: Nursing prior to session.  Patient found HOB elevated with:   upon OT entry to room.    General Precautions: Standard, fall  Orthopedic Precautions: LLE weight bearing as tolerated  Braces:  (Splint to L LE)  Respiratory Status: Room air    Occupational Performance:    Bed Mobility:    Patient completed Scooting/Bridging with modified independence  Patient completed Supine to Sit with modified independence    Functional Mobility/Transfers:  Patient completed Sit <> Stand " Transfer with contact guard assistance  with  platform walker   Patient completed Toilet Transfer Stand Pivot technique with stand by assistance with  no AD  Functional Mobility: CGA with platform rolling walker, ~10 ft from edge of bed to door, required cues/assist for stand <> sit as unable to maintain precautions    Activities of Daily Living:  Grooming: stand by assistance seated edge of bed  Upper Body Dressing: stand by assistance seated edge of bed    Cognitive/Visual Perceptual:  Cognitive/Psychosocial Skills:     -       Oriented to: Person, Place, Time, and Situation   -       Follows Commands/attention:Follows multistep  commands  -       Safety awareness/insight to disability: impaired   -       Mood/Affect/Coping skills/emotional control: Appropriate to situation    Physical Exam:  BUE's - strength and ROM WFL's    AMPA 6 Click:  [facility] Total Score:      Treatment & Education:  -Re-Evaluation completed, plan of care established.  -Patient and family educated on roles/goals of OT and POC.  -White board updated.  -Therapist provided time for questions and answered within scope of practice.  -Patient educated on importance of EOB/OOB activity to maximize recovery.    Patient left up in chair with all lines intact and call button in reach    GOALS:   Multidisciplinary Problems       Occupational Therapy Goals          Problem: Occupational Therapy    Goal Priority Disciplines Outcome Interventions   Occupational Therapy Goal     OT, PT/OT Ongoing, Progressing    Description: Goals to be met by: 2/3/24     Patient will increase functional independence with ADLs by performing:    LE Dressing with Stand-by Assistance.  Grooming while standing at sink with Stand-by Assistance.  Toileting from bedside commode with Modified [place] for hygiene and clothing management.   Supine to sit with [place].  Stand pivot transfers with Modified [place].  Toilet transfer to bedside commode with Modified  [place].                         History:     Past Medical History:   Diagnosis Date    CAD (coronary artery disease)     HLD (hyperlipidemia)     HTN (hypertension)     Stroke     R MCA 2/2023       History reviewed. No pertinent surgical history.    Time Tracking:     OT Date of Treatment:  01/07/24  OT Start Time:  1031  OT Stop Time:  1050  OT Total Time (min):  19 min    Billable Minutes:Re-eval 1 unit   Therapeutic Activity 1 unit    1/7/2024

## 2024-01-08 NOTE — ASSESSMENT & PLAN NOTE
- CTH with hypodensity and MRI brain showed small infarcts in R MCA territory.   - [facility] Neurology reports an incidental finding given patient reports his weakness and dysarthria are at baseline with no recent change.   - Per VN Etiology of these infarcts likely cardioembolic from known Afib. No further stroke workup indicated at this time.   - Recommend continuing anticoagulation with Eliquis 5 mg BID.

## 2024-01-08 NOTE — PT/OT/SLP PROGRESS
"Occupational Therapy   Co-Treatment  Co-treatment performed due to patient's multiple deficits requiring two skilled therapists to appropriately and safely assess patient's strength and endurance while facilitating functional tasks in addition to accommodating for patient's activity tolerance.        Name: Dionicio Bess  MRN: [id number]  Admitting Diagnosis:  Closed trimalleolar fracture of left ankle  3 Days Post-Op    Recommendations:     Discharge Recommendations: High Intensity Therapy  Discharge Equipment Recommendations:  wheelchair, walker, rolling, platform, bedside commode  Barriers to discharge:  Decreased caregiver support, Inaccessible home environment    Assessment:     Dionicio Bess is a 58 y.o. male with a medical diagnosis of Closed trimalleolar fracture of left ankle.  He presents with the following performance deficits affecting function are weakness, impaired endurance, impaired self care skills, impaired functional mobility, gait instability, impaired balance, decreased upper extremity function, decreased lower extremity function, pain, decreased safety awareness, impaired fine motor, orthopedic precautions, decreased ROM. Pt agreeable to therapy and tolerated fairly. Pt demonstrates good upper body strength during bed mobility but requires assistance for functional mobility 2/2 poor endurance & unsteady "hopping". Pt remains limited in ADLs, functional mobility, and functional transfers and is currently not performing tasks at [facility]. Pt would continue to benefit from skilled OT services to maximize functional independence with ADLs and functional mobility, reduce caregiver burden, and facilitate safe discharge in the least restrictive environment.     Rehab Prognosis:  Good; patient would benefit from acute skilled OT services to address these deficits and reach maximum level of function.       Plan:     Patient to be seen 3 x/week to address the above listed problems via self-care/home management, " therapeutic activities, therapeutic exercises  Plan of Care Expires: 02/02/24  Plan of Care Reviewed with: patient    Subjective     Chief Complaint: pt reported discomfort with LE cast 2/2 cast being heavy  Patient/Family Comments/goals: pt agreeable to OT/PT co-treatment session to maximize performance & maintain safety  Pain/Comfort:  Pain Rating 1: 3/10  Location - Side 1: Left  Location - Orientation 1: generalized  Location 1: ankle  Pain Addressed 1: Reposition, Distraction  Pain Rating Post-Intervention 1: 3/10    Objective:     Co-treatment session included Michael White, PT    Communicated with: RN prior to session.  Patient found HOB elevated with telemetry upon OT entry to room.    General Precautions: Standard, fall    Orthopedic Precautions:LLE non weight bearing  Braces: N/A  Respiratory Status: Room air     Occupational Performance:     Bed Mobility:    Patient completed Supine to Sit with modified independence  Patient completed Sit to Supine with modified independence     Functional Mobility/Transfers:  Patient completed Sit <> Stand Transfer with stand by assistance  with  platform walker   Functional Mobility: Pt participated in the following to improve UE/LE strength & endurance required for participation/independence with ADLs & functional mobility  Pt ambulated 22 ft using platform rolling walker with Min->moderate assistance  Pt required verbal cueing to initiate rest breaks to improve energy conservation & for maintenance of LLE  NWB precautions  Gait belt utilized during functional mobility for pt safety  SOB noted during task    [facility] 6 Click ADL: 15    Treatment & Education:  -Pt educated on hand placement for transfers  -Pt educated on RW placement for ADLs  -Pt educated on positioning of LUE to promote digit extension & functionality of UE  -Pt educated on weightbearing and surgical precautions with pt verbalizing understanding  -Pt educated to call for assistance and to transfer with  hospital staff only  -Pt educated on role of OT and plan of care s/p surgery, white board updated     Patient left HOB elevated with all lines intact, call button in reach, and RN notified    GOALS:   Multidisciplinary Problems       Occupational Therapy Goals          Problem: Occupational Therapy    Goal Priority Disciplines Outcome Interventions   Occupational Therapy Goal     OT, PT/OT Ongoing, Progressing    Description: Goals to be met by: 2/3/24     Patient will increase functional independence with ADLs by performing:    LE Dressing with Stand-by Assistance.  Grooming while standing at sink with Stand-by Assistance.  Toileting from bedside commode with Modified [place] for hygiene and clothing management.   Supine to sit with [place].  Stand pivot transfers with Modified [place].  Toilet transfer to bedside commode with Modified [place].                         Time Tracking:     OT Date of Treatment: 01/08/24  OT Start Time: 1302  OT Stop Time: 1325  OT Total Time (min): 23 min    Billable Minutes:Self Care/Home Management 8  Therapeutic Activity 15    OT/KAYODE: OT          1/8/2024

## 2024-01-08 NOTE — SUBJECTIVE & OBJECTIVE
Past Medical History:   Diagnosis Date    CAD (coronary artery disease)     HLD (hyperlipidemia)     HTN (hypertension)     Stroke     R MCA 2/2023     Past Surgical History:   Procedure Laterality Date    FIXATION OF SYNDESMOSIS OF ANKLE Left 1/5/2024    Procedure: FIXATION, SYNDESMOSIS, ANKLE;  Surgeon: Rogerio Hu MD;  Location: [facility] OR [address] [place] [address];  Service: Orthopedics;  Laterality: Left;    OPEN REDUCTION AND INTERNAL FIXATION (ORIF) OF INJURY OF ANKLE Left 1/5/2024    Procedure: ORIF, ANKLE - LEFT;  Surgeon: Rogerio Hu MD;  Location: [facility] OR [address] [place] [address];  Service: Orthopedics;  Laterality: Left;     Review of patient's allergies indicates:  No Known Allergies    Scheduled Medications:    acetaminophen  1,000 mg Oral Q8H    amLODIPine  10 mg Oral Daily    apixaban  5 mg Oral BID    aspirin  81 mg Oral Daily    atorvastatin  80 mg Oral QHS    FLUoxetine  20 mg Oral Daily    folic acid  1 mg Oral Daily    furosemide  40 mg Oral Daily    losartan  25 mg Oral Daily    methocarbamoL  1,000 mg Oral QID    metoprolol succinate  50 mg Oral Daily    multivitamin  1 tablet Oral Daily    pantoprazole  20 mg Oral QAM    polyethylene glycol  17 g Oral Daily    pregabalin  75 mg Oral BID    senna  8.6 mg Oral Daily    spironolactone  25 mg Oral Daily    thiamine  200 mg Oral Daily       PRN Medications: albuterol-ipratropium, aluminum-magnesium hydroxide-simethicone, bisacodyL, diphenhydrAMINE, lorazepam, melatonin, naloxone, ondansetron, oxyCODONE, oxyCODONE, prochlorperazine, simethicone, sodium chloride 0.9%    Family History    None       Tobacco Use    Smoking status: Never    Smokeless tobacco: Never   Substance and Sexual Activity    Alcohol use: Yes     Comment: occasional    Drug use: No    Sexual activity: Not on file     Review of Systems   Constitutional:  Positive for activity change. Negative for fatigue and fever.   HENT:  Negative for trouble swallowing.    Respiratory:  Negative for cough and  shortness of breath.    Musculoskeletal:  Positive for gait problem.   Neurological:  Positive for weakness.   Psychiatric/Behavioral:  Negative for agitation and behavioral problems.      Objective:     Vital Signs (Most Recent):  Temp: 98.2 °F (36.8 °C) (01/08/24 1111)  Pulse: 73 (01/08/24 1120)  Resp: 18 (01/08/24 1111)  BP: (!) 94/53 (01/08/24 1111)  SpO2: 98 % (01/08/24 1111)    Vital Signs (24h Range):  Temp:  [97.6 °F (36.4 °C)-98.7 °F (37.1 °C)] 98.2 °F (36.8 °C)  Pulse:  [61-75] 73  Resp:  [16-18] 18  SpO2:  [94 %-100 %] 98 %  BP: ()/(53-91) 94/53     Body mass index is 27.4 kg/m².     Physical Exam  Vitals and nursing note reviewed.   Constitutional:       Appearance: Normal appearance.   HENT:      Head: Normocephalic and atraumatic.      Mouth/Throat:      Mouth: Mucous membranes are moist.   Eyes:      Extraocular Movements: Extraocular movements intact.      Pupils: Pupils are equal, round, and reactive to light.   Pulmonary:      Effort: Pulmonary effort is normal. No respiratory distress.   Musculoskeletal:      Comments: L foot with dressing in place from sx  L hand contracture present from old CVA   4/5 LUE    Neurological:      Mental Status: He is alert and oriented to person, place, and time. Mental status is at baseline.      Motor: Weakness present.      Gait: Gait abnormal.   Psychiatric:         Mood and Affect: Mood normal.         Behavior: Behavior normal.          NEUROLOGICAL EXAMINATION:     MENTAL STATUS   Oriented to person, place, and time.     CRANIAL NERVES     CN III, IV, VI   Pupils are equal, round, and reactive to light.      Diagnostic Results: Labs: Reviewed  ECG: Reviewed  CT: Reviewed

## 2024-01-08 NOTE — PLAN OF CARE
01/08/24 1353   Post-Acute Status   Post-Acute Authorization Placement   Post-Acute Placement Status Set-up Complete/Auth obtained   Discharge Plan   Discharge Plan A Rehab     Patient's set-up complete. PFC order placed for wheelchair transport to [facility] at 3PM. Bedside nurse aware and will call report to [phone]. Updated patient at bedside.      Akilah MORRISON  Case Management  [facility]  [phone]

## 2024-01-08 NOTE — CONSULTS
[facility] - Surgery  Physical Medicine & Rehab  Consult Note    Patient Name: Dionicio Bess  MRN: [id number]  Admission Date: 12/29/2023  Hospital Length of Stay: 6 days  Attending Physician: Ashlyn Ramos MD     Inpatient consult to Physical Medicine & Rehabilitation  Consult performed by: Erica Mayer NP  Consult requested by:  Ashlyn Ramos MD    Collaborating Physician: Denise Hill MD  Reason for Consult:  Assess rehabilitation needs      Consults  Subjective:     Principal Problem: Closed trimalleolar fracture of left ankle    HPI: Dionicio Bess is a 58-year-old male with PMHx of pAfib (on eliquis), HTN, R MCA with residual L sided deficits 2/2023, CAD s/p PCI, HLD, HFrEF (EF 25-30%). Patient presented to [facility] on 12/29/23 for swelling and pain to his L ankle after a ground level mechanical fall. Patient reports he tripped on a step. Imaging revealed a left sided functional bimalleolar ankle fracture. CTH with hypodensity and MRI brain showed small infarcts in R MCA territory. [facility] reports an incidental finding given patient reports his weakness and dysarthria are at baseline with no recent change. Etiology of these infarcts likely cardioembolic from known Afib. No further stroke workup indicated at this time. Recommend continuing anticoagulation with Eliquis 5 mg BID. Ortho proceeded with fixation of Left trimalleolar ankle fracture on 1/5/24. Per Ortho nonweightbearing for 4-6 weeks pending fracture healing then begin weight-bearing as tolerated in a Cam boot  Physical occupational therapy to work on crutch walking.     Functional History: Patient lives at the [facility] and is homeless. Prior to admission, [place]. DME: .     Hospital Course: 1/7/24: Participated w/ PT & OT. 15 feet with L platform rolling walker and CGA of therapist at gait belt.  During initial 10 ft of trial, L foot (in splint) obviously touching floor but didn't appear to be any true weight being  displaced to leg so allowed him to continue.  During final 5 ft of ambulation it became obvious that he was displacing increased weight onto the L leg splint in standing so PT/OT stopped ambulation trial and had him sit down in his trailing bedside chair  Assist level: Contact-Guard Assist  Device:  L platform rolling walker      Past Medical History:   Diagnosis Date    CAD (coronary artery disease)     HLD (hyperlipidemia)     HTN (hypertension)     Stroke     R MCA 2/2023     Past Surgical History:   Procedure Laterality Date    FIXATION OF SYNDESMOSIS OF ANKLE Left 1/5/2024    Procedure: FIXATION, SYNDESMOSIS, ANKLE;  Surgeon: Rogerio Hu MD;  Location: [facility] OR [address] [place] [address];  Service: Orthopedics;  Laterality: Left;    OPEN REDUCTION AND INTERNAL FIXATION (ORIF) OF INJURY OF ANKLE Left 1/5/2024    Procedure: ORIF, ANKLE - LEFT;  Surgeon: Rogerio Hu MD;  Location: [facility] OR [address] [place] [address];  Service: Orthopedics;  Laterality: Left;     Review of patient's allergies indicates:  No Known Allergies    Scheduled Medications:    acetaminophen  1,000 mg Oral Q8H    amLODIPine  10 mg Oral Daily    apixaban  5 mg Oral BID    aspirin  81 mg Oral Daily    atorvastatin  80 mg Oral QHS    FLUoxetine  20 mg Oral Daily    folic acid  1 mg Oral Daily    furosemide  40 mg Oral Daily    losartan  25 mg Oral Daily    methocarbamoL  1,000 mg Oral QID    metoprolol succinate  50 mg Oral Daily    multivitamin  1 tablet Oral Daily    pantoprazole  20 mg Oral QAM    polyethylene glycol  17 g Oral Daily    pregabalin  75 mg Oral BID    senna  8.6 mg Oral Daily    spironolactone  25 mg Oral Daily    thiamine  200 mg Oral Daily       PRN Medications: albuterol-ipratropium, aluminum-magnesium hydroxide-simethicone, bisacodyL, diphenhydrAMINE, lorazepam, melatonin, naloxone, ondansetron, oxyCODONE, oxyCODONE, prochlorperazine, simethicone, sodium chloride 0.9%    Family History    None       Tobacco Use    Smoking status: Never    Smokeless  tobacco: Never   Substance and Sexual Activity    Alcohol use: Yes     Comment: occasional    Drug use: No    Sexual activity: Not on file     Review of Systems   Constitutional:  Positive for activity change. Negative for fatigue and fever.   HENT:  Negative for trouble swallowing.    Respiratory:  Negative for cough and shortness of breath.    Musculoskeletal:  Positive for gait problem.   Neurological:  Positive for weakness.   Psychiatric/Behavioral:  Negative for agitation and behavioral problems.      Objective:     Vital Signs (Most Recent):  Temp: 98.2 °F (36.8 °C) (01/08/24 1111)  Pulse: 73 (01/08/24 1120)  Resp: 18 (01/08/24 1111)  BP: (!) 94/53 (01/08/24 1111)  SpO2: 98 % (01/08/24 1111)    Vital Signs (24h Range):  Temp:  [97.6 °F (36.4 °C)-98.7 °F (37.1 °C)] 98.2 °F (36.8 °C)  Pulse:  [61-75] 73  Resp:  [16-18] 18  SpO2:  [94 %-100 %] 98 %  BP: ()/(53-91) 94/53     Body mass index is 27.4 kg/m².     Physical Exam  Vitals and nursing note reviewed.   Constitutional:       Appearance: Normal appearance.   HENT:      Head: Normocephalic and atraumatic.      Mouth/Throat:      Mouth: Mucous membranes are moist.   Eyes:      Extraocular Movements: Extraocular movements intact.      Pupils: Pupils are equal, round, and reactive to light.   Pulmonary:      Effort: Pulmonary effort is normal. No respiratory distress.   Musculoskeletal:      Comments: L foot with dressing in place from sx  L hand contracture present from old CVA   4/5 LUE    Neurological:      Mental Status: He is alert and oriented to person, place, and time. Mental status is at baseline.      Motor: Weakness present.      Gait: Gait abnormal.   Psychiatric:         Mood and Affect: Mood normal.         Behavior: Behavior normal.     Diagnostic Results:   Labs: Reviewed  ECG: Reviewed  CT: Reviewed    Assessment/Plan:     * Closed trimalleolar fracture of left ankle  - Ortho proceeded with fixation of Left trimalleolar ankle fracture on  1/5/24.   - Per Ortho nonweightbearing for 4-6 weeks pending fracture healing then begin weight-bearing as tolerated in a [facility] boot  Physical occupational therapy to work on crutch walking.     Recommendations  -  Encourage mobility, OOB in chair at least 3 hours per day, and early ambulation as appropriate  -  PT/OT evaluate and treat  -  Pain management  -  Monitor for and prevent skin breakdown and pressure ulcers  Early mobility, repositioning/weight shifting every 20-30 minutes when sitting, turn patient every 2 hours, proper mattress/overlay and chair cushioning, pressure relief/heel protector boots  -  DVT prophylaxis    -  Reviewed discharge options (IP rehab, SNF, HH therapy, and OP therapy)     Stroke due to embolism of right middle cerebral artery  - CTH with hypodensity and MRI brain showed small infarcts in R MCA territory.   - [facility] reports an incidental finding given patient reports his weakness and dysarthria are at baseline with no recent change.   - Per VN Etiology of these infarcts likely cardioembolic from known Afib. No further stroke workup indicated at this time.   - Recommend continuing anticoagulation with Eliquis 5 mg BID.     Paroxysmal atrial fibrillation  - Recommend continuing anticoagulation with Eliquis 5 mg BID.     PM&R Recommendation:     At this time, the PM&R team has reviewed this patient's ongoing medical case including inpatient diagnosis, medical history, clinical examination, labs, vitals, current social and functional history to provide the post-acute recommendation as follows:     RECOMMENDATIONS: inpatient rehabilitation due to good motivation/participation with therapies, has been determined to tolerate 3 hours of therapy and good potential for recovery.     The patient will be admitted for comprehensive interdisciplinary inpatient rehabilitation to address the impairments due to medical diagnosis of R MCA and L ankle fracture. The patient will benefit from an  inpatient rehabilitation program to promote functional recovery, implement compensatory strategies and will undergo assessment for needs for durable medical equipment for safe discharge to the community. This patient will benefit from a coordinated interdisciplinary rehabilitation program services that require close monitoring and treatment with 24-hour rehabilitative nursing and physical/occupational therapies for 3 hours/day for 5 days/week.This interdisciplinary program will be performed under the direction of a physiatrist.    MEDICAL STABILITY:     At this time, no barriers for post-acute rehab admission.    I will sign off with the transfer of the patient to [facility] liaison who will continue to follow going forward until admission to [facility].      Thank you for your consult.     Erica Mayer NP  Department of Physical Medicine & Rehab  [facility] - Surgery

## 2024-01-08 NOTE — PT/OT/SLP PROGRESS
Physical Therapy  Co-Treatment with OT    Dionicio Bess   [id number]    Time Tracking:     PT Received On: 01/08/24   PT Start Time: 1302   PT Stop Time: 1325   PT Total Time (min): 23 min    Billable Minutes: Gait Training 10 and Neuromuscular Re-education 13 minutes       *This session was completed as a co-treatment with OT secondary to patient pending D/C to IP rehab today*    Recommendations:     Therapy Intensity Recommendations at Discharge: High Intensity Therapy     Equipment Needed After Discharge: wheelchair, L platform rolling walker, bedside commode    Barriers to Discharge: Not ready to discharge to shelter from a mobility standpoint, needs further therapy.    Patient Information:     Recent Surgery: Procedure(s) (LRB):  ORIF, ANKLE - LEFT (Left)  FIXATION, SYNDESMOSIS, ANKLE (Left) 3 Days Post-Op    Diagnosis: Closed trimalleolar fracture of left ankle    Length of Stay: 6 days    General Precautions: Standard, fall  Orthopedic Precautions: LLE non weight bearing  Brace: N/A    Assessment:     Dionicio Bess tolerated treatment well today. He was resting in bed with no family/friends present upon PT/OT entry to room, in good spirits and agreeable to session. He is able to verbalize understanding of his LLE NWB precautions (recent L ankle ORIF). Primary goal of session today is to work on improving his ability to maintain LLE NWB when standing/walking (yesterday, gait trial had to terminate prematurely due to difficulty with maintaining NWB). He was able to ambulate 22 ft into hallway this afternoon with L platform rolling walker and therapist min-mod (A) at gait belt; req'd 2-3 standing rest breaks due to mild SOB as well as LLE fatigue (maintaining NWB). He had more success with using posterior chain (glutes, hamstrings) to maintain L NWB today. Does require more cueing to slow down today (pt aware that he is easily fatigued, tries to perform activities very quickly due to this). Pt will cont to benefit from  "high intensity therapy to maximize his post-op rehab process. Discussed PT role, POC, and goals with patient; verbalized understanding. Dionicio Bess will continue to benefit from acute PT services to promote mobility during this admission and improve return to PLOF.    Problem List: weakness, impaired endurance, impaired self care skills, impaired functional mobility, gait instability, decreased lower extremity function, decreased upper extremity function, abnormal tone, impaired balance, pain, orthopedic precautions, impaired joint extensibility, impaired cardiopulmonary response to activity    Rehab Prognosis: Good; patient would benefit from acute skilled PT services to address these deficits and reach maximum level of function.    Plan:     Patient to be seen 5 x/week to address the above listed problems via gait training, therapeutic activities, therapeutic exercises, neuromuscular re-education, wheelchair management/training    Plan of Care Expires: 02/05/24  Plan of Care reviewed with: patient    Subjective:     Communicated with RN prior to treatment, appropriate to see for treatment.    Pt found supine in bed (HOB elevated) upon PT entry to room, agreeable to treatment.    Patient commenting: "I had a stroke on Benedict's day last year, I didn't get good therapy after it. That's why my left hand doesn't do too well."    Does this patient have any cultural, spiritual, Yazidism conflicts given the current situation? Patient/family has no barriers to learning. Patient/family verbalizes understanding of his/her program and goals and demonstrates them correctly. No cultural, spiritual, or educational needs identified.    Objective:     Patient found with: telemetry    Pain:  Pain Rating 1: 3/10  Location - Side 1: Left  Location - Orientation 1: generalized  Location 1: ankle  Pain Addressed 1: Reposition, Distraction  Pain Rating Post-Intervention 1: 3/10    Functional Mobility:    Bed Mobility:  Supine to " Sitting: mod (I)  Sitting to Supine: mod (I)    Transfers:  Sit to Stand: Stand-By Assist from EOB with no AD x 2 trials    Gait:  22 feet into hallway this afternoon with L platform rolling walker and therapist min-mod (A) at gait belt; req'd 2-3 standing rest breaks due to mild SOB as well as LLE fatigue (maintaining NWB).  He had more success with using posterior chain (glutes, hamstrings) to maintain L NWB today.  Does require more cueing to slow down today (pt aware that he is easily fatigued, tries to perform activities very quickly due to this)    Assist level: Mod Assist  Device:  L platform rolling walker    Balance:  Static Sit: Independent at EOB    Static Stand: Stand-By Assist with no AD; weight shifting onto RLE    Additional Therapeutic Activity/Exercises:     1. He was resting in bed with no family/friends present upon PT/OT entry to room, in good spirits and agreeable to session. He is able to verbalize understanding of his LLE NWB precautions (recent L ankle ORIF). Primary goal of session today is to work on improving his ability to maintain LLE NWB when standing/walking (yesterday, gait trial had to terminate prematurely due to difficulty with maintaining NWB).    2. He was able to ambulate 22 ft into hallway this afternoon with L platform rolling walker and therapist min-mod (A) at gait belt; req'd 2-3 standing rest breaks due to mild SOB as well as LLE fatigue (maintaining NWB). He had more success with using posterior chain (glutes, hamstrings) to maintain L NWB today. Does require more cueing to slow down today (pt aware that he is easily fatigued, tries to perform activities very quickly due to this).    3. Pt will cont to benefit from high intensity therapy to maximize his post-op rehab process. Discussed PT role, POC, and goals with patient; verbalized understanding.    AM-PAC 6 CLICK MOBILITY  Turning over in bed (including adjusting bedclothes, sheets and blankets)?: 4  Sitting down on  and standing up from a chair with arms (e.g., wheelchair, bedside commode, etc.): 4  Moving from lying on back to sitting on the side of the bed?: 4  Moving to and from a bed to a chair (including a wheelchair)?: 4  Need to walk in hospital room?: 3  Climbing 3-5 steps with a railing?: 1  Basic Mobility Total Score: 20    Patient was left supine in bed (HOB elevated) with LLE elevated on pillows, with all lines intact and call button in reach.    GOALS:   Multidisciplinary Problems       Physical Therapy Goals          Problem: Physical Therapy    Goal Priority Disciplines Outcome Goal Variances Interventions   Physical Therapy Goal     PT, PT/OT Ongoing, Progressing     Description: Goals to be met by: 2024    Patient will increase functional independence with mobility by performin. Sit to stand transfer with Supervision to L platform RW while maintaining L LE NWB - Not met  2. Gait x 50 feet with Stand-by Assistance using left platform walker while maintaining L LE NWB - Not met                       Michael White, PT  2024

## 2024-01-08 NOTE — ADDENDUM NOTE
Addendum  created 01/08/24 1008 by Nabeel Zepeda MD    Attestation recorded in Intraprocedure, Intraprocedure Attestations filed

## 2024-01-10 NOTE — ANESTHESIA PROCEDURE NOTES
Saphenous SS    Patient location during procedure: pre-op   Block not for primary anesthetic.  Reason for block: at surgeon's request and post-op pain management   Post-op Pain Location: Left Ankle Pain   Start time: 1/5/2024 11:45 AM  Timeout: 1/5/2024 11:45 AM   End time: 1/5/2024 11:55 AM    Staffing  Authorizing Provider: Nabeel Zepeda MD  Performing Provider: Luis F Styles DO    Staffing  Performed by: Luis F Styles DO  Authorized by: Nabeel Zepeda MD    Preanesthetic Checklist  Completed: patient identified, IV checked, site marked, risks and benefits discussed, surgical consent, monitors and equipment checked, pre-op evaluation and timeout performed  Peripheral Block  Patient position: supine  Prep: ChloraPrep  Patient monitoring: heart rate, cardiac monitor, continuous pulse ox, continuous capnometry and frequent blood pressure checks  Block type: adductor canal  Laterality: left  Injection technique: single shot  Needle  Needle type: Stimuplex   Needle gauge: 21 G  Needle length: 4 in  Needle localization: anatomical landmarks and ultrasound guidance   -ultrasound image captured on disc.  Assessment  Injection assessment: negative aspiration, negative parasthesia and local visualized surrounding nerve  Paresthesia pain: none  Heart rate change: no  Slow fractionated injection: yes        Additional Notes  VSS.  [facility] RN monitoring vitals throughout procedure.  Patient tolerated procedure well.

## 2024-01-10 NOTE — ADDENDUM NOTE
Addendum  created 01/10/24 1325 by Luis F Styles, DO    Child order released for a procedure order, Clinical Note Signed, Diagnosis association updated, Intraprocedure Blocks edited, Pend clinical note, SmartForm saved

## 2024-01-17 ENCOUNTER — HOSPITAL ENCOUNTER (OUTPATIENT)
Dept: RADIOLOGY | Facility: HOSPITAL | Age: 59
Discharge: HOME | End: 2024-01-17
Attending: NURSE PRACTITIONER
Payer: MEDICAID

## 2024-01-17 PROCEDURE — 73600 X-RAY EXAM OF ANKLE: CPT | Mod: 26,LT,, | Performed by: RADIOLOGY

## 2024-01-18 ENCOUNTER — HOSPITAL ENCOUNTER (OUTPATIENT)
Dept: RADIOLOGY | Facility: HOSPITAL | Age: 59
Discharge: HOME | End: 2024-01-18
Attending: FAMILY MEDICINE
Payer: MEDICAID

## 2024-01-18 PROCEDURE — 71045 X-RAY EXAM CHEST 1 VIEW: CPT | Mod: 26,,, | Performed by: RADIOLOGY

## 2024-01-19 ENCOUNTER — HOSPITAL ENCOUNTER (OUTPATIENT)
Facility: HOSPITAL | Age: 59
Discharge: HOME | End: 2024-01-20
Attending: EMERGENCY MEDICINE | Admitting: HOSPITALIST
Payer: MEDICAID

## 2024-01-19 DIAGNOSIS — I50.20 HEART FAILURE WITH REDUCED EJECTION FRACTION: ICD-10-CM

## 2024-01-19 DIAGNOSIS — R07.9 CHEST PAIN: ICD-10-CM

## 2024-01-19 LAB
ALBUMIN SERPL BCP-MCNC: 3.1 G/DL (ref 3.5–5.2)
ALP SERPL-CCNC: 54 U/L (ref 55–135)
ALT SERPL W/O P-5'-P-CCNC: 13 U/L (ref 10–44)
ANION GAP SERPL CALC-SCNC: 8 MMOL/L (ref 8–16)
AST SERPL-CCNC: 18 U/L (ref 10–40)
BASOPHILS # BLD AUTO: 0.03 K/UL (ref 0–0.2)
BASOPHILS NFR BLD: 0.6 % (ref 0–1.9)
BILIRUB SERPL-MCNC: 0.2 MG/DL (ref 0.1–1)
BNP SERPL-MCNC: 51 PG/ML (ref 0–99)
BUN SERPL-MCNC: 22 MG/DL (ref 6–20)
BUN SERPL-MCNC: 25 MG/DL (ref 6–30)
CALCIUM SERPL-MCNC: 8.2 MG/DL (ref 8.7–10.5)
CHLORIDE SERPL-SCNC: 101 MMOL/L (ref 95–110)
CHLORIDE SERPL-SCNC: 104 MMOL/L (ref 95–110)
CO2 SERPL-SCNC: 24 MMOL/L (ref 23–29)
CREAT SERPL-MCNC: 0.8 MG/DL (ref 0.5–1.4)
CREAT SERPL-MCNC: 0.9 MG/DL (ref 0.5–1.4)
DIFFERENTIAL METHOD BLD: ABNORMAL
EOSINOPHIL # BLD AUTO: 0.3 K/UL (ref 0–0.5)
EOSINOPHIL NFR BLD: 6 % (ref 0–8)
ERYTHROCYTE [DISTWIDTH] IN BLOOD BY AUTOMATED COUNT: 13.4 % (ref 11.5–14.5)
EST. GFR  (NO RACE VARIABLE): >60 ML/MIN/1.73 M^2
GLUCOSE SERPL-MCNC: 105 MG/DL (ref 70–110)
GLUCOSE SERPL-MCNC: 106 MG/DL (ref 70–110)
HCT VFR BLD AUTO: 36.5 % (ref 40–54)
HCT VFR BLD CALC: 41 %PCV (ref 36–54)
HGB BLD-MCNC: 11.7 G/DL (ref 14–18)
IMM GRANULOCYTES # BLD AUTO: 0.05 K/UL (ref 0–0.04)
IMM GRANULOCYTES NFR BLD AUTO: 1 % (ref 0–0.5)
LYMPHOCYTES # BLD AUTO: 1.4 K/UL (ref 1–4.8)
LYMPHOCYTES NFR BLD: 26.9 % (ref 18–48)
MAGNESIUM SERPL-MCNC: 1.6 MG/DL (ref 1.6–2.6)
MCH RBC QN AUTO: 31.5 PG (ref 27–31)
MCHC RBC AUTO-ENTMCNC: 32.1 G/DL (ref 32–36)
MCV RBC AUTO: 98 FL (ref 82–98)
MONOCYTES # BLD AUTO: 0.5 K/UL (ref 0.3–1)
MONOCYTES NFR BLD: 8.7 % (ref 4–15)
NEUTROPHILS # BLD AUTO: 3 K/UL (ref 1.8–7.7)
NEUTROPHILS NFR BLD: 56.8 % (ref 38–73)
NRBC BLD-RTO: 0 /100 WBC
PLATELET # BLD AUTO: 304 K/UL (ref 150–450)
PMV BLD AUTO: 10 FL (ref 9.2–12.9)
POC CARDIAC TROPONIN I: 0.01 NG/ML (ref 0–0.08)
POC IONIZED CALCIUM: 1.16 MMOL/L (ref 1.06–1.42)
POC TCO2 (MEASURED): 26 MMOL/L (ref 23–29)
POTASSIUM BLD-SCNC: 3.8 MMOL/L (ref 3.5–5.1)
POTASSIUM SERPL-SCNC: 3.8 MMOL/L (ref 3.5–5.1)
PROT SERPL-MCNC: 6.3 G/DL (ref 6–8.4)
RBC # BLD AUTO: 3.72 M/UL (ref 4.6–6.2)
SAMPLE: NORMAL
SAMPLE: NORMAL
SODIUM BLD-SCNC: 140 MMOL/L (ref 136–145)
SODIUM SERPL-SCNC: 136 MMOL/L (ref 136–145)
TROPONIN I SERPL DL<=0.01 NG/ML-MCNC: 0.01 NG/ML (ref 0–0.03)
TROPONIN I SERPL DL<=0.01 NG/ML-MCNC: 0.01 NG/ML (ref 0–0.03)
WBC # BLD AUTO: 5.2 K/UL (ref 3.9–12.7)

## 2024-01-19 PROCEDURE — 93005 ELECTROCARDIOGRAM TRACING: CPT

## 2024-01-19 PROCEDURE — G0378 HOSPITAL OBSERVATION PER HR: HCPCS

## 2024-01-19 PROCEDURE — 99285 EMERGENCY DEPT VISIT HI MDM: CPT | Mod: 25

## 2024-01-19 PROCEDURE — 96365 THER/PROPH/DIAG IV INF INIT: CPT

## 2024-01-19 PROCEDURE — 84484 ASSAY OF TROPONIN QUANT: CPT

## 2024-01-19 PROCEDURE — 99239 HOSP IP/OBS DSCHRG MGMT >30: CPT | Mod: ,,, | Performed by: STUDENT IN AN ORGANIZED HEALTH CARE EDUCATION/TRAINING PROGRAM

## 2024-01-19 PROCEDURE — 83735 ASSAY OF MAGNESIUM: CPT

## 2024-01-19 PROCEDURE — 80053 COMPREHEN METABOLIC PANEL: CPT | Performed by: EMERGENCY MEDICINE

## 2024-01-19 PROCEDURE — 94761 N-INVAS EAR/PLS OXIMETRY MLT: CPT

## 2024-01-19 PROCEDURE — 93010 ELECTROCARDIOGRAM REPORT: CPT | Mod: ,,, | Performed by: INTERNAL MEDICINE

## 2024-01-19 PROCEDURE — 83880 ASSAY OF NATRIURETIC PEPTIDE: CPT

## 2024-01-19 PROCEDURE — 25000003 PHARM REV CODE 250: Performed by: NURSE PRACTITIONER

## 2024-01-19 PROCEDURE — 63600175 PHARM REV CODE 636 W HCPCS: Performed by: NURSE PRACTITIONER

## 2024-01-19 PROCEDURE — 85025 COMPLETE CBC W/AUTO DIFF WBC: CPT

## 2024-01-19 RX ORDER — SENNOSIDES 8.6 MG/1
1 TABLET ORAL DAILY
Status: DISCONTINUED | OUTPATIENT
Start: 2024-01-20 | End: 2024-01-20 | Stop reason: HOSPADM

## 2024-01-19 RX ORDER — METOPROLOL SUCCINATE 50 MG/1
50 TABLET, EXTENDED RELEASE ORAL DAILY
Status: DISCONTINUED | OUTPATIENT
Start: 2024-01-20 | End: 2024-01-20 | Stop reason: HOSPADM

## 2024-01-19 RX ORDER — NAPROXEN SODIUM 220 MG/1
81 TABLET, FILM COATED ORAL DAILY
Status: DISCONTINUED | OUTPATIENT
Start: 2024-01-20 | End: 2024-01-20 | Stop reason: HOSPADM

## 2024-01-19 RX ORDER — POLYETHYLENE GLYCOL 3350 17 G/17G
17 POWDER, FOR SOLUTION ORAL 2 TIMES DAILY PRN
Status: DISCONTINUED | OUTPATIENT
Start: 2024-01-19 | End: 2024-01-20 | Stop reason: HOSPADM

## 2024-01-19 RX ORDER — SIMETHICONE 80 MG
80 TABLET,CHEWABLE ORAL 4 TIMES DAILY PRN
Status: DISCONTINUED | OUTPATIENT
Start: 2024-01-19 | End: 2024-01-20 | Stop reason: HOSPADM

## 2024-01-19 RX ORDER — THIAMINE HCL 100 MG
200 TABLET ORAL DAILY
Status: DISCONTINUED | OUTPATIENT
Start: 2024-01-20 | End: 2024-01-20 | Stop reason: HOSPADM

## 2024-01-19 RX ORDER — IBUPROFEN 200 MG
24 TABLET ORAL
Status: DISCONTINUED | OUTPATIENT
Start: 2024-01-19 | End: 2024-01-20 | Stop reason: HOSPADM

## 2024-01-19 RX ORDER — METHOCARBAMOL 500 MG/1
1000 TABLET, FILM COATED ORAL 4 TIMES DAILY
Status: DISCONTINUED | OUTPATIENT
Start: 2024-01-19 | End: 2024-01-20 | Stop reason: HOSPADM

## 2024-01-19 RX ORDER — NALOXONE HCL 0.4 MG/ML
0.02 VIAL (ML) INJECTION
Status: DISCONTINUED | OUTPATIENT
Start: 2024-01-19 | End: 2024-01-20 | Stop reason: HOSPADM

## 2024-01-19 RX ORDER — FUROSEMIDE 40 MG/1
40 TABLET ORAL DAILY
Status: DISCONTINUED | OUTPATIENT
Start: 2024-01-20 | End: 2024-01-20 | Stop reason: HOSPADM

## 2024-01-19 RX ORDER — ATORVASTATIN CALCIUM 40 MG/1
80 TABLET, FILM COATED ORAL NIGHTLY
Status: DISCONTINUED | OUTPATIENT
Start: 2024-01-19 | End: 2024-01-20 | Stop reason: HOSPADM

## 2024-01-19 RX ORDER — LOSARTAN POTASSIUM 25 MG/1
25 TABLET ORAL DAILY
Status: DISCONTINUED | OUTPATIENT
Start: 2024-01-20 | End: 2024-01-20 | Stop reason: HOSPADM

## 2024-01-19 RX ORDER — PANTOPRAZOLE SODIUM 20 MG/1
20 TABLET, DELAYED RELEASE ORAL EVERY MORNING
Status: DISCONTINUED | OUTPATIENT
Start: 2024-01-20 | End: 2024-01-20 | Stop reason: HOSPADM

## 2024-01-19 RX ORDER — AMLODIPINE BESYLATE 10 MG/1
10 TABLET ORAL DAILY
Status: DISCONTINUED | OUTPATIENT
Start: 2024-01-20 | End: 2024-01-20 | Stop reason: HOSPADM

## 2024-01-19 RX ORDER — PREGABALIN 75 MG/1
75 CAPSULE ORAL 2 TIMES DAILY
Status: DISCONTINUED | OUTPATIENT
Start: 2024-01-19 | End: 2024-01-20 | Stop reason: HOSPADM

## 2024-01-19 RX ORDER — SODIUM CHLORIDE 0.9 % (FLUSH) 0.9 %
10 SYRINGE (ML) INJECTION EVERY 12 HOURS PRN
Status: DISCONTINUED | OUTPATIENT
Start: 2024-01-19 | End: 2024-01-20 | Stop reason: HOSPADM

## 2024-01-19 RX ORDER — FOLIC ACID 1 MG/1
1 TABLET ORAL DAILY
Status: DISCONTINUED | OUTPATIENT
Start: 2024-01-20 | End: 2024-01-20 | Stop reason: HOSPADM

## 2024-01-19 RX ORDER — MAGNESIUM SULFATE HEPTAHYDRATE 40 MG/ML
2 INJECTION, SOLUTION INTRAVENOUS ONCE
Status: COMPLETED | OUTPATIENT
Start: 2024-01-19 | End: 2024-01-20

## 2024-01-19 RX ORDER — GLUCAGON 1 MG
1 KIT INJECTION
Status: DISCONTINUED | OUTPATIENT
Start: 2024-01-19 | End: 2024-01-20 | Stop reason: HOSPADM

## 2024-01-19 RX ORDER — IPRATROPIUM BROMIDE AND ALBUTEROL SULFATE 2.5; .5 MG/3ML; MG/3ML
3 SOLUTION RESPIRATORY (INHALATION) EVERY 4 HOURS PRN
Status: DISCONTINUED | OUTPATIENT
Start: 2024-01-19 | End: 2024-01-20 | Stop reason: HOSPADM

## 2024-01-19 RX ORDER — FLUOXETINE HYDROCHLORIDE 20 MG/1
20 CAPSULE ORAL DAILY
Status: DISCONTINUED | OUTPATIENT
Start: 2024-01-20 | End: 2024-01-20 | Stop reason: HOSPADM

## 2024-01-19 RX ORDER — ALUMINUM HYDROXIDE, MAGNESIUM HYDROXIDE, AND SIMETHICONE 2400; 240; 2400 MG/30ML; MG/30ML; MG/30ML
15 SUSPENSION ORAL 4 TIMES DAILY PRN
Status: DISCONTINUED | OUTPATIENT
Start: 2024-01-19 | End: 2024-01-20 | Stop reason: HOSPADM

## 2024-01-19 RX ORDER — ACETAMINOPHEN 325 MG/1
650 TABLET ORAL EVERY 6 HOURS PRN
Status: DISCONTINUED | OUTPATIENT
Start: 2024-01-19 | End: 2024-01-20 | Stop reason: HOSPADM

## 2024-01-19 RX ORDER — TALC
6 POWDER (GRAM) TOPICAL NIGHTLY PRN
Status: DISCONTINUED | OUTPATIENT
Start: 2024-01-19 | End: 2024-01-20 | Stop reason: HOSPADM

## 2024-01-19 RX ORDER — SPIRONOLACTONE 25 MG/1
25 TABLET ORAL DAILY
Status: DISCONTINUED | OUTPATIENT
Start: 2024-01-20 | End: 2024-01-20 | Stop reason: HOSPADM

## 2024-01-19 RX ORDER — IBUPROFEN 200 MG
16 TABLET ORAL
Status: DISCONTINUED | OUTPATIENT
Start: 2024-01-19 | End: 2024-01-20 | Stop reason: HOSPADM

## 2024-01-19 RX ORDER — ONDANSETRON HYDROCHLORIDE 2 MG/ML
4 INJECTION, SOLUTION INTRAVENOUS EVERY 8 HOURS PRN
Status: DISCONTINUED | OUTPATIENT
Start: 2024-01-19 | End: 2024-01-20 | Stop reason: HOSPADM

## 2024-01-19 RX ORDER — OXYCODONE HYDROCHLORIDE 5 MG/1
5 TABLET ORAL EVERY 4 HOURS PRN
Status: DISCONTINUED | OUTPATIENT
Start: 2024-01-19 | End: 2024-01-20 | Stop reason: HOSPADM

## 2024-01-19 RX ADMIN — APIXABAN 5 MG: 5 TABLET, FILM COATED ORAL at 11:01

## 2024-01-19 RX ADMIN — POTASSIUM BICARBONATE 20 MEQ: 391 TABLET, EFFERVESCENT ORAL at 11:01

## 2024-01-19 RX ADMIN — PREGABALIN 75 MG: 75 CAPSULE ORAL at 11:01

## 2024-01-19 RX ADMIN — OXYCODONE 5 MG: 5 TABLET ORAL at 11:01

## 2024-01-19 RX ADMIN — MAGNESIUM SULFATE HEPTAHYDRATE 2 G: 40 INJECTION, SOLUTION INTRAVENOUS at 11:01

## 2024-01-19 RX ADMIN — METHOCARBAMOL 1000 MG: 500 TABLET ORAL at 11:01

## 2024-01-19 RX ADMIN — ATORVASTATIN CALCIUM 80 MG: 40 TABLET, FILM COATED ORAL at 11:01

## 2024-01-19 RX ADMIN — POLYETHYLENE GLYCOL 3350 17 G: 17 POWDER, FOR SOLUTION ORAL at 11:01

## 2024-01-19 NOTE — ED PROVIDER NOTES
Encounter Date: 1/19/2024       History     Chief Complaint   Patient presents with    Chest Pain     Patient arrived via ems from home. Patient stated he started experiencing chest pain (pressure left side radiating to left arm). Patient too 3 nitro's and one baby aspirin with relief. Pain now 6 out of 10      57 yo male w/ hx of CAD, HFrEF (25-30%), CVA, HLD, and HTN who presented via EMS with Chest Pain. Pt states that sxs first started last night when he had an episode of sudden onset, non-radiating chest pain. He explained that the pain would happen intermittently with episodes lasting roughly 20-30 min associated w/ SOB. Pt is homeless and was at an inpatient rehab when the first episode happened, the staff offered some breathing treatments which helped. At 10:45 AM this morning he was dropped off at the shelter where 15 min later he stated that he had another episode and decided to call EMS. In the ambulance he reported his pain 9/10 and subsequently was given 2 doses of sublingual nitroglycerin which brought his pain down to 6/10. EKG taken in the ambulance showed normal sinus rhythm with a first degree heart block with no ST elevation. Pt had a CVA in February last year and was started on ASA and Eliquis. PMHx is unclear whether or not the patient has had a stent placement. No other acute complaints including abdominal pain, n/v, issues with stool, urinary sxs, back pain or syncope.         Review of patient's allergies indicates:  No Known Allergies  Past Medical History:   Diagnosis Date    CAD (coronary artery disease)     HLD (hyperlipidemia)     HTN (hypertension)     Stroke     R MCA 2/2023     Past Surgical History:   Procedure Laterality Date    FIXATION OF SYNDESMOSIS OF ANKLE Left 1/5/2024    Procedure: FIXATION, SYNDESMOSIS, ANKLE;  Surgeon: Rogerio Hu MD;  Location: [facility] OR [address] [place] [address];  Service: Orthopedics;  Laterality: Left;    OPEN REDUCTION AND INTERNAL FIXATION (ORIF) OF INJURY OF  ANKLE Left 1/5/2024    Procedure: ORIF, ANKLE - LEFT;  Surgeon: Rogerio Hu MD;  Location: [facility] OR [address] [place] [address];  Service: Orthopedics;  Laterality: Left;     No family history on file.  Social History     Tobacco Use    Smoking status: Never    Smokeless tobacco: Never   Substance Use Topics    Alcohol use: Yes     Comment: occasional    Drug use: No     Review of Systems   Constitutional:  Negative for diaphoresis and fever.   HENT: Negative.     Eyes:  Negative for pain and visual disturbance.   Respiratory:  Positive for cough, chest tightness and shortness of breath. Negative for choking and wheezing.    Cardiovascular:  Positive for chest pain. Negative for palpitations and leg swelling.   Gastrointestinal:  Negative for abdominal distention, abdominal pain, constipation, diarrhea and vomiting.   Genitourinary: Negative.    Musculoskeletal:  Negative for back pain, joint swelling and myalgias.   Neurological:  Negative for dizziness, tremors, seizures, light-headedness, numbness and headaches.   Psychiatric/Behavioral:  Negative for agitation, behavioral problems and confusion.        Physical Exam     Initial Vitals [01/19/24 1301]   BP Pulse Resp Temp SpO2   94/70 75 18 98.7 °F (37.1 °C) 98 %      MAP       --         Physical Exam    Constitutional: He appears well-developed. He is not diaphoretic. No distress.   HENT:   Head: Normocephalic and atraumatic.   Eyes: Conjunctivae and EOM are normal.   Neck:   Normal range of motion.  Cardiovascular:  Normal rate, regular rhythm and normal heart sounds.           No murmur heard.  Pulmonary/Chest: Breath sounds normal. No respiratory distress. He has no wheezes. He has no rales.   Abdominal: Abdomen is soft. He exhibits no distension. There is no abdominal tenderness.   Musculoskeletal:         General: No tenderness or edema.      Cervical back: Normal range of motion.      Comments: Limited ROM (left sided weakness 2/2 CVA)     Neurological: He is alert and  oriented to person, place, and time.   Skin: Skin is warm and dry.   Psychiatric: He has a normal mood and affect. His behavior is normal. Judgment and thought content normal.         ED Course   Procedures  Labs Reviewed   CBC W/ AUTO DIFFERENTIAL - Abnormal; Notable for the following components:       Result Value    RBC 3.72 (*)     Hemoglobin 11.7 (*)     Hematocrit 36.5 (*)     MCH 31.5 (*)     Immature Granulocytes 1.0 (*)     Immature Grans (Abs) 0.05 (*)     All other components within normal limits   TROPONIN I   B-TYPE NATRIURETIC PEPTIDE   TROPONIN ISTAT   COMPREHENSIVE METABOLIC PANEL   POCT TROPONIN   POCT TROPONIN        ECG Results              EKG 12-lead (Final result)  Result time 01/19/24 14:38:52      Final result by Interface, [facility] (01/19/24 14:38:52)                   Narrative:    Test Reason : R07.9,    Vent. Rate : 072 BPM     Atrial Rate : 072 BPM     P-R Int : 218 ms          QRS Dur : 090 ms      QT Int : 340 ms       P-R-T Axes : 069 -45 049 degrees     QTc Int : 372 ms    Sinus rhythm with 1st degree A-V block and artifact  Left anterior fascicular block  Nonspecific T wave abnormality  Abnormal ECG  When compared with ECG of 30-DEC-2023 00:38,  Premature ventricular complexes are no longer Present  Premature atrial complexes are no longer Present  Left anterior fascicular block is now Present  Confirmed by Rolando EDDY MD ([id number]) on 1/19/2024 2:38:47 PM    Referred By: [id number]   SELF           Confirmed By:Rolando EDDY MD                                  Imaging Results              X-Ray Chest AP Portable (Final result)  Result time 01/19/24 15:14:39      Final result by Vin Hernandez MD (01/19/24 15:14:39)                   Impression:      See above      Electronically signed by: Vin Hernandez MD  Date:    01/19/2024  Time:    15:14               Narrative:    EXAMINATION:  XR CHEST AP PORTABLE    CLINICAL HISTORY:  Chest pain, unspecified    TECHNIQUE:  Single frontal view  of the chest was performed.    COMPARISON:  No 01/18/2024 ne    FINDINGS:  Loop recorder in place.  Cardiomegaly.  The lungs are clear.  No pleural effusion.                                       Medications - No data to display  Medical Decision Making  58-year-old male presents with episode of sudden onset chest pain last night, characterized as tightness in substernal area, non-radiating, intermittent bouts lasting to 20-30 minutes at a time. CP at rest accompanied by SOB, received breathing treatments at LTAC facility where he is being treated s/p left ankle fracture. Pt is homeless and was transferred to shelter this morning where he had another episode of CP shortly after arrival to [facility] for which he called an ambulance. Upon EMS arrival, pt reporting 9/10 CP, was subsequently administered two doses of NTG which brought the pain down to 6/10. EKG strip via EMS showing NSR w/ 1st degree AV block, consistent with EKG findings here in ED. Per patient he has a hx of coronary stent placement though unable to find proper documentation of this in chart and his story is ambiguous. He is not on dual antiplatelet therapy, though has been prescribed ASA and Eliquis presumptively for CVA he had in February of last year. Low suspicion for re-stent stenosis given uncertainty regarding hx and lack of ST changes on EKG. ACS w/u started in ED given reported sxs. Troponin negative, BNP not elevated. CBC and CMP unremarkable. Echo pending (hx of HFrEF 25-30% with G1DD). CXR w/o any acute findings. Pt not endorsing signs of volume overload on exam and sxs less caused by an acute heart failure exacerbation. Heart Pathway Score places pt in moderate-high risk category for MACE. Tentatively planned to place pt in observation status for further evaluation and management.     Discussed case with Dr. Myers who will assume pt's care.    Amount and/or Complexity of Data Reviewed  Labs: ordered. Decision-making details documented in  ED Course.  Radiology: ordered. Decision-making details documented in ED Course.  ECG/medicine tests:  Decision-making details documented in ED Course.               ED Course as of 01/19/24 1544   Fri Jan 19, 2024   1333 EKG 12-lead  EKG shows normal sinus rhythm and no acute ischemia per my independent interpretation.     [DC]   1427 WBC: 5.20 [DC]   1427 Hemoglobin(!): 11.7 [DC]   1427 Platelet Count: 304 [DC]      ED Course User Index  [DC] Carson Doherty MD                           Clinical Impression:  Final diagnoses:  [R07.9] Chest pain  [I50.20] Heart failure with reduced ejection fraction                 Grzegorz Hale MD  Resident  01/19/24 6554

## 2024-01-19 NOTE — ED NOTES
Patient identifiers for Dionicio Bess 58 y.o. male checked and correct.  Chief Complaint   Patient presents with    Chest Pain     Patient arrived via ems from home. Patient stated he started experiencing chest pain (pressure left side radiating to left arm). Patient too 3 nitro's and one baby aspirin with relief. Pain now 6 out of 10      Past Medical History:   Diagnosis Date    CAD (coronary artery disease)     HLD (hyperlipidemia)     HTN (hypertension)     Stroke     R MCA 2/2023     Allergies reported: Review of patient's allergies indicates:  No Known Allergies      LOC: Patient is awake, alert, and aware of environment with an appropriate affect. Patient is oriented x 4 and speaking appropriately.  APPEARANCE: Patient resting comfortably and in no acute distress. Patient is clean and well groomed, patient's clothing is properly fastened.  HEENT: WDL  SKIN: The skin is warm and dry. Patient has normal skin turgor and moist mucus membranes.   MUSKULOSKELETAL: Patient is moving all extremities well, no obvious deformities noted. Pulses intact.   RESPIRATORY: Airway is open and patent. Respirations are spontaneous and non-labored with normal effort and rate.  CARDIAC: Patient has a normal rate and rhythm. 65 on cardiac monitor. No peripheral edema noted.   ABDOMEN: No distention noted. Soft and non-tender upon palpation.  NEUROLOGICAL: pupils 2mm, PERRL. Facial expression is symmetrical. Hand grasps are equal bilaterally. Normal sensation in all extremities when touched with finger.

## 2024-01-20 VITALS
SYSTOLIC BLOOD PRESSURE: 111 MMHG | HEIGHT: 72 IN | DIASTOLIC BLOOD PRESSURE: 73 MMHG | TEMPERATURE: 98 F | OXYGEN SATURATION: 98 % | WEIGHT: 229.06 LBS | HEART RATE: 71 BPM | RESPIRATION RATE: 17 BRPM | BODY MASS INDEX: 31.03 KG/M2

## 2024-01-20 LAB
ALBUMIN SERPL BCP-MCNC: 3.6 G/DL (ref 3.5–5.2)
ALP SERPL-CCNC: 61 U/L (ref 55–135)
ALT SERPL W/O P-5'-P-CCNC: 17 U/L (ref 10–44)
ANION GAP SERPL CALC-SCNC: 7 MMOL/L (ref 8–16)
AST SERPL-CCNC: 22 U/L (ref 10–40)
BASOPHILS # BLD AUTO: 0.04 K/UL (ref 0–0.2)
BASOPHILS NFR BLD: 0.7 % (ref 0–1.9)
BILIRUB SERPL-MCNC: 0.3 MG/DL (ref 0.1–1)
BUN SERPL-MCNC: 26 MG/DL (ref 6–20)
CALCIUM SERPL-MCNC: 9.6 MG/DL (ref 8.7–10.5)
CHLORIDE SERPL-SCNC: 98 MMOL/L (ref 95–110)
CO2 SERPL-SCNC: 29 MMOL/L (ref 23–29)
CREAT SERPL-MCNC: 0.9 MG/DL (ref 0.5–1.4)
DIFFERENTIAL METHOD BLD: ABNORMAL
EOSINOPHIL # BLD AUTO: 0.4 K/UL (ref 0–0.5)
EOSINOPHIL NFR BLD: 5.9 % (ref 0–8)
ERYTHROCYTE [DISTWIDTH] IN BLOOD BY AUTOMATED COUNT: 13.5 % (ref 11.5–14.5)
EST. GFR  (NO RACE VARIABLE): >60 ML/MIN/1.73 M^2
GLUCOSE SERPL-MCNC: 89 MG/DL (ref 70–110)
HCT VFR BLD AUTO: 33.5 % (ref 40–54)
HGB BLD-MCNC: 11.1 G/DL (ref 14–18)
IMM GRANULOCYTES # BLD AUTO: 0.04 K/UL (ref 0–0.04)
IMM GRANULOCYTES NFR BLD AUTO: 0.7 % (ref 0–0.5)
LYMPHOCYTES # BLD AUTO: 1.7 K/UL (ref 1–4.8)
LYMPHOCYTES NFR BLD: 27 % (ref 18–48)
MAGNESIUM SERPL-MCNC: 2.4 MG/DL (ref 1.6–2.6)
MCH RBC QN AUTO: 31.5 PG (ref 27–31)
MCHC RBC AUTO-ENTMCNC: 33.1 G/DL (ref 32–36)
MCV RBC AUTO: 95 FL (ref 82–98)
MONOCYTES # BLD AUTO: 0.7 K/UL (ref 0.3–1)
MONOCYTES NFR BLD: 11.6 % (ref 4–15)
NEUTROPHILS # BLD AUTO: 3.3 K/UL (ref 1.8–7.7)
NEUTROPHILS NFR BLD: 54.1 % (ref 38–73)
NRBC BLD-RTO: 0 /100 WBC
PLATELET # BLD AUTO: 318 K/UL (ref 150–450)
PMV BLD AUTO: 9.9 FL (ref 9.2–12.9)
POTASSIUM SERPL-SCNC: 4.5 MMOL/L (ref 3.5–5.1)
PROT SERPL-MCNC: 7.3 G/DL (ref 6–8.4)
RBC # BLD AUTO: 3.52 M/UL (ref 4.6–6.2)
SODIUM SERPL-SCNC: 134 MMOL/L (ref 136–145)
TROPONIN I SERPL DL<=0.01 NG/ML-MCNC: 0.02 NG/ML (ref 0–0.03)
WBC # BLD AUTO: 6.11 K/UL (ref 3.9–12.7)

## 2024-01-20 PROCEDURE — 36415 COLL VENOUS BLD VENIPUNCTURE: CPT | Performed by: HOSPITALIST

## 2024-01-20 PROCEDURE — 83735 ASSAY OF MAGNESIUM: CPT | Performed by: NURSE PRACTITIONER

## 2024-01-20 PROCEDURE — 84484 ASSAY OF TROPONIN QUANT: CPT | Performed by: HOSPITALIST

## 2024-01-20 PROCEDURE — 80053 COMPREHEN METABOLIC PANEL: CPT | Performed by: NURSE PRACTITIONER

## 2024-01-20 PROCEDURE — G0378 HOSPITAL OBSERVATION PER HR: HCPCS

## 2024-01-20 PROCEDURE — 36415 COLL VENOUS BLD VENIPUNCTURE: CPT | Mod: XB | Performed by: NURSE PRACTITIONER

## 2024-01-20 PROCEDURE — 96366 THER/PROPH/DIAG IV INF ADDON: CPT

## 2024-01-20 PROCEDURE — 85025 COMPLETE CBC W/AUTO DIFF WBC: CPT | Performed by: NURSE PRACTITIONER

## 2024-01-20 PROCEDURE — 25000003 PHARM REV CODE 250: Performed by: NURSE PRACTITIONER

## 2024-01-20 RX ORDER — EMPAGLIFLOZIN 10 MG/1
10 TABLET, FILM COATED ORAL DAILY
Qty: 30 TABLET | Refills: 11 | Status: ON HOLD | OUTPATIENT
Start: 2024-01-20 | End: 2024-05-27

## 2024-01-20 RX ORDER — ATORVASTATIN CALCIUM 80 MG/1
80 TABLET, FILM COATED ORAL NIGHTLY
Qty: 30 TABLET | Refills: 2 | Status: ON HOLD | OUTPATIENT
Start: 2024-01-20 | End: 2024-05-27

## 2024-01-20 RX ORDER — LOSARTAN POTASSIUM 25 MG/1
25 TABLET ORAL DAILY
Qty: 30 TABLET | Refills: 11 | Status: ON HOLD | OUTPATIENT
Start: 2024-01-20 | End: 2024-05-29 | Stop reason: HOSPADM

## 2024-01-20 RX ORDER — AMLODIPINE BESYLATE 10 MG/1
10 TABLET ORAL DAILY
Qty: 30 TABLET | Refills: 2 | Status: ON HOLD | OUTPATIENT
Start: 2024-01-20 | End: 2024-05-29 | Stop reason: HOSPADM

## 2024-01-20 RX ORDER — FLUOXETINE HYDROCHLORIDE 20 MG/1
20 CAPSULE ORAL DAILY
Qty: 30 CAPSULE | Refills: 2 | Status: ON HOLD | OUTPATIENT
Start: 2024-01-20 | End: 2024-05-29 | Stop reason: HOSPADM

## 2024-01-20 RX ORDER — FUROSEMIDE 40 MG/1
40 TABLET ORAL DAILY
Qty: 30 TABLET | Refills: 2 | Status: ON HOLD | OUTPATIENT
Start: 2024-01-20 | End: 2024-05-29

## 2024-01-20 RX ORDER — SPIRONOLACTONE 25 MG/1
25 TABLET ORAL DAILY
Qty: 30 TABLET | Refills: 11 | Status: ON HOLD | OUTPATIENT
Start: 2024-01-20 | End: 2024-05-28

## 2024-01-20 RX ORDER — METOPROLOL SUCCINATE 50 MG/1
50 TABLET, EXTENDED RELEASE ORAL DAILY
Qty: 30 TABLET | Refills: 2 | Status: ON HOLD | OUTPATIENT
Start: 2024-01-20 | End: 2024-05-27

## 2024-01-20 RX ORDER — NAPROXEN SODIUM 220 MG/1
81 TABLET, FILM COATED ORAL DAILY
Qty: 30 TABLET | Refills: 2 | Status: ON HOLD | OUTPATIENT
Start: 2024-01-20 | End: 2024-05-27

## 2024-01-20 RX ADMIN — METHOCARBAMOL 1000 MG: 500 TABLET ORAL at 08:01

## 2024-01-20 RX ADMIN — THERA TABS 1 TABLET: TAB at 08:01

## 2024-01-20 RX ADMIN — SENNOSIDES 1 TABLET: 8.6 TABLET, FILM COATED ORAL at 08:01

## 2024-01-20 RX ADMIN — FOLIC ACID 1 MG: 1 TABLET ORAL at 08:01

## 2024-01-20 RX ADMIN — PANTOPRAZOLE SODIUM 20 MG: 20 TABLET, DELAYED RELEASE ORAL at 08:01

## 2024-01-20 RX ADMIN — AMLODIPINE BESYLATE 10 MG: 10 TABLET ORAL at 08:01

## 2024-01-20 RX ADMIN — METOPROLOL SUCCINATE 50 MG: 50 TABLET, EXTENDED RELEASE ORAL at 08:01

## 2024-01-20 RX ADMIN — PREGABALIN 75 MG: 75 CAPSULE ORAL at 08:01

## 2024-01-20 RX ADMIN — FUROSEMIDE 40 MG: 40 TABLET ORAL at 08:01

## 2024-01-20 RX ADMIN — LOSARTAN POTASSIUM 25 MG: 25 TABLET, FILM COATED ORAL at 08:01

## 2024-01-20 RX ADMIN — APIXABAN 5 MG: 5 TABLET, FILM COATED ORAL at 08:01

## 2024-01-20 RX ADMIN — ASPIRIN 81 MG CHEWABLE TABLET 81 MG: 81 TABLET CHEWABLE at 08:01

## 2024-01-20 RX ADMIN — Medication 200 MG: at 08:01

## 2024-01-20 RX ADMIN — SPIRONOLACTONE 25 MG: 25 TABLET ORAL at 08:01

## 2024-01-20 RX ADMIN — METHOCARBAMOL 1000 MG: 500 TABLET ORAL at 12:01

## 2024-01-20 RX ADMIN — FLUOXETINE 20 MG: 20 CAPSULE ORAL at 08:01

## 2024-01-20 NOTE — PLAN OF CARE
Problem: Chest Pain  Goal: Resolution of Chest Pain Symptoms  Outcome: Ongoing, Progressing     Problem: Fall Injury Risk  Goal: Absence of Fall and Fall-Related Injury  Outcome: Ongoing, Progressing     Problem: Adult Inpatient Plan of Care  Goal: Optimal Comfort and Wellbeing  Outcome: Ongoing, Progressing

## 2024-01-20 NOTE — ASSESSMENT & PLAN NOTE
Given reassuring vitals, EKG, troponins, and recent negative stress test can monitor overnight on telemetry. Does have some reproducible left sided chest wall pain on exam.  - EKG without ST-segment elevation or depression, HR 67  - Troponin 0.008>0.007.  - Continue ASA, statin, eliquis, BB  - Cardiac monitoring.  - PRN EKG for chest pain  - Previous cardiac testing with :  Nuclear stress test 12/1/23:    The ECG portion of the study is abnormal but not diagnostic due to resting ST-T abnormalities.    The patient reported no chest pain during the stress test.    During stress, frequent PVCs are noted.    The nuclear portion of this study will be reported separately.    Baseline ECG: The Baseline ECG reveals sinus rhythm with prolonged QT interval. The baseline ECG has ST and/or T wave abnormalities suggestive of myocardial ischemia.  - Scintigraphically negative for ischemia or infarct.  - The global left ventricular systolic function is mildly impaired with an LV ejection fraction of 41 % and evidence of LV dilatation.    ECHO 11/6/23    Left Ventricle: The left ventricle is normal in size. Normal wall thickness. Global hypokinesis present. There is severely reduced systolic function with a visually estimated ejection fraction of 25 - 30%. Grade I diastolic dysfunction.    Right Ventricle: Mild right ventricular enlargement. Wall thickness is normal. Right ventricle wall motion  is normal. Systolic function is mildly reduced.    Biatrial enlargement    Pulmonary Artery: The estimated pulmonary artery systolic pressure is 19 mmHg.    IVC/SVC: Normal venous pressure at 3 mmHg.

## 2024-01-20 NOTE — NURSING
Patient arrived to the unit with transport via patient's personal wheelchair. No acute distress noted.

## 2024-01-20 NOTE — SUBJECTIVE & OBJECTIVE
Past Medical History:   Diagnosis Date    CAD (coronary artery disease)     HLD (hyperlipidemia)     HTN (hypertension)     Stroke     R MCA 2/2023       Past Surgical History:   Procedure Laterality Date    FIXATION OF SYNDESMOSIS OF ANKLE Left 1/5/2024    Procedure: FIXATION, SYNDESMOSIS, ANKLE;  Surgeon: Rogerio Hu MD;  Location: [address];  Service: Orthopedics;  Laterality: Left;    OPEN REDUCTION AND INTERNAL FIXATION (ORIF) OF INJURY OF ANKLE Left 1/5/2024    Procedure: ORIF, ANKLE - LEFT;  Surgeon: Rogerio Hu MD;  Location: [address];  Service: Orthopedics;  Laterality: Left;       Review of patient's allergies indicates:  No Known Allergies    Current Facility-Administered Medications on File Prior to Encounter   Medication    [DISCONTINUED] albuterol inhaler    [DISCONTINUED] furosemide tablet    [DISCONTINUED] furosemide tablet     Current Outpatient Medications on File Prior to Encounter   Medication Sig    acetaminophen (TYLENOL) 500 MG tablet Take 2 tablets (1,000 mg total) by mouth every 8 (eight) hours.    albuterol-ipratropium (DUO-NEB) 2.5 mg-0.5 mg/3 mL nebulizer solution Take 3 mLs by nebulization every 4 (four) hours as needed for Wheezing or Shortness of Breath. Rescue    amLODIPine (NORVASC) 10 MG tablet Take 1 tablet (10 mg total) by mouth once daily.    apixaban (ELIQUIS) 5 mg Tab Take 1 tablet (5 mg total) by mouth 2 (two) times daily.    aspirin 81 MG Chew Chew and swallow 1 (1 mg total) by mouth once daily.    atorvastatin (LIPITOR) 80 MG tablet Take 1 tablet (80 mg total) by mouth every evening.    diphenhydrAMINE (BENADRYL) 25 mg capsule Take 1 capsule (25 mg total) by mouth every 6 (six) hours as needed for Itching.    FLUoxetine 20 MG capsule Take 1 capsule (20 mg total) by mouth once daily.    folic acid (FOLVITE) 1 MG tablet Take 1 tablet (1 mg total) by mouth once daily.    furosemide (LASIX) 40 MG tablet Take 1 tablet (40 mg total) by mouth once daily.     JARDIANCE 10 mg tablet Take 1 tablet (10 mg total) by mouth once daily.    losartan (COZAAR) 25 MG tablet Take 1 tablet (25 mg total) by mouth once daily.    melatonin (MELATIN) 3 mg tablet Take 2 tablets (6 mg total) by mouth nightly as needed for Insomnia.    methocarbamoL 1,000 mg Tab Take 1,000 mg by mouth 4 (four) times daily.    metoprolol succinate (TOPROL-XL) 50 MG 24 hr tablet Take 1 tablet (50 mg total) by mouth once daily.    multivitamin Tab Take 1 tablet by mouth once daily.    ondansetron (ZOFRAN-ODT) 8 MG TbDL Take 1 tablet (8 mg total) by mouth every 8 (eight) hours as needed (nausea).    oxyCODONE (ROXICODONE) 10 mg Tab immediate release tablet Take 1 tablet (10 mg total) by mouth every 6 (six) hours as needed (pain scale 7-10).    oxyCODONE (ROXICODONE) 5 MG immediate release tablet Take 1 tablet (5 mg total) by mouth every 6 (six) hours as needed (pain scale 4-6).    pantoprazole (PROTONIX) 20 MG tablet Take 1 tablet (20 mg total) by mouth every morning.    polyethylene glycol (GLYCOLAX) 17 gram PwPk Take 17 g by mouth once daily.    pregabalin (LYRICA) 75 MG capsule Take 1 capsule (75 mg total) by mouth 2 (two) times daily.    senna (SENOKOT) 8.6 mg tablet Take 1 tablet by mouth once daily.    spironolactone (ALDACTONE) 25 MG tablet Take 1 tablet (25 mg total) by mouth once daily.    thiamine 100 MG tablet Take 2 tablets (200 mg total) by mouth once daily.     Family History    None       Tobacco Use    Smoking status: Never    Smokeless tobacco: Never   Substance and Sexual Activity    Alcohol use: Yes     Comment: occasional    Drug use: No    Sexual activity: Not on file     Review of Systems   Constitutional:  Negative for chills, diaphoresis, fatigue and fever.   HENT:  Negative for congestion, rhinorrhea and sore throat.    Eyes:  Negative for photophobia and visual disturbance.   Respiratory:  Positive for shortness of breath and wheezing. Negative for cough.    Cardiovascular:   Positive for chest pain. Negative for palpitations and leg swelling.   Gastrointestinal:  Negative for abdominal distention, abdominal pain, diarrhea, nausea and vomiting.   Genitourinary:  Negative for dysuria, frequency and hematuria.   Musculoskeletal:  Positive for arthralgias, gait problem and myalgias. Negative for back pain, joint swelling and neck pain.   Skin:  Negative for color change, pallor and rash.   Neurological:  Positive for weakness (L sided weakness from previous CVA). Negative for dizziness, syncope, numbness and headaches.   Psychiatric/Behavioral:  Negative for confusion and hallucinations. The patient is nervous/anxious.      Objective:     Vital Signs (Most Recent):  Temp: 98.6 °F (37 °C) (01/19/24 1826)  Pulse: 77 (01/19/24 1837)  Resp: 20 (01/19/24 1837)  BP: 120/82 (01/19/24 1837)  SpO2: 98 % (01/19/24 1837) Vital Signs (24h Range):  Temp:  [98.6 °F (37 °C)-98.8 °F (37.1 °C)] 98.6 °F (37 °C)  Pulse:  [65-77] 77  Resp:  [15-20] 20  SpO2:  [95 %-100 %] 98 %  BP: ()/(70-82) 120/82     Weight: 91.6 kg (202 lb)  Body mass index is 27.4 kg/m².     Physical Exam  Vitals and nursing note reviewed.   Constitutional:       General: He is not in acute distress.     Appearance: He is not ill-appearing, toxic-appearing or diaphoretic.   HENT:      Head: Normocephalic and atraumatic.      Nose: Nose normal.      Mouth/Throat:      Mouth: Mucous membranes are moist.   Eyes:      Pupils: Pupils are equal, round, and reactive to light.   Cardiovascular:      Rate and Rhythm: Normal rate and regular rhythm.   Pulmonary:      Effort: Pulmonary effort is normal. No respiratory distress.      Breath sounds: No wheezing, rhonchi or rales.   Chest:      Chest wall: Tenderness present.   Abdominal:      General: Bowel sounds are normal. There is no distension.      Palpations: Abdomen is soft.      Tenderness: There is no abdominal tenderness. There is no guarding.   Musculoskeletal:      Cervical back:  Normal range of motion.      Comments: Left leg in splint   Skin:     General: Skin is warm and dry.      Capillary Refill: Capillary refill takes less than 2 seconds.   Neurological:      Mental Status: He is oriented to person, place, and time.      Motor: Weakness (residual L sided weakness) present.   Psychiatric:         Mood and Affect: Mood normal.         Behavior: Behavior normal.              CRANIAL NERVES     CN III, IV, VI   Pupils are equal, round, and reactive to light.       Significant Labs: All pertinent labs within the past 24 hours have been reviewed.  CBC:   Recent Labs   Lab 01/19/24  1350 01/19/24  1714   WBC 5.20  --    HGB 11.7*  --    HCT 36.5* 41     --      CMP:   Recent Labs   Lab 01/19/24  1707      K 3.8      CO2 24      BUN 22*   CREATININE 0.8   CALCIUM 8.2*   PROT 6.3   ALBUMIN 3.1*   BILITOT 0.2   ALKPHOS 54*   AST 18   ALT 13   ANIONGAP 8     Cardiac Markers:   Recent Labs   Lab 01/19/24  1350   BNP 51     Troponin:   Recent Labs   Lab 01/19/24  1350 01/19/24  1710   TROPONINI 0.008 0.007       Significant Imaging: I have reviewed all pertinent imaging results/findings within the past 24 hours.  Imaging Results              X-Ray Chest AP Portable (Final result)  Result time 01/19/24 15:14:39      Final result by Vin Hernandez MD (01/19/24 15:14:39)                   Impression:      See above      Electronically signed by: Vin Hernandez MD  Date:    01/19/2024  Time:    15:14               Narrative:    EXAMINATION:  XR CHEST AP PORTABLE    CLINICAL HISTORY:  Chest pain, unspecified    TECHNIQUE:  Single frontal view of the chest was performed.    COMPARISON:  No 01/18/2024 ne    FINDINGS:  Loop recorder in place.  Cardiomegaly.  The lungs are clear.  No pleural effusion.

## 2024-01-20 NOTE — ASSESSMENT & PLAN NOTE
Patient with Paroxysmal (<7 days) atrial fibrillation which is controlled currently with Beta Blocker. Patient is currently in atrial fibrillation.[id number] Score: 1.  Anticoagulation indicated. Anticoagulation done with eliquis .

## 2024-01-20 NOTE — NURSING
Nurses Note -- 4 Eyes      1/19/2024   11:30 PM      Skin assessed during: Admit      [] No Altered Skin Integrity Present    []Prevention Measures Documented      [x] Yes- Altered Skin Integrity Present or Discovered   [x] LDA Added if Not in Epic (Describe Wound)   [] New Altered Skin Integrity was Present on Admit and Documented in LDA   [] Wound Image Taken    Wound Care Consulted? No    Attending Nurse:  TIFFANIE Hairston      Second RN/Staff Member:   TRUPTI Paula

## 2024-01-20 NOTE — ASSESSMENT & PLAN NOTE
- Patient denies any history of alcohol withdrawal or seizures.  - Last drink was 12/30, prior to last hospitalization.  - Continue folic acid, thiamine, and MVI.    - Discussed the dangers of continued ETOH use with Pt and apparent systemic effects of his abuse.

## 2024-01-20 NOTE — HOSPITAL COURSE
Presented with non-cardiac reproducible chest pain. No ischemic findings on ECG and troponins were negative. Pt is unhoused. With assistance of pharmacy and case mgmt, discharged to shelter with medications provided. Has upcoming Ortho f/u as well as hospital f/u.

## 2024-01-20 NOTE — ASSESSMENT & PLAN NOTE
Chronic, controlled. Latest blood pressure and vitals reviewed-     Temp:  [98.6 °F (37 °C)-98.8 °F (37.1 °C)]   Pulse:  [65-77]   Resp:  [15-20]   BP: ()/(70-82)   SpO2:  [95 %-100 %] .   Home meds for hypertension were reviewed and noted below.   Hypertension Medications               amLODIPine (NORVASC) 10 MG tablet Take 1 tablet (10 mg total) by mouth once daily.    furosemide (LASIX) 40 MG tablet Take 1 tablet (40 mg total) by mouth once daily.    losartan (COZAAR) 25 MG tablet Take 1 tablet (25 mg total) by mouth once daily.    metoprolol succinate (TOPROL-XL) 50 MG 24 hr tablet Take 1 tablet (50 mg total) by mouth once daily.    spironolactone (ALDACTONE) 25 MG tablet Take 1 tablet (25 mg total) by mouth once daily.            While in the hospital, will manage blood pressure as follows; Continue home antihypertensive regimen    Will utilize p.r.n. blood pressure medication only if patient's blood pressure greater than 180/110 and he develops symptoms such as worsening chest pain or shortness of breath.

## 2024-01-20 NOTE — PLAN OF CARE
[facility] - Emergency Dept  Initial Discharge Assessment       Primary Care Provider: Isela, Primary Doctor    Admission Diagnosis: Chest pain [R07.9]    Admission Date: 1/19/2024  Expected Discharge Date: 01/20/2024    Sw met pt at bedside. Pt stated he is currently homeless but sometimes he stays with friends. PT stated he's awaiting his disability to help pt secure housing. Pt was given resources for NH and shelters , as requested.     Transition of Care Barriers: (P) Homeless, Mobility    Payor: MEDICAID / Plan: Occasion [facility] / Product Type: Managed Medicaid /     Extended Emergency Contact Information  Primary Emergency Contact: Keyona Bess  Mobile Phone: [phone]  Relation: Relative    Discharge Plan A: (P) Shelter  Discharge Plan B: (P) Home with family (pt stated he has applied for his disability benefits and he was told by [facility] that he should receives his benefits shortly)      [facility]  [address], [address]  [facility] [address]  Phone: [phone] Fax: [phone]      Initial Assessment (most recent)       Adult Discharge Assessment - 01/19/24 2025          Discharge Assessment    Assessment Type Discharge Planning Assessment (P)      Confirmed/corrected address, phone number and insurance Yes (P)    pt is currently homeless    Confirmed Demographics -- (P)    pt is homeless    Source of Information patient (P)      Does patient/caregiver understand observation status Yes (P)      Communicated ERWIN with patient/caregiver Date not available/Unable to determine (P)      People in Home alone (P)    pt is homeless    Do you expect to return to your current living situation? Other (see comments) (P)    pt stated he has a few family members he will reach out to for assistance    Do you have help at home or someone to help you manage your care at home? No (P)      Prior to hospitilization cognitive status: Alert/Oriented (P)      Current cognitive  status: Alert/Oriented (P)      Walking or Climbing Stairs Difficulty yes (P)      Walking or Climbing Stairs ambulation difficulty, requires equipment (P)      Dressing/Bathing Difficulty yes (P)      Dressing/Bathing -- (P)    pt has a leg injury.    Home Accessibility wheelchair accessible (P)      Equipment Currently Used at Home wheelchair (P)      Readmission within 30 days? Yes (P)      Patient currently being followed by outpatient case management? No (P)      Do you currently have service(s) that help you manage your care at home? No (P)      Do you take prescription medications? Yes (P)      Do you have prescription coverage? Yes (P)      Do you have any problems affording any of your prescribed medications? No (P)      Is the patient taking medications as prescribed? yes (P)      Who is going to help you get home at discharge? pt is homeless however pt stated he will reach out to his family for assistance. (P)      How do you get to doctors appointments? public transportation (P)      Are you on dialysis? No (P)      Do you take coumadin? No (P)      Discharge Plan A Shelter (P)      Discharge Plan B Home with family (P)    pt stated he has applied for his disability benefits and he was told by [employer] that he should receives his benefits shortly    DME Needed Upon Discharge  none (P)      Discharge Plan discussed with: Patient (P)      Transition of Care Barriers Homeless;Mobility (P)         Physical Activity    On average, how many days per week do you engage in moderate to strenuous exercise (like a brisk walk)? 0 days (P)      On average, how many minutes do you engage in exercise at this level? 0 min (P)         Financial Resource Strain    How hard is it for you to pay for the very basics like food, housing, medical care, and heating? Somewhat hard (P)         Housing Stability    In the last 12 months, was there a time when you were not able to pay the mortgage or rent on time? Yes (P)      In the  last 12 months, was there a time when you did not have a steady place to sleep or slept in a shelter (including now)? Yes (P)         Transportation Needs    In the past 12 months, has lack of transportation kept you from medical appointments or from getting medications? No (P)      In the past 12 months, has lack of transportation kept you from meetings, work, or from getting things needed for daily living? No (P)         Food Insecurity    Within the past 12 months, you worried that your food would run out before you got the money to buy more. Never true (P)      Within the past 12 months, the food you bought just didn't last and you didn't have money to get more. Never true (P)         Stress    Do you feel stress - tense, restless, nervous, or anxious, or unable to sleep at night because your mind is troubled all the time - these days? Not at all (P)         Social Connections    In a typical week, how many times do you talk on the phone with family, friends, or neighbors? Once a week (P)      How often do you get together with friends or relatives? Never (P)      How often do you attend Yarsani or Sabianist services? Never (P)      Do you belong to any clubs or organizations such as Yarsani groups, unions, fraternal or athletic groups, or school groups? No (P)      How often do you attend meetings of the clubs or organizations you belong to? Never (P)      Are you , , , , never , or living with a partner?  (P)         Alcohol Use    Q1: How often do you have a drink containing alcohol? -- (P)    pt stated he does not drink    Q2: How many drinks containing alcohol do you have on a typical day when you are drinking? Patient does not drink (P)      Q3: How often do you have six or more drinks on one occasion? Never (P)                       Soco Ortega LMSW  Case Management  Emergency Department  [phone]

## 2024-01-20 NOTE — HPI
"Dionicio Bess is a 58 y.o. male with a PMHx of CAD, HFrEF (25-30%), CVA, HLD, and HTN who presents to the ED for evaluation of chest pain. Pt states that his symptoms first started last night when he had an episode of sudden onset of left sided, aching chest pain, non-radiating. He explained that the pain would happen intermittently with episodes lasting roughly 20-30 min associated w/ SOB and wheezing. Pt is homeless and was at an inpatient rehab when the first episode happened, the staff offered some breathing treatments which helped. At 10:45 AM this morning he was dropped off at the shelter where 15 min later he stated that he felt overwhelmed with how he was going to get around at the shelter and care for himself. He had another episode of chest pain and decided to call EMS. In the ambulance he reported his pain 9/10 and subsequently was given 2 doses of sublingual nitroglycerin which brought his pain down to 6/10. Denies any chest pain currently. Does report "muscular pain" to his left upper arm made worse by laying on it and better with oxycodone. EKG taken in the ambulance showed normal sinus rhythm with a first degree heart block with no ST elevation. Pt had a CVA in February last year and was started on ASA and Eliquis. Patient reports he previously had a stent placed at [facility]. No other acute complaints including palpitations, diaphoresis, abdominal pain, n/v/d, urinary symptoms, back pain, or syncope.      In the ED, VSS, afebrile. CBC with stable anemia. K+ 3.8. Calcium 8.2. Albumin 3.1. BNP 51. Troponin 0.008>0.007. EKG shows SR w/1st degree block and L axis deviation, 67 bpm, non specific T wave abnormality. CXR with loop recorder in place. Cardiomegaly. The lungs are clear. No pleural effusion.   "

## 2024-01-20 NOTE — DISCHARGE SUMMARY
"[facility] - Cardiology [facility] Medicine  Discharge Summary      Patient Name: Dionicio Bess  MRN: [id number]  ALLY: [id number]  Patient Class: OP- Observation  Admission Date: 1/19/2024  Hospital Length of Stay: 0 days  Discharge Date and Time:  01/20/2024 2:49 PM  Attending Physician: Jessika Singleton MD   Discharging Provider: Jessika Singleton MD  Primary Care Provider: Isela, Primary Doctor  [facility] Medicine Team: [facility] Jessika Singleton MD  Primary Care Team: [facility]    HPI:   Dionicio Bess is a 58 y.o. male with a PMHx of CAD, HFrEF (25-30%), CVA, HLD, and HTN who presents to the ED for evaluation of chest pain. Pt states that his symptoms first started last night when he had an episode of sudden onset of left sided, aching chest pain, non-radiating. He explained that the pain would happen intermittently with episodes lasting roughly 20-30 min associated w/ SOB and wheezing. Pt is homeless and was at an inpatient rehab when the first episode happened, the staff offered some breathing treatments which helped. At 10:45 AM this morning he was dropped off at the shelter where 15 min later he stated that he felt overwhelmed with how he was going to get around at the shelter and care for himself. He had another episode of chest pain and decided to call EMS. In the ambulance he reported his pain 9/10 and subsequently was given 2 doses of sublingual nitroglycerin which brought his pain down to 6/10. Denies any chest pain currently. Does report "muscular pain" to his left upper arm made worse by laying on it and better with oxycodone. EKG taken in the ambulance showed normal sinus rhythm with a first degree heart block with no ST elevation. Pt had a CVA in February last year and was started on ASA and Eliquis. Patient reports he previously had a stent placed at [facility]. No other acute complaints including palpitations, diaphoresis, abdominal pain, n/v/d, urinary symptoms, back pain, or syncope.      In the ED, " VSS, afebrile. CBC with stable anemia. K+ 3.8. Calcium 8.2. Albumin 3.1. BNP 51. Troponin 0.008>0.007. EKG shows SR w/1st degree block and L axis deviation, 67 bpm, non specific T wave abnormality. CXR with loop recorder in place. Cardiomegaly. The lungs are clear. No pleural effusion.     * No surgery found *      Hospital Course:   Presented with non-cardiac reproducible chest pain. No ischemic findings on ECG and troponins were negative. Pt is unhoused. With assistance of pharmacy and case mgmt, discharged to shelter with medications provided. Has upcoming Ortho f/u as well as hospital f/u.     Goals of Care Treatment Preferences:  Code Status: Full Code      Consults:     No new Assessment & Plan notes have been filed under this hospital service since the last note was generated.  Service: Hospital Medicine    Final Active Diagnoses:    Diagnosis Date Noted POA    PRINCIPAL PROBLEM:  Chest pain [R07.9] 12/30/2023 Yes    Alcohol dependence with uncomplicated intoxication [F10.220] 12/30/2023 Yes    Closed trimalleolar fracture of left ankle [S82.852A] 12/30/2023 Yes    Pure hypercholesterolemia [E78.00] 11/13/2023 Yes    Coronary artery disease involving native coronary artery with angina pectoris [I25.119] 11/06/2023 Yes     Chronic    Chronic combined systolic and diastolic congestive heart failure [I50.42] 11/05/2023 Yes    Paroxysmal atrial fibrillation [I48.0] 05/28/2023 Yes     Chronic    Essential hypertension [I10] 05/18/2023 Yes     Chronic    History of CVA with residual deficit [I69.30] 05/18/2023 Not Applicable      Problems Resolved During this Admission:       Discharged Condition: stable    Disposition: Home or Self Care    Follow Up:   Follow-up Information       No, Primary Doctor. Schedule an appointment as soon as possible for a visit.                           Patient Instructions:      Diet Cardiac     Notify your health care provider if you experience any of the following:  severe  uncontrolled pain     Notify your health care provider if you experience any of the following:  difficulty breathing or increased cough     Activity as tolerated       Significant Diagnostic Studies: N/A    Pending Diagnostic Studies:       None           Medications:  Reconciled Home Medications:      Medication List        CONTINUE taking these medications      acetaminophen 500 MG tablet  Commonly known as: TYLENOL  Take 2 tablets (1,000 mg total) by mouth every 8 (eight) hours.     albuterol-ipratropium 2.5 mg-0.5 mg/3 mL nebulizer solution  Commonly known as: DUO-NEB  Take 3 mLs by nebulization every 4 (four) hours as needed for Wheezing or Shortness of Breath. Rescue     amLODIPine 10 MG tablet  Commonly known as: NORVASC  Take 1 tablet (10 mg total) by mouth once daily.     apixaban 5 mg Tab  Commonly known as: ELIQUIS  Take 1 tablet (5 mg total) by mouth 2 (two) times daily.     aspirin 81 MG Chew  Chew and swallow 1 (1 mg total) by mouth once daily.     atorvastatin 80 MG tablet  Commonly known as: LIPITOR  Take 1 tablet (80 mg total) by mouth every evening.     diphenhydrAMINE 25 mg capsule  Commonly known as: BENADRYL  Take 1 capsule (25 mg total) by mouth every 6 (six) hours as needed for Itching.     FLUoxetine 20 MG capsule  Take 1 capsule (20 mg total) by mouth once daily.     folic acid 1 MG tablet  Commonly known as: FOLVITE  Take 1 tablet (1 mg total) by mouth once daily.     furosemide 40 MG tablet  Commonly known as: LASIX  Take 1 tablet (40 mg total) by mouth once daily.     JARDIANCE 10 mg tablet  Generic drug: empagliflozin  Take 1 tablet (10 mg total) by mouth once daily.     losartan 25 MG tablet  Commonly known as: COZAAR  Take 1 tablet (25 mg total) by mouth once daily.     melatonin 3 mg tablet  Commonly known as: MELATIN  Take 2 tablets (6 mg total) by mouth nightly as needed for Insomnia.     methocarbamoL 1,000 mg Tab  Take 1,000 mg by mouth 4 (four) times daily.     metoprolol  succinate 50 MG 24 hr tablet  Commonly known as: TOPROL-XL  Take 1 tablet (50 mg total) by mouth once daily.     multivitamin Tab  Take 1 tablet by mouth once daily.     ondansetron 8 MG Tbdl  Commonly known as: ZOFRAN-ODT  Take 1 tablet (8 mg total) by mouth every 8 (eight) hours as needed (nausea).     * oxyCODONE 5 MG immediate release tablet  Commonly known as: ROXICODONE  Take 1 tablet (5 mg total) by mouth every 6 (six) hours as needed (pain scale 4-6).     * oxyCODONE 10 mg Tab immediate release tablet  Commonly known as: ROXICODONE  Take 1 tablet (10 mg total) by mouth every 6 (six) hours as needed (pain scale 7-10).     pantoprazole 20 MG tablet  Commonly known as: PROTONIX  Take 1 tablet (20 mg total) by mouth every morning.     polyethylene glycol 17 gram Pwpk  Commonly known as: GLYCOLAX  Take 17 g by mouth once daily.     pregabalin 75 MG capsule  Commonly known as: LYRICA  Take 1 capsule (75 mg total) by mouth 2 (two) times daily.     senna 8.6 mg tablet  Commonly known as: SENOKOT  Take 1 tablet by mouth once daily.     spironolactone 25 MG tablet  Commonly known as: ALDACTONE  Take 1 tablet (25 mg total) by mouth once daily.     thiamine 100 MG tablet  Take 2 tablets (200 mg total) by mouth once daily.           * This list has 2 medication(s) that are the same as other medications prescribed for you. Read the directions carefully, and ask your doctor or other care provider to review them with you.                  Indwelling Lines/Drains at time of discharge:   Lines/Drains/Airways       None                   Time spent on the discharge of patient: 35 minutes         Jessika Singleton MD  Department of Hospital Medicine  [facility] - Cardiology Stepdown

## 2024-01-20 NOTE — ASSESSMENT & PLAN NOTE
Patient with known CAD s/p stent placement?, which is controlled Will continue  eliquis, ASA, and Statin and monitor for S/Sx of angina/ACS. Continue to monitor on telemetry.

## 2024-01-20 NOTE — PROVIDER PROGRESS NOTES - EMERGENCY DEPT.
ED Attending Hand-off Note    I have discussed the patient's history and presentation in the ED with Dr. Doherty    Brief H&P: Patient is a 58 y.o. male who presented to the ED with Chest Pain (Patient arrived via ems from home. Patient stated he started experiencing chest pain (pressure left side radiating to left arm). Patient too 3 nitro's and one baby aspirin with relief. Pain now 6 out of 10 )      Pending studies and consultations: Cxr, CMP  Disposition:  Will continue to monitor, update patient on results of testing and determine appropriate additional treatment for the duration of stay in ED.  Rene Myers MD 6:17 PM 1/19/2024        UPDATES:   Chest x-ray on my independent interpretation shows cardiomegaly without focal consolidation or pulmonary edema.  Patient has a heart score of 4, making him high-risk.    Discussed the case [facility] Medicine to place the patient observation their service.        Clinical Impression  :  Chest pain  Heart failure with reduced ejection fraction

## 2024-01-20 NOTE — H&P
"[facility] - Cardiology [facility] Medicine  History & Physical    Patient Name: Dionicio Bess  MRN: [id number]  Patient Class: OP- Observation  Admission Date: 1/19/2024  Attending Physician: Jessika Singleton MD   Primary Care Provider: Isela Primary Doctor         Patient information was obtained from patient, past medical records, and ER records.     Subjective:     Principal Problem:Chest pain    Chief Complaint:   Chief Complaint   Patient presents with    Chest Pain     Patient arrived via ems from home. Patient stated he started experiencing chest pain (pressure left side radiating to left arm). Patient too 3 nitro's and one baby aspirin with relief. Pain now 6 out of 10         HPI: Dionicio Bess is a 58 y.o. male with a PMHx of CAD, HFrEF (25-30%), CVA, HLD, and HTN who presents to the ED for evaluation of chest pain. Pt states that his symptoms first started last night when he had an episode of sudden onset of left sided, aching chest pain, non-radiating. He explained that the pain would happen intermittently with episodes lasting roughly 20-30 min associated w/ SOB and wheezing. Pt is homeless and was at an inpatient rehab when the first episode happened, the staff offered some breathing treatments which helped. At 10:45 AM this morning he was dropped off at the shelter where 15 min later he stated that he felt overwhelmed with how he was going to get around at the shelter and care for himself. He had another episode of chest pain and decided to call EMS. In the ambulance he reported his pain 9/10 and subsequently was given 2 doses of sublingual nitroglycerin which brought his pain down to 6/10. Denies any chest pain currently. Does report "muscular pain" to his left upper arm made worse by laying on it and better with oxycodone. EKG taken in the ambulance showed normal sinus rhythm with a first degree heart block with no ST elevation. Pt had a CVA in February last year and was started on ASA and Eliquis. " Patient reports he previously had a stent placed at [facility]. No other acute complaints including palpitations, diaphoresis, abdominal pain, n/v/d, urinary symptoms, back pain, or syncope.      In the ED, VSS, afebrile. CBC with stable anemia. K+ 3.8. Calcium 8.2. Albumin 3.1. BNP 51. Troponin 0.008>0.007. EKG shows SR w/1st degree block and L axis deviation, 67 bpm, non specific T wave abnormality. CXR with loop recorder in place. Cardiomegaly. The lungs are clear. No pleural effusion.     Past Medical History:   Diagnosis Date    CAD (coronary artery disease)     HLD (hyperlipidemia)     HTN (hypertension)     Stroke     R MCA 2/2023       Past Surgical History:   Procedure Laterality Date    FIXATION OF SYNDESMOSIS OF ANKLE Left 1/5/2024    Procedure: FIXATION, SYNDESMOSIS, ANKLE;  Surgeon: Rogerio Hu MD;  Location: [facility] OR [address] [place] [address];  Service: Orthopedics;  Laterality: Left;    OPEN REDUCTION AND INTERNAL FIXATION (ORIF) OF INJURY OF ANKLE Left 1/5/2024    Procedure: ORIF, ANKLE - LEFT;  Surgeon: Rogerio Hu MD;  Location: [facility] OR [address] [place] [address];  Service: Orthopedics;  Laterality: Left;       Review of patient's allergies indicates:  No Known Allergies    Current Facility-Administered Medications on File Prior to Encounter   Medication    [DISCONTINUED] albuterol inhaler    [DISCONTINUED] furosemide tablet    [DISCONTINUED] furosemide tablet     Current Outpatient Medications on File Prior to Encounter   Medication Sig    acetaminophen (TYLENOL) 500 MG tablet Take 2 tablets (1,000 mg total) by mouth every 8 (eight) hours.    albuterol-ipratropium (DUO-NEB) 2.5 mg-0.5 mg/3 mL nebulizer solution Take 3 mLs by nebulization every 4 (four) hours as needed for Wheezing or Shortness of Breath. Rescue    amLODIPine (NORVASC) 10 MG tablet Take 1 tablet (10 mg total) by mouth once daily.    apixaban (ELIQUIS) 5 mg Tab Take 1 tablet (5 mg total) by mouth 2 (two) times daily.    aspirin 81 MG Chew Chew and swallow 1 (1  mg total) by mouth once daily.    atorvastatin (LIPITOR) 80 MG tablet Take 1 tablet (80 mg total) by mouth every evening.    diphenhydrAMINE (BENADRYL) 25 mg capsule Take 1 capsule (25 mg total) by mouth every 6 (six) hours as needed for Itching.    FLUoxetine 20 MG capsule Take 1 capsule (20 mg total) by mouth once daily.    folic acid (FOLVITE) 1 MG tablet Take 1 tablet (1 mg total) by mouth once daily.    furosemide (LASIX) 40 MG tablet Take 1 tablet (40 mg total) by mouth once daily.    JARDIANCE 10 mg tablet Take 1 tablet (10 mg total) by mouth once daily.    losartan (COZAAR) 25 MG tablet Take 1 tablet (25 mg total) by mouth once daily.    melatonin (MELATIN) 3 mg tablet Take 2 tablets (6 mg total) by mouth nightly as needed for Insomnia.    methocarbamoL 1,000 mg Tab Take 1,000 mg by mouth 4 (four) times daily.    metoprolol succinate (TOPROL-XL) 50 MG 24 hr tablet Take 1 tablet (50 mg total) by mouth once daily.    multivitamin Tab Take 1 tablet by mouth once daily.    ondansetron (ZOFRAN-ODT) 8 MG TbDL Take 1 tablet (8 mg total) by mouth every 8 (eight) hours as needed (nausea).    oxyCODONE (ROXICODONE) 10 mg Tab immediate release tablet Take 1 tablet (10 mg total) by mouth every 6 (six) hours as needed (pain scale 7-10).    oxyCODONE (ROXICODONE) 5 MG immediate release tablet Take 1 tablet (5 mg total) by mouth every 6 (six) hours as needed (pain scale 4-6).    pantoprazole (PROTONIX) 20 MG tablet Take 1 tablet (20 mg total) by mouth every morning.    polyethylene glycol (GLYCOLAX) 17 gram PwPk Take 17 g by mouth once daily.    pregabalin (LYRICA) 75 MG capsule Take 1 capsule (75 mg total) by mouth 2 (two) times daily.    senna (SENOKOT) 8.6 mg tablet Take 1 tablet by mouth once daily.    spironolactone (ALDACTONE) 25 MG tablet Take 1 tablet (25 mg total) by mouth once daily.    thiamine 100 MG tablet Take 2 tablets (200 mg total) by mouth once daily.     Family History    None       Tobacco Use     Smoking status: Never    Smokeless tobacco: Never   Substance and Sexual Activity    Alcohol use: Yes     Comment: occasional    Drug use: No    Sexual activity: Not on file     Review of Systems   Constitutional:  Negative for chills, diaphoresis, fatigue and fever.   HENT:  Negative for congestion, rhinorrhea and sore throat.    Eyes:  Negative for photophobia and visual disturbance.   Respiratory:  Positive for shortness of breath and wheezing. Negative for cough.    Cardiovascular:  Positive for chest pain. Negative for palpitations and leg swelling.   Gastrointestinal:  Negative for abdominal distention, abdominal pain, diarrhea, nausea and vomiting.   Genitourinary:  Negative for dysuria, frequency and hematuria.   Musculoskeletal:  Positive for arthralgias, gait problem and myalgias. Negative for back pain, joint swelling and neck pain.   Skin:  Negative for color change, pallor and rash.   Neurological:  Positive for weakness (L sided weakness from previous CVA). Negative for dizziness, syncope, numbness and headaches.   Psychiatric/Behavioral:  Negative for confusion and hallucinations. The patient is nervous/anxious.      Objective:     Vital Signs (Most Recent):  Temp: 98.6 °F (37 °C) (01/19/24 1826)  Pulse: 77 (01/19/24 1837)  Resp: 20 (01/19/24 1837)  BP: 120/82 (01/19/24 1837)  SpO2: 98 % (01/19/24 1837) Vital Signs (24h Range):  Temp:  [98.6 °F (37 °C)-98.8 °F (37.1 °C)] 98.6 °F (37 °C)  Pulse:  [65-77] 77  Resp:  [15-20] 20  SpO2:  [95 %-100 %] 98 %  BP: ()/(70-82) 120/82     Weight: 91.6 kg (202 lb)  Body mass index is 27.4 kg/m².     Physical Exam  Vitals and nursing note reviewed.   Constitutional:       General: He is not in acute distress.     Appearance: He is not ill-appearing, toxic-appearing or diaphoretic.   HENT:      Head: Normocephalic and atraumatic.      Nose: Nose normal.      Mouth/Throat:      Mouth: Mucous membranes are moist.   Eyes:      Pupils: Pupils are equal, round, and  reactive to light.   Cardiovascular:      Rate and Rhythm: Normal rate and regular rhythm.   Pulmonary:      Effort: Pulmonary effort is normal. No respiratory distress.      Breath sounds: No wheezing, rhonchi or rales.   Chest:      Chest wall: Tenderness present.   Abdominal:      General: Bowel sounds are normal. There is no distension.      Palpations: Abdomen is soft.      Tenderness: There is no abdominal tenderness. There is no guarding.   Musculoskeletal:      Cervical back: Normal range of motion.      Comments: Left leg in splint   Skin:     General: Skin is warm and dry.      Capillary Refill: Capillary refill takes less than 2 seconds.   Neurological:      Mental Status: He is oriented to person, place, and time.      Motor: Weakness (residual L sided weakness) present.   Psychiatric:         Mood and Affect: Mood normal.         Behavior: Behavior normal.              CRANIAL NERVES     CN III, IV, VI   Pupils are equal, round, and reactive to light.       Significant Labs: All pertinent labs within the past 24 hours have been reviewed.  CBC:   Recent Labs   Lab 01/19/24  1350 01/19/24  1714   WBC 5.20  --    HGB 11.7*  --    HCT 36.5* 41     --      CMP:   Recent Labs   Lab 01/19/24  1707      K 3.8      CO2 24      BUN 22*   CREATININE 0.8   CALCIUM 8.2*   PROT 6.3   ALBUMIN 3.1*   BILITOT 0.2   ALKPHOS 54*   AST 18   ALT 13   ANIONGAP 8     Cardiac Markers:   Recent Labs   Lab 01/19/24  1350   BNP 51     Troponin:   Recent Labs   Lab 01/19/24  1350 01/19/24  1710   TROPONINI 0.008 0.007       Significant Imaging: I have reviewed all pertinent imaging results/findings within the past 24 hours.  Imaging Results              X-Ray Chest AP Portable (Final result)  Result time 01/19/24 15:14:39      Final result by Vin Hernandez MD (01/19/24 15:14:39)                   Impression:      See above      Electronically signed by: Vin Hernandez  MD  Date:    01/19/2024  Time:    15:14               Narrative:    EXAMINATION:  XR CHEST AP PORTABLE    CLINICAL HISTORY:  Chest pain, unspecified    TECHNIQUE:  Single frontal view of the chest was performed.    COMPARISON:  No 01/18/2024 ne    FINDINGS:  Loop recorder in place.  Cardiomegaly.  The lungs are clear.  No pleural effusion.                                      Assessment/Plan:     * Chest pain  Given reassuring vitals, EKG, troponins, and recent negative stress test can monitor overnight on telemetry. Does have some reproducible left sided chest wall pain on exam.  - EKG without ST-segment elevation or depression, HR 67  - Troponin 0.008>0.007.  - Continue ASA, statin, eliquis, BB  - Cardiac monitoring.  - PRN EKG for chest pain  - Previous cardiac testing with :  Nuclear stress test 12/1/23:    The ECG portion of the study is abnormal but not diagnostic due to resting ST-T abnormalities.    The patient reported no chest pain during the stress test.    During stress, frequent PVCs are noted.    The nuclear portion of this study will be reported separately.    Baseline ECG: The Baseline ECG reveals sinus rhythm with prolonged QT interval. The baseline ECG has ST and/or T wave abnormalities suggestive of myocardial ischemia.  - Scintigraphically negative for ischemia or infarct.  - The global left ventricular systolic function is mildly impaired with an LV ejection fraction of 41 % and evidence of LV dilatation.    ECHO 11/6/23    Left Ventricle: The left ventricle is normal in size. Normal wall thickness. Global hypokinesis present. There is severely reduced systolic function with a visually estimated ejection fraction of 25 - 30%. Grade I diastolic dysfunction.    Right Ventricle: Mild right ventricular enlargement. Wall thickness is normal. Right ventricle wall motion  is normal. Systolic function is mildly reduced.    Biatrial enlargement    Pulmonary Artery: The estimated pulmonary artery systolic  pressure is 19 mmHg.    IVC/SVC: Normal venous pressure at 3 mmHg.    Alcohol dependence with uncomplicated intoxication  - Patient denies any history of alcohol withdrawal or seizures.  - Last drink was 12/30, prior to last hospitalization.  - Continue folic acid, thiamine, and MVI.    - Discussed the dangers of continued ETOH use with Pt and apparent systemic effects of his abuse.      Closed trimalleolar fracture of left ankle  - s/p fixation of Left trimalleolar ankle fracture on 1/5/24  -Per Ortho nonweightbearing for 4-6 weeks pending fracture healing then begin weight-bearing as tolerated in a Cam boot   -Continue pain control with robaxin, tylenol, lyrica, and prn oxy IR  -Fall precautions.  -Discharged form rehab earlier this morning.    Pure hypercholesterolemia   Patient is chronically on statin.will continue for now. Monitor clinically. Last LDL was   Lab Results   Component Value Date    LDLCALC 126.0 11/06/2023        Coronary artery disease involving native coronary artery with angina pectoris  Patient with known CAD s/p stent placement?, which is controlled Will continue  eliquis, ASA, and Statin and monitor for S/Sx of angina/ACS. Continue to monitor on telemetry.     Chronic combined systolic and diastolic congestive heart failure  Patient is identified as having Combined Systolic and Diastolic heart failure that is Chronic. CHF is currently controlled. Latest ECHO performed and demonstrates- Results for orders placed during the hospital encounter of 11/05/23    Echo    Interpretation Summary    Left Ventricle: The left ventricle is normal in size. Normal wall thickness. Global hypokinesis present. There is severely reduced systolic function with a visually estimated ejection fraction of 25 - 30%. Grade I diastolic dysfunction.    Right Ventricle: Mild right ventricular enlargement. Wall thickness is normal. Right ventricle wall motion  is normal. Systolic function is mildly reduced.    Biatrial  enlargement    Pulmonary Artery: The estimated pulmonary artery systolic pressure is 19 mmHg.    IVC/SVC: Normal venous pressure at 3 mmHg.  . Continue Beta Blocker, ACE/ARB, Furosemide, and Aldactone and monitor clinical status closely. Monitor on telemetry. Patient is off CHF pathway.  Monitor strict Is&Os and daily weights.  Place on fluid restriction of 1.5 L. Continue to stress to patient importance of self efficacy and  on diet for CHF. Last BNP reviewed- and noted below   Recent Labs   Lab 01/19/24  1350   BNP 51       History of CVA with residual deficit  -Previous CVA w/ residual L sided weakness.  -Continue ASA, statin, and eliquis.    Essential hypertension  Chronic, controlled. Latest blood pressure and vitals reviewed-     Temp:  [98.6 °F (37 °C)-98.8 °F (37.1 °C)]   Pulse:  [65-77]   Resp:  [15-20]   BP: ()/(70-82)   SpO2:  [95 %-100 %] .   Home meds for hypertension were reviewed and noted below.   Hypertension Medications               amLODIPine (NORVASC) 10 MG tablet Take 1 tablet (10 mg total) by mouth once daily.    furosemide (LASIX) 40 MG tablet Take 1 tablet (40 mg total) by mouth once daily.    losartan (COZAAR) 25 MG tablet Take 1 tablet (25 mg total) by mouth once daily.    metoprolol succinate (TOPROL-XL) 50 MG 24 hr tablet Take 1 tablet (50 mg total) by mouth once daily.    spironolactone (ALDACTONE) 25 MG tablet Take 1 tablet (25 mg total) by mouth once daily.            While in the hospital, will manage blood pressure as follows; Continue home antihypertensive regimen    Will utilize p.r.n. blood pressure medication only if patient's blood pressure greater than 180/110 and he develops symptoms such as worsening chest pain or shortness of breath.    Paroxysmal atrial fibrillation  Patient with Paroxysmal (<7 days) atrial fibrillation which is controlled currently with Beta Blocker. Patient is currently in atrial fibrillation.[id number] Score: 1.  Anticoagulation indicated.  Anticoagulation done with eliquis .      VTE Risk Mitigation (From admission, onward)           Ordered     apixaban tablet 5 mg  2 times daily         01/19/24 1829     IP VTE HIGH RISK PATIENT  Once         01/19/24 1829     Place sequential compression device  Until discontinued         01/19/24 1829     Reason for No Pharmacological VTE Prophylaxis  Once        Question:  Reasons:  Answer:  Already adequately anticoagulated on oral Anticoagulants    01/19/24 1829                         On 01/19/2024, patient should be placed in hospital observation services under my care in collaboration with Gabriele Waldron MD.           Lotus Myles, NP  Department of Hospital Medicine  [facility] - Cardiology [facility]

## 2024-01-20 NOTE — ASSESSMENT & PLAN NOTE
Patient is chronically on statin.will continue for now. Monitor clinically. Last LDL was   Lab Results   Component Value Date    LDLCALC 126.0 11/06/2023

## 2024-01-20 NOTE — ASSESSMENT & PLAN NOTE
- s/p fixation of Left trimalleolar ankle fracture on 1/5/24  -Per Ortho nonweightbearing for 4-6 weeks pending fracture healing then begin weight-bearing as tolerated in a Cam boot   -Continue pain control with robaxin, tylenol, lyrica, and prn oxy IR  -Fall precautions.  -Discharged form rehab earlier this morning.

## 2024-01-20 NOTE — ASSESSMENT & PLAN NOTE
Patient is identified as having Combined Systolic and Diastolic heart failure that is Chronic. CHF is currently controlled. Latest ECHO performed and demonstrates- Results for orders placed during the hospital encounter of 11/05/23    Echo    Interpretation Summary    Left Ventricle: The left ventricle is normal in size. Normal wall thickness. Global hypokinesis present. There is severely reduced systolic function with a visually estimated ejection fraction of 25 - 30%. Grade I diastolic dysfunction.    Right Ventricle: Mild right ventricular enlargement. Wall thickness is normal. Right ventricle wall motion  is normal. Systolic function is mildly reduced.    Biatrial enlargement    Pulmonary Artery: The estimated pulmonary artery systolic pressure is 19 mmHg.    IVC/SVC: Normal venous pressure at 3 mmHg.  . Continue Beta Blocker, ACE/ARB, Furosemide, and Aldactone and monitor clinical status closely. Monitor on telemetry. Patient is off CHF pathway.  Monitor strict Is&Os and daily weights.  Place on fluid restriction of 1.5 L. Continue to stress to patient importance of self efficacy and  on diet for CHF. Last BNP reviewed- and noted below   Recent Labs   Lab 01/19/24  1350   BNP 51

## 2024-01-20 NOTE — PLAN OF CARE
[facility] - Emergency Dept  Initial Discharge Assessment       Primary Care Provider: No, Primary Doctor    Admission Diagnosis: Chest pain [R07.9]    Admission Date: 1/19/2024  Expected Discharge Date:     Transition of Care Barriers: Homeless, Mobility    Payor: MEDICAID / Plan: [employer] [facility] ([employer]) / Product Type: Managed Medicaid /     Extended Emergency Contact Information  Primary Emergency Contact: Keyona Bess  Mobile Phone: [phone]  Relation: Relative    Discharge Plan A: (P) Home with family  Discharge Plan B: (P) Home with family      [facility]  [address], [address]  [facility] [address]  Phone: [phone] Fax: [phone]      Initial Assessment (most recent)       Adult Discharge Assessment - 01/19/24 2025          Discharge Assessment    Assessment Type Discharge Planning Assessment     Confirmed/corrected address, phone number and insurance Yes   pt is currently homeless    Confirmed Demographics --   pt is homeless    Source of Information patient     Does patient/caregiver understand observation status Yes     Communicated ERWIN with patient/caregiver Date not available/Unable to determine     People in Home alone   pt is homeless    Do you expect to return to your current living situation? Other (see comments)   pt stated he has a few family members he will reach out to for assistance    Do you have help at home or someone to help you manage your care at home? No     Prior to hospitilization cognitive status: Alert/Oriented     Current cognitive status: Alert/Oriented     Walking or Climbing Stairs Difficulty yes     Walking or Climbing Stairs ambulation difficulty, requires equipment     Dressing/Bathing Difficulty yes     Dressing/Bathing --   pt has a leg injury.    Home Accessibility wheelchair accessible     Equipment Currently Used at Home wheelchair     Readmission within 30 days? Yes     Patient currently being followed by outpatient  case management? No     Do you currently have service(s) that help you manage your care at home? No     Do you take prescription medications? Yes     Do you have prescription coverage? Yes     Do you have any problems affording any of your prescribed medications? No     Is the patient taking medications as prescribed? yes     Who is going to help you get home at discharge? pt is homeless however pt stated he will reach out to his family for assistance.     How do you get to doctors appointments? public transportation     Are you on dialysis? No     Do you take coumadin? No     Discharge Plan A Shelter     Discharge Plan B Home with family   pt stated he has applied for his disability benefits and he was told by [employer] that he should receives his benefits shortly    DME Needed Upon Discharge  none     Discharge Plan discussed with: Patient     Transition of Care Barriers Homeless;Mobility        Physical Activity    On average, how many days per week do you engage in moderate to strenuous exercise (like a brisk walk)? 0 days     On average, how many minutes do you engage in exercise at this level? 0 min        Financial Resource Strain    How hard is it for you to pay for the very basics like food, housing, medical care, and heating? Somewhat hard        Housing Stability    In the last 12 months, was there a time when you were not able to pay the mortgage or rent on time? Yes     In the last 12 months, was there a time when you did not have a steady place to sleep or slept in a shelter (including now)? Yes        Transportation Needs    In the past 12 months, has lack of transportation kept you from medical appointments or from getting medications? No     In the past 12 months, has lack of transportation kept you from meetings, work, or from getting things needed for daily living? No        Food Insecurity    Within the past 12 months, you worried that your food would run out before you got the money to buy more. Never  true     Within the past 12 months, the food you bought just didn't last and you didn't have money to get more. Never true        Stress    Do you feel stress - tense, restless, nervous, or anxious, or unable to sleep at night because your mind is troubled all the time - these days? Not at all        Social Connections    In a typical week, how many times do you talk on the phone with family, friends, or neighbors? Once a week     How often do you get together with friends or relatives? Never     How often do you attend Methodist or Episcopalian services? Never     Do you belong to any clubs or organizations such as Methodist groups, unions, fraternal or athletic groups, or school groups? No     How often do you attend meetings of the clubs or organizations you belong to? Never     Are you , , , , never , or living with a partner?         Alcohol Use    Q1: How often do you have a drink containing alcohol? --   pt stated he does not drink    Q2: How many drinks containing alcohol do you have on a typical day when you are drinking? Patient does not drink     Q3: How often do you have six or more drinks on one occasion? Never                      Soco Ortega LMSW  Case Management  Emergency Department  [phone]

## 2024-01-20 NOTE — ED NOTES
Telemetry Verification   Patient placed on Telemetry Box  Verified with War Room  Box # [id number]       Rate 74   Rhythm ns

## 2024-01-20 NOTE — PLAN OF CARE
Pt free of falls and injury. Pt AAOx4 with VSS. Fall precautions remain in place. Pt wheel chair remained at the bedside. Pt remains on room air. Sats 97-98%. NSR on telemetry. Plan of care reviewed with pt. Pt resting comfortably with no complaints of pain. Pt denies chest pain, headache, and SOB. No acute distress noted,  plan of care continues.      Problem: Adult Inpatient Plan of Care  Goal: Plan of Care Review  Outcome: Ongoing, Progressing  Goal: Patient-Specific Goal (Individualized)  Outcome: Ongoing, Progressing  Goal: Absence of Hospital-Acquired Illness or Injury  Outcome: Ongoing, Progressing  Goal: Optimal Comfort and Wellbeing  Outcome: Ongoing, Progressing  Goal: Readiness for Transition of Care  Outcome: Ongoing, Progressing     Problem: Chest Pain  Goal: Resolution of Chest Pain Symptoms  Outcome: Ongoing, Progressing     Problem: Fall Injury Risk  Goal: Absence of Fall and Fall-Related Injury  Outcome: Ongoing, Progressing

## 2024-01-21 NOTE — PLAN OF CARE
[facility] - Cardiology Stepdown  Discharge Final Note    Primary Care Provider: No, Primary Doctor    Expected Discharge Date: 1/20/2024    Final Discharge Note (most recent)       Final Note - 01/20/24 1920          Final Note    Assessment Type Final Discharge Note (P)      Anticipated Discharge Disposition Home or Self Care (P)      What phone number can be called within the next 1-3 days to see how you are doing after discharge? -- (P)    Pt. has no phone. Friend, Mr. Osman (c) [phone]    Hospital Resources/Appts/Education Provided Provided patient/caregiver with written discharge plan information;Provided education on problems/symptoms using teachback;Appointments scheduled and added to AVS (P)         Post-Acute Status    Post-Acute Authorization Other (P)      Other Status See Comments (P)    Pt. discharged to [facility]    Discharge Delays [facility] Arranged Transportation (P)                      Important Message from Medicare             Contact Info       No, Primary Doctor   Relationship: PCP - General        Next Steps: Schedule an appointment as soon as possible for a visit          Pt. discharged back to [facility]. SW arranging wheelchair van transportatio via [facility]. SW noting pt. States he had a Stroke almost 2 years ago. States his family (nieces) lives in [place] and [place] and have their own lives as nurses. Pt states he has no children. Friend, Mr. Osman (c) [phone] helps him. Pt. has no access to a cell phone for communication. Pt. using his friend's address which is on file for mail. Pt. Utilizes Pt. has been to various shelters. SW assisting with transportation via [facility] and [employer] Transfer to help pay for pt's medications. Pt has recently utilized pharmacy resources. Pharmacy Tech states pt recently received assistance with Eliquis medication. States it is too soon to get this medication refilled. Pt. States he was given Community Resources List. States he  will contact, Myron Bickleton for housin [address] [place] [address], MIGUEL ANGEL Granado p) [phone]. SW viewing pt's Resource List. Pt transporting with his own wheelchair which looks new and in good condition. SW encouraging pt to speak with his nieces as he may need help from family in the future and could benefit from living closer to those who can provide support.     Tristen Dickson LMSW

## 2024-01-22 ENCOUNTER — OFFICE VISIT (OUTPATIENT)
Dept: ORTHOPEDICS | Facility: CLINIC | Age: 59
End: 2024-01-22
Payer: MEDICAID

## 2024-01-22 DIAGNOSIS — S82.852D CLOSED TRIMALLEOLAR FRACTURE OF LEFT ANKLE WITH ROUTINE HEALING, SUBSEQUENT ENCOUNTER: ICD-10-CM

## 2024-01-22 DIAGNOSIS — Z87.81 S/P ORIF (OPEN REDUCTION INTERNAL FIXATION) FRACTURE: Primary | ICD-10-CM

## 2024-01-22 DIAGNOSIS — I63.411 STROKE DUE TO EMBOLISM OF RIGHT MIDDLE CEREBRAL ARTERY: ICD-10-CM

## 2024-01-22 DIAGNOSIS — Z98.890 S/P ORIF (OPEN REDUCTION INTERNAL FIXATION) FRACTURE: Primary | ICD-10-CM

## 2024-01-22 PROCEDURE — 4010F ACE/ARB THERAPY RXD/TAKEN: CPT | Mod: CPTII,,, | Performed by: PHYSICIAN ASSISTANT

## 2024-01-22 PROCEDURE — 1159F MED LIST DOCD IN RCRD: CPT | Mod: CPTII,,, | Performed by: PHYSICIAN ASSISTANT

## 2024-01-22 PROCEDURE — 99024 POSTOP FOLLOW-UP VISIT: CPT | Mod: ,,, | Performed by: PHYSICIAN ASSISTANT

## 2024-01-22 PROCEDURE — 29705 RMVL/BIVLV FULL ARM/LEG CAST: CPT | Mod: PBBFAC

## 2024-01-22 PROCEDURE — 99214 OFFICE O/P EST MOD 30 MIN: CPT | Mod: PBBFAC | Performed by: PHYSICIAN ASSISTANT

## 2024-01-22 PROCEDURE — 99999 PR PBB SHADOW E&M-EST. PATIENT-LVL IV: CPT | Mod: PBBFAC,,, | Performed by: PHYSICIAN ASSISTANT

## 2024-01-22 RX ORDER — OXYCODONE HYDROCHLORIDE 5 MG/1
5 TABLET ORAL EVERY 6 HOURS PRN
Qty: 28 TABLET | Refills: 0 | Status: SHIPPED | OUTPATIENT
Start: 2024-01-22 | End: 2024-02-09 | Stop reason: SDUPTHER

## 2024-01-22 NOTE — PROGRESS NOTES
Patient presented to cast room for removal of a plaster L&U splint of the right lower extremity Splint was removed, skin intact, no abnormalities noted.

## 2024-01-22 NOTE — PROGRESS NOTES
Principal Orthopedic Problem:  Left trimalleolar ankle fracture                          Left ankle syndesmosis disruption     01/05/24: :   Open reduction internal fixation left trimalleolar ankle fracture without posterior lip - 88750                          Open treatment of left ankle syndesmosis disruption with reduction and internal fixation - 43471      Mr. Bess is here today for a post-operative visit    Interval History:  he reports that he is doing ok.   he is at a niece home. he is not participating in PT/OT. Was discharged from rehab last week  Pain is somewhat controled.  he is not taking pain medication.  He stated that he was discharged form rehab without pain medication. He has not checked the pharmacy for medication.   He has a wheelchair no walker. In need of platform walker due to stroke.   He is concerned about his hand the has no movement since his stroke and would like PT for this.   he denies fever, chills, and sweats .       Physical exam:    Patient arrives to exam room: wheelchair.  Patient is un accompanied    Dressing taken down.  Incision is clean, dry and intact.  Sutures removed without difficulty.   Healing well no signs of breakdown or infection.    RADS: All pertinent images were reviewed by myself:   none done today    Assessment:  Post-op visit ( 2 weeks)    Plan:  Current care, treatment plan, precautions, activity level/ modifications, limitations, rehabilitation exercises and proposed future treatment were discussed with the patient. We discussed the need to monitor for changes in symptoms and condition and report them to the physician.  Discussed importance of compliance with all appointments and follow up examinations.     WOUND CARE :  - The patient was advised to keep the incision clean and dry for the next 24 hours after which he may wash the area with antibacterial soap in the shower. Will not submerge until the incision is completely healed  -Patient was  advised to monitor wound closely and multiple times daily for any problems. Call clinic immediately or report to ED for immediate medical attention for any complications including reopening of wound, drainage, purulence, redness, streaking, odor, pain out of proportion, fever, chills, etc.       ACTIVITY:   - sedentary  -range of motion as tolerated    - NWB 6 weeks pending healing      -PT/OT, [facility] , Patient is responsible to establish and continue care      PAIN MEDICATION:   - Multimodal pain control  - Pain medication: refill was not needed  - Pain medication refill policy provided to patient for review, yes.    - Patient was informed a multi-modal approach is used to treat their pain. With the goal to get off of narcotic pain medication and discontinue as soon as possible.   - ice and elevation to reduce pain and swelling     DVT PROPHYLAXIS:   - aspirin    FOLLOW UP:   - Patient will follow up in the clinic in 4 weeks, sooner if any concerns.  - X-ray of his ankle is needed.NWB OOB  - Pending healing at time consider advance to weight bearing as tolerated       If there are any questions prior to scheduled follow up, the patient was instructed to contact the office

## 2024-01-23 ENCOUNTER — HOSPITAL ENCOUNTER (EMERGENCY)
Facility: HOSPITAL | Age: 59
Discharge: HOME | End: 2024-01-23
Attending: EMERGENCY MEDICINE
Payer: MEDICAID

## 2024-01-23 VITALS
DIASTOLIC BLOOD PRESSURE: 80 MMHG | OXYGEN SATURATION: 96 % | SYSTOLIC BLOOD PRESSURE: 118 MMHG | HEART RATE: 74 BPM | TEMPERATURE: 98 F | RESPIRATION RATE: 16 BRPM

## 2024-01-23 DIAGNOSIS — M79.606 LEG PAIN: Primary | ICD-10-CM

## 2024-01-23 DIAGNOSIS — R06.02 SHORTNESS OF BREATH: ICD-10-CM

## 2024-01-23 DIAGNOSIS — R10.9 ABDOMINAL PAIN: ICD-10-CM

## 2024-01-23 LAB
ALBUMIN SERPL BCP-MCNC: 4.3 G/DL (ref 3.5–5.2)
ALP SERPL-CCNC: 69 U/L (ref 55–135)
ALT SERPL W/O P-5'-P-CCNC: 21 U/L (ref 10–44)
ANION GAP SERPL CALC-SCNC: 12 MMOL/L (ref 8–16)
AST SERPL-CCNC: 28 U/L (ref 10–40)
BASOPHILS # BLD AUTO: 0.02 K/UL (ref 0–0.2)
BASOPHILS NFR BLD: 0.3 % (ref 0–1.9)
BILIRUB SERPL-MCNC: 0.9 MG/DL (ref 0.1–1)
BILIRUB UR QL STRIP: NEGATIVE
BNP SERPL-MCNC: 30 PG/ML (ref 0–99)
BUN SERPL-MCNC: 17 MG/DL (ref 6–20)
BUN SERPL-MCNC: 20 MG/DL (ref 6–30)
CALCIUM SERPL-MCNC: 10 MG/DL (ref 8.7–10.5)
CHLORIDE SERPL-SCNC: 96 MMOL/L (ref 95–110)
CHLORIDE SERPL-SCNC: 97 MMOL/L (ref 95–110)
CLARITY UR REFRACT.AUTO: CLEAR
CO2 SERPL-SCNC: 25 MMOL/L (ref 23–29)
COLOR UR AUTO: YELLOW
CREAT SERPL-MCNC: 1.1 MG/DL (ref 0.5–1.4)
CREAT SERPL-MCNC: 1.1 MG/DL (ref 0.5–1.4)
DIFFERENTIAL METHOD BLD: ABNORMAL
EOSINOPHIL # BLD AUTO: 0.2 K/UL (ref 0–0.5)
EOSINOPHIL NFR BLD: 3.4 % (ref 0–8)
ERYTHROCYTE [DISTWIDTH] IN BLOOD BY AUTOMATED COUNT: 13 % (ref 11.5–14.5)
EST. GFR  (NO RACE VARIABLE): >60 ML/MIN/1.73 M^2
GLUCOSE SERPL-MCNC: 101 MG/DL (ref 70–110)
GLUCOSE SERPL-MCNC: 104 MG/DL (ref 70–110)
GLUCOSE UR QL STRIP: NEGATIVE
HCT VFR BLD AUTO: 36 % (ref 40–54)
HCT VFR BLD CALC: 38 %PCV (ref 36–54)
HGB BLD-MCNC: 12.1 G/DL (ref 14–18)
HGB UR QL STRIP: NEGATIVE
IMM GRANULOCYTES # BLD AUTO: 0.03 K/UL (ref 0–0.04)
IMM GRANULOCYTES NFR BLD AUTO: 0.4 % (ref 0–0.5)
KETONES UR QL STRIP: NEGATIVE
LEUKOCYTE ESTERASE UR QL STRIP: NEGATIVE
LIPASE SERPL-CCNC: 13 U/L (ref 4–60)
LYMPHOCYTES # BLD AUTO: 1.5 K/UL (ref 1–4.8)
LYMPHOCYTES NFR BLD: 21.4 % (ref 18–48)
MAGNESIUM SERPL-MCNC: 2.1 MG/DL (ref 1.6–2.6)
MCH RBC QN AUTO: 30.9 PG (ref 27–31)
MCHC RBC AUTO-ENTMCNC: 33.6 G/DL (ref 32–36)
MCV RBC AUTO: 92 FL (ref 82–98)
MONOCYTES # BLD AUTO: 0.8 K/UL (ref 0.3–1)
MONOCYTES NFR BLD: 11.9 % (ref 4–15)
NEUTROPHILS # BLD AUTO: 4.4 K/UL (ref 1.8–7.7)
NEUTROPHILS NFR BLD: 62.6 % (ref 38–73)
NITRITE UR QL STRIP: NEGATIVE
NRBC BLD-RTO: 0 /100 WBC
PH UR STRIP: 5 [PH] (ref 5–8)
PLATELET # BLD AUTO: 283 K/UL (ref 150–450)
PMV BLD AUTO: 10 FL (ref 9.2–12.9)
POC IONIZED CALCIUM: 1.15 MMOL/L (ref 1.06–1.42)
POC TCO2 (MEASURED): 30 MMOL/L (ref 23–27)
POTASSIUM BLD-SCNC: 4.3 MMOL/L (ref 3.5–5.1)
POTASSIUM SERPL-SCNC: 4.3 MMOL/L (ref 3.5–5.1)
PROT SERPL-MCNC: 8.3 G/DL (ref 6–8.4)
PROT UR QL STRIP: NEGATIVE
RBC # BLD AUTO: 3.91 M/UL (ref 4.6–6.2)
SAMPLE: ABNORMAL
SODIUM BLD-SCNC: 134 MMOL/L (ref 136–145)
SODIUM SERPL-SCNC: 134 MMOL/L (ref 136–145)
SP GR UR STRIP: 1.01 (ref 1–1.03)
TROPONIN I SERPL DL<=0.01 NG/ML-MCNC: 0.01 NG/ML (ref 0–0.03)
URN SPEC COLLECT METH UR: NORMAL
WBC # BLD AUTO: 7 K/UL (ref 3.9–12.7)

## 2024-01-23 PROCEDURE — 93010 ELECTROCARDIOGRAM REPORT: CPT | Mod: ,,, | Performed by: INTERNAL MEDICINE

## 2024-01-23 PROCEDURE — 93005 ELECTROCARDIOGRAM TRACING: CPT

## 2024-01-23 PROCEDURE — 83880 ASSAY OF NATRIURETIC PEPTIDE: CPT | Performed by: STUDENT IN AN ORGANIZED HEALTH CARE EDUCATION/TRAINING PROGRAM

## 2024-01-23 PROCEDURE — 85025 COMPLETE CBC W/AUTO DIFF WBC: CPT | Performed by: STUDENT IN AN ORGANIZED HEALTH CARE EDUCATION/TRAINING PROGRAM

## 2024-01-23 PROCEDURE — 84484 ASSAY OF TROPONIN QUANT: CPT | Performed by: STUDENT IN AN ORGANIZED HEALTH CARE EDUCATION/TRAINING PROGRAM

## 2024-01-23 PROCEDURE — 96374 THER/PROPH/DIAG INJ IV PUSH: CPT

## 2024-01-23 PROCEDURE — 99285 EMERGENCY DEPT VISIT HI MDM: CPT | Mod: 25

## 2024-01-23 PROCEDURE — 80053 COMPREHEN METABOLIC PANEL: CPT | Performed by: STUDENT IN AN ORGANIZED HEALTH CARE EDUCATION/TRAINING PROGRAM

## 2024-01-23 PROCEDURE — 83690 ASSAY OF LIPASE: CPT | Performed by: STUDENT IN AN ORGANIZED HEALTH CARE EDUCATION/TRAINING PROGRAM

## 2024-01-23 PROCEDURE — 83735 ASSAY OF MAGNESIUM: CPT | Performed by: STUDENT IN AN ORGANIZED HEALTH CARE EDUCATION/TRAINING PROGRAM

## 2024-01-23 PROCEDURE — 25000003 PHARM REV CODE 250: Performed by: STUDENT IN AN ORGANIZED HEALTH CARE EDUCATION/TRAINING PROGRAM

## 2024-01-23 PROCEDURE — 96375 TX/PRO/DX INJ NEW DRUG ADDON: CPT

## 2024-01-23 PROCEDURE — 63600175 PHARM REV CODE 636 W HCPCS: Performed by: STUDENT IN AN ORGANIZED HEALTH CARE EDUCATION/TRAINING PROGRAM

## 2024-01-23 PROCEDURE — 81003 URINALYSIS AUTO W/O SCOPE: CPT | Performed by: STUDENT IN AN ORGANIZED HEALTH CARE EDUCATION/TRAINING PROGRAM

## 2024-01-23 PROCEDURE — 80048 BASIC METABOLIC PNL TOTAL CA: CPT | Mod: XB

## 2024-01-23 RX ORDER — ONDANSETRON HYDROCHLORIDE 2 MG/ML
4 INJECTION, SOLUTION INTRAVENOUS
Status: COMPLETED | OUTPATIENT
Start: 2024-01-23 | End: 2024-01-23

## 2024-01-23 RX ORDER — FAMOTIDINE 10 MG/ML
20 INJECTION INTRAVENOUS
Status: COMPLETED | OUTPATIENT
Start: 2024-01-23 | End: 2024-01-23

## 2024-01-23 RX ORDER — ACETAMINOPHEN 325 MG/1
650 TABLET ORAL
Status: COMPLETED | OUTPATIENT
Start: 2024-01-23 | End: 2024-01-23

## 2024-01-23 RX ADMIN — ONDANSETRON 4 MG: 2 INJECTION INTRAMUSCULAR; INTRAVENOUS at 01:01

## 2024-01-23 RX ADMIN — FAMOTIDINE 20 MG: 10 INJECTION, SOLUTION INTRAVENOUS at 01:01

## 2024-01-23 RX ADMIN — ACETAMINOPHEN 650 MG: 325 TABLET ORAL at 01:01

## 2024-01-23 NOTE — DISCHARGE INSTRUCTIONS
Follow-up with your primary care physician as needed.  Return to the emergency department if her symptoms worsen.

## 2024-01-23 NOTE — ED NOTES
Bed: [facility]  Expected date:   Expected time:   Means of arrival:   Comments:

## 2024-01-23 NOTE — ED PROVIDER NOTES
Encounter Date: 1/23/2024       History     Chief Complaint   Patient presents with    Nausea     Arrives via EMS with c/o nausea and generalized weakness, was outpatient rehab and they sent him him      HPI    58-year-old male significant medical history of coronary artery disease, AFib anticoagulated on Eliquis, CVA, hypertension, have passed, ejection fraction of 25-30% on 11/06/2023, s/p trimalleolar ORIF 1/5/2024 presenting for nausea and generalized weakness from rehab SNF.    Patient is a physical therapy rehab facility, was noted to have generalized malaise, chills and flushing and diaphoresis starting yesterday.  He today he has nausea and generalized nonspecific abdominal pain that was 10/10, periumbilical, significantly improved on presentation to the emergency department and is nearly resolved.  He notes continued nausea, shortness of breath while seated, but denies orthopnea, objective fever, headache, chest pain, diarrhea, hematuria, polyuria.    The also has mild left lower extremity pain, status post internal fixation of a trimalleolar fracture.  There is no left lower leg swelling or tenderness to the posterior calf muscle pain.           Review of patient's allergies indicates:  No Known Allergies  Past Medical History:   Diagnosis Date    CAD (coronary artery disease)     HLD (hyperlipidemia)     HTN (hypertension)     Stroke     R MCA 2/2023     Past Surgical History:   Procedure Laterality Date    FIXATION OF SYNDESMOSIS OF ANKLE Left 1/5/2024    Procedure: FIXATION, SYNDESMOSIS, ANKLE;  Surgeon: Rogerio Hu MD;  Location: [facility] OR [address] [place] [address];  Service: Orthopedics;  Laterality: Left;    OPEN REDUCTION AND INTERNAL FIXATION (ORIF) OF INJURY OF ANKLE Left 1/5/2024    Procedure: ORIF, ANKLE - LEFT;  Surgeon: Rogerio Hu MD;  Location: [facility] OR [address] [place] [address];  Service: Orthopedics;  Laterality: Left;     No family history on file.  Social History     Tobacco Use    Smoking status: Never     Smokeless tobacco: Never   Substance Use Topics    Alcohol use: Yes     Comment: occasional    Drug use: No     Review of Systems  See HPI for pertinent review of systems  Physical Exam     Initial Vitals [01/23/24 1112]   BP Pulse Resp Temp SpO2   (!) 130/90 98 18 97.7 °F (36.5 °C) 99 %      MAP       --         Physical Exam    Constitutional: He appears well-developed and well-nourished.   HENT:   Head: Normocephalic and atraumatic.   Eyes: Conjunctivae are normal. Pupils are equal, round, and reactive to light.   Neck: No JVD present.   Cardiovascular:  Normal rate.     Exam reveals no friction rub.       No murmur heard.  Pulmonary/Chest: Breath sounds normal. No stridor. No respiratory distress. He has no wheezes. He has no rhonchi. He has no rales.   Abdominal: Abdomen is soft. There is abdominal tenderness (mild TTP of epigastrium). There is no rebound and no guarding.   Musculoskeletal:         General: Normal range of motion.      Comments: Left leg in a walking boot splint, partially healed surgical incision along left lower lateral leg, no erythema or purulent drainage to suggest infection.  Minimal tenderness to palpation of the posterior calf, no erythema or swelling of the calf.     Neurological: He is alert and oriented to person, place, and time.   Skin: Skin is warm and dry. Capillary refill takes less than 2 seconds.   Psychiatric: He has a normal mood and affect.         ED Course   Procedures  Labs Reviewed   CBC W/ AUTO DIFFERENTIAL - Abnormal; Notable for the following components:       Result Value    RBC 3.91 (*)     Hemoglobin 12.1 (*)     Hematocrit 36.0 (*)     All other components within normal limits   COMPREHENSIVE METABOLIC PANEL - Abnormal; Notable for the following components:    Sodium 134 (*)     All other components within normal limits   ISTAT PROCEDURE - Abnormal; Notable for the following components:    POC Sodium 134 (*)     POC TCO2 (MEASURED) 30 (*)     All other components  within normal limits   LIPASE   URINALYSIS, REFLEX TO URINE CULTURE    Narrative:     Specimen Source->Urine   MAGNESIUM   TROPONIN I   B-TYPE NATRIURETIC PEPTIDE   ISTAT CHEM8        ECG Results              EKG 12-lead (Final result)  Result time 01/23/24 15:25:10      Final result by Interface, Lab In [facility] (01/23/24 15:25:10)                   Narrative:    Test Reason : R10.9,    Vent. Rate : 079 BPM     Atrial Rate : 079 BPM     P-R Int : 224 ms          QRS Dur : 088 ms      QT Int : 410 ms       P-R-T Axes : 057 -26 036 degrees     QTc Int : 470 ms    Sinus rhythm with 1st degree A-V block with Premature atrial complexes  Low anterior forces and Rwave progression- Possible Anterior infarct ,age  undetermined vs lead placement  Abnormal ECG  When compared with ECG of 19-JAN-2024 13:48,  Premature atrial complexes are now Present  Anterior forces slightly less  Confirmed by Rolando EDDY MD ([id number]) on 1/23/2024 3:25:02 PM    Referred By: [email or web address]   SELF           Confirmed By:Rolando EDDY MD                                  Imaging Results              US Lower Extremity Veins Left (Final result)  Result time 01/23/24 18:59:16      Final result by Giovanny Wang MD (01/23/24 18:59:16)                   Impression:      No evidence of deep venous thrombosis in the left lower extremity.    Electronically signed by resident: Jenny Sandoval  Date:    01/23/2024  Time:    18:52    Electronically signed by: Giovanny Wang MD  Date:    01/23/2024  Time:    18:59               Narrative:    EXAMINATION:  US LOWER EXTREMITY VEINS LEFT    CLINICAL HISTORY:  Pain in leg, unspecified    TECHNIQUE:  Duplex and color flow Doppler evaluation and graded compression of the left lower extremity veins was performed.    COMPARISON:  None    FINDINGS:  Left thigh veins: The common femoral, femoral, popliteal, upper greater saphenous, and deep femoral veins are patent and free of thrombus. The veins are normally compressible and have  normal phasic flow and augmentation response.    Left calf veins: The visualized calf veins are patent.    Contralateral CFV: The contralateral (right) common femoral vein is patent and free of thrombus.    Miscellaneous: None                                       X-Ray Chest 1 View (Final result)  Result time 01/23/24 13:31:44      Final result by Vin Arzate MD (01/23/24 13:31:44)                   Impression:      Continued demonstration of marked cardiomegaly, but there has been no significant detrimental interval change in the appearance of the chest since 01/19/2024 at 14:15.      Electronically signed by: Vin Arzate MD  Date:    01/23/2024  Time:    13:31               Narrative:    EXAMINATION:  XR CHEST 1 VIEW    TECHNIQUE:  One view    COMPARISON:  Comparison is made to 01/19/2024 at 14:15.  Clinical information of nausea and generalized weakness is obtained from the electronic medical record.    FINDINGS:  Cardiomediastinal silhouette is again noted to be markedly prominent, but the degree of cardiomegaly and the appearance of the cardiomediastinal silhouette have not changed appreciably since the examination referenced above.  Pulmonary vascularity is normal, and there are no findings indicating current cardiac decompensation.  Lung zones continue clear, and are free of significant airspace consolidation or volume loss.  No pleural fluid.  No pneumothorax.  Loop recorder is again seen superimposed over the inferior left hemithorax.                                       Medications   ondansetron injection 4 mg (4 mg Intravenous Given 1/23/24 1316)   famotidine (PF) injection 20 mg (20 mg Intravenous Given 1/23/24 1316)   acetaminophen tablet 650 mg (650 mg Oral Given 1/23/24 1314)     Medical Decision Making  58-year-old male described as above presenting for nausea, vague generalized abdominal discomfort, and shortness of breath at rest status post recent trimalleolar fracture repair.  On presentation  the Emergency Department, patient's vital signs are stable he has not tachycardic, tachypneic and saturating well on room air.  Lungs are clear to auscultation.    Differential diagnosis includes half pack exacerbation, CAD, STEMI, gastritis, GERD, DVT, viral URI.    See ED course as above for interpretation of workup.  Workup largely reassuring.  Chest x-ray without evidence of acute pneumonia.  Signed out to oncoming physician team with likely DC pending US of LLE to rule out DVT given immobilization.  Nausea and lightheadedness and abdominal pain are resolved on re-evaluation of patient at bedside.    Amount and/or Complexity of Data Reviewed  Labs: ordered. Decision-making details documented in ED Course.  Radiology: ordered and independent interpretation performed. Decision-making details documented in ED Course.  ECG/medicine tests: ordered and independent interpretation performed. Decision-making details documented in ED Course.    Risk  OTC drugs.  Prescription drug management.  Decision regarding hospitalization.              Attending Attestation:   Physician Attestation Statement for Resident:  As the supervising MD   Physician Attestation Statement: I have personally seen and examined this patient.   I agree with the above history.  -:   As the supervising MD I agree with the above PE.     As the supervising MD I agree with the above treatment, course, plan, and disposition.    I have reviewed and agree with the residents interpretation of the following: lab data, x-rays and EKG.             Attending ED Notes:   STAFF ATTENDING PHYSICIAN NOTE:  I have individually/jointly evaluated Dionicio Bess and discussed their ED management with the resident physician. I have also reviewed their notes, assessments, and procedures documented.  I was present during all critical portions of any procedure(s) performed on Dionicio Bess.   ____________________  Luis Alexander MD, [facility]  Emergency Medicine Staff        ED  Course as of 01/23/24 2121 Tue Jan 23, 2024   1446 X-Ray Chest 1 View  On my personal interpretation there is cardiomegaly but no acute pulmonary edema or pneumonia on this chest x-ray [KB]   1446 CBC W/ AUTO DIFFERENTIAL(!)  Mild anemia not at transfusion level, no leukocytosis or thrombocytopenia. [KB]   1446 Lipase  Pancreatitis less likely [KB]   1446 Brain natriuretic peptide  Heart failure exacerbation less likely [KB]   1446 Urinalysis, Reflex to Urine Culture Urine, Clean Catch  No acute UTI [KB]   1446 Troponin I  ACS less likely [KB]   1447 Magnesium [KB]   1447 Comp. Metabolic Panel(!)  No DARRION, no significant electrolyte abnormality,  no evidence of acute hepatobiliary obstruction.   [KB]   1447 EKG 12-lead  EKG:  Sinus rhythm with first-degree AV block, rate 79, normal axis deviation, no QTC prolongation, no ST segment elevation, borderline low voltages. [KB]      ED Course User Index  [KB] Vanesa Caceres MD                           Clinical Impression:  Final diagnoses:  [R10.9] Abdominal pain  [R06.02] Shortness of breath  [M79.606] Leg pain (Primary)          ED Disposition Condition    Discharge Stable          ED Prescriptions    None       Follow-up Information       Follow up With Specialties Details Why Contact Info    [facility] - Emergency Dept Emergency Medicine  As needed [id number]  [facility] [id number] [phone]             Vanesa Caceres MD  Resident  01/23/24 1450       Titi Alexander MD  01/23/24 2121

## 2024-01-24 NOTE — PROVIDER PROGRESS NOTES - EMERGENCY DEPT.
Encounter Date: 1/23/2024    ED Physician Progress Notes        Physician Note:   -I received sign-out at 2 pm regarding Dionicio Bess  -Patient presented to the ED for nausea and left lower extremity pain, status post recent surgery, currently back at home.  Extensive ED workup performed, largely reassuring    -The following medications had been given:  Medications  ondansetron injection 4 mg (4 mg Intravenous Given 1/23/24 1316)  famotidine (PF) injection 20 mg (20 mg Intravenous Given 1/23/24 1316)  acetaminophen tablet 650 mg (650 mg Oral Given 1/23/24 1314)    -At the time of sign-out, the following labs/tests/imaging were still pending:  Left lower extremity ultrasound to rule out a DVT  -The plan is for discharge back home if ultrasound negative for DVT    -Clinical reassessment: ultrasound negative, plan for d/c home.  Pt verbalized comfort with plan.  Extensive observation in the ED, pt remained comfortable, HD stable throughout his stay

## 2024-01-30 ENCOUNTER — OUTPATIENT CASE MANAGEMENT (OUTPATIENT)
Dept: ADMINISTRATIVE | Facility: OTHER | Age: 59
End: 2024-01-30
Payer: MEDICAID

## 2024-01-30 NOTE — PROGRESS NOTES
Received call from this Pt's family member that is working to help him get set up to come to OP therapy and attend his other doctor's appts. Reviewed the upcoming appts and instructed her on how to set up the transportation for the Pt. Advised Pt relative that If he has further needs, she can call back and if Pt needs additional resources, his therapists will place a referral for OPCM services.     Future Appointments   Date Time Provider Department Center   2/1/2024  8:45 AM Vin Brown [facility] [place] [address] [facility]   2/1/2024  9:30 AM Carson Ruelas PT [place] [address] [facility]   2/2/2024  4:00 PM Norberto Villatoro MD [facility]   2/19/2024 10:00 AM Rox Mercedes PA-C [facility] [address]   3/26/2024 10:20 AM Britney Flores OD [facility]   4/29/2024  8:15 AM Rox Mercedes PA-C [facility] [address] [facility]  Neuro Therapy   [facility]  [phone]

## 2024-02-01 ENCOUNTER — CLINICAL SUPPORT (OUTPATIENT)
Dept: REHABILITATION | Facility: HOSPITAL | Age: 59
End: 2024-02-01
Attending: PHYSICIAN ASSISTANT
Payer: MEDICAID

## 2024-02-01 DIAGNOSIS — Z74.09 IMPAIRED MOBILITY AND ADLS: ICD-10-CM

## 2024-02-01 DIAGNOSIS — I69.30 HISTORY OF CVA WITH RESIDUAL DEFICIT: Primary | ICD-10-CM

## 2024-02-01 DIAGNOSIS — Z78.9 IMPAIRED MOBILITY AND ADLS: ICD-10-CM

## 2024-02-01 DIAGNOSIS — M62.89 MUSCLE TONE INCREASED: ICD-10-CM

## 2024-02-01 DIAGNOSIS — Z74.09 IMPAIRED FUNCTIONAL MOBILITY, BALANCE, GAIT, AND ENDURANCE: Primary | ICD-10-CM

## 2024-02-01 DIAGNOSIS — Z78.9 ALTERATION IN INSTRUMENTAL ACTIVITIES OF DAILY LIVING (IADL): ICD-10-CM

## 2024-02-01 DIAGNOSIS — R29.898 DECREASED GRIP STRENGTH OF LEFT HAND: ICD-10-CM

## 2024-02-01 DIAGNOSIS — M25.642 DECREASED RANGE OF MOTION OF FINGER OF LEFT HAND: ICD-10-CM

## 2024-02-01 PROCEDURE — 97162 PT EVAL MOD COMPLEX 30 MIN: CPT | Mod: PO

## 2024-02-01 PROCEDURE — 97166 OT EVAL MOD COMPLEX 45 MIN: CPT | Mod: PO

## 2024-02-01 NOTE — PLAN OF CARE
"[facility]  Physical Therapy Neurological Rehabilitation Initial Evaluation     Name: Dionicio Bess  Clinic Number: [id number]    Therapy Diagnosis:   Encounter Diagnoses   Name Primary?    Impaired functional mobility, balance, gait, and endurance Yes    Muscle tone increased      Physician: Rox Mercedes PA-C    Physician Orders: PT Eval and Treat   Medical Diagnosis from Referral: Stroke due to embolism of right middle cerebral artery   Evaluation Date: 2/1/2024  Authorization Period Expiration: 12/31/24  Plan of Care Expiration: 3/28/24  Progress Note Due: 3/1/24  Visit # / Visits authorized: 1/ 1  FOTO: 1/3    Precautions: Standard, Fall, and s/p L ankle ORIF 1/5/24 NWB L LE ~6 weeks     Time In: 0930 (including time pt spent in restroom prior to PT evaluation)  Time Out: 1024  Total Billable Time: 54 minutes    Subjective      Date of onset: per EMR: 2/14/2023 R MCA CVA resulting in L sided weakness     History of current condition - Dionicio reports: He fell back in December. He states that he tripped on the sidewalk and could not get up. States that he falls 1x/month by "missing his step". States in IPR, he was working on strengthening and stretching B LE. His biggest limiting factor is the shortness of breath he experiences and his L hand/arm. States that he gets short of breath just sitting down or standing up for too long. He currently is living with his niece and sleeping on an air mattress. He has trouble transitioning from the air mattress on the floor to a standing position. States he has no caretaker. He feels that his L LE is weaker than his R LE since his stroke in 2/2023.      Per Hospital Encounter Note dated 1/8/24:  Dionicio Bess is a 58 y.o. male w/ PMHx of pAfib (on eliquis), HTN, CVA with residual L sided deficits, CAD s/p PCI, HLD, HFrEF (EF 25-30%) who presented to [facility] ED for swelling and pain to his L ankle after a ground level mechanical fall after tripping on a step. " Had 9/10 pain to L ankle with inability to bear weight. Also endorsed intermittent lower midsternal chest pain for the past 2-3 days and cramping epigastric abdominal pain with associated nausea that developed 45 mins ago. On chart review, patient had a stress test performed last month that was nondiagnostic though abnormal. It does not appear that this was ever followed up on by cardiology as an outpatient. The patient does have socioeconomic issues and resides in a homeless shelter ([facility]). Endorses frequent alcohol use with 2-3 beers daily. Reported having some vodka and beers before the fall.      Imaging:  X-ray L ankle 12/30/23:  FINDINGS:  Trimalleolar fracture. Oblique fracture of the distal fibula above the tibiotalar joint with 3 mm lateral displacement of the more distal fragment. There is 6.6 mm lateral subluxation/displacement of the talus relative to the talar dome. Ill define tiny avulsed osseous fragment displaced just anterior and lateral to the medial malleolus.  (200: 39-44). There is a tiny osseous fragment just adjacent to the posterior malleolus, suspicious for fracture.  (201: 37-41). The talar dome/status is intact.  The calcaneus is intact.  The tarsal bones and proximal metatarsal bones appear normal. Moderate-size inferior calcaneal spur. The visualized flexor and extensor tendons appear to course normally. Circumferential subcutaneous soft tissue edema about the lower leg and ankle region and dorsal aspect of the foot.  No soft tissue air, no foreign body.  Impression: Trimalleolar fracture(Clay B, Lauge-Samano stage 4)    X-ray L ankle 1/17/24:   FINDINGS: Left ankle remains in posterior splint, status post ORIF lateral malleolar fracture, disruption tibia-fibular syndesmosis with lateral metal plate and fixation screws.  Moderate heel spur again noted.  Impression: ORIF lateral malleolar fracture with satisfactory position alignment, no hardware failure.    Prior Therapy:  "acute care, IPR   Social History: living with niece currently, homeless  Falls: mechanical fall on 12/29/23, ~1x/month per pt report   DME:  platform walker, CAM boot, manual w/c  Home Environment: single story home, duplex, 2 steps to enter   Exercise Routine / History: none   Family Present at time of Eval: none   Occupation: not working  Prior Level of Function: Independent with all ADL and mobility tasks  Current Level of Function: limited in his ability to walk, going to the bathroom, pulling up his pants     Pain:  Current 0/10, worst 7/10, best 0/10   Location:  L ankle   Description: Aching  Aggravating Factors: sitting too long, laying down too long (being in dependent positions)  Easing Factors: rest, laying down     Patient's goals: "stand up so I can get off of the toilet"    Medical History:   Past Medical History:   Diagnosis Date    CAD (coronary artery disease)     HLD (hyperlipidemia)     HTN (hypertension)     Stroke     R MCA 2/2023       Surgical History:   Dionicio Bess  has a past surgical history that includes Open reduction and internal fixation (ORIF) of injury of ankle (Left, 1/5/2024) and Fixation of syndesmosis of ankle (Left, 1/5/2024).    Medications:   Dionicio has a current medication list which includes the following prescription(s): acetaminophen, albuterol-ipratropium, amlodipine, apixaban, aspirin, atorvastatin, diphenhydramine, fluoxetine, folic acid, furosemide, jardiance, losartan, melatonin, methocarbamol, metoprolol succinate, multivitamin, ondansetron, pantoprazole, polyethylene glycol, pregabalin, senna, spironolactone, and thiamine.    Allergies:   Review of patient's allergies indicates:  No Known Allergies     Objective     Vitals: (taken over pt's sweatshirt)  BP: 118/84 mmHg  HR: 77 bpm  SPO2: 92 - 94%    Observation: pleasant and cooperative  Speech: clear    Mental status: alert, oriented, normal mood, behavior, speech, dress, motor activity, and thought processes as " observed during SPT interview   Appearance: Casually dressed  Behavior:  cooperative and adequate rapport can be established  Attention Span and Concentration:  Normal    Posture Alignment :  Sitting: posterior pelvic tilt, forward head, rounded shoulders, L hand in increased flexor tone  Standing: forward head, rounded shoulders, forward trunk lean, L hand in increased flexor tone    Dominant hand:  right     Skin integrity:  Intact    Visual/Auditory: denies changes (wears bifocals)    ROM:   GROSS AROM/PROM  UPPER EXTREMITY  (R) UE: See OT evaluation dated 2/1/24  (L) UE: See OT evaluation dated 2/1/24  LOWER EXTREMITY  (R) LE: WFL  (L) LE: WFL       Lower Extremity Strength: tested sitting edge of mat   Right LE  Left LE    Hip flexion:  4/5 Hip flexion: 4-/5   Knee extension: 4+/5 Knee extension: 4+/5   Knee flexion: 5/5 Knee flexion: 4/5   Ankle dorsiflexion:  4+/5 Ankle dorsiflexion: NT- pt in CAM boot   Ankle plantarflexion:  4+/5 Ankle plantarflexion: NT- pt in CAM boot   Hip abduction: 4+/5 Hip abduction: 4/5   Hip adduction: 4+/5 Hip adduction 4+/5   Hip extension: 4/5 Hip extension: NT- pt in CAM boot, NWB     Upper Extremity Strength: See OT evaluation dated 2/1/24    Coordination: NT    Sensation: Intact to light touch B LE and UE    Proprioception: NT    Tone/Spasticity: not formally tested, no abnormal tone or spasticity observed through PROM of B LE or functional mobility. Difficult to fully assess due to pt's apparent inability to relax. L hand and wrist demonstrate high flexor tone.     Functional Mobility (Bed mobility, transfers):  Squat pivot transfer w/c<>mat, S to SBA for w/c management   Sit<>stand to platform walker, S (increased time)       Evaluation   Single Limb Stance R LE NT  (<10 sec = HIGH FALL RISK)   Single Limb Stance L LE NT  (<10 sec = HIGH FALL RISK)   30 second Chair Rise NT   5 times sit to stand 16 seconds, PRW placed anterior   TUG 39 seconds with PRW~ cues to attempt to  maintain NWB to L LE  SPO2: 94%   Self selected walking speed (SSWS) 0.35 m/sec (6m/17s) with PRW, cues to attempt to maintain NWB to L LE   Fast walking speed NT     5x sit to stand normative information:   >12 sec= fall risk for general elderly  >16 sec= fall risk for Parkinson's disease  >10 sec= balance/vestibular dysfunction (<61 y/o)  >14.2 sec= balance/vestibular dysfunction (>61 y/o)  >12 sec= fall risk for CVA    Gait Assessment:   - AD used: L platform RW  - Assistance: SBA  - Stairs: NT    GAIT DEVIATIONS:   Dionicio displays the following deviations with ambulation: step-to pattern on L LE, increased reliance on B UE support, forward trunk lean, R trunk lean, decreased time in L LE swing, L hip hike and R trunk lean to improve L LE clearance    Impairments contributing to deviations: impaired motor control, L LE weight bearing precautions, slight increased L LE weakness vs. R LE, presence of CAM boot, increased L UE flexor tone    Endurance Deficit: Mild as observed through pt's reports of shortness of breath and decreased O2 saturations following limited activity.          Intake Outcome Measure for FOTO Ankle Fracture Survey    Therapist reviewed FOTO scores for Dionicio Bess on 2/1/2024.   FOTO documents entered into [employer] - see Media section.    Intake Score: 20 %               Treatment     Total Treatment time separate from Evaluation: 0 minutes    All allotted time spent on initial evaluation.       Patient Education and Home Exercises     Education provided:   - Pt educated regarding evaluation findings, potential plan of care and scheduling process.     Written Home Exercises Provided: To be provided at a later date.     Assessment     Dionicio is a 58 y.o. male referred to outpatient Physical Therapy with a medical diagnosis of Stroke due to embolism of right middle cerebral artery and recent L ankle ORIF on 1/5/24. Patient presents with mild impairments in B LE strength, gait impairments, decreased  muscular endurance, and decreased gait speed. Likely, he will demonstrate increased weakness in L ankle musculature but SPT unable to assess on this date due to CAM boot and weightbearing precautions. His TUG time of 39 seconds and his 5 x sit<> stand score of 16 repetitions both place him in the increased risk of fall category. His SSWS of 0.35 m/s places him in the household ambulation only category which indicates a 80-99% impairment in gait speed. Likely, these outcomes are not an accurate measure of Dionicio's true mobility status and are very much impacted by the current weightbearing precautions in place for his L LE. Due to his prior stroke and L UE deficits, he is really not able to maintain true NWB with any attempts to ambulate. Therefore, attempts to ambulate will be limited until his weight bearing status is upgraded. He is appropriate for skilled physical therapy at a frequency of 1-2x/week for 8 weeks in order to address the above listed impairments with a goal of improving functional mobility and maximizing safety with mobility tasks.     Patient prognosis is Good.   Patient will benefit from skilled outpatient Physical Therapy to address the deficits stated above and in the chart below, provide patient/family education, and to maximize patient's level of independence.     Plan of care discussed with patient: Yes  Patient's spiritual, cultural and educational needs considered and patient is agreeable to the plan of care and goals as stated below:     Anticipated Barriers for therapy: co-morbidities, transportation, L LE weightbearing precautions, pt's compliance with L LE weightbearing precautions     Medical Necessity is demonstrated by the following  History  Co-morbidities and personal factors that may impact the plan of care [] LOW: no personal factors / co-morbidities  [] MODERATE: 1-2 personal factors / co-morbidities  [x] HIGH: 3+ personal factors / co-morbidities    Moderate / High Support  Documentation: History of R MCA CVA, CAD, CHF, HTN, s/p L ankle ORIF     Examination  Body Structures and Functions, activity limitations and participation restrictions that may impact the plan of care [] LOW: addressing 1-2 elements  [] MODERATE: 3+ elements  [x] HIGH: 4+ elements (please support below)    Moderate / High Support Documentation: gait impairments, decreased strength, decreased gait speed, decreased endurance     Clinical Presentation [] LOW: stable  [x] MODERATE: Evolving  [] HIGH: Unstable     Decision Making/ Complexity Score: moderate       Goals:  Short Term Goals: 4 weeks   1. Pt will be issued first installment of HEP and report at least partial compliance  2. Pt will improve TUG time to 36 seconds or less in order to reach MCID and demonstrate decreased fall risk and improved household ambulation ability.   3. Pt will increase SSWS to 0.40 m/s or greater in order to improve community navigation and decrease fall risk.  4. Pt will improve 5 x sit<>stand time to 14 seconds or less in order to demonstrate improved muscular endurance.    Long Term Goals: 8 weeks   1. Pt will be at least partially compliant with finalized HEP to maintain any potential gains realized in PT.  2. Pt will improve TUG time to 33 seconds or less in order to demonstrate decreased fall risk and improved household ambulation ability.   3. Pt will increase SSWS to 0.46 m/s or greater in order to improve community navigation and decrease fall risk.  4. Pt will improve 5 x sit<>stand time to 12 seconds or less in order to demonstrate improved muscular endurance and score below the fall risk cut off in CVA populations.       Plan     Plan of care Certification: 2/1/2024 to 3/28/24.    Outpatient Physical Therapy 1 - 2 times weekly for 8 weeks to include the following interventions: Gait Training, Neuromuscular Re-ed, Patient Education, Therapeutic Activities, and Therapeutic Exercise.     Kelsi Claudio, SPT  2/1/2024    I certify  that I was present in the room directing the student in service delivery and guiding them using my skilled judgment. As the co-signing therapist, I have reviewed the student's documentation and am responsible for the treatment, assessment and plan.    Carson Ruelas, PT  2/1/2024

## 2024-02-01 NOTE — PLAN OF CARE
[facility]   Occupational Therapy Initial Neurological Evaluation     Name: Dionicio Bess  Clinic Number: [id number]    Therapy Diagnosis:   Encounter Diagnoses   Name Primary?    History of CVA with residual deficit Yes    Impaired mobility and ADLs     Alteration in instrumental activities of daily living (IADL)     Decreased range of motion of finger of left hand     Decreased  strength of left hand     Muscle tone increased      Physician: Rox Mercedes PA-C    Physician Orders: Eval and Treat (Hand Therapy)  Comment - 2-3 x per wek x 6weeks  Medical Diagnosis: I63.411 (ICD-10-CM) - Stroke due to embolism of right middle cerebral artery  Evaluation Date: 2/1/2024  Insurance Authorization Period Expiration: 12/31/2024  Plan of Care Certification Period: 5/3/24  Date of Return to MD: 2/19/2024: Ortho  FOTO: 1/ 2     Visit # / Visits authorized: 1 / 1    Precautions:  Standard and Fall; LLE non weight bearing    Time In: 8:40am  Time Out: 9:30 am  Total Billable Time: 50 minutes    Subjective     Date of Onset: February 2022 CVA therapy when first happened    History of Current Condition: Currently homeless but living with niece right now. L UE hemiparesis. No functional movement of L hand. Recent fall with L ankle fracture. NWB 6 weeks. Toe touch only for balance. Comes to clinic for OT to address L hemiparesis. Primary concern is increased tightness and lack of function. Understands he will have limitations.     Involved Side: Left  Dominant Side: Right    Surgical Procedure: 01/05/24: :   Open reduction internal fixation left trimalleolar ankle fracture without posterior lip - 69145  Open treatment of left ankle syndesmosis disruption with reduction and internal fixation - 35390    1/22/24:     ACTIVITY:   - sedentary  -range of motion as tolerated               - NWB 6 weeks pending healing                 Imaging: MRI studies 12/30/23  Impression:     1. Motion  compromised examination.  2. Small foci of acute to early subacute ischemia noted in the right corona radiata/basal ganglia/internal capsule, adjoining remote large MCA territory infarct.  Equivocal punctate right parietal cortical infarct margin the posterior aspect of the infarct territory.  3. No acute intracranial hemorrhage or mass effect.  4. Remote post ischemic sequelae, as discussed.    Previous Therapy: none in system    Pain:  Pain Related Behaviors Observed: No   Functional Pain Scale Rating 0-10:   5/10 on average  5/10 at best  9/10 at worst  Location: L arm generalized  Description: Tight and sore  Aggravating Factors: yawning  Easing Factors: movement    Occupation:  was cutting grass  Working presently: unemployed  Duties: n/a    Functional Limitations/Social History:    Prior Level of Function: Independent with all ADLs.   Current Level of Function: Mod I with difficulty. L UE with no functional use    Current Functional Status   Home/Living environment: is homeless, lives right now with niece     - single story duplex, 2 steps to enter    - DME: Manual wheelchair , CAM boot, platform walker      Limitation of Functional Status as follows:   ADLs/IADLs:     - Feeding: Mod I after set up    - Bathing: Mod I but very difficult and unsafe, needs TTB and long handled sponge    - Dressing/Grooming: Mod I with R arm only    - Home Management: makes his bed    - Toileting: Mod I    - Driving: Not currently      Functional mobility: NWB 6 weeks, platform walker, was walking prior to his fall    Tranfers: Mod I, stand pivot     Leisure: relax outside    Patient's Goals for Therapy: improve L UE function, address tone    Past Medical History/Physical Systems Review:     Past Medical History:  Dionicio Bess  has a past medical history of CAD (coronary artery disease), HLD (hyperlipidemia), HTN (hypertension), and Stroke.    Past Surgical History:  Dionicio Bess  has a past surgical history that includes Open  reduction and internal fixation (ORIF) of injury of ankle (Left, 1/5/2024) and Fixation of syndesmosis of ankle (Left, 1/5/2024).    Current Medications:  Dionicio has a current medication list which includes the following prescription(s): acetaminophen, albuterol-ipratropium, amlodipine, apixaban, aspirin, atorvastatin, diphenhydramine, fluoxetine, folic acid, furosemide, jardiance, losartan, melatonin, methocarbamol, metoprolol succinate, multivitamin, ondansetron, pantoprazole, polyethylene glycol, pregabalin, senna, spironolactone, and thiamine.    Allergies:  Review of patient's allergies indicates:  No Known Allergies     Objective     BP: 114/74 HR 78    Cognitive Exam:  Oriented: Person, Place, Time, and Situation  Behaviors: normal, cooperative  Follows Commands/attention: Follows multistep  commands  Communication: dysarthria  Memory: no reported deficits   Safety awareness/insight to disability: not following NWB status, SES limitations   Coping skills/emotional control: Appropriate to situation    Visual/Perceptual:  Acuity: has bifocals      Physical Exam:  Postural examination/scapula alignment: Rounded shoulder, Affected scapula depressed, and Slouched posture  Joint integrity: Firm end feeling  Skin integrity: warm/dry  Edema: none observed UEs   Palpation: TTP L generalized    R UE ARM WFL all planes, strength 4+/5 all planes    L MMT deferred this date    Joint Evaluation  AROM  2/1/2024 PROM   2/1/2024    Left  Left    Shoulder flex 0-180 105 120   Shoulder Abd 0-180 90 105   Shoulder ER 0-90 50 65   Shoulder IR 0-90 WFL WNL   Shoulder Extension 0-80 70 WNL   Shoulder Horizontal adduction 0-90 -15 40   Elbow flex/ext 0-150 5-125 WFL   Wrist flex 0-80 neutral 20   Wrist ext 0-70 45 60   Supination 0-80 20 20   Pronation 0-80 WFL WFL     Fist: tight    Gross motor coordination:   RICARDO (Rapid Alternating Movements): unable L  Finger to Nose (5 times): unable L  Finger Flicks (coordination moving from  digit flexion to digit extension): unable L    Tone:   Modified Ilda Scale:   3 Considerable increase in muscle tone, passive movement difficult in L forearm/hand    Sensation:  Dionicio  reports tingling L arm      Balance:   Static Sit - GOOD+: Takes MAXIMAL challenges from all directions.    Dynamic sit- GOOD+: Takes MAXIMAL challenges from all directions.    Static Stand - NT, NWB 6 weeks  Dynamic stand - NT, NWB 6 weeks    Endurance Deficit: mild                    Intake Outcome Measure for FOTO Neuro Survey    Therapist reviewed FOTO scores for Dionicio Bess on 2/1/2024.   FOTO documents entered into [employer] - see Media section.             Home Exercises and Patient Education Provided     Education provided:   -role of OT, goals for OT, scheduling  -Additional Education provided: need for PROM    Assessment     Dionicio Bess is a 58 y.o. male referred to outpatient occupational therapy and presents with a medical diagnosis of CVA. He presents with significant distal tone in his L UE. He has no functional use of L hand. However, he does demo good proximal movement. Prognosis is fair 2/2 time since injury, homelessness, and limited support at home.  Following medical record review it is determined that pt will benefit from occupational therapy services in order to maximize pain free and/or functional use of left UE. The following goals were discussed with the patient and patient is in agreement with them as to be addressed in the treatment plan. The patient's rehab potential is Fair.     Anticipated barriers to occupational therapy: 2 years since CVA    Plan of care discussed with patient: Yes  Patient's spiritual, cultural and educational needs considered and patient is agreeable to the plan of care and goals as stated below:     Medical Necessity is demonstrated by the following  Occupational Profile/History  Co-morbidities and personal factors that may impact the plan of care [] LOW: Brief chart review  [x]  MODERATE: Expanded chart review   [] HIGH: Extensive chart review    Moderate / High Support Documentation: chart review, several ED visits     Examination  Performance deficits relating to physical, cognitive or psychosocial skills that result in activity limitations and/or participation restrictions  [] LOW: addressing 1-3 Performance deficits  [] MODERATE: 3-5 Performance deficits  [x] HIGH: 5+ Performance deficits (please support below)    Moderate / High Support Documentation:    Physical:  Joint Mobility  Muscle Power/Strength  Muscle Endurance  Control of Voluntary Movement   Strength  Pinch Strength  Gross Motor Coordination  Fine Motor Coordination  Muscle Tone  Pain    Cognitive:  No Deficits    Psychosocial:    No Deficits     Treatment Options [] LOW: Limited options  [x] MODERATE: Several options  [] HIGH: Multiple options      Decision Making/ Complexity Score: moderate       The following goals were discussed with the patient and patient is in agreement with them as to be addressed in the treatment plan.     Goals:  Short Term Goals: 6 weeks   - Pt. Will be edu in self PROM program and be able to complete with visual cues  - Pt. Will have improved L UE AROM of at least 10 degrees in all measured planes of movement including wrist for improved functional arm use  - Pt. Will be able to utilize L UE as a support when completing simple home care tasks  - Pt. Will be fitted for a night time resting hand splint and utilize nightly  - Pt. Will have improve PROM of at least 20 degrees in all measured planes of movement including wrist for tone management  - Pt. Will be able to bathe himself fully with AD such as a long handled sponge    Long Term Goals: 12 weeks   - Pt. Will complete HEP daily to help manage tone per patient report  - Pt. Will have improved L UE AROM of at least 20 degrees in all measured planes of movement including wrist for improved functional arm use  - Pt. 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 Will be able to utilize L UE/hand to open/close doors at least 50% of the time for forced L UE activity  - Pt. Will be able to utilize R Ue as helper during UB and LB dressing    More goals to be made as appropriate within POC as needed    Plan   Certification Period/Plan of care expiration: 2/1/2024 to 5/3/24.    Outpatient Occupational Therapy 2 times weekly for 12 weeks to include the following interventions: Electrical Stimulation of L UE, Fluidotherapy, Manual Therapy, Moist Heat/ Ice, Neuromuscular Re-ed, Orthotic Management and Training, Paraffin, Patient Education, Self Care, Therapeutic Activities, and Therapeutic Exercise.    SIERRA Iyer        Physician's Signature: _________________________________________ Date: ________________

## 2024-02-02 ENCOUNTER — OUTPATIENT CASE MANAGEMENT (OUTPATIENT)
Dept: ADMINISTRATIVE | Facility: OTHER | Age: 59
End: 2024-02-02
Payer: MEDICAID

## 2024-02-02 NOTE — PROGRESS NOTES
"[facility]   Physical Therapy Treatment Note      Name: Dionicio Bess  Clinic Number: [id number]    Therapy Diagnosis:  Encounter Diagnoses   Name Primary?    Muscle tone increased Yes    Impaired functional mobility, balance, gait, and endurance        Physician: Rox Mercedes PA-C    Visit Date: 2/5/2024         Encounter Diagnoses   Name Primary?    Impaired functional mobility, balance, gait, and endurance Yes    Muscle tone increased        Physician: Rox Mercedes PA-C     Physician Orders: PT Eval and Treat   Medical Diagnosis from Referral: Stroke due to embolism of right middle cerebral artery   Evaluation Date: 2/1/2024  Authorization Period Expiration: 12/31/24  Plan of Care Expiration: 3/28/24  Progress Note Due: 3/1/24  Visit # / Visits authorized: 1/ 1  FOTO: 1/3     Precautions: Standard, Fall, and s/p L ankle ORIF 1/5/24 NWB L LE ~6 weeks      Time In: 0845  Time Out: 0930  Total Billable Time: 45 minutes    PTA Visit #: 1/5       Subjective     Patient reports: no pain.  He was not compliant with home exercise program.  Response to previous treatment: Eval only   Functional change: Ongoing    Pain: 0/10  Location: left ankle      Objective      Objective Measures updated at progress report unless specified. Presents in [facility] in w/c    Treatment     Dionicio received the treatments listed below:      therapeutic exercises to develop strength, endurance, ROM, flexibility, posture, and core stabilization for 45 minutes including:  Transfer training: W/C >mat table via squat pivot NWB LLE   Seated:   2x10 reps of hip flexion   2x10 reps of long arc quad   2x10 reps of heel and toe raises     3x30" of long sitting calf stretch with strap    Supine:  2x10 reps of straight leg raise   2x10 reps of heel slides    Side lying   2x10 reps of Hip Abd     Prone:   2x10 reps of hamstring curls   2x10 reps of hip extension          Patient Education and Home Exercises       Education " provided:   - home exercise program   - Reviewed necessity of NWB compliance    Written Home Exercises Provided: yes. Exercises were reviewed and Dionicio was able to demonstrate them prior to the end of the session.  Dionicio demonstrated good  understanding of the education provided. See Electronic Medical Record under Patient Instructions for exercises provided during therapy sessions    Assessment     Dionicio presents to patient's for his first follow up for the new plan of care. Presents seated in [facility] apparently having walked using a single point cane. He was transported via clinic W/C  following NWB LLE transfer to treatment gym. He was instructed in a home exercise program and proved a printed handout. His NWB LLE status was reviewed.  His CAM boot was removed and incision inspected which is approximated, skin in tact. Following treatment he was escorted to 1st floor [facility] via W/C and tech to assist to transportation.     Dionicio Is progressing well towards his goals.   Patient prognosis is Good.     Patient will continue to benefit from skilled outpatient physical therapy to address the deficits listed in the problem list box on initial evaluation, provide pt/family education and to maximize pt's level of independence in the home and community environment.     Patient's spiritual, cultural and educational needs considered and pt agreeable to plan of care and goals.     Anticipated barriers to physical therapy:  co-morbidities, transportation, L LE weightbearing precautions, pt's compliance with L LE weightbearing precautions     Goals:   Short Term Goals: 4 weeks   1. Pt will be issued first installment of HEP and report at least partial compliance  2. Pt will improve TUG time to 36 seconds or less in order to reach MCID and demonstrate decreased fall risk and improved household ambulation ability.   3. Pt will increase SSWS to 0.40 m/s or greater in order to improve community navigation and decrease fall risk.  4. Pt  will improve 5 x sit<>stand time to 14 seconds or less in order to demonstrate improved muscular endurance.     Long Term Goals: 8 weeks   1. Pt will be at least partially compliant with finalized HEP to maintain any potential gains realized in PT.  2. Pt will improve TUG time to 33 seconds or less in order to demonstrate decreased fall risk and improved household ambulation ability.   3. Pt will increase SSWS to 0.46 m/s or greater in order to improve community navigation and decrease fall risk.  4. Pt will improve 5 x sit<>stand time to 12 seconds or less in order to demonstrate improved muscular endurance and score below the fall risk cut off in CVA populations.      Plan     Continue patient's plan of care     Sami Gibson, JEANCARLOS

## 2024-02-05 ENCOUNTER — CLINICAL SUPPORT (OUTPATIENT)
Dept: REHABILITATION | Facility: HOSPITAL | Age: 59
End: 2024-02-05
Payer: MEDICAID

## 2024-02-05 DIAGNOSIS — Z74.09 IMPAIRED FUNCTIONAL MOBILITY, BALANCE, GAIT, AND ENDURANCE: ICD-10-CM

## 2024-02-05 DIAGNOSIS — M62.89 MUSCLE TONE INCREASED: Primary | ICD-10-CM

## 2024-02-05 PROCEDURE — 97110 THERAPEUTIC EXERCISES: CPT | Mod: PO,CQ

## 2024-02-05 NOTE — PROGRESS NOTES
[facility]  Occupational Therapy Treatment Note     Date: 2/6/2024  Name: Dionicio Bess  Clinic Number: [id number]    Therapy Diagnosis:   Encounter Diagnoses   Name Primary?    Impaired mobility and ADLs Yes    Alteration in instrumental activities of daily living (IADL)     Decreased range of motion of finger of left hand     Decreased  strength of left hand     Muscle tone increased      Physician: Rox Mercedes PA-C    Physician Orders: Eval and Treat (Hand Therapy)  Comment - 2-3 x per wek x 6weeks  Medical Diagnosis: I63.411 (ICD-10-CM) - Stroke due to embolism of right middle cerebral artery  Evaluation Date: 2/1/2024  Insurance Authorization Period Expiration: 12/31/2024  Plan of Care Certification Period: 5/3/24  Date of Return to MD: 2/19/2024: Ortho  FOTO: 1/ 2      Visit # / Visits authorized: 1 / 20     Precautions:  Standard and Fall; LLE non weight bearing     Time In: 8:00 am  Time Out: 8:45 am  Total Billable Time: 45 minutes      Subjective     Patient reports: hand feeling much better  Response to previous treatment:first follow up  Functional change: first follow up    Pain: 0/10 no verbal score, discomfort with end range PROM  Location:  L UE with PROM     Objective     Objective Measures updated at progress report unless specified.    Treatment     Dionicio received the treatments listed below:      therapeutic activities to improve functional performance for 45  minutes, including:  - slough removal from L fisted hand  - Left general wrist stretches including metacarpal spreads, carpal rolls, increasing mobility towards radial/ulnar deviation, increasing mobility of wrist towards extension/flexion, Increasing mobility of wrist towards supination/pronation. PROM of fingers to encourage extension.  - AAROM scap mobs elevation and retraction x 10 each with 5 sec holds  - Cup placed into L hand to prevent fisting while completing PROM  - supine PROm to L UE all planes  with hold at end range  - reviewed transfer strategies to prevent WB into L foot      Patient Education and Home Exercises     Education provided:   - HEP  - Progress towards goals     Written Home Exercises Provided: yes.  Exercises were reviewed and Dionicio was able to demonstrate them prior to the end of the session.  Dionicio demonstrated good  understanding of the home exercise program provided. See electronic medical record under Patient Instructions for exercises provided during therapy sessions.       Assessment     Dionicio tolerated session well. He was shown best practice for L hand hygiene/skin  care to prevent slough build up and maceration. Improved hand mobility noted post session with some active flex and extension. HEP reviewed with good understanding and high motivation to complete regularly. Distal forearm/hand tone most problematic for him functionally but he shows good potential to improve.     Dionicio is progressing well towards his goals and there are no updates to goals at this time. Pt prognosis is Good.     Patient will continue to benefit from skilled outpatient occupational therapy to address the deficits listed in the problem list on initial evaluation provide patient/family education and to maximize patient's level of independence in the home and community environment.     Patient's spiritual, cultural and educational needs considered and patient agreeable to plan of care and goals.    Anticipated barriers to occupational therapy: chronic condition     Goals:  Short Term Goals: 6 weeks   - Pt. Will be edu in self PROM program and be able to complete with visual cues  - Pt. Will have improved L UE AROM of at least 10 degrees in all measured planes of movement including wrist for improved functional arm use  - Pt. Will be able to utilize L UE as a support when completing simple home care tasks  - Pt. Will be fitted for a night time resting hand splint and utilize nightly  - Pt. Will have improve PROM  of at least 20 degrees in all measured planes of movement including wrist for tone management  - Pt. Will be able to bathe himself fully with AD such as a long handled sponge     Long Term Goals: 12 weeks   - Pt. Will complete HEP daily to help manage tone per patient report  - Pt. Will have improved L UE AROM of at least 20 degrees in all measured planes of movement including wrist for improved functional arm use  - Pt. 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 Will be able to utilize L UE/hand to open/close doors at least 50% of the time for forced L UE activity  - Pt. Will be able to utilize R Ue as helper during UB and LB dressing     More goals to be made as appropriate within POC as needed     Plan   Certification Period/Plan of care expiration: 2/1/2024 to 5/3/24.     Outpatient Occupational Therapy 2 times weekly for 12 weeks to include the following interventions: Electrical Stimulation of L UE, Fluidotherapy, Manual Therapy, Moist Heat/ Ice, Neuromuscular Re-ed, Orthotic Management and Training, Paraffin, Patient Education, Self Care, Therapeutic Activities, and Therapeutic Exercise.    Updates/Grading for next session: progress into WB on 1/2 SIERRA Rodrigues   2/6/2024

## 2024-02-06 ENCOUNTER — DOCUMENTATION ONLY (OUTPATIENT)
Dept: REHABILITATION | Facility: HOSPITAL | Age: 59
End: 2024-02-06

## 2024-02-06 ENCOUNTER — CLINICAL SUPPORT (OUTPATIENT)
Dept: REHABILITATION | Facility: HOSPITAL | Age: 59
End: 2024-02-06
Payer: MEDICAID

## 2024-02-06 DIAGNOSIS — M62.89 MUSCLE TONE INCREASED: ICD-10-CM

## 2024-02-06 DIAGNOSIS — Z78.9 IMPAIRED MOBILITY AND ADLS: Primary | ICD-10-CM

## 2024-02-06 DIAGNOSIS — M25.642 DECREASED RANGE OF MOTION OF FINGER OF LEFT HAND: ICD-10-CM

## 2024-02-06 DIAGNOSIS — R29.898 DECREASED GRIP STRENGTH OF LEFT HAND: ICD-10-CM

## 2024-02-06 DIAGNOSIS — Z74.09 IMPAIRED MOBILITY AND ADLS: Primary | ICD-10-CM

## 2024-02-06 DIAGNOSIS — Z78.9 ALTERATION IN INSTRUMENTAL ACTIVITIES OF DAILY LIVING (IADL): ICD-10-CM

## 2024-02-06 PROCEDURE — 97530 THERAPEUTIC ACTIVITIES: CPT | Mod: PO

## 2024-02-06 NOTE — PROGRESS NOTES
Outpatient Care Management   - Low Risk Patient Assessment    Patient: Dionicio Bess  MRN:  [id number]  Date of Service:  2/6/2024  Completed by:  Sharon Almazan LCSW  Referral Date: 02/01/2024    Reason for Visit   Patient presents with    Other     Scheduled appt to complete assessment after tomorrow's therapy session.    Social Work Assessment - High Risk     2/6/24    OPCM Enrollment Call       Brief Summary:  received a referral from [facility] therapists (SARAH Phan) for the following patient identified psycho-social needs: Pt needs permanent housing and established resources. Pt elected to participate in the OPCM program. Social work assessment and SDOH completed. Pt reported he lives with his niece and her family in a single story town home that has 1 step to enter. He is sleeping on a blow up mattress until his SSI disability kicks in. He reported he was initially denied, but he was able to get a  to help and was informed he was approved. He is waiting on the official letter and first check. He plans to utilize this to get his own place but needs assistance. He was able to get the Medicaid and food stamps already. He also needs his card as the original one was lost. Pt utilizes a WC and straight cane with a boot for his DME and is independent with most ADLs, though his niece assists him with the household care. Care plan was created in collaboration with patient input.  INTERVENTIONS:  Contacted Medicaid and requested a new card to be mailed to his niece's address on [address]. Provided Pt with SSI number to see if he can track the letter and get the amounts he will be provided so that he can look for housing. Provided folder with information about [employer], and [facility] [employer] [facility] (low income senior housing with [employer]).     Future Appointments   Date Time Provider Department Center   2/8/2024  8:45 AM Vin Brown LOTR [facility]   2/16/2024  9:20 AM Norberto Villatoro MD [facility]   2/19/2024 10:00 AM Rox Mercedes PA-C [facility]   2/21/2024  8:00 AM Silbernagel, Sunil, [facility]   2/21/2024  8:45 AM Bhumi Velazquez, PTA [facility]   2/23/2024  9:30 AM Silbernagel, Sunil, [facility]   2/26/2024  8:00 AM Sami Gibson, PTA [facility]   2/26/2024  8:45 AM Silbernagel, Sunil, [facility]   2/29/2024  8:45 AM Silbernagel, Sunil, [facility]   3/5/2024  8:45 AM Silbernajames, Sunil, [facility]   3/5/2024  9:30 AM Carson Ruelas, PT [facility]   3/7/2024  9:30 AM Silnancy, Sunil, [facility]R [facility]   3/12/2024 10:30 AM Carson Ruelas, PT [facility]   3/12/2024 11:15 AM Silberneida, Sunil, [facility]R [facility]   3/14/2024  9:30 AM Kevin, Sunil, [facility]R [facility]   3/19/2024  9:30 AM Carson Ruelas, PT [facility]   3/19/2024 10:30 AM Silbernajames, Sunil, [facility]   3/21/2024  9:30 AM Silbernajames, Sunil, [facility]R [facility]   3/25/2024  9:30 AM Silbernagel, Sunil, [facility]   3/25/2024 10:30 AM Sami Gibson, PTA [facility]   3/26/2024 10:20 AM Britney Flores, [facility] [address]   3/28/2024  9:30 AM Vin Brown, [facility]   4/1/2024  8:45 AM Sami Gibson, PTA [facility]   4/1/2024  9:30 AM Vin Brown, [facility]   4/4/2024  9:30 AM Vin Brown, [facility]   4/12/2024  9:30 AM Carson Ruelas, PT [facility]   4/16/2024  9:30 AM Carson Ruelas, PT [facility]   4/23/2024  8:45 AM Carson Ruelas, PT [facility]   4/23/2024  9:30 AM Vin Brown, [facility]   4/25/2024  9:30 AM  Vin Brown, LOTR [facility]   4/29/2024  8:15 AM Rox Mercedes PA-C [facility]   4/30/2024  8:45 AM Carson Ruelas, PT [facility]   4/30/2024  9:30 AM Vin Brown [facility]R [facility]   5/2/2024  9:30 AM Vin Brown [facility]R [facility]     Sharon Almazan [facility]  Neuro Therapy   [facility]  [phone]

## 2024-02-06 NOTE — PROGRESS NOTES
PT/PTA met face to face to discuss pt's treatment plan and progress towards established goals.  Continue with current PT POC with focus on functional mobility, transitions, mobility and strengthening. Pt is currently NWB to L LE.  Patient will be seen by physical therapist at least every sixth treatment or 30 days, whichever occurs first.    Bhumi Velazquez, PTA  02/06/2024

## 2024-02-09 DIAGNOSIS — S82.852D CLOSED TRIMALLEOLAR FRACTURE OF LEFT ANKLE WITH ROUTINE HEALING, SUBSEQUENT ENCOUNTER: ICD-10-CM

## 2024-02-09 RX ORDER — OXYCODONE HYDROCHLORIDE 5 MG/1
5 TABLET ORAL EVERY 8 HOURS PRN
Qty: 21 TABLET | Refills: 0 | Status: SHIPPED | OUTPATIENT
Start: 2024-02-09 | End: 2024-02-16

## 2024-02-16 ENCOUNTER — OUTPATIENT CASE MANAGEMENT (OUTPATIENT)
Dept: ADMINISTRATIVE | Facility: OTHER | Age: 59
End: 2024-02-16
Payer: MEDICAID

## 2024-02-16 ENCOUNTER — OFFICE VISIT (OUTPATIENT)
Dept: PRIMARY CARE CLINIC | Facility: CLINIC | Age: 59
End: 2024-02-16
Payer: MEDICAID

## 2024-02-16 ENCOUNTER — HOSPITAL ENCOUNTER (EMERGENCY)
Facility: HOSPITAL | Age: 59
Discharge: LEFT BEFORE DISCHARGE | End: 2024-02-16
Attending: STUDENT IN AN ORGANIZED HEALTH CARE EDUCATION/TRAINING PROGRAM
Payer: MEDICAID

## 2024-02-16 VITALS
TEMPERATURE: 98 F | RESPIRATION RATE: 18 BRPM | WEIGHT: 200 LBS | OXYGEN SATURATION: 100 % | DIASTOLIC BLOOD PRESSURE: 86 MMHG | SYSTOLIC BLOOD PRESSURE: 144 MMHG | BODY MASS INDEX: 27.12 KG/M2 | HEART RATE: 90 BPM

## 2024-02-16 DIAGNOSIS — R07.9 CHEST PAIN: ICD-10-CM

## 2024-02-16 DIAGNOSIS — I69.30 HISTORY OF CVA WITH RESIDUAL DEFICIT: Primary | ICD-10-CM

## 2024-02-16 PROCEDURE — 99499 UNLISTED E&M SERVICE: CPT | Mod: S$PBB,,, | Performed by: FAMILY MEDICINE

## 2024-02-16 PROCEDURE — 99283 EMERGENCY DEPT VISIT LOW MDM: CPT | Mod: 25

## 2024-02-16 PROCEDURE — 93010 ELECTROCARDIOGRAM REPORT: CPT | Mod: ,,, | Performed by: INTERNAL MEDICINE

## 2024-02-16 PROCEDURE — 93005 ELECTROCARDIOGRAM TRACING: CPT

## 2024-02-16 NOTE — PROGRESS NOTES
Outpatient Care Management   - Care Plan Follow Up    Patient: Dionicio Bess  MRN:  [id number]  Date of Service:  2/16/2024  Completed by:  Sharon Almazan LCSW  Referral Date: 02/01/2024    Reason for Visit   Patient presents with    OPCM SW Follow Up Call       Brief Summary:  contacted Pt today to follow up and check in. Pt did not come to his appt yesterday. Pt reported he is having a lot of issues with his niece and needs housing. Reminded Pt of the flyers provided at last visit for [facility] low income housing. Pt advised he received the letter and will get a check soon. He will received $800 monthly from disability. Requested that he ask his friend to bring him to the offices for the apartment complexes to apply in person. Pt will update this SW next week on the status of the applications placed for housing and confirmed he will be at the next scheduled appt.     Future Appointments   Date Time Provider Department Center   2/19/2024 10:00 AM Rox Mercedes PA-C [facility]   2/21/2024  8:00 AM Vin Brown, [facility]   2/21/2024  8:45 AM Bhumi Velazquez, PTA [facility]   2/23/2024  9:30 AM Vin Brown, [facility]   2/26/2024  8:00 AM Sami Gibson, PTA [facility]   2/26/2024  8:45 AM SilVin cruz A, [facility]   2/29/2024  8:45 AM SilVin cruz A, [facility]   3/5/2024  8:45 AM Vin Brown A, [facility]   3/5/2024  9:30 AM Carson Ruelas, PT [facility]   3/7/2024  9:30 AM Vin Brown, [facility]   3/12/2024 10:30 AM Carson Ruelas, PT [facility]   3/12/2024 11:15 AM Vin Brown [facility]   3/14/2024  9:30 AM Vin Brown [facility]   3/19/2024  9:30 AM Carson Ruelas, PT [facility]    3/19/2024 10:30 AM Silbernagel, Sunil, [facility]R [facility]   3/21/2024  9:30 AM Silbernagel, Sunil, [facility]   3/25/2024  9:30 AM Silbernagel, Sunil, [facility]   3/25/2024 10:30 AM Buehl, Sami, PTA [facility]   3/26/2024 10:20 AM Britney Flores, OD [facility] [address]   3/28/2024  9:30 AM Silbernagel, Sunil, [facility]   4/1/2024  8:45 AM Buehl, Sami, PTA [facility]   4/1/2024  9:30 AM Silbernagel, Sunil, [facility]   4/4/2024  9:30 AM Silbernajames, Sunil, [facility]R [facility]   4/12/2024  9:30 AM Carson Ruelas, PT [facility]   4/16/2024  9:30 AM Carson Ruelas, PT [facility]   4/23/2024  8:45 AM Carson Ruelas, PT [facility]   4/23/2024  9:30 AM Silbernajames, Sunil, [facility]   4/25/2024  9:30 AM Silbernajames, Sunil, [facility]   4/29/2024  8:15 AM Rox Mercedes PA-C [facility] [address] [facility]   4/30/2024  8:45 AM Carson Ruelas, PT [facility]   4/30/2024  9:30 AM Silbernagel, Sunil, [facility]   5/2/2024  9:30 AM Silbernagel, Sunil, [facility]     MARIA ISABEL Storey  Neuro Therapy   [facility]  [phone]

## 2024-02-17 LAB
OHS QRS DURATION: 90 MS
OHS QTC CALCULATION: 476 MS

## 2024-02-17 NOTE — ED PROVIDER NOTES
I initially signed up for this patient however he was never roomed in the emergency department and eloped prior to be seen by me.       Beatriz Chun MD  02/16/24 [id number]

## 2024-02-20 ENCOUNTER — TELEPHONE (OUTPATIENT)
Dept: ORTHOPEDICS | Facility: CLINIC | Age: 59
End: 2024-02-20
Payer: MEDICAID

## 2024-02-20 NOTE — TELEPHONE ENCOUNTER
Spoke with pt regarding missed appointment on 2/19/24. Pt is now reschedule to 2/26/24 @ 11:30am. Patient states verbal understanding and has no further questions.

## 2024-02-21 ENCOUNTER — CLINICAL SUPPORT (OUTPATIENT)
Dept: REHABILITATION | Facility: HOSPITAL | Age: 59
End: 2024-02-21
Payer: MEDICAID

## 2024-02-21 ENCOUNTER — OUTPATIENT CASE MANAGEMENT (OUTPATIENT)
Dept: ADMINISTRATIVE | Facility: OTHER | Age: 59
End: 2024-02-21
Payer: MEDICAID

## 2024-02-21 DIAGNOSIS — Z78.9 ALTERATION IN INSTRUMENTAL ACTIVITIES OF DAILY LIVING (IADL): ICD-10-CM

## 2024-02-21 DIAGNOSIS — M25.642 DECREASED RANGE OF MOTION OF FINGER OF LEFT HAND: ICD-10-CM

## 2024-02-21 DIAGNOSIS — R29.898 DECREASED GRIP STRENGTH OF LEFT HAND: ICD-10-CM

## 2024-02-21 DIAGNOSIS — Z78.9 IMPAIRED MOBILITY AND ADLS: Primary | ICD-10-CM

## 2024-02-21 DIAGNOSIS — Z74.09 IMPAIRED MOBILITY AND ADLS: Primary | ICD-10-CM

## 2024-02-21 DIAGNOSIS — M62.89 MUSCLE TONE INCREASED: ICD-10-CM

## 2024-02-21 DIAGNOSIS — Z74.09 IMPAIRED FUNCTIONAL MOBILITY, BALANCE, GAIT, AND ENDURANCE: ICD-10-CM

## 2024-02-21 DIAGNOSIS — M62.89 MUSCLE TONE INCREASED: Primary | ICD-10-CM

## 2024-02-21 PROCEDURE — 97530 THERAPEUTIC ACTIVITIES: CPT | Mod: PO

## 2024-02-21 PROCEDURE — 97110 THERAPEUTIC EXERCISES: CPT | Mod: PO,CQ

## 2024-02-21 NOTE — PROGRESS NOTES
"[facility]   Physical Therapy Treatment Note      Name: Dionicio Bess  Clinic Number: [id number]    Therapy Diagnosis:  Encounter Diagnoses   Name Primary?    Muscle tone increased Yes    Impaired functional mobility, balance, gait, and endurance        Physician: Rox Mercedes PA-C    Visit Date: 2/21/2024         Encounter Diagnoses   Name Primary?    Impaired functional mobility, balance, gait, and endurance Yes    Muscle tone increased        Physician: Rox Mercedes PA-C     Physician Orders: PT Eval and Treat   Medical Diagnosis from Referral: Stroke due to embolism of right middle cerebral artery   Evaluation Date: 2/1/2024  Authorization Period Expiration: 12/31/24  Plan of Care Expiration: 3/28/24  Progress Note Due: 3/1/24  Visit # / Visits authorized: 2/ 16 ( plus eval)  FOTO: 1/3     Precautions: Standard, Fall, and s/p L ankle ORIF 1/5/24 NWB L LE ~6 weeks      Time In: 0845  Time Out: 0930  Total Billable Time: 45 minutes    PTA Visit #: 1/5       Subjective     Patient reports: " I feel blessed by the best."   He was compliant with his HEP   Response to previous treatment:It is helping out  Functional change: Ongoing    Pain: 0/10  Location: left ankle      Objective      Objective Measures updated at progress report unless specified. Presents in [facility] in w/c    Treatment     Dionicio received the treatments listed below:      therapeutic exercises to develop strength, endurance, ROM, flexibility, posture, and core stabilization for 45 minutes including:  Transfer training: W/C >mat table via squat pivot NWB LLE   Seated:   2x10 reps of hip flexion   2x10 reps of long arc quad, #2lb cuff weights  2x10 reps of heel and toe raises         Supine:  2x10 reps of straight leg raise #2lb cuff weights  2x10 reps of B LE heel slides with #2lb weights applied     Side lying   2x10 reps of B LE  Hip Abd     Prone:   2x10 reps of hamstring curls, #2lb cuff weights            Patient " Education and Home Exercises       Education provided:   - home exercise program   - Reviewed necessity of NWB compliance    Written Home Exercises Provided: yes. Exercises were reviewed and Dionicio was able to demonstrate them prior to the end of the session.  Dionicio demonstrated good  understanding of the education provided. See Electronic Medical Record under Patient Instructions for exercises provided during therapy sessions    Assessment     Dionicio tolerated his tx session fairly well with no complaints.  Dionicio is still supposed to be NWB, but is non compliant with his MD orders on WB status.  Dionicio was able to increase his weights on all of his sitting, supine and prone exercises.      Dionicio Is progressing well towards his goals.   Patient prognosis is Good.     Patient will continue to benefit from skilled outpatient physical therapy to address the deficits listed in the problem list box on initial evaluation, provide pt/family education and to maximize pt's level of independence in the home and community environment.     Patient's spiritual, cultural and educational needs considered and pt agreeable to plan of care and goals.     Anticipated barriers to physical therapy:  co-morbidities, transportation, L LE weightbearing precautions, pt's compliance with L LE weightbearing precautions     Goals:   Short Term Goals: 4 weeks   1. Pt will be issued first installment of HEP and report at least partial compliance  2. Pt will improve TUG time to 36 seconds or less in order to reach MCID and demonstrate decreased fall risk and improved household ambulation ability.   3. Pt will increase SSWS to 0.40 m/s or greater in order to improve community navigation and decrease fall risk.  4. Pt will improve 5 x sit<>stand time to 14 seconds or less in order to demonstrate improved muscular endurance.     Long Term Goals: 8 weeks   1. Pt will be at least partially compliant with finalized HEP to maintain any potential gains realized  in PT.  2. Pt will improve TUG time to 33 seconds or less in order to demonstrate decreased fall risk and improved household ambulation ability.   3. Pt will increase SSWS to 0.46 m/s or greater in order to improve community navigation and decrease fall risk.  4. Pt will improve 5 x sit<>stand time to 12 seconds or less in order to demonstrate improved muscular endurance and score below the fall risk cut off in CVA populations.      Plan     Continue patient's plan of care     Bhumi Velazquez, PTA

## 2024-02-21 NOTE — PROGRESS NOTES
Outpatient Care Management   - Care Plan Follow Up    Patient: Dionicio Bess  MRN:  [id number]  Date of Service:  2/21/2024  Completed by:  Sharon Almazan LCSW  Referral Date: 02/01/2024    Reason for Visit   Patient presents with    OPCM SW Follow Up Call       Brief Summary:  met with Pt after his therapy appts today. He reported his niece completed several housing applications and he has an award letter for the retroactive payment, but not one for the monthly income that he will need to determine rent. He will advise when he gets that letter and hears back from the apartments they applied to.     Future Appointments   Date Time Provider Department Center   2/23/2024  9:30 AM Vin Brown, [facility]   2/26/2024  8:00 AM Sami Gibson, PTA [facility]   2/26/2024  8:45 AM SilberVin carrasquillo, LOTR [facility]   2/26/2024 11:30 AM Rox Mercedes PA-C [facility]   2/29/2024  8:45 AM SilVin cruz, LOTR [facility]   3/5/2024  8:45 AM Vin Brown, [facility]   3/5/2024  9:30 AM Carson Ruelas, PT [facility]   3/7/2024  9:30 AM Vin Brown, [facility]R [facility]   3/12/2024 10:30 AM Carson Ruelas, PT [facility]   3/12/2024 11:15 AM Vin Brown, LOTR [facility]   3/14/2024  9:30 AM Vin Brown, [facility]R [facility]   3/19/2024  9:30 AM Carson Ruelas, PT [facility]   3/19/2024 10:30 AM SilVin cruz, LOTR [facility]   3/21/2024  9:30 AM Vin Brown, [facility]R [facility]   3/25/2024  9:30 AM Vin Brown [facility]   3/25/2024 10:30 AM Sami Gibson PTA [facility]   3/26/2024 10:20 AM Britney Flores, [facility]   3/28/2024  9:30 AM Vin Brown LOTR [facility]   4/1/2024  8:45 AM Paige  Sami, PTA [facility]   4/1/2024  9:30 AM Vin Brown, [facility]R [facility]   4/4/2024  9:30 AM Vin Brown, LOTR [facility]   4/12/2024  9:30 AM Carson Ruelas, PT [facility]   4/16/2024  9:30 AM Carson Ruelas, PT [facility]   4/23/2024  8:45 AM Carson Ruelas, PT [facility]   4/23/2024  9:30 AM Vin Brown, [facility]   4/25/2024  9:30 AM Vin Brown, [facility]   4/29/2024  8:15 AM Rox Mercedes PA-C [facility]   4/30/2024  8:45 AM Carson Ruelas, PT [facility]   4/30/2024  9:30 AM Vin Brown, LOTR [facility]   5/2/2024  9:30 AM Vin Brown, [facility]R [facility]     MARIA ISABEL Storey  Neuro Therapy   [facility]  [phone]

## 2024-02-21 NOTE — PROGRESS NOTES
[facility]  Occupational Therapy Treatment Note     Date: 2/21/2024  Name: Dionicio Walker  Clinic Number: [id number]    Therapy Diagnosis:   Encounter Diagnoses   Name Primary?    Impaired mobility and ADLs Yes    Alteration in instrumental activities of daily living (IADL)     Decreased range of motion of finger of left hand     Decreased  strength of left hand     Muscle tone increased      Physician: Rox Mercedes PA-C    Physician Orders: Eval and Treat (Hand Therapy)  Comment - 2-3 x per wek x 6weeks  Medical Diagnosis: I63.411 (ICD-10-CM) - Stroke due to embolism of right middle cerebral artery  Evaluation Date: 2/1/2024  Insurance Authorization Period Expiration: 12/31/2024  Plan of Care Certification Period: 5/3/24  Date of Return to MD: 2/19/2024: Ortho  FOTO: 1/ 2      Visit # / Visits authorized: 2 / 20     Precautions:  Standard and Fall; LLE non weight bearing     Time In: 8:25 am (10 minutes late) then 15 minute sin the  Time Out: 8:45 am  Total Billable Time: 20 minutes      Subjective     Patient reports:  doing well today, walking all over (despite WB restrictions)  Response to previous treatment: positive  Functional change:  ongoing    Pain: 0/10 no verbal score, discomfort with end range PROM  Location:  L UE with PROM     Objective     Objective Measures updated at progress report unless specified.    Treatment     Pt. Received from waiting room via lidyastommie w/c    Dionicio received the treatments listed below:      therapeutic activities to improve functional performance for 20 minutes, including:  - Left general wrist stretches including metacarpal spreads, carpal rolls, increasing mobility towards radial/ulnar deviation, increasing mobility of wrist towards extension/flexion, Increasing mobility of wrist towards supination/pronation. PROM of fingers to encourage extension.  - hand hygiene L hand    NOT COMPLETED:  - AAROM scap mobs elevation and retraction x 10 each  with 5 sec holds  - supine PROm to L UE all functional planes with hold at end range   Cup placed into L hand to prevent fisting while completing PROM  - seated edge of mat closed chain WB into L UE on 1/2 dome while R UE crossed midline to place and remove squigz from mirror         Patient Education and Home Exercises     Education provided:   - HEP  - Progress towards goals     Written Home Exercises Provided: yes.  Exercises were reviewed and Dionicio was able to demonstrate them prior to the end of the session.  Dionicio demonstrated good  understanding of the home exercise program provided. See electronic medical record under Patient Instructions for exercises provided during therapy sessions.       Assessment     Dionicio had good tolerance to limited session this date. He arrived late and took increased time in bathroom which limited session. He has NOT been compliant with his WB and continues to ambulate without boot despite constant edu.      Dionicio is progressing well towards his goals and there are no updates to goals at this time. Pt prognosis is Good.     Patient will continue to benefit from skilled outpatient occupational therapy to address the deficits listed in the problem list on initial evaluation provide patient/family education and to maximize patient's level of independence in the home and community environment.     Patient's spiritual, cultural and educational needs considered and patient agreeable to plan of care and goals.    Anticipated barriers to occupational therapy: chronic condition     Goals:  Short Term Goals: 6 weeks   - Pt. Will be edu in self PROM program and be able to complete with visual cues  - Pt. Will have improved L UE AROM of at least 10 degrees in all measured planes of movement including wrist for improved functional arm use  - Pt. Will be able to utilize L UE as a support when completing simple home care tasks  - Pt. Will be fitted for a night time resting hand splint and utilize  nightly  - Pt. Will have improve PROM of at least 20 degrees in all measured planes of movement including wrist for tone management  - Pt. Will be able to bathe himself fully with AD such as a long handled sponge     Long Term Goals: 12 weeks   - Pt. Will complete HEP daily to help manage tone per patient report  - Pt. Will have improved L UE AROM of at least 20 degrees in all measured planes of movement including wrist for improved functional arm use  - Pt. 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 Will be able to utilize L UE/hand to open/close doors at least 50% of the time for forced L UE activity  - Pt. Will be able to utilize R Ue as helper during UB and LB dressing     More goals to be made as appropriate within POC as needed     Plan   Certification Period/Plan of care expiration: 2/1/2024 to 5/3/24.     Outpatient Occupational Therapy 2 times weekly for 12 weeks to include the following interventions: Electrical Stimulation of L UE, Fluidotherapy, Manual Therapy, Moist Heat/ Ice, Neuromuscular Re-ed, Orthotic Management and Training, Paraffin, Patient Education, Self Care, Therapeutic Activities, and Therapeutic Exercise.    Updates/Grading for next session: progress into WB on 1/2 SIERRA Rodrigues   2/21/2024

## 2024-02-23 ENCOUNTER — CLINICAL SUPPORT (OUTPATIENT)
Dept: REHABILITATION | Facility: HOSPITAL | Age: 59
End: 2024-02-23
Payer: MEDICAID

## 2024-02-23 DIAGNOSIS — M62.89 MUSCLE TONE INCREASED: ICD-10-CM

## 2024-02-23 DIAGNOSIS — Z78.9 ALTERATION IN INSTRUMENTAL ACTIVITIES OF DAILY LIVING (IADL): ICD-10-CM

## 2024-02-23 DIAGNOSIS — M25.642 DECREASED RANGE OF MOTION OF FINGER OF LEFT HAND: ICD-10-CM

## 2024-02-23 DIAGNOSIS — Z74.09 IMPAIRED MOBILITY AND ADLS: Primary | ICD-10-CM

## 2024-02-23 DIAGNOSIS — R29.898 DECREASED GRIP STRENGTH OF LEFT HAND: ICD-10-CM

## 2024-02-23 DIAGNOSIS — Z78.9 IMPAIRED MOBILITY AND ADLS: Primary | ICD-10-CM

## 2024-02-23 PROCEDURE — 97530 THERAPEUTIC ACTIVITIES: CPT | Mod: PO

## 2024-02-23 NOTE — PROGRESS NOTES
[facility]  Occupational Therapy Treatment Note     Date: 2/23/2024  Name: Dionicio Bess  Clinic Number: [id number]    Therapy Diagnosis:   Encounter Diagnoses   Name Primary?    Impaired mobility and ADLs Yes    Alteration in instrumental activities of daily living (IADL)     Decreased range of motion of finger of left hand     Decreased  strength of left hand     Muscle tone increased      Physician: Rox Mercedes PA-C    Physician Orders: Eval and Treat (Hand Therapy)  Comment - 2-3 x per wek x 6weeks  Medical Diagnosis: I63.411 (ICD-10-CM) - Stroke due to embolism of right middle cerebral artery  Evaluation Date: 2/1/2024  Insurance Authorization Period Expiration: 5/6/24  Plan of Care Certification Period: 5/3/24  Date of Return to MD: 2/19/2024: Ortho  FOTO: 1/ 2      Visit # / Visits authorized: 3 / 20     Precautions:  Standard and Fall; LLE non weight bearing     Time In: 9:30 am  Time Out: 10:15 am  Total Billable Time: 45 minutes      Subjective     Patient reports:  doing well today  Response to previous treatment: positive  Functional change:  ongoing    Pain: 0/10 no verbal score, discomfort with end range PROM  Location:  L UE with PROM     Objective     Objective Measures updated at progress report unless specified.    Treatment     Pt. Received from waiting room via [facility] w/c    Dionicio received the treatments listed below:      therapeutic activities to improve functional performance for 45 minutes, including:  - HEP review to improve carryover at home 10 x 10 sec holds   - supine shoulder flex   - shoulder abd with cane   - Shoulder Er with cane   - wrist ext/flex   - elbow ext/flex   - sup/pro        Patient Education and Home Exercises     Education provided:   - HEP  - Progress towards goals     Written Home Exercises Provided: yes.  Exercises were reviewed and Dionicio was able to demonstrate them prior to the end of the session.  Dionicio demonstrated good   understanding of the home exercise program provided. See electronic medical record under Patient Instructions for exercises provided during therapy sessions.       Assessment     Dionicio tolerated session well but needed frequent cues to stay on task. Despite edu on WB restrictions he is refusing to comply. HEP reviewed with fair understanding.     Dionicio is progressing well towards his goals and there are no updates to goals at this time. Pt prognosis is Good.     Patient will continue to benefit from skilled outpatient occupational therapy to address the deficits listed in the problem list on initial evaluation provide patient/family education and to maximize patient's level of independence in the home and community environment.     Patient's spiritual, cultural and educational needs considered and patient agreeable to plan of care and goals.    Anticipated barriers to occupational therapy: chronic condition     Goals:  Short Term Goals: 6 weeks   - Pt. Will be edu in self PROM program and be able to complete with visual cues  - Pt. Will have improved L UE AROM of at least 10 degrees in all measured planes of movement including wrist for improved functional arm use  - Pt. Will be able to utilize L UE as a support when completing simple home care tasks  - Pt. Will be fitted for a night time resting hand splint and utilize nightly  - Pt. Will have improve PROM of at least 20 degrees in all measured planes of movement including wrist for tone management  - Pt. Will be able to bathe himself fully with AD such as a long handled sponge     Long Term Goals: 12 weeks   - Pt. Will complete HEP daily to help manage tone per patient report  - Pt. Will have improved L UE AROM of at least 20 degrees in all measured planes of movement including wrist for improved functional arm use  - Pt. 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 Will be able to utilize L  UE/hand to open/close doors at least 50% of the time for forced L UE activity  - Pt. Will be able to utilize R Ue as helper during UB and LB dressing     More goals to be made as appropriate within POC as needed     Plan   Certification Period/Plan of care expiration: 2/1/2024 to 5/3/24.     Outpatient Occupational Therapy 2 times weekly for 12 weeks to include the following interventions: Electrical Stimulation of L UE, Fluidotherapy, Manual Therapy, Moist Heat/ Ice, Neuromuscular Re-ed, Orthotic Management and Training, Paraffin, Patient Education, Self Care, Therapeutic Activities, and Therapeutic Exercise.    Updates/Grading for next session: progress into WB on 1/2 [facility]   2/23/2024

## 2024-02-26 ENCOUNTER — OFFICE VISIT (OUTPATIENT)
Dept: ORTHOPEDICS | Facility: CLINIC | Age: 59
End: 2024-02-26
Payer: MEDICAID

## 2024-02-26 ENCOUNTER — OUTPATIENT CASE MANAGEMENT (OUTPATIENT)
Dept: ADMINISTRATIVE | Facility: OTHER | Age: 59
End: 2024-02-26
Payer: MEDICAID

## 2024-02-26 ENCOUNTER — HOSPITAL ENCOUNTER (OUTPATIENT)
Dept: RADIOLOGY | Facility: HOSPITAL | Age: 59
Discharge: HOME | End: 2024-02-26
Attending: PHYSICIAN ASSISTANT
Payer: MEDICAID

## 2024-02-26 DIAGNOSIS — S82.852D CLOSED TRIMALLEOLAR FRACTURE OF LEFT ANKLE WITH ROUTINE HEALING, SUBSEQUENT ENCOUNTER: ICD-10-CM

## 2024-02-26 DIAGNOSIS — S82.852D CLOSED TRIMALLEOLAR FRACTURE OF LEFT ANKLE WITH ROUTINE HEALING, SUBSEQUENT ENCOUNTER: Primary | ICD-10-CM

## 2024-02-26 PROCEDURE — 73610 X-RAY EXAM OF ANKLE: CPT | Mod: TC,LT

## 2024-02-26 PROCEDURE — 73610 X-RAY EXAM OF ANKLE: CPT | Mod: 26,LT,, | Performed by: RADIOLOGY

## 2024-02-26 PROCEDURE — 4010F ACE/ARB THERAPY RXD/TAKEN: CPT | Mod: CPTII,,, | Performed by: PHYSICIAN ASSISTANT

## 2024-02-26 PROCEDURE — 99024 POSTOP FOLLOW-UP VISIT: CPT | Mod: ,,, | Performed by: PHYSICIAN ASSISTANT

## 2024-02-26 NOTE — PROGRESS NOTES
Outpatient Care Management   - Care Plan Follow Up    Patient: Dionicio Bess  MRN:  [id number]  Date of Service:  2/26/2024  Completed by:  Sharon Almazan LCSW  Referral Date: 02/01/2024    Reason for Visit   Patient presents with    OPCM SW Follow Up Call       Brief Summary: SW received request from OT regarding needing to obtain a Requested L Resting Hand Splint for this Pt. Requested order from Dr Villatoro's office (Pt was seen there 2/16/24). Also received message from Pt sarah regarding Pt new phone number. Reviewed chart and observed it was already updated.     Future Appointments   Date Time Provider Department Center   2/26/2024 11:30 AM Rox Mercedes PA-C [facility] [address] [facility]   2/29/2024  8:45 AM Vin Brown, LOT[facility]   2/29/2024 10:30 AM Carson Ruelas, PT [facility]   3/5/2024  8:45 AM Vin Brown, [facility]   3/5/2024  9:30 AM Carson Ruelas, PT [facility]   3/7/2024  9:30 AM Vin Brown, [facility]   3/12/2024 10:30 AM Carson Ruelas, PT [facility]   3/12/2024 11:15 AM Vin Brown, [facility]   3/14/2024  9:30 AM Vin Brown, [facility]   3/19/2024  9:30 AM Carson Ruelas, PT [facility]   3/19/2024 10:30 AM Vin Brown, [facility]   3/21/2024  9:30 AM SilVin cruz A, [facility]   3/25/2024  9:30 AM SilbernaVin ramirez A, [facility]   3/25/2024 10:30 AM Sami Gibson, PTA [facility]   3/26/2024 10:20 AM Britney Flores, OD [facility] [address]   3/28/2024  9:30 AM Vin Brown [facility]   4/1/2024  8:45 AM Sami Gibson PTA [facility]   4/1/2024  9:30 AM Vin Brown [facility]   4/4/2024  9:30 AM Vin Brown [facility]    4/12/2024  9:30 AM Carson Ruelas, PT [facility]   4/16/2024  9:30 AM Carson Ruelas, PT [facility]   4/23/2024  8:45 AM Carson Ruelas, PT [facility]   4/23/2024  9:30 AM Vin Brown, LOT[facility]   4/25/2024  9:30 AM Vin Brown, [facility]   4/29/2024  8:15 AM Rox Mercedes PA-C [facility]   4/30/2024  8:45 AM Carson Ruelas, PT [facility]   4/30/2024  9:30 AM Vin Brown, LOTR [facility]   5/2/2024  9:30 AM Vin Brown, [facility]R [facility]     Sharon Almazan LCSW  Neuro Therapy   [facility]  [phone]

## 2024-02-26 NOTE — PROGRESS NOTES
Principal Orthopedic Problem:  Left trimalleolar ankle fracture                          Left ankle syndesmosis disruption     01/05/24: :   Open reduction internal fixation left trimalleolar ankle fracture without posterior lip - 56886                          Open treatment of left ankle syndesmosis disruption with reduction and internal fixation - 55417      Mr. Bess is here today for a post-operative visit    Interval History:  he reports that he is doing ok.   he is at a niece home. he is participating in PT/OT.  He was to not be placing weight but has been placing quite a bit of weight on his ankle  Pain is somewhat controled.  he is not taking pain medication.  .   he denies fever, chills, and sweats .       Physical exam:    Patient arrives to exam room: walked in placing full weight on his ankle.  Patient is un accompanied      Incision is clean, dry and intact.  .   Healing well no signs of breakdown or infection.    RADS: All pertinent images were reviewed by myself:   There are postop changes of open reduction internal fixation of previously displaced left distal fibular fracture.  There is satisfactory approximation of the fracture components.  The lateral buttressing plate and stabilizing screws are intact.  No periprosthetic fracture is identified.  There has been interval removal of the overlying cast.   There is a persistent nondisplaced fractures of the medial and posterior malleolus with associated periostitis.  The fracture of the posterior malleolus is more apparent on the current examination.  There is no interval fracture.   There is slight widening of the medial clear space with no evidence of ankle instability.  There are minimal osteophytosis.   There is diffuse soft tissue swelling of the left ankle.  No radiopaque foreign body is identified.    Assessment:  Post-op visit ( 6 weeks)    Plan:  Current care, treatment plan, precautions, activity level/ modifications, limitations,  rehabilitation exercises and proposed future treatment were discussed with the patient. We discussed the need to monitor for changes in symptoms and condition and report them to the physician.  Discussed importance of compliance with all appointments and follow up examinations.     WOUND CARE :  -  he may wash the area with antibacterial soap in the shower. Will not submerge until the incision is completely healed  -Patient was advised to monitor wound closely and multiple times daily for any problems. Call clinic immediately or report to ED for immediate medical attention for any complications including reopening of wound, drainage, purulence, redness, streaking, odor, pain out of proportion, fever, chills, etc.       ACTIVITY:   - sedentary  -range of motion as tolerated    - will remain NWB due to change in position of syndesmosis screw   - Discussed changes in his x-ray and the importance fo remaining NWB with the threat of failure of his fixation. Patient voiced understanding and said he will try his best but has to do things.      -PT/OT, [facility] , Patient is responsible to establish and continue care      PAIN MEDICATION:   - Multimodal pain control  - Pain medication: refill was not needed  - Pain medication refill policy provided to patient for review, yes.    - Patient was informed a multi-modal approach is used to treat their pain. With the goal to get off of narcotic pain medication and discontinue as soon as possible.   - ice and elevation to reduce pain and swelling     DVT PROPHYLAXIS:   - aspirin    FOLLOW UP:   - Patient will follow up in the clinic in 4 weeks, sooner if any concerns.  - X-ray of his ankle is needed.NWB OOB  - Pending healing at time consider advance to weight bearing as tolerated       If there are any questions prior to scheduled follow up, the patient was instructed to contact the office

## 2024-02-28 NOTE — PROGRESS NOTES
"[facility]   Physical Therapy Progress Note and Treatment Note      Name: Dionicio Bess  Clinic Number: [id number]    Therapy Diagnosis:  Encounter Diagnoses   Name Primary?    Muscle tone increased Yes    Impaired functional mobility, balance, gait, and endurance        Physician: Rox Mercedes PA-C    Visit Date: 2/29/2024      Physician Orders: PT Eval and Treat   Medical Diagnosis from Referral: Stroke due to embolism of right middle cerebral artery   Evaluation Date: 2/1/2024  Authorization Period Expiration: 4/5/24  Plan of Care Expiration: 3/28/24  Progress Note Due: 3/28/24   Visit # / Visits authorized: 3/ 16 ( plus eval)  FOTO: 2/3      Precautions: Standard, Fall, and s/p L ankle ORIF 1/5/24 NWB L LE ~6 weeks      Time In: 1020  Time Out: 1103   Total Billable Time: 43 minutes    PTA Visit #: 0/5       Subjective     Patient reports: He states that he moves around more and more easily since starting PT. He states his L ankle does not hurt him at all.     He was compliant with his HEP   Response to previous treatment:" It is helping out"  Functional change: Ongoing    Pain: 0/10  Location: N/A    Objective      Objective Measures updated on 2/29/24. Pt presents in lobby with SPC and no CAM boot.  PT communicated with ortho PA prior to PT session, to indicate that pt has not been compliant with NWB status to L LE. PA indicated that she is aware of this and has counseled pt regarding his decision to ignore these recommendations.    Lower Extremity Strength: tested sitting in standard chair 19  Right LE 2/1/24- eval   2/29/24 Left LE  2/1/24- eval  2/29/24   Hip flexion:  4/5 4/5 Hip flexion: 4-/5 4-/5   Knee extension: 4+/5 5/5 Knee extension: 4+/5 5/5   Knee flexion: 5/5 5/5 Knee flexion: 4/5 5/5   Ankle dorsiflexion:  4+/5 5/5 Ankle dorsiflexion: NT- pt in CAM boot AROM approaching neutral- no reistance provided    Ankle plantarflexion:  4+/5 5/5 Ankle plantarflexion: NT- pt in " "CAM boot AROM approaching ~30 degrees- no resistance provided    Hip abduction: 4+/5 5/5 Hip abduction: 4/5 4+/5   Hip adduction: 4+/5 5/5 Hip adduction 4+/5 5/5   Hip extension: 4/5 4+/5 Hip extension: NT- pt in CAM boot, NWB 4+/5         2/1/24- Evaluation 2/29/24   Single Limb Stance R LE NT  (<10 sec = HIGH FALL RISK) 8 seconds   (<10 sec = HIGH FALL RISK)   Single Limb Stance L LE NT  (<10 sec = HIGH FALL RISK) NT   30 second Chair Rise NT NT   5 times sit to stand 16 seconds, PRW placed anterior 12 seconds   no UE support    TUG 39 seconds with PRW~ cues to attempt to maintain NWB to L LE  SPO2: 94% 14 seconds   with SPC and WBAT to L LE   Self selected walking speed (SSWS) 0.35 m/sec (6m/17s) with PRW, cues to attempt to maintain NWB to L LE 0.75 m/sec (6m/8s)   with SPC and WBAT to L LE   Fast walking speed NT NT     5x sit to stand normative information:   >12 sec= fall risk for general elderly  >16 sec= fall risk for Parkinson's disease  >10 sec= balance/vestibular dysfunction (<61 y/o)  >14.2 sec= balance/vestibular dysfunction (>61 y/o)  >12 sec= fall risk for CVA    MCTSIB:  1. Eyes Open/feet together/Firm: P, 30"  2. Eyes Closed/feet together/Firm: P, 30"  3. Eyes Open/feet together/Foam: P, 30"  4. Eyes Closed/feet together/Foam: P, 30" slight sway    Functional Gait Assessment:   1. Gait on level surface =  1 (8s)   (3) Normal: less than 5.5 sec, no A.D., no imbalance, normal gait pattern, deviates< 6in   (2) Mild impairment: 7-5.6 sec, uses A.D., mild gait deviations, or deviates 6-10 in   (1) Moderate impairment: > 7 sec, slow speed, imbalance, deviates 10-15 in.   (0) Severe impairment: needs assist, deviates >15 in, reach/touch wall  2. Change in Gait Speed = 2 (use of SPC)   (3) Normal: smooth change w/o loss of balance or gait deviation, deviates < 6 in, significant difference between speeds   (2) Mild impairment: changes speed, but demonstrates mild gait deviations, deviates 6-10 in, OR no " deviations but unable to significantly speed, OR uses A.D.   (1) Moderate impairment: minor changes to speed, OR changes speed w/ significant deviations, deviates 10-15 in, OR  Changes speed , but loses balance & recovers   (0) Severe impairment: cannot change speed, deviates >15 in, or loses balance & needs assist  3. Gait with horizontal head turns  = 2 (use of SPC)   (3) Normal: no change in gait, deviates <6 in   (2) Mild impairment: slight change in speed, deviates 6-10 in, OR uses A.D.   (1) Moderate impairment: moderate change in speed, deviates 10-15 in   (0) Severe impairment: severe disruption of gait, deviates >15in  4. Gait with vertical head turns = 2 (use of SPC)   (3) Normal: no change in gait, deviates <6 in   (2) Mild impairment: slight change in speed, deviates 6-10 in OR uses A.D.   (1) Moderate impairment: moderate change in speed, deviates 10-15 in   (0) Severe impairment: severe disruption of gait, deviates >15 in  5. Gait with pivot turns = 1   (3) Normal: performs safely in 3 sec, no LOB   (2) Mild impairment: performs in >3 sec & no LOB, OR turns safely & requires several steps to regain LOB   (1) Moderate impairment: turns slow, OR requires several small steps for balance following turn & stop   (0) Severe impairment: cannot turn safely, needs assist  6. Step over obstacle = 2   (3) Normal: steps over 2 stacked boxes w/o change in speed or LOB   (2) Mild impairment: able to step over 1 box w/o change in speed or LOB   (1) Moderate impairment: steps over 1 box but must slow down, may require VC   (0) Severe impairment: cannot perform w/o assist  7. Gait with Narrow PRIYANK = 2 (use of SPC)   (3) Normal: 10 steps no staggering   (2) Mild impairment: 7-9 steps   (1) Moderate impairment: 4-7 steps   (0) Severe impairment: < 4 steps or cannot perform w/o assist  8. Gait with eyes closed = 2 (8 s)   (3) Normal: < 7 sec, no A.D., no LOB, normal gait pattern, deviates <6 in   (2) Mild impairment: 7.1-9  sec, mild gait deviations, deviates 6-10 in   (1) Moderate impairment: > 9 sec, abnormal pattern, LOB, deviates 10-15 in   (0) Severe impairment: cannot perform w/o assist, LOB, deviates >15in  9. Ambulating Backwards = 2 (use of SPC)   (3) Normal: no A.D., no LOB, normal gait pattern, deviates <6in   (2) Mild impairment: uses A.D., slower speed, mild gait deviations, deviates 6-10 in   (1) Moderate impairment: slow speed, abnormal gait pattern, LOB, deviates 10-15 in   (0) Severe impairment: severe gait deviations or LOB, deviates >15in  10. Steps = 1   (3) Normal: alternating feet, no rail   (2) Mild Impairment: alternating feet, uses rail   (1) Moderate impairment: step-to, uses rail   (0) Severe impairment: cannot perform safely    Score: 17/30     Score:   <22/30 fall risk   <20/30 fall risk in older adults   <18/30 fall risk in Parkinsons     FOTO: 49        Treatment     Dionicio received the treatments listed below:      therapeutic exercises to develop strength, endurance, ROM, flexibility, posture, and core stabilization for 43 minutes including:    Time included for completion of all objective measures listed above and reviewing results with pt included here.       Patient Education and Home Exercises       Education provided:   - home exercise program   - Reviewed necessity of NWB compliance    Written Home Exercises Provided: yes. Exercises were reviewed and Dionicio was able to demonstrate them prior to the end of the session.  Dionicio demonstrated good  understanding of the education provided. See Electronic Medical Record under Patient Instructions for exercises provided during therapy sessions    Assessment     Dionicio Bess tolerated today's session well with a focus on reassessment of progress towards goals. He continues to be non-compliant with weightbearing precautions established by his orthopedist. Outcome measures performed today were more in line with WBAT vs. NWB on L LE. He was able to tolerate  performing the outcome measures listed above with increased strength in the following muscle groups: B knee extension, B hip adduction, B hip abduction, B hip ext, L hip flexion, L knee flexion, R ankle dorsiflexion and R ankle plantarflexion. He also demonstrated improvement in his performance of 5x sit<>stand, TUG, and self-selected walking speed, which translates to improvements in his B LE muscular endurance and dynamic balance. He was able to tolerate updated objective measures of R SLS, MCTSIB and FGA today. His performance on MCTSIB suggests intact static standing balance. His FGA score of 17/30 and R SLS score of 8 seconds indicate that he is at increased risk of falls. To date, he has met 4/4 short and 3/4 long-term goals established at his initial evaluation. He remains appropriate for skilled physical therapy services at his current frequency for the remainder of the plan of care to continue addressing his functional deficits and goals related to improved dynamic balance, B LE strength and endurance, and maximizing safety with functional mobility tasks. See below for comments regarding goal achievement and any revisions/updates.     Dionicio Is progressing well towards his goals.   Patient prognosis is Good.     Patient will continue to benefit from skilled outpatient physical therapy to address the deficits listed in the problem list box on initial evaluation, provide pt/family education and to maximize pt's level of independence in the home and community environment.     Patient's spiritual, cultural and educational needs considered and pt agreeable to plan of care and goals.     Anticipated barriers to physical therapy:  co-morbidities, transportation, L LE weightbearing precautions, pt's compliance with L LE weightbearing precautions     Goals:   Short Term Goals: 4 weeks   1. Pt will be issued first installment of HEP and report at least partial compliance- MET 2/29/24  2. Pt will improve TUG time to 36  seconds or less in order to reach MCID and demonstrate decreased fall risk and improved household ambulation ability.- MET 2/29/24  3. Pt will increase SSWS to 0.40 m/s or greater in order to improve community navigation and decrease fall risk.- MET 2/29/24  4. Pt will improve 5 x sit<>stand time to 14 seconds or less in order to demonstrate improved muscular endurance.- MET 2/29/24     Long Term Goals: 8 weeks   1. Pt will be at least partially compliant with finalized HEP to maintain any potential gains realized in PT.- Ongoing 2/29/24  2. Pt will improve TUG time to 33 seconds or less in order to demonstrate decreased fall risk and improved household ambulation ability. - MET 2/29/24   UPDATED GOAL 2/29/24: Pt will improve TUG time to 12 seconds or less in order to demonstrate decreased fall risk and improved household ambulation ability.  3. Pt will increase SSWS to 0.46 m/s or greater in order to improve community navigation and decrease fall risk.- MET 2/29/24   UPDATED GOAL 2/29/24: Pt will increase SSWS to 0.86 m/s or greater with LRAD in order to improve community navigation and decrease fall risk.  4. Pt will improve 5 x sit<>stand time to 12 seconds or less in order to demonstrate improved muscular endurance and score below the fall risk cut off in CVA populations.- MET 2/29/24  5. NEW GOAL 2/29/24: Pt will improve B hip flexion strength to at least 4+/5 in order to improve efficiency with functional mobility tasks.   6. NEW GOAL 2/29/24: Pt will improve R SLS time to 10 seconds or more to decrease fall risk and demonstrate improved SLS balance.   7. NEW GOAL 2/29/24: Pt will improve FGA score to at least 19/30 in order to demonstrate improved dynamic balance and decreased fall risk.        Plan     Continue to progress B LE strength and endurance. Focus on static and dynamic balance in future sessions.      Kelsi Claudio, SPT  2/29/2024    I certify that I was present in the room directing the student in  service delivery and guiding them using my skilled judgment. As the co-signing therapist, I have reviewed the student's documentation and am responsible for the treatment, assessment and plan.      Carson Ruelas, PT  2/29/2024

## 2024-02-29 ENCOUNTER — CLINICAL SUPPORT (OUTPATIENT)
Dept: REHABILITATION | Facility: HOSPITAL | Age: 59
End: 2024-02-29
Payer: MEDICAID

## 2024-02-29 ENCOUNTER — OUTPATIENT CASE MANAGEMENT (OUTPATIENT)
Dept: ADMINISTRATIVE | Facility: OTHER | Age: 59
End: 2024-02-29
Payer: MEDICAID

## 2024-02-29 DIAGNOSIS — M25.642 DECREASED RANGE OF MOTION OF FINGER OF LEFT HAND: ICD-10-CM

## 2024-02-29 DIAGNOSIS — Z78.9 ALTERATION IN INSTRUMENTAL ACTIVITIES OF DAILY LIVING (IADL): ICD-10-CM

## 2024-02-29 DIAGNOSIS — Z74.09 IMPAIRED MOBILITY AND ADLS: Primary | ICD-10-CM

## 2024-02-29 DIAGNOSIS — M62.89 MUSCLE TONE INCREASED: Primary | ICD-10-CM

## 2024-02-29 DIAGNOSIS — M62.89 MUSCLE TONE INCREASED: ICD-10-CM

## 2024-02-29 DIAGNOSIS — Z74.09 IMPAIRED FUNCTIONAL MOBILITY, BALANCE, GAIT, AND ENDURANCE: ICD-10-CM

## 2024-02-29 DIAGNOSIS — R29.898 DECREASED GRIP STRENGTH OF LEFT HAND: ICD-10-CM

## 2024-02-29 DIAGNOSIS — Z78.9 IMPAIRED MOBILITY AND ADLS: Primary | ICD-10-CM

## 2024-02-29 PROCEDURE — 97530 THERAPEUTIC ACTIVITIES: CPT | Mod: PO

## 2024-02-29 PROCEDURE — 97110 THERAPEUTIC EXERCISES: CPT | Mod: PO

## 2024-02-29 NOTE — PROGRESS NOTES
[facility]  Occupational Therapy Treatment Note     Date: 2/29/2024  Name: Dionicio Bess  Clinic Number: [id number]    Therapy Diagnosis:   Encounter Diagnoses   Name Primary?    Impaired mobility and ADLs Yes    Alteration in instrumental activities of daily living (IADL)     Decreased range of motion of finger of left hand     Decreased  strength of left hand     Muscle tone increased      Physician: Rox Mercedes PA-C    Physician Orders: Eval and Treat (Hand Therapy)  Comment - 2-3 x per wek x 6weeks  Medical Diagnosis: I63.411 (ICD-10-CM) - Stroke due to embolism of right middle cerebral artery  Evaluation Date: 2/1/2024  Insurance Authorization Period Expiration: 5/6/24  Plan of Care Certification Period: 5/3/24  Date of Return to MD: 2/19/2024: Ortho  FOTO: 1/ 2      Visit # / Visits authorized: 4 / 20     Precautions:  Standard and Fall; LLE non weight bearing     Time In: 8:45 am  Time Out: 9:30 am  Total Billable Time: 45 minutes      Subjective     Patient reports: feeling tight today, tired  Response to previous treatment: positive  Functional change:  ongoing    Pain: 0/10 no verbal score, discomfort with end range PROM  Location:  L UE with PROM     Objective     Objective Measures updated at progress report unless specified.    Treatment     Pt. Received from waiting room via [facility]    Dionicio received the treatments listed below:      therapeutic activities to improve functional performance for 45 minutes, including:  - Left general wrist stretches including metacarpal spreads, carpal rolls, increasing mobility towards radial/ulnar deviation, increasing mobility of wrist towards extension/flexion, Increasing mobility of wrist towards supination/pronation. PROM of fingers to encourage extension.   - AAROM scap mobs elevation and retraction x 10 each with 5 sec holds  with mirror feedback  - supine PROm to L UE all functional planes with hold at end range  - seated  edge of mat closed chain WB into L UE on 1/2 dome while R UE crossed midline to place and remove squigz from mirror   - reviewed need to complete HEP regularly       Patient Education and Home Exercises     Education provided:   - HEP  - Progress towards goals     Written Home Exercises Provided: yes.  Exercises were reviewed and Dionicio was able to demonstrate them prior to the end of the session.  Dionicio demonstrated good  understanding of the home exercise program provided. See electronic medical record under Patient Instructions for exercises provided during therapy sessions.       Assessment     Dionicio had fair tolerance to tx this date. HEP compliance has been poor despite review and building of confidence. Increased tone palpated this date which required increased time to address. He has no neurologist to help manage his tone/spasticity. Discussed with social work to help acquire one for him. Botox may be beneficial for his UE spasticity. HEP will be reviewed again to confirm understanding.     Dionicio is progressing well towards his goals and there are no updates to goals at this time. Pt prognosis is Good.     Patient will continue to benefit from skilled outpatient occupational therapy to address the deficits listed in the problem list on initial evaluation provide patient/family education and to maximize patient's level of independence in the home and community environment.     Patient's spiritual, cultural and educational needs considered and patient agreeable to plan of care and goals.    Anticipated barriers to occupational therapy: chronic condition     Goals:  Short Term Goals: 6 weeks   - Pt. Will be edu in self PROM program and be able to complete with visual cues   - Pt. Will have improved L UE AROM of at least 10 degrees in all measured planes of movement including wrist for improved functional arm use  - Pt. Will be able to utilize L UE as a support when completing simple home care tasks  - Pt. Will be  fitted for a night time resting hand splint and utilize nightly  - Pt. Will have improve PROM of at least 20 degrees in all measured planes of movement including wrist for tone management  - Pt. Will be able to bathe himself fully with AD such as a long handled sponge     Long Term Goals: 12 weeks   - Pt. Will complete HEP daily to help manage tone per patient report  - Pt. Will have improved L UE AROM of at least 20 degrees in all measured planes of movement including wrist for improved functional arm use  - Pt. 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 Will be able to utilize L UE/hand to open/close doors at least 50% of the time for forced L UE activity  - Pt. Will be able to utilize R Ue as helper during UB and LB dressing     More goals to be made as appropriate within POC as needed     Plan   Certification Period/Plan of care expiration: 2/1/2024 to 5/3/24.     Outpatient Occupational Therapy 2 times weekly for 12 weeks to include the following interventions: Electrical Stimulation of L UE, Fluidotherapy, Manual Therapy, Moist Heat/ Ice, Neuromuscular Re-ed, Orthotic Management and Training, Paraffin, Patient Education, Self Care, Therapeutic Activities, and Therapeutic Exercise.    Updates/Grading for next session: progress into WB on 1/2 SIERRA Rodrigues   2/29/2024

## 2024-02-29 NOTE — PROGRESS NOTES
Outpatient Care Management   - Care Plan Follow Up    Patient: Dionicio Bess  MRN:  [id number]  Date of Service:  2/29/2024  Completed by:  Sharon Almazan LCSW  Referral Date: 02/01/2024    Reason for Visit   Patient presents with    OPCM SW Follow Up Call       Brief Summary:  met with Pt between his therapy appts today. He received his SSI determination letter and some forms from one of the apartment complexes that he applied for housing for. SW had PT completed the mobility need verification form and made copies of his documents to turn in to the complex. Also advised by OT that Pt needs a neuro follow up for Botox injections and general follow up. Pt also does not have the splint yet. Sent message to Dr Villatoro's MA due to Pt having seen him a few weeks ago for a follow up. Requested the order and if Pt would be able to identify him a PCP. Also sent message to RN for Neuro requesting assistance scheduling Pt.      Future Appointments   Date Time Provider Department Center   2/29/2024 10:30 AM Carson Ruelas, PT [facility]   3/5/2024  8:45 AM Vin Brown A, [facility]   3/5/2024  9:30 AM Carson Ruelas, PT [facility]   3/7/2024  9:30 AM Vin Brown A, [facility]   3/12/2024 10:30 AM Carson Ruelas, PT [facility]   3/12/2024 11:15 AM Vin Brown A, [facility]   3/14/2024  9:30 AM Silberneida Sunil, [facility]   3/19/2024  9:30 AM Carson Ruelas, PT [facility]   3/19/2024 10:30 AM Silnancy Sunil, [facility]   3/21/2024  9:30 AM Silberneida Sunil, [facility]   3/25/2024  9:30 AM Silberneida Sunil, [facility]   3/25/2024 10:30 AM Sami Gibson, JE[facility]LOS [facility]   3/25/2024 11:30 AM Rox Mercedes PA-C [facility]   3/26/2024 10:20 AM Britney Flores OD [facility]  [facility] [address]   3/28/2024  9:30 AM SilVin cruz, [facility]   4/1/2024  8:45 AM Sami Gibson, PTA [facility]   4/1/2024  9:30 AM SilVin cruz, [facility]   4/4/2024  9:30 AM SilVin cruz, [facility]R [facility]   4/12/2024  9:30 AM Carson Ruelas, PT [facility]   4/16/2024  9:30 AM Carson Ruelas, PT [facility]   4/23/2024  8:45 AM Carson Ruelas, PT [facility]   4/23/2024  9:30 AM Vin Brown, [facility]R [facility]   4/25/2024  9:30 AM Vin Brown, [facility]   4/29/2024  8:15 AM Rox Mercedes PA-C [facility] [address] [facility]   4/30/2024  8:45 AM Carson Ruelas, PT [facility]   4/30/2024  9:30 AM Vin Brown, [facility]   5/2/2024  9:30 AM SilberVin carrasquillo A, [facility]     Sharon Almazan LCSW  Neuro Therapy   [facility]  [phone]

## 2024-03-04 ENCOUNTER — PATIENT MESSAGE (OUTPATIENT)
Dept: NEUROLOGY | Facility: CLINIC | Age: 59
End: 2024-03-04
Payer: MEDICAID

## 2024-03-05 ENCOUNTER — OUTPATIENT CASE MANAGEMENT (OUTPATIENT)
Dept: ADMINISTRATIVE | Facility: OTHER | Age: 59
End: 2024-03-05
Payer: MEDICAID

## 2024-03-05 ENCOUNTER — DOCUMENTATION ONLY (OUTPATIENT)
Dept: REHABILITATION | Facility: HOSPITAL | Age: 59
End: 2024-03-05
Payer: MEDICAID

## 2024-03-05 NOTE — PROGRESS NOTES
Dionicio did not attend his scheduled physical therapy session today at 9:30 AM. His next visit is scheduled on 3/12/24. No charges posted to account today.      Carson Ruelas, PT  3/5/2024

## 2024-03-05 NOTE — PROGRESS NOTES
Missed Visit/Cancellation      Date: 3/5/2024        Canceled Number: 1  No Show Number: 3                                                                                                                Pt initially had visit scheduled for today for 8:45 am.   Reason for cancellation: no show  Pt's next scheduled occupational therapy visit is 3/7 2 9:30am.    This is Dionicio's 3rd no show. Plan to discuss at next session attendance policy.       SIERRA Iyer  3/5/2024

## 2024-03-06 ENCOUNTER — TELEPHONE (OUTPATIENT)
Dept: NEUROLOGY | Facility: CLINIC | Age: 59
End: 2024-03-06
Payer: MEDICAID

## 2024-03-06 NOTE — TELEPHONE ENCOUNTER
Attempted to call pt to verify appt. VM has not been set up.  Would also like to know where pt received stroke care and if he has had any follow up. Need to obtain images MRI/CT Brain prior to appt with Mariah. Must know date/location of imaging.

## 2024-03-06 NOTE — TELEPHONE ENCOUNTER
----- Message from Sharon Almazan LCSW sent at 2/29/2024  9:21 AM CST -----  Regarding: needs neuro appt  Good Morning Cristina!!!    It's been a while!! This Pt had a CVA a while back and we are seeing him for therapy. We really thing he needs a neurologist to follow up with. Can you help me get him in with someone?    I greatly appreciate your help and that you let me bug you so much!!!    Thanks!  Sharon

## 2024-03-08 ENCOUNTER — TELEPHONE (OUTPATIENT)
Dept: NEUROLOGY | Facility: CLINIC | Age: 59
End: 2024-03-08
Payer: MEDICAID

## 2024-03-08 NOTE — PROGRESS NOTES
Outpatient Care Management   - Care Plan Follow Up    Patient: Dionicio Bess  MRN:  [id number]  Date of Service:  3/8/2024  Completed by:  Sharon Almazan LCSW  Referral Date: 02/01/2024    Reason for Visit   Patient presents with    Other     3/5/2024  1st attempt to complete Follow-Up  for Outpatient Care Management, could not leave message  3/7/2024  2nd attempt to complete Follow-Up  for Outpatient Care Management, left message with a relative.    OPCM SW Follow Up Call     3/8/24       Brief Summary:  advised by OT that Pt has missed a few sessions as a no show. SW attempted to reach him several times in the beginning of the week but someone else has his phone and his relative listed, [place], has not been able to reach him. SW found number for his relative that he is living with and spoke with her. Added her to the contacts and advised of the neuro appt coming up on Monday. She will makes sure he attends. Provided with the information neuro was requesting in order to get his records. Set up his transport and updated neuro RN to let her know Pt was reached and his transport was set up for the upcoming appt. Per sarah, Pt transport is set up through the 21st of March save for the one this SW set up. She also confirmed that Pt will be at therapy next Tuesday.     Future Appointments   Date Time Provider Department Center   3/11/2024 11:00 AM Mariah Bustillos NP [facility] [id number]   3/12/2024 10:30 AM Carson Ruelas, PT [facility]   3/12/2024 11:15 AM Vin Brown, [facility]   3/14/2024  9:30 AM Vin Brown, [facility]   3/19/2024  9:30 AM Carson Ruelas, PT [facility]   3/19/2024 10:30 AM Vin Brown, [facility]   3/21/2024  9:30 AM Vin Brown, [facility]   3/25/2024  9:30 AM Vin Brown, [facility]   3/25/2024 10:30 AM  Sami Gibson, PTA [facility]   3/25/2024 11:30 AM Rox Mercedes PA-C [facility] [address] [facility]   3/26/2024 10:20 AM Britney Flores, OD [facility] [address]   3/28/2024  9:30 AM Silbernagel, Sunil, [facility]   4/1/2024  8:45 AM Sami Gibson, PTA [facility]   4/1/2024  9:30 AM Silbernagel, Sunil, [facility]   4/4/2024  9:30 AM Silbernagel, Sunil, [facility]R [facility]   4/12/2024  9:30 AM Carson Ruelas, PT [facility]   4/16/2024  9:30 AM Carson Ruelas, PT [facility]   4/23/2024  8:45 AM Carson Ruelas, PT [facility]   4/23/2024  9:30 AM Silbernajames, Sunil, [facility]   4/25/2024  9:30 AM Silbernajames, Sunil, [facility]   4/29/2024  8:15 AM Rox Mercedes PA-C [facility] [address] [facility]   4/30/2024  8:45 AM Carson Ruelas, PT [facility]   4/30/2024  9:30 AM Silbernagel, Sunil, [facility]R [facility]   5/2/2024  9:30 AM Silbernagel, Sunil, [facility]     Sharon Almazan [facility]  Neuro Therapy   [facility]  [phone]

## 2024-03-08 NOTE — TELEPHONE ENCOUNTER
Upcoming appt 3/11, with ALO Bustillos, confirmed by Sharon Hickman LCSW, via pt and his niece.     Pt reports having received stroke care at [facility].    Head CT/MRI Images available in Epic from 12/30/23

## 2024-03-11 ENCOUNTER — OFFICE VISIT (OUTPATIENT)
Dept: NEUROLOGY | Facility: CLINIC | Age: 59
End: 2024-03-11
Payer: MEDICAID

## 2024-03-11 VITALS
SYSTOLIC BLOOD PRESSURE: 133 MMHG | HEIGHT: 72 IN | WEIGHT: 199.94 LBS | BODY MASS INDEX: 27.08 KG/M2 | HEART RATE: 83 BPM | DIASTOLIC BLOOD PRESSURE: 84 MMHG

## 2024-03-11 DIAGNOSIS — I69.352 SPASTIC HEMIPLEGIA OF LEFT DOMINANT SIDE AS LATE EFFECT OF CEREBRAL INFARCTION: Primary | ICD-10-CM

## 2024-03-11 DIAGNOSIS — Z74.09 IMPAIRED MOBILITY AND ADLS: ICD-10-CM

## 2024-03-11 DIAGNOSIS — Z86.73 HISTORY OF ISCHEMIC RIGHT MCA STROKE: ICD-10-CM

## 2024-03-11 DIAGNOSIS — I48.0 PAROXYSMAL ATRIAL FIBRILLATION: Chronic | ICD-10-CM

## 2024-03-11 DIAGNOSIS — I10 ESSENTIAL HYPERTENSION: Chronic | ICD-10-CM

## 2024-03-11 DIAGNOSIS — Z78.9 IMPAIRED MOBILITY AND ADLS: ICD-10-CM

## 2024-03-11 DIAGNOSIS — Z78.9 ALTERATION IN INSTRUMENTAL ACTIVITIES OF DAILY LIVING (IADL): ICD-10-CM

## 2024-03-11 DIAGNOSIS — Z74.09 IMPAIRED FUNCTIONAL MOBILITY, BALANCE, GAIT, AND ENDURANCE: ICD-10-CM

## 2024-03-11 PROCEDURE — 3008F BODY MASS INDEX DOCD: CPT | Mod: CPTII,,, | Performed by: NURSE PRACTITIONER

## 2024-03-11 PROCEDURE — 3079F DIAST BP 80-89 MM HG: CPT | Mod: CPTII,,, | Performed by: NURSE PRACTITIONER

## 2024-03-11 PROCEDURE — 1160F RVW MEDS BY RX/DR IN RCRD: CPT | Mod: CPTII,,, | Performed by: NURSE PRACTITIONER

## 2024-03-11 PROCEDURE — 4010F ACE/ARB THERAPY RXD/TAKEN: CPT | Mod: CPTII,,, | Performed by: NURSE PRACTITIONER

## 2024-03-11 PROCEDURE — 99999 PR PBB SHADOW E&M-EST. PATIENT-LVL IV: CPT | Mod: PBBFAC,,, | Performed by: NURSE PRACTITIONER

## 2024-03-11 PROCEDURE — 1159F MED LIST DOCD IN RCRD: CPT | Mod: CPTII,,, | Performed by: NURSE PRACTITIONER

## 2024-03-11 PROCEDURE — 99213 OFFICE O/P EST LOW 20 MIN: CPT | Mod: S$PBB,,, | Performed by: NURSE PRACTITIONER

## 2024-03-11 PROCEDURE — 3075F SYST BP GE 130 - 139MM HG: CPT | Mod: CPTII,,, | Performed by: NURSE PRACTITIONER

## 2024-03-11 PROCEDURE — 99214 OFFICE O/P EST MOD 30 MIN: CPT | Mod: PBBFAC | Performed by: NURSE PRACTITIONER

## 2024-03-11 NOTE — PROGRESS NOTES
[facility]   Physical Therapy Treatment Note      Name: Dionicio Bess  Clinic Number: [id number]    Therapy Diagnosis:  Encounter Diagnoses   Name Primary?    Muscle tone increased Yes    Impaired functional mobility, balance, gait, and endurance          Physician: Rox Mercedes PA-C    Visit Date: 3/12/2024      Physician Orders: PT Eval and Treat   Medical Diagnosis from Referral: Stroke due to embolism of right middle cerebral artery   Evaluation Date: 2/1/2024  Authorization Period Expiration: 4/5/24  Plan of Care Expiration: 3/28/24  Progress Note Due: 3/28/24   Visit # / Visits authorized: 4/ 16 ( plus eval)  FOTO: 2/3      Precautions: Standard, Fall, and s/p L ankle ORIF 1/5/24 NWB L LE ~6 weeks      Time In: 1032   Time Out: 1115    Total Billable Time: 43  minutes ( Medicaid pt~ charge TE only)    PTA Visit #: 0/5       Subjective     Patient reports: He states that he lost his phone and had trouble with getting his transportation to pick him up. He now has a new phone. Indicates he will be getting Botox injections in the near future.  He was compliant with his HEP   Response to previous treatment: felt good  Functional change: Ongoing    Pain: 5/10  Location: L ankle    Objective      Objective Measures updated on 2/29/24.            Treatment     Dionicio received the treatments listed below:      therapeutic exercises to develop strength, endurance, ROM, flexibility, posture, and core stabilization for 43 minutes including:      PT spends 5 minutes attempting to inhibit tone to L hand and wrist. PT able to open hand and digits about care home to full range after sustained stretch.    Seated:   2 x 10 reps of L LE hip flexion with 4 # ankle weight  2 x 10 reps of L LE LAQ with 4 # ankle weight       Supine:  2 x 10 reps of L LE SAQ with 4 # ankle weight   2 x 10 reps of L LE AA heel slides with 4 # ankle weight  2 x 10 reps of L LE AA hip abduction/adduction with 4 # ankle weight   2  "x 10 reps of L LE AA SLR with 4 # ankle weight     At ballet bar:    1 x 10 B LE forward/backward step ups/downs on foam fitter, 1 UE support, R foot ascends and L foot descends, SBA  1 x 10 L LE step overs on foam fitter, 1 to no UE, R foot on foam fitter and L foot steps across to ground and back, SBA/CGA    1 x 10 split stance L LE cone taps with R foot on foam fitter and L foot on ground, 1 UE support,  CGA/SBA  1 x 10 split stance L LE double cone taps with R foot on foam fitter and L foot on ground, 1 UE support,  CGA/SBA    2 x 30" split stance balance with L foot on foam fitter and R foot on ground, no UE support, CGA    3 x 30" static stance on foam with eyes closed, no UE support, CGA    1 x 10 split stance L LE stepping forward and back over orange cristian, 1 UE support, SBA~ cues for stepping directly over and not around cristian          Patient Education and Home Exercises       Education provided:   - home exercise program   - Reviewed necessity of NWB compliance    Written Home Exercises Provided: yes. Exercises were reviewed and Dionicio was able to demonstrate them prior to the end of the session.  Dionicio demonstrated good  understanding of the education provided. See Electronic Medical Record under Patient Instructions for exercises provided during therapy sessions    Assessment     Dionicio Bess tolerated today's session well. He continues to arrive to therapy with straight cane and is performing all mobility with WBAT to L LE. He adopted this mobility strategy some time ago, despite ortho recommendation to remain NWB. PT attempted to inhibit tone and open L hand and wrist today, but was only partially successful. Dionicio performed L LE strengthening exercises with 4 # ankle weight and progressed to perform some fairly straightforward balance tasks at [employer] bar. PT did his best to place as little stress on L LE here as possible. Dionicio remains appropriate for skilled physical therapy services at his current " frequency for the remainder of the plan of care to continue addressing his functional deficits and goals related to improved dynamic balance, B LE strength and endurance, and maximizing safety with functional mobility tasks. Now that his phone has been replaced, PT expects him to attend his scheduled sessions.    Dionicio Is progressing well towards his goals.   Patient prognosis is Good.     Patient will continue to benefit from skilled outpatient physical therapy to address the deficits listed in the problem list box on initial evaluation, provide pt/family education and to maximize pt's level of independence in the home and community environment.     Patient's spiritual, cultural and educational needs considered and pt agreeable to plan of care and goals.     Anticipated barriers to physical therapy:  co-morbidities, transportation, L LE weightbearing precautions, pt's compliance with L LE weightbearing precautions     Goals:   Short Term Goals: 4 weeks   1. Pt will be issued first installment of HEP and report at least partial compliance- MET 2/29/24  2. Pt will improve TUG time to 36 seconds or less in order to reach MCID and demonstrate decreased fall risk and improved household ambulation ability.- MET 2/29/24  3. Pt will increase SSWS to 0.40 m/s or greater in order to improve community navigation and decrease fall risk.- MET 2/29/24  4. Pt will improve 5 x sit<>stand time to 14 seconds or less in order to demonstrate improved muscular endurance.- MET 2/29/24     Long Term Goals: 8 weeks   1. Pt will be at least partially compliant with finalized HEP to maintain any potential gains realized in PT.- Ongoing 2/29/24  2. Pt will improve TUG time to 33 seconds or less in order to demonstrate decreased fall risk and improved household ambulation ability. - MET 2/29/24   UPDATED GOAL 2/29/24: Pt will improve TUG time to 12 seconds or less in order to demonstrate decreased fall risk and improved household ambulation  ability.  3. Pt will increase SSWS to 0.46 m/s or greater in order to improve community navigation and decrease fall risk.- MET 2/29/24   UPDATED GOAL 2/29/24: Pt will increase SSWS to 0.86 m/s or greater with LRAD in order to improve community navigation and decrease fall risk.  4. Pt will improve 5 x sit<>stand time to 12 seconds or less in order to demonstrate improved muscular endurance and score below the fall risk cut off in CVA populations.- MET 2/29/24  5. NEW GOAL 2/29/24: Pt will improve B hip flexion strength to at least 4+/5 in order to improve efficiency with functional mobility tasks.   6. NEW GOAL 2/29/24: Pt will improve R SLS time to 10 seconds or more to decrease fall risk and demonstrate improved SLS balance.   7. NEW GOAL 2/29/24: Pt will improve FGA score to at least 19/30 in order to demonstrate improved dynamic balance and decreased fall risk.        Plan     Continue to progress B LE strength and endurance. Focus on static and dynamic balance in future sessions.        Carson Ruelas, PT  3/12/2024

## 2024-03-11 NOTE — PROGRESS NOTES
[facility]  Occupational Therapy Treatment Note     Date: 3/12/2024  Name: Dionicio Bess  Clinic Number: [id number]    Therapy Diagnosis:   Encounter Diagnoses   Name Primary?    Impaired mobility and ADLs Yes    Alteration in instrumental activities of daily living (IADL)     Decreased range of motion of finger of left hand     Decreased  strength of left hand     Muscle tone increased      Physician: Rox Mercedes PA-C    Physician Orders: Eval and Treat (Hand Therapy)  Comment - 2-3 x per wek x 6weeks  Medical Diagnosis: I63.411 (ICD-10-CM) - Stroke due to embolism of right middle cerebral artery  Evaluation Date: 2/1/2024  Insurance Authorization Period Expiration: 5/6/24  Plan of Care Certification Period: 5/3/24  Date of Return to MD: 2/19/2024: Ortho  FOTO: 1/ 2      Visit # / Visits authorized: 5 / 20     Precautions:  Standard and Fall; LLE non weight bearing     Time In: 11:20 am  Time Out: 11:45 am  Total Billable Time: 25 minutes      Subjective     Patient reports: doing what he can at home but inconsistent   Response to previous treatment: positive  Functional change:  ongoing    Pain: 0/10 no verbal score, discomfort with end range PROM  Location:  L UE with PROM     Objective     Objective Measures updated at progress report unless specified. 3/5/24    Cognitive Exam:  Oriented: Person, Place, Time, and Situation  Behaviors: normal, cooperative  Follows Commands/attention: Follows multistep  commands  Communication: dysarthria  Memory: no reported deficits   Safety awareness/insight to disability: not following NWB status, SES limitations   Coping skills/emotional control: Appropriate to situation     Visual/Perceptual:  Acuity: has bifocals        Physical Exam:  Postural examination/scapula alignment: Rounded shoulder, Affected scapula depressed, and Slouched posture  Joint integrity: Firm end feeling  Skin integrity: warm/dry  Edema: none observed UEs   Palpation:  TTP L generalized     R UE ARM WFL all planes, strength 4+/5 all planes        Joint Evaluation  AROM  2/1/2024 PROM   2/1/2024 AROM  3/12/24 PROM  3/12/24     Left  Left  Left Left   Shoulder flex 0-180 105 120 110 125   Shoulder Abd 0-180 90 105 110 120   Shoulder ER 0-90 50 65 65 80   Shoulder IR 0-90 WFL WNL WFL WNL   Shoulder Extension 0-80 70 WNL 65 WNL   Shoulder Horizontal adduction 0-90 -15 40 neutral 40   Elbow flex/ext 0-150 5-125 WFL  WFL   Wrist flex 0-80 neutral 20 22 35   Wrist ext 0-70 45 60 50 70   Supination 0-80 20 20 60 60   Pronation 0-80 WFL WFL WFL WFL      Fist: tight       Strength 3/12/24   **within available ROM** Left    Shoulder flex 3-/5   Shoulder abd 3-/5   Shoulder ER 3/5   Shoulder IR 3/5   Shoulder Extension 4+/5   Shoulder Horizontal adduction 3/5   Elbow flex 4/5   Elbow ext 4-/5   Wrist flex 3-/5   Wrist ext 3/5   Supination 3-/5   Pronation 4/5       Gross motor coordination:   RICARDO (Rapid Alternating Movements): unable L  Finger to Nose (5 times): unable L  Finger Flicks (coordination moving from digit flexion to digit extension): unable L     Tone:   Modified Ilda Scale:   3 Considerable increase in muscle tone, passive movement difficult in L forearm/hand     Sensation:  Dionicio  reports tingling L arm        Balance:   Static Sit - GOOD+: Takes MAXIMAL challenges from all directions.    Dynamic sit- GOOD+: Takes MAXIMAL challenges from all directions.    Static Stand - see PT  Dynamic stand - see PT     Endurance Deficit: mild             Treatment     Pt. Received from waiting room    Dionicio received the treatments listed below:      therapeutic activities to improve functional performance for 25 minutes, including:  - RA completed above  - discussed need for improved HEP compliance   - session ended early as he needed to speak with social work and use restroom       Patient Education and Home Exercises     Education provided:   - HEP  - Progress towards goals      Written Home Exercises Provided: yes.  Exercises were reviewed and Dionicio was able to demonstrate them prior to the end of the session.  Dionicio demonstrated good  understanding of the home exercise program provided. See electronic medical record under Patient Instructions for exercises provided during therapy sessions.       Assessment     Dionicio tolerated shortened session this date. RA completed with fair objective improvements in ROM. HEP compliance has been limited and L fist still very tight. Recent eval for Botox completed with injections scheduled soon. He remains appropriate for skilled OT services.     Dionicio is progressing well towards his goals and there are no updates to goals at this time. Pt prognosis is Good.     Patient will continue to benefit from skilled outpatient occupational therapy to address the deficits listed in the problem list on initial evaluation provide patient/family education and to maximize patient's level of independence in the home and community environment.     Patient's spiritual, cultural and educational needs considered and patient agreeable to plan of care and goals.    Anticipated barriers to occupational therapy: chronic condition     Goals:  Short Term Goals: 6 weeks   - Pt. Will be edu in self PROM program and be able to complete with visual cues   - Pt. Will have improved L UE AROM of at least 10 degrees in all measured planes of movement including wrist for improved functional arm use  - Pt. Will be able to utilize L UE as a support when completing simple home care tasks  - Pt. Will be fitted for a night time resting hand splint and utilize nightly  - Pt. Will have improve PROM of at least 20 degrees in all measured planes of movement including wrist for tone management  - Pt. Will be able to bathe himself fully with AD such as a long handled sponge     Long Term Goals: 12 weeks   - Pt. Will complete HEP daily to help manage tone per patient report  - Pt. Will have improved  L UE AROM of at least 20 degrees in all measured planes of movement including wrist for improved functional arm use  - Pt. 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 Will be able to utilize L UE/hand to open/close doors at least 50% of the time for forced L UE activity  - Pt. Will be able to utilize R Ue as helper during UB and LB dressing     More goals to be made as appropriate within POC as needed     Plan   Certification Period/Plan of care expiration: 2/1/2024 to 5/3/24.     Outpatient Occupational Therapy 2 times weekly for 12 weeks to include the following interventions: Electrical Stimulation of L UE, Fluidotherapy, Manual Therapy, Moist Heat/ Ice, Neuromuscular Re-ed, Orthotic Management and Training, Paraffin, Patient Education, Self Care, Therapeutic Activities, and Therapeutic Exercise.    Updates/Grading for next session: progress into WB on 1/2 SIERRA Rodrigues   3/12/2024

## 2024-03-12 ENCOUNTER — CLINICAL SUPPORT (OUTPATIENT)
Dept: REHABILITATION | Facility: HOSPITAL | Age: 59
End: 2024-03-12
Payer: MEDICAID

## 2024-03-12 ENCOUNTER — OUTPATIENT CASE MANAGEMENT (OUTPATIENT)
Dept: ADMINISTRATIVE | Facility: OTHER | Age: 59
End: 2024-03-12
Payer: MEDICAID

## 2024-03-12 DIAGNOSIS — M62.89 MUSCLE TONE INCREASED: ICD-10-CM

## 2024-03-12 DIAGNOSIS — M62.89 MUSCLE TONE INCREASED: Primary | ICD-10-CM

## 2024-03-12 DIAGNOSIS — Z74.09 IMPAIRED FUNCTIONAL MOBILITY, BALANCE, GAIT, AND ENDURANCE: ICD-10-CM

## 2024-03-12 DIAGNOSIS — Z78.9 ALTERATION IN INSTRUMENTAL ACTIVITIES OF DAILY LIVING (IADL): ICD-10-CM

## 2024-03-12 DIAGNOSIS — Z78.9 IMPAIRED MOBILITY AND ADLS: Primary | ICD-10-CM

## 2024-03-12 DIAGNOSIS — Z74.09 IMPAIRED MOBILITY AND ADLS: Primary | ICD-10-CM

## 2024-03-12 DIAGNOSIS — M25.642 DECREASED RANGE OF MOTION OF FINGER OF LEFT HAND: ICD-10-CM

## 2024-03-12 DIAGNOSIS — R29.898 DECREASED GRIP STRENGTH OF LEFT HAND: ICD-10-CM

## 2024-03-12 PROCEDURE — 97110 THERAPEUTIC EXERCISES: CPT | Mod: PO

## 2024-03-12 PROCEDURE — 97530 THERAPEUTIC ACTIVITIES: CPT | Mod: PO

## 2024-03-12 NOTE — PROGRESS NOTES
Outpatient Care Management   - Care Plan Follow Up    Patient: Dionicio Bess  MRN:  [id number]  Date of Service:  3/12/2024  Completed by:  Sharon Almazan LCSW  Referral Date: 02/01/2024    Reason for Visit   Patient presents with    OP[facility] SW Follow Up Call       Brief Summary:    met with Pt after his therapy sessions today. Per Pt, he attended his visit and they will be doing the shots in two weeks. Printed his calendar of appts for him to review with his niece so that she can make sure transport is set up and help remind him to attend. Pt got a new phone. Updated in Epic. 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 He is receiving SSI and is pending the lump sum still.     Future Appointments   Date Time Provider Department Center   3/14/2024  9:30 AM Vin Brown, [facility]   3/19/2024  9:30 AM Carson Ruelas, PT [facility]   3/19/2024 10:30 AM Vin Brown, [facility]   3/21/2024  9:30 AM Vin Brown, [facility]   3/25/2024  9:30 AM Vin Brown, [facility]   3/25/2024 10:30 AM Sami Gibson, PTA [facility]   3/25/2024 11:30 AM Rox Mercedes PA-C [facility] [address]   3/26/2024 10:20 AM Britney Flores OD [facility] [address]   3/28/2024  9:30 AM Vin Brown, [facility]   4/1/2024  8:45 AM Sami Gibson, PTA [facility]   4/1/2024  9:30 AM Vin Brown, [facility]   4/4/2024  9:30 AM Vin Brown, [facility]   4/8/2024 10:00 AM Mariah Bustillos, NP [id number]   4/12/2024  9:30 AM Carson Ruelas, PT [facility]   4/16/2024  9:30 AM Carson Ruelas, PT [facility]   4/23/2024  8:45 AM [facility]uelas, PT [facility]    4/23/2024  9:30 AM Vin Brown, [facility]   4/25/2024  9:30 AM Vin Brown, [facility]   4/29/2024  8:15 AM Rox Mercedes PA-C [facility]   4/30/2024  8:45 AM Carson Ruelas, PT [facility]   4/30/2024  9:30 AM Vin Brown, [facility]   5/2/2024  9:30 AM Vin Brown, [facility]     MARIA ISABEL Storey  Neuro Therapy   [facility]  [phone]

## 2024-03-13 PROBLEM — Z86.73 HISTORY OF ISCHEMIC RIGHT MCA STROKE: Status: ACTIVE | Noted: 2023-12-30

## 2024-03-13 PROBLEM — I69.352: Status: ACTIVE | Noted: 2024-03-13

## 2024-03-13 NOTE — PROGRESS NOTES
[facility] VIS[facility]      History for Present Illness: Dionicio Bess is a 58 y.o. male  with a past medical history of coronary artery disease, AFib (on Eliquis), right MCA stroke (residual spastic left hemiparesis), HTN, EF of 25-30% (11/06/2023), and s/p ORIF on 01/05/2024 who presents to me today for initial assessment and recommendations.    Interval history:    At the time of today's visit, the patient denies new or worsening focal neurologic symptoms concerning for new stroke or TIA. He is doing well overall with gradual improvements .    Medications:  None  Injections:  None  Surgical procedures:  Left ORIF 01/05/2024    EQUIPMENT:  Braces:  None  Wheelchair:  None  Walker:  4 point cane    MOBILITY/TRANSFERS:  Walking:  Ambulates indefinitely with a cane for assistance    ACTIVITIES OF DAILY LIVING:  Upper extremity dressing:  Independent-difficulty with snaps and buttons  Lower extremity dressing:  Independent-difficulty with laces , snaps and buttons  Bathe:  Independent  Groom:  Independent  Toilet:  Independent    THERAPY/LOCATION:  PT:  2 times per week-patient has missed several outpatient appointment  OT: 2 times per week-patient has missed several outpatient appointment due to transportation  Speech:  None    EDUCATION/VOCATION:  Occupation status:  Disabled    LIVING SITUATION:  Currently living with niece      Past Medical History:   Diagnosis Date    CAD (coronary artery disease)     HLD (hyperlipidemia)     HTN (hypertension)     Stroke     R MCA 2/2023     Past Surgical History:   Procedure Laterality Date    FIXATION OF SYNDESMOSIS OF ANKLE Left 1/5/2024    Procedure: FIXATION, SYNDESMOSIS, ANKLE;  Surgeon: Rogerio Hu MD;  Location: [facility] OR [address] [place] [address];  Service: Orthopedics;  Laterality: Left;    OPEN REDUCTION AND INTERNAL FIXATION (ORIF) OF INJURY OF ANKLE Left 1/5/2024    Procedure: ORIF, ANKLE - LEFT;  Surgeon: Rogerio Hu MD;  Location: [facility] OR [address] [place] [address];   Service: Orthopedics;  Laterality: Left;      History reviewed. No pertinent family history.  Social History     Socioeconomic History    Marital status: Single   Tobacco Use    Smoking status: Never    Smokeless tobacco: Never   Substance and Sexual Activity    Alcohol use: Yes     Comment: occasional    Drug use: No     Social Determinants of Health     Financial Resource Strain: Medium Risk (2/6/2024)    Overall Financial Resource Strain (CARDIA)     Difficulty of Paying Living Expenses: Somewhat hard   Food Insecurity: No Food Insecurity (2/6/2024)    Hunger Vital Sign     Worried About Running Out of Food in the Last Year: Never true     Ran Out of Food in the Last Year: Never true   Transportation Needs: No Transportation Needs (2/6/2024)    PRAPARE - Transportation     Lack of Transportation (Medical): No     Lack of Transportation (Non-Medical): No   Physical Activity: Inactive (2/6/2024)    Exercise Vital Sign     Days of Exercise per Week: 0 days     Minutes of Exercise per Session: 0 min   Stress: No Stress Concern Present (2/6/2024)    [employer] - Occupational Stress Questionnaire     Feeling of Stress : Not at all   Social Connections: Socially Isolated (2/6/2024)    Social Connection and Isolation Panel [NHANES]     Frequency of Communication with Friends and Family: Once a week     Frequency of Social Gatherings with Friends and Family: Once a week     Attends Yarsani Services: Never     Active Member of Clubs or Organizations: No     Attends Club or Organization Meetings: Never     Marital Status:    Housing Stability: High Risk (2/6/2024)    Housing Stability Vital Sign     Unable to Pay for Housing in the Last Year: Yes     Number of Places Lived in the Last Year: 2     Unstable Housing in the Last Year: Yes       Current Outpatient Medications:     acetaminophen (TYLENOL) 500 MG tablet, Take 2 tablets (1,000 mg total) by mouth every 8 (eight) hours., Disp:  , Rfl: 0    albuterol-ipratropium (DUO-NEB) 2.5 mg-0.5 mg/3 mL nebulizer solution, Take 3 mLs by nebulization every 4 (four) hours as needed for Wheezing or Shortness of Breath. Rescue, Disp: 75 mL, Rfl: 0    amLODIPine (NORVASC) 10 MG tablet, Take 1 tablet (10 mg total) by mouth once daily., Disp: 30 tablet, Rfl: 2    apixaban (ELIQUIS) 5 mg Tab, Take 1 tablet (5 mg total) by mouth 2 (two) times daily., Disp: 60 tablet, Rfl: 2    aspirin 81 MG Chew, Chew and swallow 1 (1 mg total) by mouth once daily., Disp: 30 tablet, Rfl: 2    atorvastatin (LIPITOR) 80 MG tablet, Take 1 tablet (80 mg total) by mouth every evening., Disp: 30 tablet, Rfl: 2    diphenhydrAMINE (BENADRYL) 25 mg capsule, Take 1 capsule (25 mg total) by mouth every 6 (six) hours as needed for Itching., Disp: , Rfl: 0    FLUoxetine 20 MG capsule, Take 1 capsule (20 mg total) by mouth once daily., Disp: 30 capsule, Rfl: 2    folic acid (FOLVITE) 1 MG tablet, Take 1 tablet (1 mg total) by mouth once daily., Disp: , Rfl: 0    furosemide (LASIX) 40 MG tablet, Take 1 tablet (40 mg total) by mouth once daily., Disp: 30 tablet, Rfl: 2    JARDIANCE 10 mg tablet, Take 1 tablet (10 mg total) by mouth once daily., Disp: 30 tablet, Rfl: 11    losartan (COZAAR) 25 MG tablet, Take 1 tablet (25 mg total) by mouth once daily., Disp: 30 tablet, Rfl: 11    melatonin (MELATIN) 3 mg tablet, Take 2 tablets (6 mg total) by mouth nightly as needed for Insomnia., Disp: , Rfl: 0    methocarbamoL 1,000 mg Tab, Take 1,000 mg by mouth 4 (four) times daily., Disp: , Rfl:     metoprolol succinate (TOPROL-XL) 50 MG 24 hr tablet, Take 1 tablet (50 mg total) by mouth once daily., Disp: 30 tablet, Rfl: 2    multivitamin Tab, Take 1 tablet by mouth once daily., Disp: , Rfl:     ondansetron (ZOFRAN-ODT) 8 MG TbDL, Take 1 tablet (8 mg total) by mouth every 8 (eight) hours as needed (nausea)., Disp: , Rfl:     pantoprazole (PROTONIX) 20 MG tablet, Take 1 tablet (20 mg total) by mouth every  morning., Disp: 30 tablet, Rfl: 2    polyethylene glycol (GLYCOLAX) 17 gram PwPk, Take 17 g by mouth once daily., Disp: , Rfl: 0    pregabalin (LYRICA) 75 MG capsule, Take 1 capsule (75 mg total) by mouth 2 (two) times daily., Disp: 60 capsule, Rfl: 6    senna (SENOKOT) 8.6 mg tablet, Take 1 tablet by mouth once daily., Disp: , Rfl:     spironolactone (ALDACTONE) 25 MG tablet, Take 1 tablet (25 mg total) by mouth once daily., Disp: 30 tablet, Rfl: 11    Current Facility-Administered Medications:     onabotulinumtoxina injection 300 Units, 300 Units, Intramuscular, 1 time in Clinic/HOD, Mariah Bustillos NP     Review of patient's allergies indicates:  No Known Allergies    Review of Systems:  Review of systems is negative except for the symptoms mentioned in HPI    PHYSICAL EXAMINATION:   Vitals:    03/11/24 1055   BP: 133/84   Pulse: 83       General: Well-developed, well-groomed. No apparent distress  HENT: Normocephalic, atraumatic.    Cardiovascular: Regular rate and rhythm  Chest: No audible wheezes, stridor, ronchi appreciated.  Musculoskeletal: No peripheral edema     Neurologic Exam: The patient is awake, alert and oriented to person, place, time and situation. Attentive, vigilant during exam. Language is fluent. Naming & repetition intact, +2-step commands.  Fund of knowledge is appropriate. Well organized thoughts.     Cranial nerves:   CN II: Visual fields are full to confrontation. Pupils are 4 mm and briskly reactive to light.  CN III, IV, VI: EOMI, no nystagmus, no ptosis  CN V: Facial sensation is intact in all 3 divisions bilaterally.  CN VII:  Left lower facial droop with normal eye closure   CN VIII: Hearing is normal bilaterally  CN IX, X: Palate elevates symmetrically. Phonation is normal.  CN XI: Head turning and shoulder shrug are intact  CN XII: Tongue is midline with normal movements and no atrophy.     Motor examination of all extremities demonstrates normal bulk and tone in all four  limbs. There are no atrophy or fasciculations.     NEUROMUSCULAR:   Tardieu Scale:     RIGHT   LEFT      R1 R2 R1 R2   Shoulder Abduction       Elbow Extension   45 10   Wrist Extension   +10 0   Finger Extension              Hip Abduction         Hip External Rotation         Hip Internal  Rotation         Knee Extension     full    Popliteal Angles      full    Ankle Dorsiflexion   0 +5   Ankle Plantarflexion              Prior to neurotoxin injections:  Modified Ilda Scale:  1:    1+:  2:  Left EF; left WF  3:  Left FF  4:         Left Right     Left Right   Deltoid 4/5 5/5   Hip Flexion 4/5 5/5   Biceps 3/5 5/5   Hip Extension 4/5 5/5   Triceps 3/5 5/5   Knee Flexion 4/5 5/5   Wrist Ext 3/5 5/5   Knee Extension 4/5 5/5   Finger Abd 1/5 5/5   Ankle dorsiflex 3/5 5/5           Ankle plantar flex 3/5 5/5     Appropriate sitting balance.   No dyskinetic or dystonic movements appreciated.   No clonus was elicited at either ankle.    GAIT/DYNAMIC:     Transfers from supine to sit and sit to stand are independent.  Poor hip flexion and knee extension with initial foot flat strike and moderate ankle dorsiflexion.    ASSESSMENT:   1. Spastic hemiplegia of left dominant side as late effect of cerebral infarction  onabotulinumtoxina injection 300 Units    Prior authorization Order      2. History of ischemic right MCA stroke        3. Impaired functional mobility, balance, gait, and endurance        4. Alteration in instrumental activities of daily living (IADL)        5. Impaired mobility and ADLs        6. Paroxysmal atrial fibrillation        7. Essential hypertension             Dionicio Bess  is a 58 y.o.  male  seen today in clinic for follow-up assessment and recommendations. The following recommendations and plan were discussed in depth with the patient who voiced understanding and was in agreement.    PLAN:   Left spastic hemiparesis as a result of prior right MCA stroke  Spasticity:    -Limited ROM with  focal  areas of hypertonicity noted on exam - especially in the left wrist flexor, elbow flexor and finger flexors,negatively impacting the patient's quality of life, commonly causing physical discomfort, social embarrassment and disruption of occupational and daily activities. We can treat these spastic elements with low-dose botulinum toxin injections  -I have discussed treatment options including therapy and low-dose botulinum toxin injections. The patient verbalized understanding of risk and benefits and would like to precede with  injections at this time. Patients questions were answered . he  has no new questions at this time.  -I will submit a request for 200 units of botulinum toxin  ( 100 units left biceps brachii; 50 units left flexor digitorum profundus; 25 units flexor carpi radialis)  for chemodenervation to assist with improvement of Stretching/splinting, Gait and Self-care  Bracing:  None  Equipment:  None  Therapy: The patient continued to exhibit spastic left hemiparesis as a result of prior right MCA stroke. He would benefit from outpatient  PT/OT to address Gait Training, Manual Therapy,  Neuromuscular Re-ed, Orthotic Management and Training,  Patient Education, Self Care, Therapeutic Activities, Therapeutic Exercise.     Questions and concerns were answered to the patient's stated verbal satisfaction. The patient verbalizes understanding and agreement with the above stated treatment plan.     I will plan on having Dionicio Bess return to clinic once we have insurance approval for botulinum toxin injections. The patient can contact my office with any questions or concerns they may have as they arise in the interim.       Collaborating Physician, Dr Stout, was available during today's encounter. Any change to plan along with cosign to appear in the EMR    MYRTLE Lobato  Department of Vascular Neurology  [facility]  [phone]      This note is dictated on M*Modal Fluency  Direct word recognition program. There are word recognition mistakes that are occasionally missed on review

## 2024-03-13 NOTE — PROGRESS NOTES
[facility]  Occupational Therapy Treatment Note     Date: 3/14/2024  Name: Dionicio Bess  Clinic Number: [id number]    Therapy Diagnosis:   Encounter Diagnoses   Name Primary?    Impaired mobility and ADLs Yes    Alteration in instrumental activities of daily living (IADL)     Decreased range of motion of finger of left hand     Decreased  strength of left hand     Muscle tone increased      Physician: Rox Mercedes PA-C    Physician Orders: Eval and Treat (Hand Therapy)  Comment - 2-3 x per wek x 6weeks  Medical Diagnosis: I63.411 (ICD-10-CM) - Stroke due to embolism of right middle cerebral artery  Evaluation Date: 2/1/2024  Insurance Authorization Period Expiration: 5/6/24  Plan of Care Certification Period: 5/3/24  Date of Return to MD: 2/19/2024: Ortho  FOTO: 1/ 2      Visit # / Visits authorized: 6 / 20     Precautions:  Standard and Fall; LLE non weight bearing     Time In: 9:30 am  Time Out: 10:15 am  Total Billable Time: 45 minutes      Subjective     Patient reports: having a hard time sleeping lately  Response to previous treatment: positive  Functional change:  ongoing    Pain: 6/10 L UE 9/10 L ankle  Location:  L UE with PROM     Objective     Objective Measures updated at progress report unless specified. 3/12/24    Treatment       Dionicio received the treatments listed below:      therapeutic activities to improve functional performance for 45 minutes, including:  - Left general wrist stretches including metacarpal spreads, carpal rolls, increasing mobility towards radial/ulnar deviation, increasing mobility of wrist towards extension/flexion, Increasing mobility of wrist towards supination/pronation. PROM of fingers to encourage extension.  - AAROM scap mobs elevation and retraction x 10 each with 5 sec holds  - Cup placed into L hand to prevent fisting while completing PROM  - supine PROm to L UE all planes with hold at end range  - supine chest press focusing on L elbow  ext and triceps activation and shoulder flex with gentle hold at end range 3 x 10 each with 3# dowel  - seated edge of mat closed chain WB into L UE on edge of table while R UE crossed midline to place tennis balls in basket  - reviewed strategies to incorporate HEP throughput the day         Patient Education and Home Exercises     Education provided:   - HEP  - Progress towards goals     Written Home Exercises Provided: yes.  Exercises were reviewed and Dionicio was able to demonstrate them prior to the end of the session.  Dionicio demonstrated good  understanding of the home exercise program provided. See electronic medical record under Patient Instructions for exercises provided during therapy sessions.       Assessment     Dionicio tolerated session well. Improved L resting hand tone post but ongoing management needed. Discussed need for resting hand splint with case management and plans to be ordered. When cued, his L UE activation in supine is grossly improved over baseline and shows potential to improve. He remains appropriate for skilled OT services.     Dionicio is progressing well towards his goals and there are no updates to goals at this time. Pt prognosis is Good.     Patient will continue to benefit from skilled outpatient occupational therapy to address the deficits listed in the problem list on initial evaluation provide patient/family education and to maximize patient's level of independence in the home and community environment.     Patient's spiritual, cultural and educational needs considered and patient agreeable to plan of care and goals.    Anticipated barriers to occupational therapy: chronic condition     Goals:  Short Term Goals: 6 weeks   - Pt. Will be edu in self PROM program and be able to complete with visual cues MET  - Pt. Will have improved L UE AROM of at least 10 degrees in all measured planes of movement including wrist for improved functional arm use progressing  - Pt. Will be able to utilize  L UE as a support when completing simple home care tasks ongoing  - Pt. Will be fitted for a night time resting hand splint and utilize nightly ongoing  - Pt. Will have improve PROM of at least 20 degrees in all measured planes of movement including wrist for tone management progressing  - Pt. Will be able to bathe himself fully with AD such as a long handled sponge     Long Term Goals: 12 weeks   - Pt. Will complete HEP daily to help manage tone per patient report ongoing  - Pt. Will have improved L UE AROM of at least 20 degrees in all measured planes of movement including wrist for improved functional arm use ongoing  - Pt. 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 Will be able to utilize L UE/hand to open/close doors at least 50% of the time for forced L UE activity ongoing  - Pt. Will be able to utilize L Ue as helper during UB and LB dressing ongoing     More goals to be made as appropriate within POC as needed     Plan   Certification Period/Plan of care expiration: 2/1/2024 to 5/3/24.     Outpatient Occupational Therapy 2 times weekly for 12 weeks to include the following interventions: Electrical Stimulation of L UE, Fluidotherapy, Manual Therapy, Moist Heat/ Ice, Neuromuscular Re-ed, Orthotic Management and Training, Paraffin, Patient Education, Self Care, Therapeutic Activities, and Therapeutic Exercise.    Updates/Grading for next session: progress into WB on 1/2 SIERRA Rodrigues   3/14/2024

## 2024-03-14 ENCOUNTER — OUTPATIENT CASE MANAGEMENT (OUTPATIENT)
Dept: ADMINISTRATIVE | Facility: OTHER | Age: 59
End: 2024-03-14
Payer: MEDICAID

## 2024-03-14 ENCOUNTER — CLINICAL SUPPORT (OUTPATIENT)
Dept: REHABILITATION | Facility: HOSPITAL | Age: 59
End: 2024-03-14
Payer: MEDICAID

## 2024-03-14 DIAGNOSIS — Z74.09 IMPAIRED MOBILITY AND ADLS: Primary | ICD-10-CM

## 2024-03-14 DIAGNOSIS — M62.89 MUSCLE TONE INCREASED: ICD-10-CM

## 2024-03-14 DIAGNOSIS — R29.898 DECREASED GRIP STRENGTH OF LEFT HAND: ICD-10-CM

## 2024-03-14 DIAGNOSIS — Z78.9 ALTERATION IN INSTRUMENTAL ACTIVITIES OF DAILY LIVING (IADL): ICD-10-CM

## 2024-03-14 DIAGNOSIS — M25.642 DECREASED RANGE OF MOTION OF FINGER OF LEFT HAND: ICD-10-CM

## 2024-03-14 DIAGNOSIS — Z78.9 IMPAIRED MOBILITY AND ADLS: Primary | ICD-10-CM

## 2024-03-14 PROCEDURE — 97530 THERAPEUTIC ACTIVITIES: CPT | Mod: PO

## 2024-03-14 RX ORDER — PANTOPRAZOLE SODIUM 20 MG/1
20 TABLET, DELAYED RELEASE ORAL EVERY MORNING
Qty: 30 TABLET | Refills: 2 | OUTPATIENT
Start: 2024-03-14 | End: 2024-06-12

## 2024-03-14 RX ORDER — PANTOPRAZOLE SODIUM 20 MG/1
20 TABLET, DELAYED RELEASE ORAL EVERY MORNING
Qty: 30 TABLET | Refills: 2 | Status: CANCELLED | OUTPATIENT
Start: 2024-03-14 | End: 2024-06-12

## 2024-03-14 NOTE — PROGRESS NOTES
Outpatient Care Management   - Care Plan Follow Up    Patient: Dionicio Bess  MRN:  [id number]  Date of Service:  3/14/2024  Completed by:  Sharon Almazan LCSW  Referral Date: 02/01/2024    Reason for Visit   Patient presents with    Other     Work on obtaining orders for a Resting Hand Splint       Brief Summary: Advised by OT that Pt needs a Large Left Resting Hand Splint. Sent request for orders to Pt last seen provider (Pt does not yet have a PCP) with that request.    Sharon Almazan LCSW  Neuro Therapy   [facility]  [phone]

## 2024-03-15 DIAGNOSIS — I69.359 HEMIPARESIS AFFECTING DOMINANT SIDE AS LATE EFFECT OF CEREBROVASCULAR ACCIDENT: Primary | ICD-10-CM

## 2024-03-18 NOTE — PROGRESS NOTES
"[facility]   Physical Therapy Treatment Note      Name: Dioniico Bess  Clinic Number: [id number]    Therapy Diagnosis:  Encounter Diagnoses   Name Primary?    Muscle tone increased Yes    Impaired functional mobility, balance, gait, and endurance            Physician: Rox Mercedes PA-C    Visit Date: 3/19/2024      Physician Orders: PT Eval and Treat   Medical Diagnosis from Referral: Stroke due to embolism of right middle cerebral artery   Evaluation Date: 2/1/2024  Authorization Period Expiration: 4/5/24  Plan of Care Expiration: 3/28/24  Progress Note Due: 3/28/24   Visit # / Visits authorized: 5/ 16 ( plus eval)  FOTO: 2/3      Precautions: Standard, Fall, and s/p L ankle ORIF 1/5/24 NWB L LE ~6 weeks      Time In: 0931    Time Out: 1016    Total Billable Time: 45   minutes ( Medicaid pt~ charge TE only)    PTA Visit #: 0/5       Subjective     Patient reports: He states that he lost his cane at a [place] in [place] " [place]", but he got another one at [facility].  He was compliant with his HEP   Response to previous treatment: felt good  Functional change: Ongoing    Pain: 5/10  Location: L ankle    Objective      Objective Measures updated on 2/29/24.            Treatment     Dionicio received the treatments listed below:      therapeutic exercises to develop strength, endurance, ROM, flexibility, posture, and core stabilization for 45 minutes including:      PT spends 5 minutes attempting to inhibit tone to L hand and wrist. PT able to open hand and digits about snf to full range after sustained stretch.       Supine:  2 x 10 reps of L LE SAQ with 5 # ankle weight   2 x 10 reps of L LE AA heel slides with 5 # ankle weight  2 x 10 reps of L LE AA hip abduction/adduction with 5 # ankle weight   2 x 10 reps of L LE AA SLR with 5 # ankle weight   2 x 10 reps of L LE active ankle pumps, distal legs rest on bolster, PT removes L shoe and sock    At ballet bar:    1 x 10 B LE forward/backward step " "ups/downs on foam fitter, no UE support, R foot ascends and L foot descends, CGA  1 x 10 L LE step overs on foam fitter,  no UE support, R foot on foam fitter and L foot steps across to ground and back, CGA    2 x 10 split stance L LE double cone taps with R foot on foam fitter and L foot on ground, 1 UE support,  CGA/SBA    2 x 30" split stance balance with L foot on foam fitter and R foot on ground, no UE support, eyes closed, CGA    2 x 30" static stance on foam fitter with eyes closed + narrow PRIYANK, no UE support, CGA    X 1 minute of rolling basketball to perform self ROM at L ankle, R UE support    X 2 minutes of rolling basketball back and forth with PT using L foot, R UE support    X 5 laps of walking forward/backward with R UE support~ cues for slow oneyda          Patient Education and Home Exercises       Education provided:   - home exercise program   - Reviewed necessity of NWB compliance    Written Home Exercises Provided: yes. Exercises were reviewed and Dionicio was able to demonstrate them prior to the end of the session.  Dionicio demonstrated good  understanding of the education provided. See Electronic Medical Record under Patient Instructions for exercises provided during therapy sessions    Assessment     Dionicio Bess tolerated today's session well. He continues to arrive to therapy with straight cane and is performing all mobility with WBAT to L LE. He adopted this mobility strategy some time ago, despite ortho recommendation to remain NWB. PT continues to inhibit tone and open L hand and wrist to patient tolerance. OT has looked into a resting hand splint for pt, so PT will continue to provide limited stretching in his sessions to allow easier access to splint. Dionicio performed L LE strengthening exercises with 5 # ankle weight and progressed to perform some fairly straightforward balance tasks at ballet bar. PT did his best to place as little stress on L LE here as possible. Dionicio remains appropriate " for skilled physical therapy services at his current frequency for the remainder of the plan of care to continue addressing his functional deficits and goals related to improved dynamic balance, B LE strength and endurance, and maximizing safety with functional mobility tasks.     Dionicio Is progressing well towards his goals.   Patient prognosis is Good.     Patient will continue to benefit from skilled outpatient physical therapy to address the deficits listed in the problem list box on initial evaluation, provide pt/family education and to maximize pt's level of independence in the home and community environment.     Patient's spiritual, cultural and educational needs considered and pt agreeable to plan of care and goals.     Anticipated barriers to physical therapy:  co-morbidities, transportation, L LE weightbearing precautions, pt's compliance with L LE weightbearing precautions     Goals:   Short Term Goals: 4 weeks   1. Pt will be issued first installment of HEP and report at least partial compliance- MET 2/29/24  2. Pt will improve TUG time to 36 seconds or less in order to reach MCID and demonstrate decreased fall risk and improved household ambulation ability.- MET 2/29/24  3. Pt will increase SSWS to 0.40 m/s or greater in order to improve community navigation and decrease fall risk.- MET 2/29/24  4. Pt will improve 5 x sit<>stand time to 14 seconds or less in order to demonstrate improved muscular endurance.- MET 2/29/24     Long Term Goals: 8 weeks   1. Pt will be at least partially compliant with finalized HEP to maintain any potential gains realized in PT.- Ongoing 2/29/24  2. Pt will improve TUG time to 33 seconds or less in order to demonstrate decreased fall risk and improved household ambulation ability. - MET 2/29/24   UPDATED GOAL 2/29/24: Pt will improve TUG time to 12 seconds or less in order to demonstrate decreased fall risk and improved household ambulation ability.  3. Pt will increase SSWS  to 0.46 m/s or greater in order to improve community navigation and decrease fall risk.- MET 2/29/24   UPDATED GOAL 2/29/24: Pt will increase SSWS to 0.86 m/s or greater with LRAD in order to improve community navigation and decrease fall risk.  4. Pt will improve 5 x sit<>stand time to 12 seconds or less in order to demonstrate improved muscular endurance and score below the fall risk cut off in CVA populations.- MET 2/29/24  5. NEW GOAL 2/29/24: Pt will improve B hip flexion strength to at least 4+/5 in order to improve efficiency with functional mobility tasks.   6. NEW GOAL 2/29/24: Pt will improve R SLS time to 10 seconds or more to decrease fall risk and demonstrate improved SLS balance.   7. NEW GOAL 2/29/24: Pt will improve FGA score to at least 19/30 in order to demonstrate improved dynamic balance and decreased fall risk.        Plan     Continue to progress B LE strength and endurance. Focus on static and dynamic balance in future sessions.        Carson Ruelas, PT  3/19/2024

## 2024-03-19 ENCOUNTER — CLINICAL SUPPORT (OUTPATIENT)
Dept: REHABILITATION | Facility: HOSPITAL | Age: 59
End: 2024-03-19
Payer: MEDICAID

## 2024-03-19 DIAGNOSIS — M62.89 MUSCLE TONE INCREASED: Primary | ICD-10-CM

## 2024-03-19 DIAGNOSIS — Z78.9 IMPAIRED MOBILITY AND ADLS: Primary | ICD-10-CM

## 2024-03-19 DIAGNOSIS — R29.898 DECREASED GRIP STRENGTH OF LEFT HAND: ICD-10-CM

## 2024-03-19 DIAGNOSIS — Z78.9 ALTERATION IN INSTRUMENTAL ACTIVITIES OF DAILY LIVING (IADL): ICD-10-CM

## 2024-03-19 DIAGNOSIS — M62.89 MUSCLE TONE INCREASED: ICD-10-CM

## 2024-03-19 DIAGNOSIS — M25.642 DECREASED RANGE OF MOTION OF FINGER OF LEFT HAND: ICD-10-CM

## 2024-03-19 DIAGNOSIS — Z74.09 IMPAIRED MOBILITY AND ADLS: Primary | ICD-10-CM

## 2024-03-19 DIAGNOSIS — Z74.09 IMPAIRED FUNCTIONAL MOBILITY, BALANCE, GAIT, AND ENDURANCE: ICD-10-CM

## 2024-03-19 PROCEDURE — 97530 THERAPEUTIC ACTIVITIES: CPT | Mod: PO

## 2024-03-19 PROCEDURE — 97110 THERAPEUTIC EXERCISES: CPT | Mod: PO

## 2024-03-19 NOTE — PROGRESS NOTES
[facility]  Occupational Therapy Treatment Note     Date: 3/19/2024  Name: Dionicio Bess  Clinic Number: [id number]    Therapy Diagnosis:   Encounter Diagnoses   Name Primary?    Impaired mobility and ADLs Yes    Alteration in instrumental activities of daily living (IADL)     Decreased range of motion of finger of left hand     Decreased  strength of left hand     Muscle tone increased      Physician: Rox Mercedes PA-C    Physician Orders: Eval and Treat (Hand Therapy)  Comment - 2-3 x per wek x 6weeks  Medical Diagnosis: I63.411 (ICD-10-CM) - Stroke due to embolism of right middle cerebral artery  Evaluation Date: 2/1/2024  Insurance Authorization Period Expiration: 5/6/24  Plan of Care Certification Period: 5/3/24  Date of Return to MD: 2/19/2024: Ortho  FOTO: 1/ 2      Visit # / Visits authorized:  7 / 20     Precautions:  Standard and Fall; LLE non weight bearing     Time In: 10:30 am  Time Out: 11:00 am (transportation arrived early)  Total Billable Time: 30 minutes      Subjective     Patient reports: not complaint with his stretches, poor sleep at night, lost his cane out partying   Response to previous treatment: positive  Functional change:  ongoing    Pain: 5/10 general, 7 /10 L wrist  Location:  L UE with PROM     Objective     Objective Measures updated at progress report unless specified. 3/12/24    Treatment       Dionicio received the treatments listed below:      therapeutic activities to improve functional performance for 30 minutes, including:  - L hand hygiene completed Mod I  - Left general wrist stretches including metacarpal spreads, carpal rolls, increasing mobility towards radial/ulnar deviation, increasing mobility of wrist towards extension/flexion, Increasing mobility of wrist towards supination/pronation. PROM of fingers to encourage extension.  NP - AAROM scap mobs elevation and retraction x 10 each with 5 sec holds  - Cup placed into L hand to prevent  fisting while completing PROM  - supine PROm to L UE all planes with hold at end range  - seated table slides x 3 minutes L UE only all directions     Patient Education and Home Exercises     Education provided:   - HEP  - Progress towards goals     Written Home Exercises Provided: yes.  Exercises were reviewed and Dionicio was able to demonstrate them prior to the end of the session.  Dionicio demonstrated good  understanding of the home exercise program provided. See electronic medical record under Patient Instructions for exercises provided during therapy sessions.       Assessment     Dionicio tolerated shortened session well but has self-limiting behavior. He is not complaint with HEPs nor is he attempting to utilize strategies reviewed in therapy. This is becoming a barrier to his prognosis. He does demo improved resting tone post interventions but carryover limited. He remains appropriate for skilled OT services.     Dionicio is progressing well towards his goals and there are no updates to goals at this time. Pt prognosis is Good.     Patient will continue to benefit from skilled outpatient occupational therapy to address the deficits listed in the problem list on initial evaluation provide patient/family education and to maximize patient's level of independence in the home and community environment.     Patient's spiritual, cultural and educational needs considered and patient agreeable to plan of care and goals.    Anticipated barriers to occupational therapy: chronic condition     Goals:  Short Term Goals: 6 weeks   - Pt. Will be edu in self PROM program and be able to complete with visual cues MET  - Pt. Will have improved L UE AROM of at least 10 degrees in all measured planes of movement including wrist for improved functional arm use progressing  - Pt. Will be able to utilize L UE as a support when completing simple home care tasks ongoing  - Pt. Will be fitted for a night time resting hand splint and utilize nightly  ongoing  - Pt. Will have improve PROM of at least 20 degrees in all measured planes of movement including wrist for tone management progressing  - Pt. Will be able to bathe himself fully with AD such as a long handled sponge     Long Term Goals: 12 weeks   - Pt. Will complete HEP daily to help manage tone per patient report ongoing  - Pt. Will have improved L UE AROM of at least 20 degrees in all measured planes of movement including wrist for improved functional arm use ongoing  - Pt. 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 Will be able to utilize L UE/hand to open/close doors at least 50% of the time for forced L UE activity ongoing  - Pt. Will be able to utilize L Ue as helper during UB and LB dressing ongoing     More goals to be made as appropriate within POC as needed     Plan   Certification Period/Plan of care expiration: 2/1/2024 to 5/3/24.     Outpatient Occupational Therapy 2 times weekly for 12 weeks to include the following interventions: Electrical Stimulation of L UE, Fluidotherapy, Manual Therapy, Moist Heat/ Ice, Neuromuscular Re-ed, Orthotic Management and Training, Paraffin, Patient Education, Self Care, Therapeutic Activities, and Therapeutic Exercise.    Updates/Grading for next session: progress into WB on 1/2 SIERRA Rodrigues   3/19/2024

## 2024-03-22 ENCOUNTER — OUTPATIENT CASE MANAGEMENT (OUTPATIENT)
Dept: ADMINISTRATIVE | Facility: OTHER | Age: 59
End: 2024-03-22
Payer: MEDICAID

## 2024-03-22 NOTE — PROGRESS NOTES
Outpatient Care Management   - Care Plan Follow Up    Patient: Dionicio Bess  MRN:  [id number]  Date of Service:  3/22/2024  Completed by:  Sharon Almazan LCSW  Referral Date: 02/01/2024    Reason for Visit   Patient presents with    OPCM SW Follow Up Call       Brief Summary:  received message from Pt niece reporting she got the call about the appt on Monday that was set up and scheduled transport. Asked this SW to call Pt to discuss further with him. Contacted Pt to let him know this is for the resting hand splint fitting that he needs to go to. Discussed upcoming appts. He reported he will attend and that he is aware she has all the appts set up. He is planning to go to some of the complexes he applied to today to follow up.     Future Appointments   Date Time Provider Department Center   3/25/2024 11:30 AM Rox Mercedes PA-C [facility]   3/26/2024 10:20 AM Britney Flores OD [facility]   3/28/2024  9:30 AM Vin Brown, LOTR [facility]   4/1/2024  8:45 AM Sami Gibson, JEANCARLOS [facility]   4/1/2024  9:30 AM Vin Brown, [facility]R [facility]   4/4/2024  9:30 AM Vin Brown, LOTR [facility]   4/8/2024 10:00 AM Mariah Bustillos NP [facility]   4/12/2024  9:30 AM Carson Ruelas, PT [facility]   4/16/2024  9:30 AM Carson Ruelas, PT [facility]   4/23/2024  8:45 AM Carson Ruelas, PT [facility]   4/23/2024  9:30 AM Vin Brown, LOTR [facility]   4/25/2024  9:30 AM Vin Brown, LOTR [facility]   4/29/2024  8:15 AM Rox Mercedes PA-C [facility]   4/30/2024  8:45 AM Carson Ruelas, PT [facility]   4/30/2024  9:30 AM Vin Brown, SIERRA NG [facility]   5/2/2024  9:30 AM Vin Brown LOTR [facility]     MARIA ISABEL Storey  Neuro  Therapy   [facility]  [phone]

## 2024-03-25 ENCOUNTER — HOSPITAL ENCOUNTER (OUTPATIENT)
Dept: RADIOLOGY | Facility: HOSPITAL | Age: 59
Discharge: HOME | End: 2024-03-25
Attending: PHYSICIAN ASSISTANT
Payer: MEDICAID

## 2024-03-25 ENCOUNTER — OFFICE VISIT (OUTPATIENT)
Dept: ORTHOPEDICS | Facility: CLINIC | Age: 59
End: 2024-03-25
Payer: MEDICAID

## 2024-03-25 VITALS — HEIGHT: 72 IN | WEIGHT: 199.94 LBS | BODY MASS INDEX: 27.08 KG/M2

## 2024-03-25 DIAGNOSIS — G89.18 POST-OP PAIN: ICD-10-CM

## 2024-03-25 DIAGNOSIS — S82.852D CLOSED TRIMALLEOLAR FRACTURE OF LEFT ANKLE WITH ROUTINE HEALING, SUBSEQUENT ENCOUNTER: Primary | ICD-10-CM

## 2024-03-25 DIAGNOSIS — S82.852D CLOSED TRIMALLEOLAR FRACTURE OF LEFT ANKLE WITH ROUTINE HEALING, SUBSEQUENT ENCOUNTER: ICD-10-CM

## 2024-03-25 PROCEDURE — 4010F ACE/ARB THERAPY RXD/TAKEN: CPT | Mod: CPTII,,, | Performed by: PHYSICIAN ASSISTANT

## 2024-03-25 PROCEDURE — 73610 X-RAY EXAM OF ANKLE: CPT | Mod: TC,LT

## 2024-03-25 PROCEDURE — 1159F MED LIST DOCD IN RCRD: CPT | Mod: CPTII,,, | Performed by: PHYSICIAN ASSISTANT

## 2024-03-25 PROCEDURE — 99214 OFFICE O/P EST MOD 30 MIN: CPT | Mod: PBBFAC,25 | Performed by: PHYSICIAN ASSISTANT

## 2024-03-25 PROCEDURE — 99999 PR PBB SHADOW E&M-EST. PATIENT-LVL IV: CPT | Mod: PBBFAC,,, | Performed by: PHYSICIAN ASSISTANT

## 2024-03-25 PROCEDURE — 73610 X-RAY EXAM OF ANKLE: CPT | Mod: 26,LT,, | Performed by: RADIOLOGY

## 2024-03-25 PROCEDURE — 99024 POSTOP FOLLOW-UP VISIT: CPT | Mod: ,,, | Performed by: PHYSICIAN ASSISTANT

## 2024-03-25 NOTE — PROGRESS NOTES
Principal Orthopedic Problem:  Left trimalleolar ankle fracture                          Left ankle syndesmosis disruption     01/05/24: :   Open reduction internal fixation left trimalleolar ankle fracture without posterior lip - 79520                          Open treatment of left ankle syndesmosis disruption with reduction and internal fixation - 14113      Mr. Bess is here today for a post-operative visit    Interval History:  he reports that he is doing ok.   he is at a niece home. he is participating in PT/OT.  Using cane to assist with ambulation.He stated that he knows that I requested him to not place weight at his last visit but has been walking a lot more and more.    Patient reports achy throbbing pain in his toes and request oxycodone 10 mg. He has a prescription for Lyrica and tylenol. He is to avoid NSAID due to stroke.    he denies fever, chills, and sweats .       Physical exam:    Patient arrives to exam room: walked in placing full weight on his ankle.  Cane in hand Patient is un accompanied      Incision is clean, dry and intact.  .   Healing well no signs of breakdown or infection. Moderate swelling.     RADS: All pertinent images were reviewed by myself:   Of postoperative changes are noted with a plate and screws on the fibula and screws attaching at to the tibia. Ankle mortise is still widened medially. Fractures of the medial malleolus are seen in place and soft tissue swelling is seen both medially and laterally.     Assessment:  Post-op visit ( 6 weeks)    Plan:  Current care, treatment plan, precautions, activity level/ modifications, limitations, rehabilitation exercises and proposed future treatment were discussed with the patient. We discussed the need to monitor for changes in symptoms and condition and report them to the physician.  Discussed importance of compliance with all appointments and follow up examinations.     WOUND CARE :  -  he may wash the area with  antibacterial soap in the shower. Will not submerge until the incision is completely healed  -Patient was advised to monitor wound closely and multiple times daily for any problems. Call clinic immediately or report to ED for immediate medical attention for any complications including reopening of wound, drainage, purulence, redness, streaking, odor, pain out of proportion, fever, chills, etc.       ACTIVITY:   - sedentary, ankle brace.   -range of motion as tolerated    - will slowly advance weightbearing.       -PT/OT, [facility] , Patient is responsible to establish and continue care      PAIN MEDICATION:   - Multimodal pain control  - Pain medication: refill was not needed  - Pain medication refill policy provided to patient for review, yes.    - Patient was informed a multi-modal approach is used to treat their pain. With the goal to get off of narcotic pain medication and discontinue as soon as possible.   - ice and elevation to reduce pain and swelling     DVT PROPHYLAXIS:   - aspirin    FOLLOW UP:   - Patient will follow up in the clinic in 6 weeks, sooner if any concerns.  - X-ray of his ankle is needed.NWB OOB        If there are any questions prior to scheduled follow up, the patient was instructed to contact the office

## 2024-03-28 ENCOUNTER — OUTPATIENT CASE MANAGEMENT (OUTPATIENT)
Dept: ADMINISTRATIVE | Facility: OTHER | Age: 59
End: 2024-03-28
Payer: MEDICAID

## 2024-03-28 ENCOUNTER — CLINICAL SUPPORT (OUTPATIENT)
Dept: REHABILITATION | Facility: HOSPITAL | Age: 59
End: 2024-03-28
Payer: MEDICAID

## 2024-03-28 DIAGNOSIS — R29.898 DECREASED GRIP STRENGTH OF LEFT HAND: ICD-10-CM

## 2024-03-28 DIAGNOSIS — Z78.9 ALTERATION IN INSTRUMENTAL ACTIVITIES OF DAILY LIVING (IADL): ICD-10-CM

## 2024-03-28 DIAGNOSIS — M62.89 MUSCLE TONE INCREASED: ICD-10-CM

## 2024-03-28 DIAGNOSIS — M25.642 DECREASED RANGE OF MOTION OF FINGER OF LEFT HAND: ICD-10-CM

## 2024-03-28 DIAGNOSIS — Z78.9 IMPAIRED MOBILITY AND ADLS: Primary | ICD-10-CM

## 2024-03-28 DIAGNOSIS — Z74.09 IMPAIRED MOBILITY AND ADLS: Primary | ICD-10-CM

## 2024-03-28 PROCEDURE — 97530 THERAPEUTIC ACTIVITIES: CPT | Mod: PO

## 2024-03-28 NOTE — PROGRESS NOTES
[facility]  Occupational Therapy Treatment Note     Date: 3/28/2024  Name: Dionicio Bess  Clinic Number: [id number]    Therapy Diagnosis:   Encounter Diagnoses   Name Primary?    Impaired mobility and ADLs Yes    Alteration in instrumental activities of daily living (IADL)     Decreased range of motion of finger of left hand     Decreased  strength of left hand     Muscle tone increased      Physician: Rox Mercedes PA-C    Physician Orders: Eval and Treat (Hand Therapy)  Comment - 2-3 x per wek x 6weeks  Medical Diagnosis: I63.411 (ICD-10-CM) - Stroke due to embolism of right middle cerebral artery  Evaluation Date: 2/1/2024  Insurance Authorization Period Expiration: 5/6/24  Plan of Care Certification Period: 5/3/24  Date of Return to MD: 2/19/2024: Ortho  FOTO: 1/ 2      Visit # / Visits authorized:  8 / 20     Precautions:  Standard and Fall; LLE non weight bearing     Time In: 9:35 am  Time Out: 10:15 am  Total Billable Time: 40 minutes      Subjective     Patient reports: tearful, frustrated with current situation   Response to previous treatment: positive  Functional change:  ongoing    Pain: 5no verbal score this date  Location:  L UE with PROM     Objective     Objective Measures updated at progress report unless specified. 3/12/24    Treatment       Dionicio received the treatments listed below:      therapeutic activities to improve functional performance for 45 minutes, including:  - seated UBE res 1.5 x 8 min with reversal half way, focus on forced exertion and to keep RPM above 30, L hand secured  - L hand hygiene completed Mod I  - Left general wrist stretches including metacarpal spreads, carpal rolls, increasing mobility towards radial/ulnar deviation, increasing mobility of wrist towards extension/flexion, Increasing mobility of wrist towards supination/pronation. PROM of fingers to encourage extension.  - AAROM scap mobs elevation and retraction x 10 each with 5 sec  holds  - AAROM shoulder flex and elbo ext 3 x 10 each with cane  - modified quad closed chain WB into L Uewhile R UE crossed midline and then reach away from midline to place and remove squigz from mirror     Patient Education and Home Exercises     Education provided:   - HEP  - Progress towards goals     Written Home Exercises Provided: yes.  Exercises were reviewed and Dionicio was able to demonstrate them prior to the end of the session.  Dionicio demonstrated good  understanding of the home exercise program provided. See electronic medical record under Patient Instructions for exercises provided during therapy sessions.       Assessment     Dionicio tolerated session well but continues with poor HEP compliance despite ability to complete on his own. He does demo improved resting tone post interventions but carryover limited. He remains appropriate for skilled OT services.     Dionicio is progressing well towards his goals and there are no updates to goals at this time. Pt prognosis is Good.     Patient will continue to benefit from skilled outpatient occupational therapy to address the deficits listed in the problem list on initial evaluation provide patient/family education and to maximize patient's level of independence in the home and community environment.     Patient's spiritual, cultural and educational needs considered and patient agreeable to plan of care and goals.    Anticipated barriers to occupational therapy: chronic condition     Goals:  Short Term Goals: 6 weeks   - Pt. Will be edu in self PROM program and be able to complete with visual cues MET  - Pt. Will have improved L UE AROM of at least 10 degrees in all measured planes of movement including wrist for improved functional arm use progressing  - Pt. Will be able to utilize L UE as a support when completing simple home care tasks ongoing  - Pt. Will be fitted for a night time resting hand splint and utilize nightly ongoing  - Pt. Will have improve PROM of at  least 20 degrees in all measured planes of movement including wrist for tone management progressing  - Pt. Will be able to bathe himself fully with AD such as a long handled sponge     Long Term Goals: 12 weeks   - Pt. Will complete HEP daily to help manage tone per patient report ongoing  - Pt. Will have improved L UE AROM of at least 20 degrees in all measured planes of movement including wrist for improved functional arm use ongoing  - Pt. 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 Will be able to utilize L UE/hand to open/close doors at least 50% of the time for forced L UE activity ongoing  - Pt. Will be able to utilize L Ue as helper during UB and LB dressing ongoing     More goals to be made as appropriate within POC as needed     Plan   Certification Period/Plan of care expiration: 2/1/2024 to 5/3/24.     Outpatient Occupational Therapy 2 times weekly for 12 weeks to include the following interventions: Electrical Stimulation of L UE, Fluidotherapy, Manual Therapy, Moist Heat/ Ice, Neuromuscular Re-ed, Orthotic Management and Training, Paraffin, Patient Education, Self Care, Therapeutic Activities, and Therapeutic Exercise.    Updates/Grading for next session: progress into WB on 1/2 [facility]ues   3/28/2024

## 2024-03-28 NOTE — PROGRESS NOTES
Outpatient Care Management   - Care Plan Follow Up    Patient: Dionicio Bess  MRN:  [id number]  Date of Service:  3/28/2024  Completed by:  Sharon Almazan LCSW  Referral Date: 02/01/2024    Reason for Visit   Patient presents with    OPCM SW Follow Up Call       Brief Summary:  met with Pt to report that [employer] reported yesterday that the order for the hand splint looks good, they are pending auth but should call today vs Monday to schedule the fitting. Also provided some additional agencies found that help with housing. Pt reported he will go to [employer] today to get assistance.     Future Appointments   Date Time Provider Department Center   4/1/2024  8:45 AM Sami Gibson, PTA [facility]   4/1/2024  9:30 AM Vin Brown, LOTR [facility]   4/4/2024  9:30 AM SilVin cruz A, LOTR [facility]   4/8/2024 10:00 AM Mariah Bustillos NP [id number] [place]   4/12/2024  9:30 AM Carson Ruelas, PT [facility]   4/16/2024  9:30 AM Carson Ruelas, PT [facility]   4/23/2024  8:45 AM Carson Ruelas, PT [facility]   4/23/2024  9:30 AM SilVin cruz, LOTR [facility]   4/25/2024  9:30 AM Vin Brown, LOTR [facility]   4/30/2024  8:45 AM Carson Ruelas, PT [facility]   4/30/2024  9:30 AM Vin Brown, LOTR [facility]   5/2/2024  9:30 AM Vin Brown, LOTR [facility]   5/6/2024 11:00 AM Rox Mercedes PA-C [facility] [place] [facility]     Sharon Almazan LCSW  Neuro Therapy   [facility]  [phone]

## 2024-04-01 ENCOUNTER — CLINICAL SUPPORT (OUTPATIENT)
Dept: REHABILITATION | Facility: HOSPITAL | Age: 59
End: 2024-04-01
Payer: MEDICAID

## 2024-04-01 DIAGNOSIS — M62.89 MUSCLE TONE INCREASED: Primary | ICD-10-CM

## 2024-04-01 DIAGNOSIS — R29.898 DECREASED GRIP STRENGTH OF LEFT HAND: ICD-10-CM

## 2024-04-01 DIAGNOSIS — M25.642 DECREASED RANGE OF MOTION OF FINGER OF LEFT HAND: ICD-10-CM

## 2024-04-01 DIAGNOSIS — M62.89 MUSCLE TONE INCREASED: ICD-10-CM

## 2024-04-01 DIAGNOSIS — Z74.09 IMPAIRED MOBILITY AND ADLS: Primary | ICD-10-CM

## 2024-04-01 DIAGNOSIS — Z78.9 ALTERATION IN INSTRUMENTAL ACTIVITIES OF DAILY LIVING (IADL): ICD-10-CM

## 2024-04-01 DIAGNOSIS — Z74.09 IMPAIRED FUNCTIONAL MOBILITY, BALANCE, GAIT, AND ENDURANCE: ICD-10-CM

## 2024-04-01 DIAGNOSIS — Z78.9 IMPAIRED MOBILITY AND ADLS: Primary | ICD-10-CM

## 2024-04-01 PROCEDURE — 97530 THERAPEUTIC ACTIVITIES: CPT | Mod: PO

## 2024-04-01 PROCEDURE — 97110 THERAPEUTIC EXERCISES: CPT | Mod: PO

## 2024-04-01 NOTE — PLAN OF CARE
"[facility]   Physical Therapy Treatment Note / Plan of Care Update     Name: Dionicio Bess  Clinic Number: [id number]    Therapy Diagnosis:  Encounter Diagnoses   Name Primary?    Muscle tone increased Yes    Impaired functional mobility, balance, gait, and endurance          Physician: Rox Mercedes PA-C    Visit Date: 4/1/2024      Physician Orders: PT Eval and Treat   Medical Diagnosis from Referral: Stroke due to embolism of right middle cerebral artery   Evaluation Date: 2/1/2024  Authorization Period Expiration: 4/5/24  Plan of Care Expiration: 4/29/24  Progress Note Due: 4/29/24   Visit # / Visits authorized: 6/ 16 ( plus eval)  FOTO: 2/3      Precautions: Standard, Fall, and s/p L ankle ORIF 1/5/24 NWB L LE ~6 weeks      Time In: 08:30    Time Out: 09:15    Total Billable Time: 45 minutes (Medicaid pt~ charge TE only)    PTA Visit #: 0/5       Subjective     Patient reports: "I walked a lot. I walk everyday."   He was semi- compliant with his HEP - "a little bit"   Response to previous treatment: felt good  Functional change: Ongoing    Pain: 5/10  Location: L ankle    Objective      Objective Measures updated on 4/1/24.    Lower Extremity Strength: tested sitting   Right LE 2/1/24- eval   2/29/24 4/1/24 Left LE  2/1/24- eval  2/29/24 4/1/24   Hip flexion:  4/5 4/5 4+/5 Hip flexion: 4-/5 4-/5 4/5   Knee extension: 4+/5 5/5 5/5 Knee extension: 4+/5 5/5 5/5   Knee flexion: 5/5 5/5 5/5 Knee flexion: 4/5 5/5 5/5   Ankle dorsiflexion:  4+/5 5/5 5/5 Ankle dorsiflexion: NT- pt in CAM boot AROM approaching neutral- no reistance provided  At least 3/5, no resistance provided   Ankle plantarflexion:  4+/5 5/5 5/5 Ankle plantarflexion: NT- pt in CAM boot AROM approaching ~30 degrees- no resistance provided  At least 3/5, no resistance provided   Hip abduction: 4+/5 5/5 5/5 Hip abduction: 4/5 4+/5 4/5   Hip adduction: 4+/5 5/5 5/5 Hip adduction 4+/5 5/5 4+/5   Hip extension: 4/5 4+/5 4+/5 Hip " "extension: NT- pt in CAM boot, NWB 4+/5 4+/5           2/1/24- Evaluation 2/29/24 4/1/2024   Single Limb Stance R LE NT  (<10 sec = HIGH FALL RISK) 8 seconds   (<10 sec = HIGH FALL RISK) 8 sec   5 times sit to stand 16 seconds, PRW placed anterior 12 seconds   no UE support  17 sec without arms;  L foot staggered in front   TUG 39 seconds with PRW~ cues to attempt to maintain NWB to L LE  SPO2: 94% 14 seconds   with SPC and WBAT to L LE 15 sec  with SPC and WBAT to L LE   Self selected walking speed (SSWS) 0.35 m/sec (6m/17s) with PRW, cues to attempt to maintain NWB to L LE 0.75 m/sec (6m/8s)   with SPC and WBAT to L LE 0.67 m/sec (6m/9s)   with SPC and WBAT to L LE      5x sit to stand normative information:   >12 sec= fall risk for general elderly  >16 sec= fall risk for Parkinson's disease  >10 sec= balance/vestibular dysfunction (<59 y/o)  >14.2 sec= balance/vestibular dysfunction (>59 y/o)  >12 sec= fall risk for CVA     MCTSIB:  1. Eyes Open/feet together/Firm: P, 30"  2. Eyes Closed/feet together/Firm: P, 30"  3. Eyes Open/feet together/Foam: P, 30"  4. Eyes Closed/feet together/Foam: P, 30" min sway    Functional Gait Assessment:   1. Gait on level surface =  1   (3) Normal: less than 5.5 sec, no A.D., no imbalance, normal gait pattern, deviates< 6in   (2) Mild impairment: 7-5.6 sec, uses A.D., mild gait deviations, or deviates 6-10 in   (1) Moderate impairment: > 7 sec, slow speed, imbalance, deviates 10-15 in.   (0) Severe impairment: needs assist, deviates >15 in, reach/touch wall  2. Change in Gait Speed = 2   (3) Normal: smooth change w/o loss of balance or gait deviation, deviates < 6 in, significant difference between speeds   (2) Mild impairment: changes speed, but demonstrates mild gait deviations, deviates 6-10 in, OR no deviations but unable to significantly speed, OR uses A.D.   (1) Moderate impairment: minor changes to speed, OR changes speed w/ significant deviations, deviates 10-15 in, OR  " Changes speed , but loses balance & recovers   (0) Severe impairment: cannot change speed, deviates >15 in, or loses balance & needs assist  3. Gait with horizontal head turns  = 1   (3) Normal: no change in gait, deviates <6 in   (2) Mild impairment: slight change in speed, deviates 6-10 in, OR uses A.D.   (1) Moderate impairment: moderate change in speed, deviates 10-15 in   (0) Severe impairment: severe disruption of gait, deviates >15in  4. Gait with vertical head turns = 2   (3) Normal: no change in gait, deviates <6 in   (2) Mild impairment: slight change in speed, deviates 6-10 in OR uses A.D.   (1) Moderate impairment: moderate change in speed, deviates 10-15 in   (0) Severe impairment: severe disruption of gait, deviates >15 in  5. Gait with pivot turns = 1   (3) Normal: performs safely in 3 sec, no LOB   (2) Mild impairment: performs in >3 sec & no LOB, OR turns safely & requires several steps to regain LOB   (1) Moderate impairment: turns slow, OR requires several small steps for balance following turn & stop   (0) Severe impairment: cannot turn safely, needs assist  6. Step over obstacle = 2   (3) Normal: steps over 2 stacked boxes w/o change in speed or LOB   (2) Mild impairment: able to step over 1 box w/o change in speed or LOB   (1) Moderate impairment: steps over 1 box but must slow down, may require VC   (0) Severe impairment: cannot perform w/o assist  7. Gait with Narrow PRIYANK = 1   (3) Normal: 10 steps no staggering   (2) Mild impairment: 7-9 steps   (1) Moderate impairment: 4-7 steps   (0) Severe impairment: < 4 steps or cannot perform w/o assist  8. Gait with eyes closed = 1   (3) Normal: < 7 sec, no A.D., no LOB, normal gait pattern, deviates <6 in   (2) Mild impairment: 7.1-9 sec, mild gait deviations, deviates 6-10 in   (1) Moderate impairment: > 9 sec, abnormal pattern, LOB, deviates 10-15 in   (0) Severe impairment: cannot perform w/o assist, LOB, deviates >15in  9. Ambulating Backwards =  1   (3) Normal: no A.D., no LOB, normal gait pattern, deviates <6in   (2) Mild impairment: uses A.D., slower speed, mild gait deviations, deviates 6-10 in   (1) Moderate impairment: slow speed, abnormal gait pattern, LOB, deviates 10-15 in   (0) Severe impairment: severe gait deviations or LOB, deviates >15in  10. Steps = 1   (3) Normal: alternating feet, no rail   (2) Mild Impairment: alternating feet, uses rail   (1) Moderate impairment: step-to, uses rail   (0) Severe impairment: cannot perform safely    Score 13/30     Score:   <22/30 fall risk   <20/30 fall risk in older adults   <18/30 fall risk in Parkinsons     Treatment     Dionicio received the treatments listed below:      therapeutic exercises to develop strength, endurance, ROM, flexibility, posture, and core stabilization for 45 minutes including:    Time includes objective testing as above.     SciFit stepper R UE / BLE at [place] L3 for x8 minutes     Supine:  2 x 10 reps of L LE SAQ with 5 # ankle weight   2 x 10 reps of L LE AA heel slides with 5 # ankle weight  2 x 10 reps of L LE AA hip abduction/adduction with 5 # ankle weight   2 x 10 reps of L LE AA SLR with 5 # ankle weight   2 x 10 reps of B LE active ankle pumps, distal legs rest on bolster      Patient Education and Home Exercises       Education provided:   - home exercise program   - Reviewed necessity of NWB compliance    Written Home Exercises Provided: Patient instructed to cont prior HEP. Exercises were reviewed and Dionicio was able to demonstrate them prior to the end of the session.  Dionicio demonstrated good  understanding of the education provided. See Electronic Medical Record under Patient Instructions for exercises provided during therapy sessions    Assessment     Dionicio Bess demonstrates fluctuating improvements in functional mobility today as he had some increases in lower extremity strength but increased difficulty with gait activities, standing balance, and functional  transfers. Today's testing may have been limited by patient's increased somnolence during session as he reported poor sleep and frequently was closing his eyes to rest as well as different PT performing measurements. He would continue to benefit from skilled PT to address remaining deficits for at least another 4 weeks.     Dionicio Is progressing well towards his goals.   Patient prognosis is Good.     Patient will continue to benefit from skilled outpatient physical therapy to address the deficits listed in the problem list box on initial evaluation, provide pt/family education and to maximize pt's level of independence in the home and community environment.     Patient's spiritual, cultural and educational needs considered and pt agreeable to plan of care and goals.     Anticipated barriers to physical therapy:  co-morbidities, transportation, L LE weightbearing precautions, pt's compliance with L LE weightbearing precautions     Goals:   Short Term Goals: 4 weeks   1. Pt will be issued first installment of HEP and report at least partial compliance- MET 2/29/24  2. Pt will improve TUG time to 36 seconds or less in order to reach MCID and demonstrate decreased fall risk and improved household ambulation ability.- MET 2/29/24  3. Pt will increase SSWS to 0.40 m/s or greater in order to improve community navigation and decrease fall risk.- MET 2/29/24  4. Pt will improve 5 x sit<>stand time to 14 seconds or less in order to demonstrate improved muscular endurance.- MET 2/29/24     Long Term Goals: 8 weeks   1. Pt will be at least partially compliant with finalized HEP to maintain any potential gains realized in PT.- MET 4/1/24  2. Pt will improve TUG time to 33 seconds or less in order to demonstrate decreased fall risk and improved household ambulation ability. - MET 2/29/24  Pt will improve TUG time to 12 seconds or less in order to demonstrate decreased fall risk and improved household ambulation ability. Ongoing    3. Pt will increase SSWS to 0.46 m/s or greater in order to improve community navigation and decrease fall risk.- MET 2/29/24   Pt will increase SSWS to 0.86 m/s or greater with LRAD in order to improve community navigation and decrease fall risk. Ongoing   4. Pt will improve 5 x sit<>stand time to 12 seconds or less in order to demonstrate improved muscular endurance and score below the fall risk cut off in CVA populations.- MET 2/29/24  5. Pt will improve B hip flexion strength to at least 4+/5 in order to improve efficiency with functional mobility tasks. Ongoing   6. Pt will improve R SLS time to 10 seconds or more to decrease fall risk and demonstrate improved SLS balance. Ongoing   7. Pt will improve FGA score to at least 19/30 in order to demonstrate improved dynamic balance and decreased fall risk. Ongoing        Plan     Plan of care Certification: 4/1/2024 to 4/29/24.     Outpatient Physical Therapy 1 - 2 times weekly for 8 weeks to include the following interventions: Gait Training, Neuromuscular Re-ed, Patient Education, Therapeutic Activities, and Therapeutic Exercise.     Continue to progress B LE strength and endurance. Focus on static and dynamic balance in future sessions.      Clari Henley, PT

## 2024-04-01 NOTE — PROGRESS NOTES
"See PT updated POC in plan of care.    Clari Henley, PT, DPT, NCS    [facility]   Physical Therapy Treatment Note / Plan of Care Update     Name: Dionicio Bess  Clinic Number: [id number]    Therapy Diagnosis:  Encounter Diagnoses   Name Primary?    Muscle tone increased Yes    Impaired functional mobility, balance, gait, and endurance          Physician: Rox Mercedes PA-C    Visit Date: 4/1/2024      Physician Orders: PT Eval and Treat   Medical Diagnosis from Referral: Stroke due to embolism of right middle cerebral artery   Evaluation Date: 2/1/2024  Authorization Period Expiration: 4/5/24  Plan of Care Expiration: 4/29/24  Progress Note Due: 4/29/24   Visit # / Visits authorized: 6/ 16 ( plus eval)  FOTO: 2/3      Precautions: Standard, Fall, and s/p L ankle ORIF 1/5/24 NWB L LE ~6 weeks      Time In: 08:30    Time Out: 09:15    Total Billable Time: 45 minutes (Medicaid pt~ charge TE only)    PTA Visit #: 0/5       Subjective     Patient reports: "I walked a lot. I walk everyday."   He was semi- compliant with his HEP - "a little bit"   Response to previous treatment: felt good  Functional change: Ongoing    Pain: 5/10  Location: L ankle    Objective      Objective Measures updated on 4/1/24.    Lower Extremity Strength: tested sitting   Right LE 2/1/24- eval   2/29/24 4/1/24 Left LE  2/1/24- eval  2/29/24 4/1/24   Hip flexion:  4/5 4/5 4+/5 Hip flexion: 4-/5 4-/5 4/5   Knee extension: 4+/5 5/5 5/5 Knee extension: 4+/5 5/5 5/5   Knee flexion: 5/5 5/5 5/5 Knee flexion: 4/5 5/5 5/5   Ankle dorsiflexion:  4+/5 5/5 5/5 Ankle dorsiflexion: NT- pt in CAM boot AROM approaching neutral- no reistance provided  At least 3/5, no resistance provided   Ankle plantarflexion:  4+/5 5/5 5/5 Ankle plantarflexion: NT- pt in CAM boot AROM approaching ~30 degrees- no resistance provided  At least 3/5, no resistance provided   Hip abduction: 4+/5 5/5 5/5 Hip abduction: 4/5 4+/5 4/5   Hip adduction: 4+/5 " "5/5 5/5 Hip adduction 4+/5 5/5 4+/5   Hip extension: 4/5 4+/5 4+/5 Hip extension: NT- pt in CAM boot, NWB 4+/5 4+/5           2/1/24- Evaluation 2/29/24 4/1/2024   Single Limb Stance R LE NT  (<10 sec = HIGH FALL RISK) 8 seconds   (<10 sec = HIGH FALL RISK) 8 sec   5 times sit to stand 16 seconds, PRW placed anterior 12 seconds   no UE support  17 sec without arms;  L foot staggered in front   TUG 39 seconds with PRW~ cues to attempt to maintain NWB to L LE  SPO2: 94% 14 seconds   with SPC and WBAT to L LE 15 sec  with SPC and WBAT to L LE   Self selected walking speed (SSWS) 0.35 m/sec (6m/17s) with PRW, cues to attempt to maintain NWB to L LE 0.75 m/sec (6m/8s)   with SPC and WBAT to L LE 0.67 m/sec (6m/9s)   with SPC and WBAT to L LE      5x sit to stand normative information:   >12 sec= fall risk for general elderly  >16 sec= fall risk for Parkinson's disease  >10 sec= balance/vestibular dysfunction (<61 y/o)  >14.2 sec= balance/vestibular dysfunction (>61 y/o)  >12 sec= fall risk for CVA     MCTSIB:  1. Eyes Open/feet together/Firm: P, 30"  2. Eyes Closed/feet together/Firm: P, 30"  3. Eyes Open/feet together/Foam: P, 30"  4. Eyes Closed/feet together/Foam: P, 30" min sway    Functional Gait Assessment:   1. Gait on level surface =  1   (3) Normal: less than 5.5 sec, no A.D., no imbalance, normal gait pattern, deviates< 6in   (2) Mild impairment: 7-5.6 sec, uses A.D., mild gait deviations, or deviates 6-10 in   (1) Moderate impairment: > 7 sec, slow speed, imbalance, deviates 10-15 in.   (0) Severe impairment: needs assist, deviates >15 in, reach/touch wall  2. Change in Gait Speed = 2   (3) Normal: smooth change w/o loss of balance or gait deviation, deviates < 6 in, significant difference between speeds   (2) Mild impairment: changes speed, but demonstrates mild gait deviations, deviates 6-10 in, OR no deviations but unable to significantly speed, OR uses A.D.   (1) Moderate impairment: minor changes to " speed, OR changes speed w/ significant deviations, deviates 10-15 in, OR  Changes speed , but loses balance & recovers   (0) Severe impairment: cannot change speed, deviates >15 in, or loses balance & needs assist  3. Gait with horizontal head turns  = 1   (3) Normal: no change in gait, deviates <6 in   (2) Mild impairment: slight change in speed, deviates 6-10 in, OR uses A.D.   (1) Moderate impairment: moderate change in speed, deviates 10-15 in   (0) Severe impairment: severe disruption of gait, deviates >15in  4. Gait with vertical head turns = 2   (3) Normal: no change in gait, deviates <6 in   (2) Mild impairment: slight change in speed, deviates 6-10 in OR uses A.D.   (1) Moderate impairment: moderate change in speed, deviates 10-15 in   (0) Severe impairment: severe disruption of gait, deviates >15 in  5. Gait with pivot turns = 1   (3) Normal: performs safely in 3 sec, no LOB   (2) Mild impairment: performs in >3 sec & no LOB, OR turns safely & requires several steps to regain LOB   (1) Moderate impairment: turns slow, OR requires several small steps for balance following turn & stop   (0) Severe impairment: cannot turn safely, needs assist  6. Step over obstacle = 2   (3) Normal: steps over 2 stacked boxes w/o change in speed or LOB   (2) Mild impairment: able to step over 1 box w/o change in speed or LOB   (1) Moderate impairment: steps over 1 box but must slow down, may require VC   (0) Severe impairment: cannot perform w/o assist  7. Gait with Narrow PRIYANK = 1   (3) Normal: 10 steps no staggering   (2) Mild impairment: 7-9 steps   (1) Moderate impairment: 4-7 steps   (0) Severe impairment: < 4 steps or cannot perform w/o assist  8. Gait with eyes closed = 1   (3) Normal: < 7 sec, no A.D., no LOB, normal gait pattern, deviates <6 in   (2) Mild impairment: 7.1-9 sec, mild gait deviations, deviates 6-10 in   (1) Moderate impairment: > 9 sec, abnormal pattern, LOB, deviates 10-15 in   (0) Severe impairment:  cannot perform w/o assist, LOB, deviates >15in  9. Ambulating Backwards = 1   (3) Normal: no A.D., no LOB, normal gait pattern, deviates <6in   (2) Mild impairment: uses A.D., slower speed, mild gait deviations, deviates 6-10 in   (1) Moderate impairment: slow speed, abnormal gait pattern, LOB, deviates 10-15 in   (0) Severe impairment: severe gait deviations or LOB, deviates >15in  10. Steps = 1   (3) Normal: alternating feet, no rail   (2) Mild Impairment: alternating feet, uses rail   (1) Moderate impairment: step-to, uses rail   (0) Severe impairment: cannot perform safely    Score 13/30     Score:   <22/30 fall risk   <20/30 fall risk in older adults   <18/30 fall risk in Parkinsons     Treatment     Dionicio received the treatments listed below:      therapeutic exercises to develop strength, endurance, ROM, flexibility, posture, and core stabilization for 45 minutes including:    Time includes objective testing as above.     SciFit stepper R UE / BLE at [place] L3 for x8 minutes     Supine:  2 x 10 reps of L LE SAQ with 5 # ankle weight   2 x 10 reps of L LE AA heel slides with 5 # ankle weight  2 x 10 reps of L LE AA hip abduction/adduction with 5 # ankle weight   2 x 10 reps of L LE AA SLR with 5 # ankle weight   2 x 10 reps of B LE active ankle pumps, distal legs rest on bolster      Patient Education and Home Exercises       Education provided:   - home exercise program   - Reviewed necessity of NWB compliance    Written Home Exercises Provided: Patient instructed to cont prior HEP. Exercises were reviewed and Dionicio was able to demonstrate them prior to the end of the session.  Dionicio demonstrated good  understanding of the education provided. See Electronic Medical Record under Patient Instructions for exercises provided during therapy sessions    Assessment     Dionicio Bess demonstrates fluctuating improvements in functional mobility today as he had some increases in lower extremity strength but increased  difficulty with gait activities, standing balance, and functional transfers. Today's testing may have been limited by patient's increased somnolence during session as he reported poor sleep and frequently was closing his eyes to rest as well as different PT performing measurements. He would continue to benefit from skilled PT to address remaining deficits for at least another 4 weeks.     Dionicio Is progressing well towards his goals.   Patient prognosis is Good.     Patient will continue to benefit from skilled outpatient physical therapy to address the deficits listed in the problem list box on initial evaluation, provide pt/family education and to maximize pt's level of independence in the home and community environment.     Patient's spiritual, cultural and educational needs considered and pt agreeable to plan of care and goals.     Anticipated barriers to physical therapy:  co-morbidities, transportation, L LE weightbearing precautions, pt's compliance with L LE weightbearing precautions     Goals:   Short Term Goals: 4 weeks   1. Pt will be issued first installment of HEP and report at least partial compliance- MET 2/29/24  2. Pt will improve TUG time to 36 seconds or less in order to reach MCID and demonstrate decreased fall risk and improved household ambulation ability.- MET 2/29/24  3. Pt will increase SSWS to 0.40 m/s or greater in order to improve community navigation and decrease fall risk.- MET 2/29/24  4. Pt will improve 5 x sit<>stand time to 14 seconds or less in order to demonstrate improved muscular endurance.- MET 2/29/24     Long Term Goals: 8 weeks   1. Pt will be at least partially compliant with finalized HEP to maintain any potential gains realized in PT.- MET 4/1/24  2. Pt will improve TUG time to 33 seconds or less in order to demonstrate decreased fall risk and improved household ambulation ability. - MET 2/29/24  Pt will improve TUG time to 12 seconds or less in order to demonstrate  decreased fall risk and improved household ambulation ability. Ongoing   3. Pt will increase SSWS to 0.46 m/s or greater in order to improve community navigation and decrease fall risk.- MET 2/29/24   Pt will increase SSWS to 0.86 m/s or greater with LRAD in order to improve community navigation and decrease fall risk. Ongoing   4. Pt will improve 5 x sit<>stand time to 12 seconds or less in order to demonstrate improved muscular endurance and score below the fall risk cut off in CVA populations.- MET 2/29/24  5. Pt will improve B hip flexion strength to at least 4+/5 in order to improve efficiency with functional mobility tasks. Ongoing   6. Pt will improve R SLS time to 10 seconds or more to decrease fall risk and demonstrate improved SLS balance. Ongoing   7. Pt will improve FGA score to at least 19/30 in order to demonstrate improved dynamic balance and decreased fall risk. Ongoing        Plan     Plan of care Certification: 4/1/2024 to 4/29/24.     Outpatient Physical Therapy 1 - 2 times weekly for 8 weeks to include the following interventions: Gait Training, Neuromuscular Re-ed, Patient Education, Therapeutic Activities, and Therapeutic Exercise.     Continue to progress B LE strength and endurance. Focus on static and dynamic balance in future sessions.      Clari Henley, PT

## 2024-04-01 NOTE — PROGRESS NOTES
"  [facility]  Occupational Therapy Treatment Note     Date: 4/1/2024  Name: Dionicio Bess  Clinic Number: [id number]    Therapy Diagnosis:   Encounter Diagnoses   Name Primary?    Impaired mobility and ADLs Yes    Alteration in instrumental activities of daily living (IADL)     Decreased range of motion of finger of left hand     Decreased  strength of left hand     Muscle tone increased      Physician: Rox Mercedes PA-C    Physician Orders: Eval and Treat (Hand Therapy)  Comment - 2-3 x per wek x 6weeks  Medical Diagnosis: I63.411 (ICD-10-CM) - Stroke due to embolism of right middle cerebral artery  Evaluation Date: 2/1/2024  Insurance Authorization Period Expiration: 5/6/24  Plan of Care Certification Period: 5/3/24  Date of Return to MD: 2/19/2024: Ortho  FOTO: 1/ 2      Visit # / Visits authorized:  9 / 20     Precautions:  Standard and Fall; LLE non weight bearing     Time In: 9:30 am  Time Out: 10:15 am    Total Billable Time: 45 minutes      Subjective     Patient reports: "not at peace" that is why he is tired  Response to previous treatment: positive  Functional change:  ongoing    Pain: no verbal score this date  Location:  L UE with PROM     Objective     Objective Measures updated at progress report unless specified. 3/12/24    Treatment       Dionicio received the treatments listed below:      therapeutic activities to improve functional performance for 45 minutes, including:  - seated UBE res 2.0 x 10 min with reversal half way, focus on forced exertion and to keep RPM above 30, L hand secured  - L general wrist stretches including metacarpal spreads, carpal rolls, increasing mobility towards radial/ulnar deviation, increasing mobility of wrist towards extension/flexion, Increasing mobility of wrist towards supination/pronation. PROM of fingers to encourage extension.  - AAROM scap mobs elevation and retraction x 10 each with 5 sec holds  - AAROM shoulder flex and elbow ext 3 " x 10 each with 3# dowel  - side lying L UE AROM in shoulder flex, elbow ext, and clockwise x 2 min each      Patient Education and Home Exercises     Education provided:   - HEP  - Progress towards goals     Written Home Exercises Provided: yes.  Exercises were reviewed and Dionicio was able to demonstrate them prior to the end of the session.  Dionicio demonstrated good  understanding of the home exercise program provided. See electronic medical record under Patient Instructions for exercises provided during therapy sessions.       Assessment     Dionicio tolerated session well but continues with increased L hand tone. Despite this, his L UE proximally is demonstrating gross improvements. Especially in a gravity modified plane. HEP compliance limited 2/2 to disturbed sleep and lifestyle choices. However, he continues to show potential to improve. He remains appropriate for skilled OT services.     Dionicio is progressing well towards his goals and there are no updates to goals at this time. Pt prognosis is Good.     Patient will continue to benefit from skilled outpatient occupational therapy to address the deficits listed in the problem list on initial evaluation provide patient/family education and to maximize patient's level of independence in the home and community environment.     Patient's spiritual, cultural and educational needs considered and patient agreeable to plan of care and goals.    Anticipated barriers to occupational therapy: chronic condition     Goals:  Short Term Goals: 6 weeks   - Pt. Will be edu in self PROM program and be able to complete with visual cues MET  - Pt. Will have improved L UE AROM of at least 10 degrees in all measured planes of movement including wrist for improved functional arm use progressing  - Pt. Will be able to utilize L UE as a support when completing simple home care tasks ongoing  - Pt. Will be fitted for a night time resting hand splint and utilize nightly ongoing  - Pt. Will have  improve PROM of at least 20 degrees in all measured planes of movement including wrist for tone management progressing  - Pt. Will be able to bathe himself fully with AD such as a long handled sponge     Long Term Goals: 12 weeks   - Pt. Will complete HEP daily to help manage tone per patient report ongoing  - Pt. Will have improved L UE AROM of at least 20 degrees in all measured planes of movement including wrist for improved functional arm use ongoing  - Pt. 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 Will be able to utilize L UE/hand to open/close doors at least 50% of the time for forced L UE activity ongoing  - Pt. Will be able to utilize L Ue as helper during UB and LB dressing ongoing     More goals to be made as appropriate within POC as needed     Plan   Certification Period/Plan of care expiration: 2/1/2024 to 5/3/24.     Outpatient Occupational Therapy 2 times weekly for 12 weeks to include the following interventions: Electrical Stimulation of L UE, Fluidotherapy, Manual Therapy, Moist Heat/ Ice, Neuromuscular Re-ed, Orthotic Management and Training, Paraffin, Patient Education, Self Care, Therapeutic Activities, and Therapeutic Exercise.    Updates/Grading for next session: progress into WB on 1/2 SIERRA Rodrigues   4/1/2024

## 2024-04-10 RX ORDER — PANTOPRAZOLE SODIUM 20 MG/1
20 TABLET, DELAYED RELEASE ORAL EVERY MORNING
Qty: 30 TABLET | Refills: 2 | Status: CANCELLED | OUTPATIENT
Start: 2024-04-10 | End: 2024-07-09

## 2024-04-11 ENCOUNTER — OUTPATIENT CASE MANAGEMENT (OUTPATIENT)
Dept: ADMINISTRATIVE | Facility: OTHER | Age: 59
End: 2024-04-11
Payer: MEDICAID

## 2024-04-12 ENCOUNTER — DOCUMENTATION ONLY (OUTPATIENT)
Dept: REHABILITATION | Facility: HOSPITAL | Age: 59
End: 2024-04-12
Payer: MEDICAID

## 2024-04-12 RX ORDER — PANTOPRAZOLE SODIUM 20 MG/1
20 TABLET, DELAYED RELEASE ORAL EVERY MORNING
Qty: 30 TABLET | Refills: 2 | Status: CANCELLED | OUTPATIENT
Start: 2024-04-10 | End: 2024-07-09

## 2024-04-12 NOTE — PROGRESS NOTES
Outpatient Care Management   - Care Plan Follow Up    Patient: Dionicio Bess  MRN:  [id number]  Date of Service:  4/12/2024  Completed by:  Sharon Almazan LCSW  Referral Date: 02/01/2024    Reason for Visit   Patient presents with    OPCM SW Follow Up Call       Brief Summary:  observed Pt is not present for his appt today. Called Pt and left message. Received call back from Pt reporting he was not aware he had an appt today. He moved out of his cousin's home and is staying with a friend. He did not get his calendar from her before he left and now she is in the hospital. Pt advised he has a car, and will attend the next appt on Tuesday. Obtained his new address and updated his chart.     Future Appointments   Date Time Provider Department Center   4/16/2024  9:30 AM Carson Ruelas, PT [facility]   4/23/2024  8:45 AM Carson Ruelas, PT [facility]   4/23/2024  9:30 AM Vin Brown, [facility]   4/25/2024  9:30 AM Vin Brown, [facility]   4/30/2024  8:45 AM Carson Ruelas, PT [facility]   4/30/2024  9:30 AM Vin Brown, [facility]   5/2/2024  9:30 AM Vin Brown, [facility]   5/6/2024 11:00 AM Rox Mercedes PADarinC [facility] Tan Camille Orkali Almazan LCSW  Neuro Therapy   [facility]  [phone]

## 2024-04-12 NOTE — PROGRESS NOTES
Pt did not attend today's scheduled therapy session at 9:30 AM.  spoke with pt today and he apparently has moved. He did report that he now has a car and can drive himself to future appointments.  reminded pt of his next appointment on 4/16/24. No charges posted today.    Carson Ruelas, PT  4/12/2024

## 2024-04-13 RX ORDER — PANTOPRAZOLE SODIUM 20 MG/1
20 TABLET, DELAYED RELEASE ORAL EVERY MORNING
Qty: 30 TABLET | Refills: 2 | OUTPATIENT
Start: 2024-04-13 | End: 2024-07-12

## 2024-04-16 ENCOUNTER — DOCUMENTATION ONLY (OUTPATIENT)
Dept: REHABILITATION | Facility: HOSPITAL | Age: 59
End: 2024-04-16
Payer: MEDICAID

## 2024-04-16 ENCOUNTER — OUTPATIENT CASE MANAGEMENT (OUTPATIENT)
Dept: ADMINISTRATIVE | Facility: OTHER | Age: 59
End: 2024-04-16
Payer: MEDICAID

## 2024-04-16 NOTE — PROGRESS NOTES
"Outpatient Care Management   - Care Plan Follow Up    Patient: Dionicio Bess  MRN:  [id number]  Date of Service:  4/16/2024  Completed by:  Sharon Almazan LCSW  Referral Date: 02/01/2024    Reason for Visit   Patient presents with    OPCM SW Follow Up Call       Brief Summary:  observed Pt is not present for his appt today. Called Pt to discuss. He reported his car "would not crank up" and that he has someone looking at it. Discussed that there is a long waiting list of Pt's that need therapy and that it is important for him to come to appts or cancel if he knows he is unable to come. Reported he has three no show allowances before being taken off the schedule. Pt reported he will be at the next appt. Discussed transportation options and utilizing his medicaid transport just in case he has car issues. Especially if he had previously reported he is not yet supposed to be driving the car.      Future Appointments   Date Time Provider Department Center   4/23/2024  8:45 AM Carson Ruelas, PT [facility]   4/23/2024  9:30 AM Vin Brown, MIKELR [facility]   4/25/2024  9:30 AM Vin Brown, MIKELR [facility]   4/30/2024  8:45 AM Carson Ruelas, PT [facility]   4/30/2024  9:30 AM Vin Brown [facility]   5/2/2024  9:30 AM Vin Brown [facility]   5/6/2024 11:00 AM Rox Mercedes PA-C [facility]THO Tan Obrien Orkali Almazan LCSW  Neuro Therapy   [facility]  [phone]    "

## 2024-04-16 NOTE — PROGRESS NOTES
Dionicio did not attend his physical therapy session today at 9:30 AM.  called him and he apparently could not start his car. Pt has 2 remaining visits and will be discharged after his 4/30/24 session. No charges posted to account today.      Carson Ruelas, PT  4/16/2024

## 2024-04-22 ENCOUNTER — OUTPATIENT CASE MANAGEMENT (OUTPATIENT)
Dept: ADMINISTRATIVE | Facility: OTHER | Age: 59
End: 2024-04-22
Payer: MEDICAID

## 2024-04-22 NOTE — PROGRESS NOTES
Outpatient Care Management   - Care Plan Follow Up    Patient: Dionicio Bess  MRN:  [id number]  Date of Service:  4/22/2024  Completed by:  Sharon Almazan LCSW  Referral Date: 02/01/2024    Reason for Visit   Patient presents with    OPCM SW Follow Up Call       Brief Summary: Contacted Pt to confirm he will attend tomorrow's therapy appointments.     Sharon Almazan LCSW  Neuro Therapy   [facility]  [phone]

## 2024-04-23 ENCOUNTER — DOCUMENTATION ONLY (OUTPATIENT)
Dept: REHABILITATION | Facility: HOSPITAL | Age: 59
End: 2024-04-23
Payer: MEDICAID

## 2024-04-23 ENCOUNTER — CLINICAL SUPPORT (OUTPATIENT)
Dept: REHABILITATION | Facility: HOSPITAL | Age: 59
End: 2024-04-23
Payer: MEDICAID

## 2024-04-23 DIAGNOSIS — Z78.9 ALTERATION IN INSTRUMENTAL ACTIVITIES OF DAILY LIVING (IADL): ICD-10-CM

## 2024-04-23 DIAGNOSIS — Z74.09 IMPAIRED MOBILITY AND ADLS: Primary | ICD-10-CM

## 2024-04-23 DIAGNOSIS — M25.642 DECREASED RANGE OF MOTION OF FINGER OF LEFT HAND: ICD-10-CM

## 2024-04-23 DIAGNOSIS — R29.898 DECREASED GRIP STRENGTH OF LEFT HAND: ICD-10-CM

## 2024-04-23 DIAGNOSIS — Z78.9 IMPAIRED MOBILITY AND ADLS: Primary | ICD-10-CM

## 2024-04-23 DIAGNOSIS — M62.89 MUSCLE TONE INCREASED: ICD-10-CM

## 2024-04-23 NOTE — PROGRESS NOTES
Dionicio arrived early for his PT and OT appointments today, and PT saw him in the waiting room. Before his scheduled therapy time, he left the clinic and attended neither appointment. Due to his poor attendance the past month, a clinic decision was made to discharge pt at this time.  phoned pt to make him aware. PT will do a formal discharge summary in another documentation only note.    Carson Ruelas, PT  4/23/2024

## 2024-04-23 NOTE — PROGRESS NOTES
Occupational Therapy / Hand Therapy Discharge Summary    Patient:  Dionicio Bess  :  1965  AGE:  58 y.o.  Clinic #:  [id number]   Date of Note: 2024   Referring Physician:  Rox Mercedes PA-C   Diagnosis:  I63.411 (ICD-10-CM) - Stroke due to embolism of right middle cerebral artery     Patient is a 58 y.o. male referred to Occupational Therapy by Rox Mercedes PA-C  with a referral for stroke.  Patient received initial evaluation on 24 and returned for 9 treatment sessions. Patient had 4 missed visits. Patient's treatment included TA.    Patient's therapy goals not formally Reassessed 2/2 missed visits    Short Term Goals: 6 weeks   - Pt. Will be edu in self PROM program and be able to complete with visual cues MET  - Pt. Will have improved L UE AROM of at least 10 degrees in all measured planes of movement including wrist for improved functional arm use progressing  - Pt. Will be able to utilize L UE as a support when completing simple home care tasks ongoing  - Pt. Will be fitted for a night time resting hand splint and utilize nightly ongoing  - Pt. Will have improve PROM of at least 20 degrees in all measured planes of movement including wrist for tone management progressing  - Pt. Will be able to bathe himself fully with AD such as a long handled sponge     Long Term Goals: 12 weeks   - Pt. Will complete HEP daily to help manage tone per patient report ongoing  - Pt. Will have improved L UE AROM of at least 20 degrees in all measured planes of movement including wrist for improved functional arm use ongoing  - Pt. 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 Will be able to utilize L UE/hand to open/close doors at least 50% of the time for forced L UE activity ongoing  - Pt. Will be able to utilize L Ue as helper during UB and LB dressing ongoing    Patient is D/C from outpatient occupational therapy secondary to  poor attendance.       SIERRA Iyer  04/23/2024

## 2024-04-23 NOTE — PROGRESS NOTES
OUTPATIENT PHYSICAL THERAPY DISCHARGE SUMMARY     Name: Dionicio Bess  Clinic Number: [id number]    Diagnosis: Stroke due to embolism of right middle cerebral artery    Physician: Rox Mercedes PA-C   Treatment Orders: PT Eval and Treat  Past Medical History:   Diagnosis Date    CAD (coronary artery disease)     HLD (hyperlipidemia)     HTN (hypertension)     Stroke     R MCA 2/2023       Initial visit: 2/1/24  Date of Last visit: 4/1/24  Date of Discharge Note:  4/23/24  Total Visits Received: 7  Missed Visits: multiple no-shows and cancels  ASSESSMENT   Status Towards Goals Met:      The below represents pt's most recent goal updates, as of his last attended therapy session on 4/1/24:    Goals:   Short Term Goals: 4 weeks   1. Pt will be issued first installment of HEP and report at least partial compliance- MET 2/29/24  2. Pt will improve TUG time to 36 seconds or less in order to reach MCID and demonstrate decreased fall risk and improved household ambulation ability.- MET 2/29/24  3. Pt will increase SSWS to 0.40 m/s or greater in order to improve community navigation and decrease fall risk.- MET 2/29/24  4. Pt will improve 5 x sit<>stand time to 14 seconds or less in order to demonstrate improved muscular endurance.- MET 2/29/24     Long Term Goals: 8 weeks   1. Pt will be at least partially compliant with finalized HEP to maintain any potential gains realized in PT.- MET 4/1/24  2. Pt will improve TUG time to 33 seconds or less in order to demonstrate decreased fall risk and improved household ambulation ability. - MET 2/29/24  Pt will improve TUG time to 12 seconds or less in order to demonstrate decreased fall risk and improved household ambulation ability. Ongoing   3. Pt will increase SSWS to 0.46 m/s or greater in order to improve community navigation and decrease fall risk.- MET 2/29/24              Pt will increase SSWS to 0.86 m/s or greater with LRAD in order to improve community navigation and  decrease fall risk. Ongoing   4. Pt will improve 5 x sit<>stand time to 12 seconds or less in order to demonstrate improved muscular endurance and score below the fall risk cut off in CVA populations.- MET 2/29/24  5. Pt will improve B hip flexion strength to at least 4+/5 in order to improve efficiency with functional mobility tasks. Ongoing   6. Pt will improve R SLS time to 10 seconds or more to decrease fall risk and demonstrate improved SLS balance. Ongoing   7. Pt will improve FGA score to at least 19/30 in order to demonstrate improved dynamic balance and decreased fall risk. Ongoing           Goals Not achieved and why:     Several long term goals not met due to poor compliance with attendance. Pt did not follow MD recommendations during the early portion of his therapy episode( with regard to weight bearing status to L LE). Pt has missed many sessions and today arrived early for his therapy and left, apparently because he did not want to wait.  has gone to great lengths to try and keep pt on schedule with his attendance, but he continues to miss visits. Clinic decision was made today to cancel any remaining future visits.        Discharge reason : Non-Compliance with attendance    PLAN   This patient is discharged from Outpatient Physical Therapy Services.     Carson Ruelas, PT  04/23/2024

## 2024-04-23 NOTE — PROGRESS NOTES
[facility]  Occupational Therapy Treatment/Reassessment Note     Date: 4/23/2024  Name: Dionicio Bess  Clinic Number: [id number]    Therapy Diagnosis:   Encounter Diagnoses   Name Primary?    Impaired mobility and ADLs Yes    Alteration in instrumental activities of daily living (IADL)     Decreased range of motion of finger of left hand     Decreased  strength of left hand     Muscle tone increased      Physician: Rox Mercedes PA-C    Physician Orders: Eval and Treat (Hand Therapy)  Comment - 2-3 x per wek x 6weeks  Medical Diagnosis: I63.411 (ICD-10-CM) - Stroke due to embolism of right middle cerebral artery  Evaluation Date: 2/1/2024  Insurance Authorization Period Expiration: 5/6/24  Plan of Care Certification Period: 5/3/24  Date of Return to MD: 2/19/2024: Ortho  FOTO: 1/ 2      Visit # / Visits authorized:  10 / 20     Precautions:  Standard and Fall; LLE non weight bearing     Time In: ***  Time Out: ***    Total Billable Time: 45 minutes      Subjective     Patient reports: ***  Response to previous treatment: positive  Functional change:  ongoing    Pain: no verbal score this date  Location:  L UE with PROM     Objective     Objective Measures updated at progress report unless specified. 3/12/24, 4/23/24    Cognitive Exam:  Oriented: Person, Place, Time, and Situation  Behaviors: normal, cooperative  Follows Commands/attention: Follows multistep  commands  Communication: dysarthria  Memory: no reported deficits   Safety awareness/insight to disability: SES limitations   Coping skills/emotional control: Appropriate to situation     Visual/Perceptual:  Acuity: has bifocals        Physical Exam:  Postural examination/scapula alignment: Rounded shoulder, Affected scapula depressed, and Slouched posture  Joint integrity: Firm end feeling  Skin integrity: warm/dry  Edema: none observed UEs   Palpation: TTP L generalized     R UE ARM WFL all planes, strength 4+/5 all planes         Joint Evaluation  AROM  2/1/2024 PROM   2/1/2024 AROM  3/12/24 PROM  3/12/24       Left  Left  Left Left     Shoulder flex 0-180 105 120 110 125     Shoulder Abd 0-180 90 105 110 120     Shoulder ER 0-90 50 65 65 80     Shoulder IR 0-90 WFL WNL WFL WNL     Shoulder Extension 0-80 70 WNL 65 WNL     Shoulder Horizontal adduction 0-90 -15 40 neutral 40     Elbow flex/ext 0-150 5-125 WFL  WFL     Wrist flex 0-80 neutral 20 22 35     Wrist ext 0-70 45 60 50 70     Supination 0-80 20 20 60 60     Pronation 0-80 WFL WFL WFL WFL        Fist: tight        Strength 3/12/24    **within available ROM** Left     Shoulder flex 3-/5    Shoulder abd 3-/5    Shoulder ER 3/5    Shoulder IR 3/5    Shoulder Extension 4+/5    Shoulder Horizontal adduction 3/5    Elbow flex 4/5    Elbow ext 4-/5    Wrist flex 3-/5    Wrist ext 3/5    Supination 3-/5    Pronation 4/5          Gross motor coordination:   RICARDO (Rapid Alternating Movements): unable L  Finger to Nose (5 times): unable L  Finger Flicks (coordination moving from digit flexion to digit extension): unable L     Tone:   Modified Ilda Scale:   3 Considerable increase in muscle tone, passive movement difficult in L forearm/hand     Sensation:  Dionicio  reports tingling L arm        Balance:   Static Sit - GOOD+: Takes MAXIMAL challenges from all directions.    Dynamic sit- GOOD+: Takes MAXIMAL challenges from all directions.    Static Stand - see PT  Dynamic stand - see PT     Endurance Deficit: mild             Treatment       Dionicio received the treatments listed below:      therapeutic activities to improve functional performance for 45 minutes, including:  - seated UBE res 2.0 x 10 min with reversal half way, focus on forced exertion and to keep RPM above 30, L hand secured  - L general wrist stretches including metacarpal spreads, carpal rolls, increasing mobility towards radial/ulnar deviation, increasing mobility of wrist towards extension/flexion, Increasing mobility  of wrist towards supination/pronation. PROM of fingers to encourage extension.  - AAROM scap mobs elevation and retraction x 10 each with 5 sec holds  - AAROM shoulder flex and elbow ext 3 x 10 each with 3# dowel  - side lying L UE AROM in shoulder flex, elbow ext, and clockwise x 2 min each      Patient Education and Home Exercises     Education provided:   - HEP  - Progress towards goals     Written Home Exercises Provided: yes.  Exercises were reviewed and Dionicio was able to demonstrate them prior to the end of the session.  Dionicio demonstrated good  understanding of the home exercise program provided. See electronic medical record under Patient Instructions for exercises provided during therapy sessions.       Assessment     ***    Dionicio tolerated session well but continues with increased L hand tone. Despite this, his L UE proximally is demonstrating gross improvements. Especially in a gravity modified plane. HEP compliance limited 2/2 to disturbed sleep and lifestyle choices. However, he continues to show potential to improve. He remains appropriate for skilled OT services.     Dionicio is progressing well towards his goals and there are no updates to goals at this time. Pt prognosis is Good.     Patient will continue to benefit from skilled outpatient occupational therapy to address the deficits listed in the problem list on initial evaluation provide patient/family education and to maximize patient's level of independence in the home and community environment.     Patient's spiritual, cultural and educational needs considered and patient agreeable to plan of care and goals.    Anticipated barriers to occupational therapy: chronic condition     Goals:  Short Term Goals: 6 weeks   - Pt. Will be edu in self PROM program and be able to complete with visual cues MET  - Pt. Will have improved L UE AROM of at least 10 degrees in all measured planes of movement including wrist for improved functional arm use progressing  -  Pt. Will be able to utilize L UE as a support when completing simple home care tasks ongoing  - Pt. Will be fitted for a night time resting hand splint and utilize nightly ongoing  - Pt. Will have improve PROM of at least 20 degrees in all measured planes of movement including wrist for tone management progressing  - Pt. Will be able to bathe himself fully with AD such as a long handled sponge     Long Term Goals: 12 weeks   - Pt. Will complete HEP daily to help manage tone per patient report ongoing  - Pt. Will have improved L UE AROM of at least 20 degrees in all measured planes of movement including wrist for improved functional arm use ongoing  - Pt. 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 Will be able to utilize L UE/hand to open/close doors at least 50% of the time for forced L UE activity ongoing  - Pt. Will be able to utilize L Ue as helper during UB and LB dressing ongoing     More goals to be made as appropriate within POC as needed     Plan   Certification Period/Plan of care expiration: 2/1/2024 to 5/3/24.     Outpatient Occupational Therapy 2 times weekly for 12 weeks to include the following interventions: Electrical Stimulation of L UE, Fluidotherapy, Manual Therapy, Moist Heat/ Ice, Neuromuscular Re-ed, Orthotic Management and Training, Paraffin, Patient Education, Self Care, Therapeutic Activities, and Therapeutic Exercise.    Updates/Grading for next session: progress into WB on 1/2 SIERRA Rodrigues   4/23/2024

## 2024-04-23 NOTE — PROGRESS NOTES
Outpatient Care Management   - Care Plan Follow Up    Patient: Dionicio Bess  MRN:  [id number]  Date of Service:  4/23/2024  Completed by:  Sharon Almazan LCSW  Referral Date: 02/01/2024    Reason for Visit   Patient presents with    OPCM SW Follow Up Call       Brief Summary:  contacted Pt 4/22 to confirm he would attend today's appt. He had reported he would attend, but upon arriving this morning it was reported that he did come early, but did not want to wait for the appt and left. Called Pt and discussed. Reported that since this is the third no show, he will be taken off the calendar. If he wants to return to therapy, he will be placed on the waiting list. Pt advised he understands and is agreeable. Discussed housing and applied for [employer] for all apartment complexes in [place]. Reported to Pt they will reach out to him to clarify and discuss the waiting list.     Future Appointments   Date Time Provider Department Center   5/6/2024 11:00 AM Rox Mercedes PA-C [facility] [address]TH[address] Orkali Almazan LCSW  Neuro Therapy   [facility]  [phone]

## 2024-05-26 ENCOUNTER — HOSPITAL ENCOUNTER (INPATIENT)
Facility: HOSPITAL | Age: 59
LOS: 4 days | Discharge: HOME | DRG: 299 | End: 2024-05-30
Attending: EMERGENCY MEDICINE | Admitting: HOSPITALIST
Payer: MEDICAID

## 2024-05-26 DIAGNOSIS — I95.9 HYPOTENSION: ICD-10-CM

## 2024-05-26 DIAGNOSIS — M79.89 LEFT LEG SWELLING: ICD-10-CM

## 2024-05-26 DIAGNOSIS — Z78.9 IMPAIRED MOBILITY AND ADLS: ICD-10-CM

## 2024-05-26 DIAGNOSIS — I69.30 HISTORY OF CVA WITH RESIDUAL DEFICIT: ICD-10-CM

## 2024-05-26 DIAGNOSIS — I82.432 ACUTE DEEP VEIN THROMBOSIS (DVT) OF POPLITEAL VEIN OF LEFT LOWER EXTREMITY: Primary | ICD-10-CM

## 2024-05-26 DIAGNOSIS — Z74.09 IMPAIRED MOBILITY AND ADLS: ICD-10-CM

## 2024-05-26 DIAGNOSIS — I50.20 HEART FAILURE WITH REDUCED EJECTION FRACTION: ICD-10-CM

## 2024-05-26 DIAGNOSIS — R07.9 CHEST PAIN: ICD-10-CM

## 2024-05-26 PROBLEM — E87.20 LACTIC ACIDOSIS: Status: ACTIVE | Noted: 2024-05-26

## 2024-05-26 LAB
ALBUMIN SERPL BCP-MCNC: 3.9 G/DL (ref 3.5–5.2)
ALLENS TEST: ABNORMAL
ALP SERPL-CCNC: 91 U/L (ref 55–135)
ALT SERPL W/O P-5'-P-CCNC: 19 U/L (ref 10–44)
AMPHET+METHAMPHET UR QL: NEGATIVE
ANION GAP SERPL CALC-SCNC: 13 MMOL/L (ref 8–16)
APTT PPP: 27.1 SEC (ref 21–32)
AST SERPL-CCNC: 40 U/L (ref 10–40)
BACTERIA #/AREA URNS AUTO: ABNORMAL /HPF
BARBITURATES UR QL SCN>200 NG/ML: NEGATIVE
BASOPHILS # BLD AUTO: 0.03 K/UL (ref 0–0.2)
BASOPHILS NFR BLD: 0.6 % (ref 0–1.9)
BENZODIAZ UR QL SCN>200 NG/ML: NEGATIVE
BILIRUB SERPL-MCNC: 0.6 MG/DL (ref 0.1–1)
BILIRUB UR QL STRIP: NEGATIVE
BNP SERPL-MCNC: 219 PG/ML (ref 0–99)
BUN SERPL-MCNC: 16 MG/DL (ref 6–20)
BZE UR QL SCN: ABNORMAL
CALCIUM SERPL-MCNC: 9.8 MG/DL (ref 8.7–10.5)
CANNABINOIDS UR QL SCN: NEGATIVE
CHLORIDE SERPL-SCNC: 105 MMOL/L (ref 95–110)
CLARITY UR REFRACT.AUTO: CLEAR
CO2 SERPL-SCNC: 21 MMOL/L (ref 23–29)
COLOR UR AUTO: YELLOW
CREAT SERPL-MCNC: 1.4 MG/DL (ref 0.5–1.4)
CREAT UR-MCNC: 361 MG/DL (ref 23–375)
DIFFERENTIAL METHOD BLD: ABNORMAL
EOSINOPHIL # BLD AUTO: 0.2 K/UL (ref 0–0.5)
EOSINOPHIL NFR BLD: 4.1 % (ref 0–8)
ERYTHROCYTE [DISTWIDTH] IN BLOOD BY AUTOMATED COUNT: 14.4 % (ref 11.5–14.5)
EST. GFR  (NO RACE VARIABLE): 59 ML/MIN/1.73 M^2
ETHANOL SERPL-MCNC: 153 MG/DL
ETHANOL UR-MCNC: 78 MG/DL
GLUCOSE SERPL-MCNC: 120 MG/DL (ref 70–110)
GLUCOSE UR QL STRIP: ABNORMAL
HCT VFR BLD AUTO: 45.4 % (ref 40–54)
HGB BLD-MCNC: 14.8 G/DL (ref 14–18)
HGB UR QL STRIP: ABNORMAL
HYALINE CASTS UR QL AUTO: 2 /LPF
IMM GRANULOCYTES # BLD AUTO: 0 K/UL (ref 0–0.04)
IMM GRANULOCYTES NFR BLD AUTO: 0 % (ref 0–0.5)
INR PPP: 1 (ref 0.8–1.2)
KETONES UR QL STRIP: ABNORMAL
LACTATE SERPL-SCNC: 1.6 MMOL/L (ref 0.5–2.2)
LDH SERPL L TO P-CCNC: 2.63 MMOL/L (ref 0.5–2.2)
LEUKOCYTE ESTERASE UR QL STRIP: NEGATIVE
LYMPHOCYTES # BLD AUTO: 1.8 K/UL (ref 1–4.8)
LYMPHOCYTES NFR BLD: 36.5 % (ref 18–48)
MCH RBC QN AUTO: 31.2 PG (ref 27–31)
MCHC RBC AUTO-ENTMCNC: 32.6 G/DL (ref 32–36)
MCV RBC AUTO: 96 FL (ref 82–98)
METHADONE UR QL SCN>300 NG/ML: NEGATIVE
MICROSCOPIC COMMENT: ABNORMAL
MONOCYTES # BLD AUTO: 0.5 K/UL (ref 0.3–1)
MONOCYTES NFR BLD: 9.2 % (ref 4–15)
NEUTROPHILS # BLD AUTO: 2.4 K/UL (ref 1.8–7.7)
NEUTROPHILS NFR BLD: 49.6 % (ref 38–73)
NITRITE UR QL STRIP: NEGATIVE
NRBC BLD-RTO: 0 /100 WBC
OPIATES UR QL SCN: NEGATIVE
PCP UR QL SCN>25 NG/ML: NEGATIVE
PH UR STRIP: 6 [PH] (ref 5–8)
PLATELET # BLD AUTO: 252 K/UL (ref 150–450)
PMV BLD AUTO: 10 FL (ref 9.2–12.9)
POTASSIUM SERPL-SCNC: 3.2 MMOL/L (ref 3.5–5.1)
PROT SERPL-MCNC: 8 G/DL (ref 6–8.4)
PROT UR QL STRIP: ABNORMAL
PROTHROMBIN TIME: 10.7 SEC (ref 9–12.5)
RBC # BLD AUTO: 4.75 M/UL (ref 4.6–6.2)
RBC #/AREA URNS AUTO: 5 /HPF (ref 0–4)
SAMPLE: ABNORMAL
SITE: ABNORMAL
SODIUM SERPL-SCNC: 139 MMOL/L (ref 136–145)
SP GR UR STRIP: >1.03 (ref 1–1.03)
SQUAMOUS #/AREA URNS AUTO: 2 /HPF
TOXICOLOGY INFORMATION: ABNORMAL
TROPONIN I SERPL DL<=0.01 NG/ML-MCNC: 0.03 NG/ML (ref 0–0.03)
TROPONIN I SERPL DL<=0.01 NG/ML-MCNC: 0.03 NG/ML (ref 0–0.03)
TROPONIN I SERPL DL<=0.01 NG/ML-MCNC: 0.04 NG/ML (ref 0–0.03)
URN SPEC COLLECT METH UR: ABNORMAL
WBC # BLD AUTO: 4.9 K/UL (ref 3.9–12.7)
WBC #/AREA URNS AUTO: 4 /HPF (ref 0–5)

## 2024-05-26 PROCEDURE — 84484 ASSAY OF TROPONIN QUANT: CPT | Mod: 91

## 2024-05-26 PROCEDURE — 93005 ELECTROCARDIOGRAM TRACING: CPT

## 2024-05-26 PROCEDURE — 82077 ASSAY SPEC XCP UR&BREATH IA: CPT | Performed by: EMERGENCY MEDICINE

## 2024-05-26 PROCEDURE — 99285 EMERGENCY DEPT VISIT HI MDM: CPT | Mod: 25

## 2024-05-26 PROCEDURE — 25000003 PHARM REV CODE 250

## 2024-05-26 PROCEDURE — 80307 DRUG TEST PRSMV CHEM ANLYZR: CPT

## 2024-05-26 PROCEDURE — 36415 COLL VENOUS BLD VENIPUNCTURE: CPT

## 2024-05-26 PROCEDURE — 93010 ELECTROCARDIOGRAM REPORT: CPT | Mod: ,,, | Performed by: INTERNAL MEDICINE

## 2024-05-26 PROCEDURE — G0378 HOSPITAL OBSERVATION PER HR: HCPCS

## 2024-05-26 PROCEDURE — 83880 ASSAY OF NATRIURETIC PEPTIDE: CPT

## 2024-05-26 PROCEDURE — 84484 ASSAY OF TROPONIN QUANT: CPT

## 2024-05-26 PROCEDURE — 63600175 PHARM REV CODE 636 W HCPCS

## 2024-05-26 PROCEDURE — 83605 ASSAY OF LACTIC ACID: CPT

## 2024-05-26 PROCEDURE — 80053 COMPREHEN METABOLIC PANEL: CPT

## 2024-05-26 PROCEDURE — 25500020 PHARM REV CODE 255: Performed by: HOSPITALIST

## 2024-05-26 PROCEDURE — 85610 PROTHROMBIN TIME: CPT

## 2024-05-26 PROCEDURE — 21400001 HC TELEMETRY ROOM

## 2024-05-26 PROCEDURE — 87040 BLOOD CULTURE FOR BACTERIA: CPT

## 2024-05-26 PROCEDURE — 81001 URINALYSIS AUTO W/SCOPE: CPT | Mod: XB

## 2024-05-26 PROCEDURE — 85730 THROMBOPLASTIN TIME PARTIAL: CPT

## 2024-05-26 PROCEDURE — 96365 THER/PROPH/DIAG IV INF INIT: CPT

## 2024-05-26 PROCEDURE — 85025 COMPLETE CBC W/AUTO DIFF WBC: CPT

## 2024-05-26 RX ORDER — NALOXONE HCL 0.4 MG/ML
0.02 VIAL (ML) INJECTION
Status: DISCONTINUED | OUTPATIENT
Start: 2024-05-26 | End: 2024-05-30 | Stop reason: HOSPADM

## 2024-05-26 RX ORDER — METOPROLOL SUCCINATE 50 MG/1
50 TABLET, EXTENDED RELEASE ORAL DAILY
Status: DISCONTINUED | OUTPATIENT
Start: 2024-05-27 | End: 2024-05-30 | Stop reason: HOSPADM

## 2024-05-26 RX ORDER — FUROSEMIDE 10 MG/ML
40 INJECTION INTRAMUSCULAR; INTRAVENOUS
Status: DISCONTINUED | OUTPATIENT
Start: 2024-05-26 | End: 2024-05-26

## 2024-05-26 RX ORDER — IBUPROFEN 200 MG
24 TABLET ORAL
Status: DISCONTINUED | OUTPATIENT
Start: 2024-05-26 | End: 2024-05-30 | Stop reason: HOSPADM

## 2024-05-26 RX ORDER — ATORVASTATIN CALCIUM 40 MG/1
80 TABLET, FILM COATED ORAL NIGHTLY
Status: DISCONTINUED | OUTPATIENT
Start: 2024-05-26 | End: 2024-05-30 | Stop reason: HOSPADM

## 2024-05-26 RX ORDER — THIAMINE HCL 100 MG
100 TABLET ORAL DAILY
Status: DISCONTINUED | OUTPATIENT
Start: 2024-05-27 | End: 2024-05-30 | Stop reason: HOSPADM

## 2024-05-26 RX ORDER — SODIUM CHLORIDE 0.9 % (FLUSH) 0.9 %
10 SYRINGE (ML) INJECTION EVERY 12 HOURS PRN
Status: DISCONTINUED | OUTPATIENT
Start: 2024-05-26 | End: 2024-05-30 | Stop reason: HOSPADM

## 2024-05-26 RX ORDER — METHOCARBAMOL 500 MG/1
500 TABLET, FILM COATED ORAL 3 TIMES DAILY PRN
Status: DISCONTINUED | OUTPATIENT
Start: 2024-05-26 | End: 2024-05-30 | Stop reason: HOSPADM

## 2024-05-26 RX ORDER — GLUCAGON 1 MG
1 KIT INJECTION
Status: DISCONTINUED | OUTPATIENT
Start: 2024-05-26 | End: 2024-05-30 | Stop reason: HOSPADM

## 2024-05-26 RX ORDER — FLUOXETINE HYDROCHLORIDE 20 MG/1
20 CAPSULE ORAL DAILY
Status: DISCONTINUED | OUTPATIENT
Start: 2024-05-27 | End: 2024-05-26

## 2024-05-26 RX ORDER — HEPARIN SODIUM,PORCINE/D5W 25000/250
0-40 INTRAVENOUS SOLUTION INTRAVENOUS CONTINUOUS
Status: DISCONTINUED | OUTPATIENT
Start: 2024-05-26 | End: 2024-05-29

## 2024-05-26 RX ORDER — FOLIC ACID 1 MG/1
1 TABLET ORAL DAILY
Status: DISCONTINUED | OUTPATIENT
Start: 2024-05-27 | End: 2024-05-30 | Stop reason: HOSPADM

## 2024-05-26 RX ORDER — IBUPROFEN 200 MG
16 TABLET ORAL
Status: DISCONTINUED | OUTPATIENT
Start: 2024-05-26 | End: 2024-05-30 | Stop reason: HOSPADM

## 2024-05-26 RX ORDER — POTASSIUM CHLORIDE 20 MEQ/1
40 TABLET, EXTENDED RELEASE ORAL ONCE
Status: COMPLETED | OUTPATIENT
Start: 2024-05-26 | End: 2024-05-26

## 2024-05-26 RX ADMIN — ATORVASTATIN CALCIUM 80 MG: 40 TABLET, FILM COATED ORAL at 08:05

## 2024-05-26 RX ADMIN — POTASSIUM CHLORIDE 40 MEQ: 1500 TABLET, EXTENDED RELEASE ORAL at 08:05

## 2024-05-26 RX ADMIN — HEPARIN SODIUM AND DEXTROSE 18 UNITS/KG/HR: 10000; 5 INJECTION INTRAVENOUS at 07:05

## 2024-05-26 RX ADMIN — SODIUM CHLORIDE 1000 ML: 9 INJECTION, SOLUTION INTRAVENOUS at 06:05

## 2024-05-26 RX ADMIN — IOHEXOL 77 ML: 350 INJECTION, SOLUTION INTRAVENOUS at 08:05

## 2024-05-26 NOTE — SUBJECTIVE & OBJECTIVE
Past Medical History:   Diagnosis Date    CAD (coronary artery disease)     HLD (hyperlipidemia)     HTN (hypertension)     Stroke     R MCA 2/2023       Past Surgical History:   Procedure Laterality Date    FIXATION OF SYNDESMOSIS OF ANKLE Left 1/5/2024    Procedure: FIXATION, SYNDESMOSIS, ANKLE;  Surgeon: Rogerio Hu MD;  Location: [address];  Service: Orthopedics;  Laterality: Left;    OPEN REDUCTION AND INTERNAL FIXATION (ORIF) OF INJURY OF ANKLE Left 1/5/2024    Procedure: ORIF, ANKLE - LEFT;  Surgeon: Rogerio Hu MD;  Location: [address];  Service: Orthopedics;  Laterality: Left;       Review of patient's allergies indicates:  No Known Allergies    Current Facility-Administered Medications on File Prior to Encounter   Medication    onabotulinumtoxina injection 300 Units     Current Outpatient Medications on File Prior to Encounter   Medication Sig    acetaminophen (TYLENOL) 500 MG tablet Take 2 tablets (1,000 mg total) by mouth every 8 (eight) hours.    albuterol-ipratropium (DUO-NEB) 2.5 mg-0.5 mg/3 mL nebulizer solution Take 3 mLs by nebulization every 4 (four) hours as needed for Wheezing or Shortness of Breath. Rescue    amLODIPine (NORVASC) 10 MG tablet Take 1 tablet (10 mg total) by mouth once daily.    apixaban (ELIQUIS) 5 mg Tab Take 1 tablet (5 mg total) by mouth 2 (two) times daily.    aspirin 81 MG Chew Chew and swallow 1 (1 mg total) by mouth once daily.    atorvastatin (LIPITOR) 80 MG tablet Take 1 tablet (80 mg total) by mouth every evening.    diphenhydrAMINE (BENADRYL) 25 mg capsule Take 1 capsule (25 mg total) by mouth every 6 (six) hours as needed for Itching.    FLUoxetine 20 MG capsule Take 1 capsule (20 mg total) by mouth once daily.    folic acid (FOLVITE) 1 MG tablet Take 1 tablet (1 mg total) by mouth once daily.    furosemide (LASIX) 40 MG tablet Take 1 tablet (40 mg total) by mouth once daily.    JARDIANCE 10 mg tablet Take 1 tablet (10 mg total) by mouth  once daily.    losartan (COZAAR) 25 MG tablet Take 1 tablet (25 mg total) by mouth once daily.    melatonin (MELATIN) 3 mg tablet Take 2 tablets (6 mg total) by mouth nightly as needed for Insomnia.    methocarbamoL 1,000 mg Tab Take 1,000 mg by mouth 4 (four) times daily.    metoprolol succinate (TOPROL-XL) 50 MG 24 hr tablet Take 1 tablet (50 mg total) by mouth once daily.    multivitamin Tab Take 1 tablet by mouth once daily.    ondansetron (ZOFRAN-ODT) 8 MG TbDL Take 1 tablet (8 mg total) by mouth every 8 (eight) hours as needed (nausea).    pantoprazole (PROTONIX) 20 MG tablet Take 1 tablet (20 mg total) by mouth every morning.    polyethylene glycol (GLYCOLAX) 17 gram PwPk Take 17 g by mouth once daily.    pregabalin (LYRICA) 75 MG capsule Take 1 capsule (75 mg total) by mouth 2 (two) times daily.    senna (SENOKOT) 8.6 mg tablet Take 1 tablet by mouth once daily.    spironolactone (ALDACTONE) 25 MG tablet Take 1 tablet (25 mg total) by mouth once daily.     Family History    None       Tobacco Use    Smoking status: Never    Smokeless tobacco: Never   Substance and Sexual Activity    Alcohol use: Yes     Comment: occasional    Drug use: No    Sexual activity: Not on file     Review of Systems   Constitutional:  Negative for chills, fatigue and fever.   HENT:  Negative for congestion.    Eyes:  Negative for discharge.   Respiratory:  Negative for cough and shortness of breath.    Cardiovascular:  Positive for chest pain and leg swelling. Negative for palpitations.   Gastrointestinal:  Negative for abdominal pain, diarrhea, nausea and vomiting.   Genitourinary:  Negative for dysuria.   Musculoskeletal:  Negative for back pain.   Skin:  Negative for rash.   Neurological:  Negative for dizziness and headaches.     Objective:     Vital Signs (Most Recent):  Temp: 97.9 °F (36.6 °C) (05/26/24 1443)  Pulse: 89 (05/26/24 1545)  Resp: 20 (05/26/24 1545)  BP: 109/61 (05/26/24 1545)  SpO2: 96 % (05/26/24 1545) Vital  Signs (24h Range):  Temp:  [97.9 °F (36.6 °C)] 97.9 °F (36.6 °C)  Pulse:  [82-89] 89  Resp:  [18-20] 20  SpO2:  [96 %-98 %] 96 %  BP: ()/(61-70) 109/61     Weight: 90.3 kg (199 lb)  Body mass index is 26.99 kg/m².     Physical Exam  Vitals and nursing note reviewed.   Constitutional:       General: He is not in acute distress.     Appearance: He is not toxic-appearing.      Comments: overweight   HENT:      Head: Normocephalic and atraumatic.      Right Ear: External ear normal.      Left Ear: External ear normal.      Nose: Nose normal.   Eyes:      General: No scleral icterus.  Cardiovascular:      Rate and Rhythm: Normal rate and regular rhythm.      Heart sounds: Normal heart sounds. No murmur heard.     No friction rub. No gallop.   Pulmonary:      Effort: Pulmonary effort is normal. No respiratory distress.      Breath sounds: No wheezing, rhonchi or rales.   Chest:      Chest wall: Tenderness (reproducible on palpation) present.   Abdominal:      General: Abdomen is flat. There is no distension.      Palpations: Abdomen is soft. There is no mass.      Tenderness: There is no abdominal tenderness. There is no guarding.      Hernia: No hernia is present.   Musculoskeletal:         General: Tenderness (to palpation of LLE) present. No swelling. Normal range of motion.      Cervical back: Normal range of motion.      Right lower leg: No edema.      Left lower leg: No edema.   Skin:     General: Skin is warm.      Capillary Refill: Capillary refill takes less than 2 seconds.      Coloration: Skin is not jaundiced.      Findings: No bruising or erythema.   Neurological:      General: No focal deficit present.      Mental Status: He is alert and oriented to person, place, and time.   Psychiatric:         Mood and Affect: Mood normal.         Behavior: Behavior normal.         Thought Content: Thought content normal.         Judgment: Judgment normal.                Significant Labs: All pertinent labs within  the past 24 hours have been reviewed.  CBC:   Recent Labs   Lab 05/26/24  1537   WBC 4.90   HGB 14.8   HCT 45.4        CMP:   Recent Labs   Lab 05/26/24  1537      K 3.2*      CO2 21*   *   BUN 16   CREATININE 1.4   CALCIUM 9.8   PROT 8.0   ALBUMIN 3.9   BILITOT 0.6   ALKPHOS 91   AST 40   ALT 19   ANIONGAP 13       Significant Imaging: I have reviewed all pertinent imaging results/findings within the past 24 hours.

## 2024-05-26 NOTE — ASSESSMENT & PLAN NOTE
Patient is identified as having Systolic (HFrEF) heart failure that is Acute on chronic. CHF is currently not controlled due to medications non adherence. Latest ECHO performed and demonstrates-     Results for orders placed during the hospital encounter of 11/05/23    Echo    Interpretation Summary    Left Ventricle: The left ventricle is normal in size. Normal wall thickness. Global hypokinesis present. There is severely reduced systolic function with a visually estimated ejection fraction of 25 - 30%. Grade I diastolic dysfunction.    Right Ventricle: Mild right ventricular enlargement. Wall thickness is normal. Right ventricle wall motion  is normal. Systolic function is mildly reduced.    Biatrial enlargement    Pulmonary Artery: The estimated pulmonary artery systolic pressure is 19 mmHg.    IVC/SVC: Normal venous pressure at 3 mmHg.    Recent Labs   Lab 05/26/24  1537   *     Likely elevated in the setting of medication noncompliance. No overt sign of decompensation on exam    Plan:  - Restart GDMT; uptritrate as tolerated   - Repeat TTE

## 2024-05-26 NOTE — ASSESSMENT & PLAN NOTE
57 y/o M with hx of CAD, HFrEF (25-30% on 2023)  HTN, HLD, paroxysmal afib, prior MCA who presented for left sided chest pain. Reproducible on exam. EKG on admission with no ST changes or LBBB noted. Trop at 0.029 on admission. . CXR unremarkable. Low suspicion for ACS at this time. Suspect chest pain is MSK in nature. Considered PE but given findings of acute DVT, additional workup will not .    - f/u repeat trop, trend to peak  - tele  - continue heparin for DVT

## 2024-05-26 NOTE — ASSESSMENT & PLAN NOTE
Lab Results   Component Value Date    CHOL 225 (H) 11/06/2023    HDL 85 (H) 11/06/2023    LDLCALC 126.0 11/06/2023    TRIG 70 11/06/2023     Plan:  - continue liptor 80 mg

## 2024-05-26 NOTE — ASSESSMENT & PLAN NOTE
Questionable stent placement, was documented on chart. Will continue to monitor on telemetry. Previously on Lipitor but no longer taking as of Feb 2024.     - restart home Lipitor

## 2024-05-26 NOTE — ED PROVIDER NOTES
Encounter Date: 5/26/2024       History     Chief Complaint   Patient presents with    Joint Swelling     L ankle swelling and pain. Entire L arm pain. Hx stroke with residual weakness on L side. Has been drinking alcohol. Also states chest pain     Patient is a 58-year-old male previous history of stroke, hypertension, hyperlipidemia, CHF supposed to be on Lasix as well as anticoagulation for AFib.  Patient reports that he has not been taking his medications secondary to on housed status as well as not having his medications after discharge.  Patient reports that he has been having some ongoing issues with worsening shortness of breath generalized fatigue as well as worsening left leg swelling and pain with ambulation.  He reports that he has previously had it repaired after a fracture and has been having ongoing issues.  He denies any fevers, nausea vomiting, diarrhea.  Patient reports that he has worsening shortness of breath.  He has been drinking and doing crack cocaine with the last drug use noted to be yesterday.  Patient denies any palpitations lightheadedness or dizziness at this time.    The history is provided by the patient.     Review of patient's allergies indicates:  No Known Allergies  Past Medical History:   Diagnosis Date    CAD (coronary artery disease)     HLD (hyperlipidemia)     HTN (hypertension)     Stroke     R MCA 2/2023     Past Surgical History:   Procedure Laterality Date    FIXATION OF SYNDESMOSIS OF ANKLE Left 1/5/2024    Procedure: FIXATION, SYNDESMOSIS, ANKLE;  Surgeon: Rogerio Hu MD;  Location: [address];  Service: Orthopedics;  Laterality: Left;    OPEN REDUCTION AND INTERNAL FIXATION (ORIF) OF INJURY OF ANKLE Left 1/5/2024    Procedure: ORIF, ANKLE - LEFT;  Surgeon: Rogerio Hu MD;  Location: [facility] OR [address] [place] [address];  Service: Orthopedics;  Laterality: Left;     No family history on file.  Social History     Tobacco Use    Smoking status: Never    Smokeless tobacco:  Never   Substance Use Topics    Alcohol use: Yes     Comment: occasional    Drug use: No         Physical Exam     Initial Vitals [05/26/24 1443]   BP Pulse Resp Temp SpO2   (!) 89/61 83 18 97.9 °F (36.6 °C) 98 %      MAP       --         Physical Exam    Nursing note and vitals reviewed.  Constitutional: He appears well-developed and well-nourished.   HENT:   Head: Normocephalic and atraumatic.   Eyes: Pupils are equal, round, and reactive to light.   Cardiovascular:  Normal rate.           Pulmonary/Chest: Breath sounds normal.   Abdominal: Abdomen is soft. He exhibits no distension.   Musculoskeletal:         General: Tenderness and edema present.     Neurological: He is alert and oriented to person, place, and time.   Skin: Skin is warm and dry.         ED Course   Procedures  Labs Reviewed   CBC W/ AUTO DIFFERENTIAL - Abnormal; Notable for the following components:       Result Value    MCH 31.2 (*)     All other components within normal limits   COMPREHENSIVE METABOLIC PANEL - Abnormal; Notable for the following components:    Potassium 3.2 (*)     CO2 21 (*)     Glucose 120 (*)     eGFR 59.0 (*)     All other components within normal limits   TROPONIN I - Abnormal; Notable for the following components:    Troponin I 0.029 (*)     All other components within normal limits   B-TYPE NATRIURETIC PEPTIDE - Abnormal; Notable for the following components:     (*)     All other components within normal limits   ALCOHOL,MEDICAL (ETHANOL) - Abnormal; Notable for the following components:    Alcohol, Serum 153 (*)     All other components within normal limits   TROPONIN I - Abnormal; Notable for the following components:    Troponin I 0.029 (*)     All other components within normal limits    Narrative:     Draw baseline aPTT prior to starting the heparin bolus or  infusion  (if patient is on warfarin prior to heparin therapy)   ISTAT LACTATE - Abnormal; Notable for the following components:    POC Lactate 2.63  (*)     All other components within normal limits   CULTURE, BLOOD   CULTURE, BLOOD   APTT    Narrative:     Draw baseline aPTT prior to starting the heparin bolus or  infusion  (if patient is on warfarin prior to heparin therapy)   PROTIME-INR    Narrative:     Draw baseline aPTT prior to starting the heparin bolus or  infusion  (if patient is on warfarin prior to heparin therapy)   URINALYSIS, REFLEX TO URINE CULTURE   TOXICOLOGY SCREEN, URINE, RANDOM (COMPLIANCE)   LACTIC ACID, PLASMA     EKG Readings: (Independently Interpreted)   Initial Reading: No STEMI. Previous EKG: Compared with most recent EKG Rhythm: Normal Sinus Rhythm. Ectopy: Rare PVCs. Conduction: 1st Degree AV Block and LAFB. ST Segments: Normal ST Segments. T Waves Flipped: I, AVL, V4 and V5. Axis: Left Axis Deviation.       Imaging Results               US Lower Extremity Veins Left (Final result)  Result time 05/26/24 18:19:42      Final result by Nabeel Giordano MD (05/26/24 18:19:42)                   Impression:      This report was flagged in Epic as abnormal.    Thrombus within the mid to distal aspect of the popliteal vein and 1 of the paired posterior tibial veins.      Electronically signed by: Nabeel Giordano MD  Date:    05/26/2024  Time:    18:19               Narrative:    EXAMINATION:  US LOWER EXTREMITY VEINS LEFT    CLINICAL HISTORY:  Other specified soft tissue disorders    TECHNIQUE:  Duplex and color flow Doppler evaluation and graded compression of the left lower extremity veins was performed.    COMPARISON:  01/23/2024    FINDINGS:  There is thrombus within the mid to distal aspect of the left popliteal vein.  There is thrombus within 1 of the paired posterior tibial veins.  Otherwise, Duplex and color flow Doppler evaluation does not reveal any evidence of acute venous thrombosis in the common femoral, superficial femoral, greater saphenous, peroneal, and anterior tibial veins of the left lower extremity.  There is no  reflux to suggest valvular incompetence.                                       X-Ray Chest AP Portable (Final result)  Result time 05/26/24 16:50:37      Final result by Robert Harris Jr., MD (05/26/24 16:50:37)                   Impression:      No significant cardiopulmonary abnormality identified.      Electronically signed by: Robert Harris MD  Date:    05/26/2024  Time:    16:50               Narrative:    EXAMINATION:  XR CHEST AP PORTABLE    CLINICAL HISTORY:  Sepsis;    TECHNIQUE:  Single frontal view of the chest was performed.    COMPARISON:  January 23, 2024    FINDINGS:  Loop recorder and monitoring leads noted.  Heart is mildly enlarged.  Pulmonary vascularity is not engorged.  No confluent consolidation or pneumothorax.                                       Medications   atorvastatin tablet 80 mg (has no administration in time range)   metoprolol succinate (TOPROL-XL) 24 hr tablet 50 mg (has no administration in time range)   folic acid tablet 1 mg (has no administration in time range)   multivitamin tablet (has no administration in time range)   sodium chloride 0.9% flush 10 mL (has no administration in time range)   naloxone 0.4 mg/mL injection 0.02 mg (has no administration in time range)   glucose chewable tablet 16 g (has no administration in time range)   glucose chewable tablet 24 g (has no administration in time range)   glucagon (human recombinant) injection 1 mg (has no administration in time range)   dextrose 10% bolus 125 mL 125 mL (has no administration in time range)   dextrose 10% bolus 250 mL 250 mL (has no administration in time range)   heparin 25,000 units in dextrose 5% 250 mL (100 units/mL) infusion HIGH INTENSITY nomogram - [facility] (18 Units/kg/hr × 82.7 kg (Adjusted) Intravenous New Bag 5/26/24 1937)   heparin 25,000 units in dextrose 5% (100 units/ml) IV bolus from bag HIGH INTENSITY nomogram - [facility] (has no administration in time range)   heparin 25,000 units in dextrose 5% (100  units/ml) IV bolus from bag HIGH INTENSITY nomogram - [facility] (has no administration in time range)   methocarbamoL tablet 500 mg (has no administration in time range)   thiamine tablet 100 mg (has no administration in time range)   potassium chloride SA CR tablet 40 mEq (has no administration in time range)   sodium chloride 0.9% bolus 1,000 mL 1,000 mL (1,000 mLs Intravenous New Bag 5/26/24 1847)   heparin 25,000 units in dextrose 5% (100 units/ml) IV bolus from bag HIGH INTENSITY nomogram - [facility] (6,616 Units Intravenous Bolus from Bag 5/26/24 1938)     Medical Decision Making  Patient is a 58-year-old female presenting department for evaluation chest discomfort, shortness of breath and left ankle swelling.  On examination patient has a significant tenderness and swelling to the left ankle.  Patient is able to range Blair low suspicion for septic arthritis or overlying abscess at this time.  Concern for possible DVT as patient has been out of medication versus dependent edema.  DVT ultrasound of the left lower extremity ordered and pending.  At this time given presentation and patient's complaint concern for fluid overload secondary to CHF exacerbation.  Initially BNP and troponin elevated with reported orthopnea suspicion fo fluid overload and plenty of diuresis however repeat echo performed at bedside able to visualize IVC and highly collapsible.  Patient given IV fluids.  Given patient's presentation of chest pain and shortness of breath discussed case with Hospital Medicine who will admit patient for continued management of high-risk chest pain pending evaluation of left ankle swelling.  Patient is generally high-risk as he has poor follow-up and extremely limited social factors not taking his medications.  At this time discussed case with Hospital Medicine who will admit patient for continued care.  Plan discussed with patient who is agreeable with plan.    Amount and/or Complexity of Data Reviewed  Labs:  ordered.  Radiology: ordered.              Attending Attestation:   Physician Attestation Statement for Resident:  As the supervising MD   Physician Attestation Statement: I have personally seen and examined this patient.   I agree with the above history.  -: 58-year-old male with history of HF reduced ejection fraction, AFib, homelessness, substance abuse presents with a chief complaint of chest pain left ankle pain.  He notes he has been suffering from intermittent chest pain and shortness of breath for the past week or so.  He has been drinking alcohol and has not been taking his medications including his diuretics and apixaban.  He notes he has had issues with his left ankle pain that have been recurrent since an orthopedic surgery.  Initial blood pressure was decreased at 89/61 at triage but in the room his systolic was 110s to 120s.  On exam he was in no respiratory distress breathing very comfortably.  Lungs clear.  No JVD.  Normal heart sounds.  He had edema and swelling to the left ankle but no bony tenderness.  Bedside ultrasound revealed an ejection fraction of 55%, an improvement from previous, and a flat and collapsible IVC.  He had RV dilation.  See imaging tab in epic for more details.  His troponin was slightly elevated at 0.029.  BNP slightly elevated at 219.  He had an ultrasound that revealed a thrombus in the mid to distal popliteal vein and 1 of the posterior tibial veins.  Lactic acid was slightly elevated at 2.63.  Ethanol was elevated.  I am suspicious for possible submassive PE given the echo findings along with the elevated cardiac enzymes.  Anticoagulation and admission.     As the supervising MD I agree with the above PE.     As the supervising MD I agree with the above treatment, course, plan, and disposition.                                           Clinical Impression:  Final diagnoses:  [R07.9] Chest pain  [I95.9] Hypotension  [M79.89] Left leg swelling  [I82.432] Acute deep vein  thrombosis (DVT) of popliteal vein of left lower extremity (Primary)          ED Disposition Condition    Observation                 Kp Vasquez MD  Resident  05/26/24 1828       Yousif Krishnan MD  05/26/24 1952       Yousif Krishnan MD  05/26/24 1955

## 2024-05-26 NOTE — ASSESSMENT & PLAN NOTE
Chronic, controlled. Latest blood pressure and vitals reviewed-     Temp:  [97.9 °F (36.6 °C)]   Pulse:  [82-89]   Resp:  [18-20]   BP: ()/(61-70)   SpO2:  [96 %-98 %] .   Home meds for hypertension were reviewed and noted below.   Hypertension Medications               amLODIPine (NORVASC) 10 MG tablet Take 1 tablet (10 mg total) by mouth once daily.    furosemide (LASIX) 40 MG tablet Take 1 tablet (40 mg total) by mouth once daily.    losartan (COZAAR) 25 MG tablet Take 1 tablet (25 mg total) by mouth once daily.    metoprolol succinate (TOPROL-XL) 50 MG 24 hr tablet Take 1 tablet (50 mg total) by mouth once daily.    spironolactone (ALDACTONE) 25 MG tablet Take 1 tablet (25 mg total) by mouth once daily.            While in the hospital, will manage blood pressure as follows; Adjust home antihypertensive regimen as follows- HOLDING patient is currently normotensive    Will utilize p.r.n. blood pressure medication only if patient's blood pressure greater than 180/110 and he develops symptoms such as worsening chest pain or shortness of breath.

## 2024-05-26 NOTE — ASSESSMENT & PLAN NOTE
Patient with Paroxysmal (<7 days) atrial fibrillation which is controlled  Patient is currently in sinus rhythm.[id number] Score: 1. Anticoagulation indicated. Anticoagulation done with Eliquis . No longer taking as of Feb 2024.     - Restart Eliquis and Metoprolol

## 2024-05-26 NOTE — ASSESSMENT & PLAN NOTE
59 y/o M with hx of CAD, HFrEF (25-30% on 2023)  HTN, HLD, paroxysmal afib, prior MCA who presented for chest pain and left leg swelling and tenderness. Noncompliant with Eliquis since Feb 2024. Doppler ultrasound significant for thrombus within the mid to distal aspect of the popliteal vein and 1 of the paired posterior tibial veins.     - started on Heparin

## 2024-05-26 NOTE — ASSESSMENT & PLAN NOTE
-Previous CVA w/ residual L sided weakness.    - Restart statin, and eliquis  - Can consider restarting ASA pending tolerance of Eliquis

## 2024-05-26 NOTE — HPI
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 For the past few days, he reported increase left leg swelling and pain with ambulation. He reports that he has previously had it repaired after a fracture and has been having ongoing issues. Also reports left sided chest pain for the past few day. Reports that it is reproducible with no radiation. He denies any fevers, nausea, vomiting, diarrhea, fever, chills, shortness of breath, tremors, confusion, palpitations or hallucinations. Patient denies any palpitations lightheadedness or dizziness at this time. Patient reports that is currently homeless and reports that he has not been taking his medication. Last time filled medications was in February of 2024. Patient requests to be restarted on home medications upon discharge. Reports having 4-5 shots of liquor daily. Last drink was yesterday. Denies any hx of alcohol withdrawals.     Initial vitals, pressure 89/61, satting well on room air.  CBC unremarkable, CMP remarkable for hypokalemia 3.2.  BUN creatinine baseline 16/1.4.  Chest x-ray showed no acute pulmonary process.  Cardiac workup elevated BNP of 219, troponin 0.029. BC pending. Patient admitted for chest pain rule out.

## 2024-05-26 NOTE — ED TRIAGE NOTES
Dionicio Bess, a 58 y.o. male presents to the ED w/ complaint of left ankle swelling and pain. Hx of Stroke with left residual weakness and CHF. Reports has been drinking alcohol. Pt denies sob or chest pain at this time. States left ankle pain 8/10    Triage note:  Chief Complaint   Patient presents with    Joint Swelling     L ankle swelling and pain. Entire L arm pain. Hx stroke with residual weakness on L side. Has been drinking alcohol. Also states chest pain     Review of patient's allergies indicates:  No Known Allergies  Past Medical History:   Diagnosis Date    CAD (coronary artery disease)     HLD (hyperlipidemia)     HTN (hypertension)     Stroke     R MCA 2/2023         APPEARANCE: awake and alert in NAD. PAIN  8/10  SKIN: warm, dry and intact. No breakdown or bruising.  MUSCULOSKELETAL: Left ankle swelling and pain. Left residual weakness due to hx of stroke.  Ambulates with cane at baseline.  RESPIRATORY: Denies shortness of breath.Respirations unlabored.   CARDIAC: Denies CP at this time, 2+ distal pulses, edema noted to both feet   ABDOMEN: S/ND/NT, Denies nausea  : voids spontaneously, denies difficulty  Neurologic: AAO x 4; follows commands

## 2024-05-27 PROBLEM — E87.6 HYPOKALEMIA: Status: RESOLVED | Noted: 2023-12-31 | Resolved: 2024-05-27

## 2024-05-27 PROBLEM — E87.20 LACTIC ACIDOSIS: Status: RESOLVED | Noted: 2024-05-26 | Resolved: 2024-05-27

## 2024-05-27 PROBLEM — F10.20 ALCOHOL DEPENDENCE: Status: ACTIVE | Noted: 2023-12-30

## 2024-05-27 PROBLEM — Z74.09 IMPAIRED MOBILITY AND ADLS: Status: RESOLVED | Noted: 2024-02-01 | Resolved: 2024-05-27

## 2024-05-27 PROBLEM — Z78.9 IMPAIRED MOBILITY AND ADLS: Status: RESOLVED | Noted: 2024-02-01 | Resolved: 2024-05-27

## 2024-05-27 PROBLEM — R79.89 ELEVATED TROPONIN: Status: ACTIVE | Noted: 2024-05-27

## 2024-05-27 PROBLEM — I25.10 CORONARY ARTERIOSCLEROSIS: Status: ACTIVE | Noted: 2023-11-06

## 2024-05-27 PROBLEM — I26.99 ACUTE PULMONARY EMBOLISM: Status: ACTIVE | Noted: 2023-12-30

## 2024-05-27 LAB
ALBUMIN SERPL BCP-MCNC: 3.2 G/DL (ref 3.5–5.2)
ALP SERPL-CCNC: 70 U/L (ref 55–135)
ALT SERPL W/O P-5'-P-CCNC: 16 U/L (ref 10–44)
ANION GAP SERPL CALC-SCNC: 10 MMOL/L (ref 8–16)
APTT PPP: 109.7 SEC (ref 21–32)
APTT PPP: 54 SEC (ref 21–32)
APTT PPP: 55.2 SEC (ref 21–32)
ASCENDING AORTA: 3.48 CM
AST SERPL-CCNC: 30 U/L (ref 10–40)
AV INDEX (PROSTH): 0.53
AV MEAN GRADIENT: 4 MMHG
AV PEAK GRADIENT: 6 MMHG
AV VALVE AREA BY VELOCITY RATIO: 2.28 CM²
AV VALVE AREA: 2.16 CM²
AV VELOCITY RATIO: 0.55
BASOPHILS # BLD AUTO: 0.03 K/UL (ref 0–0.2)
BASOPHILS NFR BLD: 0.6 % (ref 0–1.9)
BILIRUB SERPL-MCNC: 0.8 MG/DL (ref 0.1–1)
BSA FOR ECHO PROCEDURE: 2.14 M2
BUN SERPL-MCNC: 14 MG/DL (ref 6–20)
CALCIUM SERPL-MCNC: 8.9 MG/DL (ref 8.7–10.5)
CHLORIDE SERPL-SCNC: 106 MMOL/L (ref 95–110)
CO2 SERPL-SCNC: 21 MMOL/L (ref 23–29)
CREAT SERPL-MCNC: 0.9 MG/DL (ref 0.5–1.4)
CV ECHO LV RWT: 0.37 CM
DIFFERENTIAL METHOD BLD: ABNORMAL
DOP CALC AO PEAK VEL: 1.21 M/S
DOP CALC AO VTI: 24.87 CM
DOP CALC LVOT AREA: 4.1 CM2
DOP CALC LVOT DIAMETER: 2.29 CM
DOP CALC LVOT PEAK VEL: 0.67 M/S
DOP CALC LVOT STROKE VOLUME: 53.8 CM3
DOP CALCLVOT PEAK VEL VTI: 13.07 CM
E WAVE DECELERATION TIME: 177.19 MSEC
E/A RATIO: 1.28
E/E' RATIO: 15 M/S
ECHO LV POSTERIOR WALL: 1.1 CM (ref 0.6–1.1)
EOSINOPHIL # BLD AUTO: 0.2 K/UL (ref 0–0.5)
EOSINOPHIL NFR BLD: 4 % (ref 0–8)
ERYTHROCYTE [DISTWIDTH] IN BLOOD BY AUTOMATED COUNT: 14.5 % (ref 11.5–14.5)
EST. GFR  (NO RACE VARIABLE): >60 ML/MIN/1.73 M^2
FRACTIONAL SHORTENING: 16 % (ref 28–44)
GLUCOSE SERPL-MCNC: 95 MG/DL (ref 70–110)
HCT VFR BLD AUTO: 42.4 % (ref 40–54)
HGB BLD-MCNC: 14.2 G/DL (ref 14–18)
IMM GRANULOCYTES # BLD AUTO: 0 K/UL (ref 0–0.04)
IMM GRANULOCYTES NFR BLD AUTO: 0 % (ref 0–0.5)
INTERVENTRICULAR SEPTUM: 1.01 CM (ref 0.6–1.1)
LA MAJOR: 6.56 CM
LA MINOR: 7.4 CM
LA WIDTH: 5.16 CM
LEFT ATRIUM SIZE: 4.48 CM
LEFT ATRIUM VOLUME INDEX MOD: 70.4 ML/M2
LEFT ATRIUM VOLUME INDEX: 64.5 ML/M2
LEFT ATRIUM VOLUME MOD: 149.15 CM3
LEFT ATRIUM VOLUME: 136.66 CM3
LEFT INTERNAL DIMENSION IN SYSTOLE: 4.98 CM (ref 2.1–4)
LEFT VENTRICLE DIASTOLIC VOLUME INDEX: 77.04 ML/M2
LEFT VENTRICLE DIASTOLIC VOLUME: 163.32 ML
LEFT VENTRICLE MASS INDEX: 121 G/M2
LEFT VENTRICLE SYSTOLIC VOLUME INDEX: 55.2 ML/M2
LEFT VENTRICLE SYSTOLIC VOLUME: 116.98 ML
LEFT VENTRICULAR INTERNAL DIMENSION IN DIASTOLE: 5.9 CM (ref 3.5–6)
LEFT VENTRICULAR MASS: 257.31 G
LV LATERAL E/E' RATIO: 20 M/S
LV SEPTAL E/E' RATIO: 12 M/S
LYMPHOCYTES # BLD AUTO: 1.9 K/UL (ref 1–4.8)
LYMPHOCYTES NFR BLD: 39.1 % (ref 18–48)
MAGNESIUM SERPL-MCNC: 1.8 MG/DL (ref 1.6–2.6)
MCH RBC QN AUTO: 31.6 PG (ref 27–31)
MCHC RBC AUTO-ENTMCNC: 33.5 G/DL (ref 32–36)
MCV RBC AUTO: 94 FL (ref 82–98)
MONOCYTES # BLD AUTO: 0.5 K/UL (ref 0.3–1)
MONOCYTES NFR BLD: 10.4 % (ref 4–15)
MV PEAK A VEL: 0.47 M/S
MV PEAK E VEL: 0.6 M/S
MV STENOSIS PRESSURE HALF TIME: 51.38 MS
MV VALVE AREA P 1/2 METHOD: 4.28 CM2
NEUTROPHILS # BLD AUTO: 2.2 K/UL (ref 1.8–7.7)
NEUTROPHILS NFR BLD: 45.9 % (ref 38–73)
NRBC BLD-RTO: 0 /100 WBC
OHS CV RV/LV RATIO: 0.73 CM
OHS QRS DURATION: 100 MS
OHS QRS DURATION: 92 MS
OHS QTC CALCULATION: 377 MS
OHS QTC CALCULATION: 464 MS
PHOSPHATE SERPL-MCNC: 3.7 MG/DL (ref 2.7–4.5)
PISA TR MAX VEL: 2.2 M/S
PLATELET # BLD AUTO: 190 K/UL (ref 150–450)
PMV BLD AUTO: 9.7 FL (ref 9.2–12.9)
POTASSIUM SERPL-SCNC: 4.1 MMOL/L (ref 3.5–5.1)
PROT SERPL-MCNC: 6.5 G/DL (ref 6–8.4)
RA MAJOR: 6.32 CM
RA PRESSURE ESTIMATED: 8 MMHG
RA WIDTH: 4.96 CM
RBC # BLD AUTO: 4.49 M/UL (ref 4.6–6.2)
RIGHT VENTRICLE FREE WALL STRAIN: -9 %
RIGHT VENTRICULAR END-DIASTOLIC DIMENSION: 4.29 CM
RV TB RVSP: 10 MMHG
SINUS: 3.27 CM
SODIUM SERPL-SCNC: 137 MMOL/L (ref 136–145)
STJ: 2.8 CM
TDI LATERAL: 0.03 M/S
TDI SEPTAL: 0.05 M/S
TDI: 0.04 M/S
TR MAX PG: 19 MMHG
TRICUSPID ANNULAR PLANE SYSTOLIC EXCURSION: 1.2 CM
TROPONIN I SERPL DL<=0.01 NG/ML-MCNC: 0.04 NG/ML (ref 0–0.03)
TV REST PULMONARY ARTERY PRESSURE: 27 MMHG
WBC # BLD AUTO: 4.81 K/UL (ref 3.9–12.7)
Z-SCORE OF LEFT VENTRICULAR DIMENSION IN END DIASTOLE: -1.27
Z-SCORE OF LEFT VENTRICULAR DIMENSION IN END SYSTOLE: 1.56

## 2024-05-27 PROCEDURE — 84100 ASSAY OF PHOSPHORUS: CPT

## 2024-05-27 PROCEDURE — 63600175 PHARM REV CODE 636 W HCPCS

## 2024-05-27 PROCEDURE — 85730 THROMBOPLASTIN TIME PARTIAL: CPT | Performed by: HOSPITALIST

## 2024-05-27 PROCEDURE — 25000242 PHARM REV CODE 250 ALT 637 W/ HCPCS

## 2024-05-27 PROCEDURE — 84484 ASSAY OF TROPONIN QUANT: CPT

## 2024-05-27 PROCEDURE — 80053 COMPREHEN METABOLIC PANEL: CPT

## 2024-05-27 PROCEDURE — 25000003 PHARM REV CODE 250

## 2024-05-27 PROCEDURE — 83735 ASSAY OF MAGNESIUM: CPT

## 2024-05-27 PROCEDURE — 97165 OT EVAL LOW COMPLEX 30 MIN: CPT

## 2024-05-27 PROCEDURE — 21400001 HC TELEMETRY ROOM

## 2024-05-27 PROCEDURE — 36415 COLL VENOUS BLD VENIPUNCTURE: CPT | Performed by: HOSPITALIST

## 2024-05-27 PROCEDURE — 97530 THERAPEUTIC ACTIVITIES: CPT

## 2024-05-27 PROCEDURE — 85025 COMPLETE CBC W/AUTO DIFF WBC: CPT

## 2024-05-27 RX ORDER — EMPAGLIFLOZIN 10 MG/1
10 TABLET, FILM COATED ORAL DAILY
Qty: 30 TABLET | Refills: 11 | Status: SHIPPED | OUTPATIENT
Start: 2024-05-27

## 2024-05-27 RX ORDER — LANOLIN ALCOHOL/MO/W.PET/CERES
100 CREAM (GRAM) TOPICAL DAILY
Qty: 90 TABLET | Refills: 3 | Status: SHIPPED | OUTPATIENT
Start: 2024-05-28 | End: 2025-05-23

## 2024-05-27 RX ORDER — METOPROLOL SUCCINATE 50 MG/1
50 TABLET, EXTENDED RELEASE ORAL DAILY
Qty: 30 TABLET | Refills: 11 | Status: SHIPPED | OUTPATIENT
Start: 2024-05-27 | End: 2025-05-22

## 2024-05-27 RX ORDER — FOLIC ACID 1 MG/1
1 TABLET ORAL DAILY
Qty: 90 TABLET | Refills: 3 | Status: SHIPPED | OUTPATIENT
Start: 2024-05-27 | End: 2025-05-27

## 2024-05-27 RX ORDER — LOSARTAN POTASSIUM 25 MG/1
25 TABLET ORAL DAILY
Status: DISCONTINUED | OUTPATIENT
Start: 2024-05-27 | End: 2024-05-29

## 2024-05-27 RX ORDER — METHOCARBAMOL 1000 MG/1
1000 TABLET, COATED ORAL 4 TIMES DAILY PRN
Status: ON HOLD | COMMUNITY
Start: 2024-01-18 | End: 2024-05-27 | Stop reason: HOSPADM

## 2024-05-27 RX ORDER — NAPROXEN SODIUM 220 MG/1
81 TABLET, FILM COATED ORAL DAILY
Qty: 30 TABLET | Refills: 2 | Status: SHIPPED | OUTPATIENT
Start: 2024-05-27

## 2024-05-27 RX ORDER — ATORVASTATIN CALCIUM 80 MG/1
80 TABLET, FILM COATED ORAL NIGHTLY
Qty: 90 TABLET | Refills: 3 | Status: SHIPPED | OUTPATIENT
Start: 2024-05-27

## 2024-05-27 RX ORDER — NAPROXEN SODIUM 220 MG/1
81 TABLET, FILM COATED ORAL DAILY
Status: DISCONTINUED | OUTPATIENT
Start: 2024-05-27 | End: 2024-05-30 | Stop reason: HOSPADM

## 2024-05-27 RX ORDER — PREGABALIN 75 MG/1
75 CAPSULE ORAL 2 TIMES DAILY
Status: DISCONTINUED | OUTPATIENT
Start: 2024-05-27 | End: 2024-05-30 | Stop reason: HOSPADM

## 2024-05-27 RX ADMIN — EMPAGLIFLOZIN 10 MG: 10 TABLET, FILM COATED ORAL at 12:05

## 2024-05-27 RX ADMIN — HEPARIN SODIUM AND DEXTROSE 14 UNITS/KG/HR: 10000; 5 INJECTION INTRAVENOUS at 08:05

## 2024-05-27 RX ADMIN — LOSARTAN POTASSIUM 25 MG: 25 TABLET, FILM COATED ORAL at 12:05

## 2024-05-27 RX ADMIN — FOLIC ACID 1 MG: 1 TABLET ORAL at 08:05

## 2024-05-27 RX ADMIN — Medication 100 MG: at 08:05

## 2024-05-27 RX ADMIN — THERA TABS 1 TABLET: TAB at 08:05

## 2024-05-27 RX ADMIN — PREGABALIN 75 MG: 75 CAPSULE ORAL at 02:05

## 2024-05-27 RX ADMIN — METHOCARBAMOL 500 MG: 500 TABLET ORAL at 08:05

## 2024-05-27 RX ADMIN — PREGABALIN 75 MG: 75 CAPSULE ORAL at 08:05

## 2024-05-27 RX ADMIN — METHOCARBAMOL 500 MG: 500 TABLET ORAL at 04:05

## 2024-05-27 RX ADMIN — ASPIRIN 81 MG CHEWABLE TABLET 81 MG: 81 TABLET CHEWABLE at 12:05

## 2024-05-27 RX ADMIN — ATORVASTATIN CALCIUM 80 MG: 40 TABLET, FILM COATED ORAL at 08:05

## 2024-05-27 RX ADMIN — METOPROLOL SUCCINATE 50 MG: 50 TABLET, EXTENDED RELEASE ORAL at 08:05
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
## 2024-05-27 NOTE — PLAN OF CARE
Problem: Occupational Therapy  Goal: Occupational Therapy Goal  Outcome: Met     Pt evaluated.  He's performing at his functional baseline and doesn't require acute OT services.  Current OT orders to be discontinued.  Please re-consult if pt has a change in functional status.

## 2024-05-27 NOTE — ASSESSMENT & PLAN NOTE
59 y/o M with hx of CAD, HFrEF (25-30% on 2023)  HTN, HLD, paroxysmal afib, prior MCA who presented for chest pain and left leg swelling and tenderness. Noncompliant with Eliquis since Feb 2024. Doppler ultrasound significant for thrombus within the mid to distal aspect of the popliteal vein and 1 of the paired posterior tibial veins.     - started on Heparin  - restart Eliquis on discharge

## 2024-05-27 NOTE — PLAN OF CARE
[facility] - Med Surg  Initial Discharge Assessment       Primary Care Provider: No, Primary Doctor    Admission Diagnosis: Chest pain [R07.9]  Hypotension [I95.9]  Left leg swelling [M79.89]  Heart failure with reduced ejection fraction [I50.20]  Acute deep vein thrombosis (DVT) of popliteal vein of left lower extremity [I82.432]    Admission Date: 5/26/2024  Expected Discharge Date:     Transition of Care Barriers: Homeless, Mobility, Underinsured    Payor: MEDICAID / Plan: [employer] [facility] / Product Type: Managed Medicaid /     Extended Emergency Contact Information  Primary Emergency Contact: ShahriarKeyona  Mobile Phone: [phone]  Relation: Relative  Secondary Emergency Contact: Denisse Hernandez  Mobile Phone: [phone]  Relation: None    Discharge Plan A: Shelter  Discharge Plan B: Shelter [facility]  [address], [address]  [facility] [address]  Phone: [phone] Fax: [phone]    Initial Assessment (most recent)       Adult Discharge Assessment - 05/27/24 1350          Discharge Assessment    Assessment Type Discharge Planning Assessment     Confirmed/corrected address, phone number and insurance Yes   Patient is currently homeless.    Source of Information patient     Communicated ERWIN with patient/caregiver Date not available/Unable to determine     Reason For Admission Acute deep vein thrombosis (DVT) of popliteal vein of left lower extremity     People in Home alone   Patient is homeless    Do you have help at home or someone to help you manage your care at home? No     Prior to hospitilization cognitive status: Unable to Assess     Current cognitive status: Alert/Oriented     Walking or Climbing Stairs Difficulty yes     Walking or Climbing Stairs ambulation difficulty, requires equipment     Mobility Management Straight cane     Dressing/Bathing Difficulty yes     Equipment Currently Used at Home cane, straight     Readmission within 30 days? No      Patient currently being followed by outpatient case management? No     Do you currently have service(s) that help you manage your care at home? No     Do you take prescription medications? Yes     Do you have prescription coverage? Yes     Do you have any problems affording any of your prescribed medications? No     Is the patient taking medications as prescribed? yes     How do you get to doctors appointments? public transportation     Are you on dialysis? No     Do you take coumadin? No     Discharge Plan A Shelter     Discharge Plan B Shelter     DME Needed Upon Discharge  none     Discharge Plan discussed with: Patient     Transition of Care Barriers Homeless;Mobility;Underinsured                 Discharge Plan A and Plan B have been determined by review of patient's clinical status, future medical and therapeutic needs, and coverage/benefits for post-acute care in coordination with multidisciplinary team members.       Mariah Perry RN  Weekend  - [facility] [address]  Spectralink: [phone]

## 2024-05-27 NOTE — ASSESSMENT & PLAN NOTE
Hx of chronic alcohol use for 30 years with daily use of 4-5 drinks. Last drink was on 5/26. Denies any hx of alcohol withdrawals or DT. Denies any symptoms of alcohol withdrawal at this time.     - CIWA q4h  - PO thiamin  - Multivitamin, Folic acid  - Can consider adding Benzodiazepine if needed

## 2024-05-27 NOTE — ASSESSMENT & PLAN NOTE
57 y/o M with hx of CAD, HFrEF (25-30% on 2023)  HTN, HLD, paroxysmal afib, prior MCA who presented for left sided chest pain. Reproducible on exam. EKG on admission with no ST changes or LBBB noted. Trop at 0.029 on admission. . CXR unremarkable. Low suspicion for ACS at this time. Suspect chest pain is MSK in nature. Considered PE but given findings of acute DVT, additional workup will not . Trops peaked at 0.043. CTA Chest significant for right pulmonary artery and segmental branch PE of left upper lobe.     - continue heparin; restart Eliquis on discharge  - pending TTE for evaluate for right heart strain  - tele

## 2024-05-27 NOTE — NURSING
Notified by lab that patient APTT result was elevated 112.1.Lab inquiring about redraw, Notified Patient care team and spoke with DO Roz and received phone orders for a redraw. Lab notified.    0523 am New APTT RESULT 109. Infusion stopped Patient has no signs of bleeding, vitals stable . Med team notified.

## 2024-05-27 NOTE — SUBJECTIVE & OBJECTIVE
Interval History: NAEO. Patient with mild improvement of chest pain and left leg tenderness. CTA chest significant for PE. Already on heparin gtt for DVT. Pending TTE. Restart on home Jardiance and Losartan. Reports that he is currently homeless and has having issues with medication management d/t residual deficit from his prior CVA. Will get PT/OT as we suspect patient will post-acute needs. Social work consult for discharge planning. Will need refills on home medications.    Review of Systems   Constitutional:  Negative for chills, fatigue and fever.   HENT:  Negative for congestion.    Eyes:  Negative for discharge.   Respiratory:  Positive for shortness of breath. Negative for cough.    Cardiovascular:  Positive for chest pain and leg swelling. Negative for palpitations.   Gastrointestinal:  Negative for abdominal pain, diarrhea, nausea and vomiting.   Genitourinary:  Negative for dysuria.   Musculoskeletal:  Negative for back pain.   Skin:  Negative for rash.   Neurological:  Negative for dizziness and headaches.     Objective:     Vital Signs (Most Recent):  Temp: 98.4 °F (36.9 °C) (05/27/24 1129)  Pulse: 73 (05/27/24 1129)  Resp: 18 (05/27/24 1129)  BP: 126/60 (05/27/24 1129)  SpO2: 96 % (05/27/24 1129) Vital Signs (24h Range):  Temp:  [97.9 °F (36.6 °C)-98.4 °F (36.9 °C)] 98.4 °F (36.9 °C)  Pulse:  [61-91] 73  Resp:  [17-22] 18  SpO2:  [96 %-100 %] 96 %  BP: ()/() 126/60     Weight: 90.3 kg (199 lb)  Body mass index is 26.99 kg/m².    Intake/Output Summary (Last 24 hours) at 5/27/2024 1205  Last data filed at 5/27/2024 1037  Gross per 24 hour   Intake --   Output 750 ml   Net -750 ml         Physical Exam  Vitals and nursing note reviewed.   Constitutional:       General: He is not in acute distress.     Appearance: He is not toxic-appearing.      Comments: overweight   HENT:      Head: Normocephalic and atraumatic.      Right Ear: External ear normal.      Left Ear: External ear normal.       Nose: Nose normal.   Eyes:      General: No scleral icterus.  Cardiovascular:      Rate and Rhythm: Normal rate and regular rhythm.      Heart sounds: Normal heart sounds. No murmur heard.     No friction rub. No gallop.   Pulmonary:      Effort: Pulmonary effort is normal. No respiratory distress.      Breath sounds: No wheezing, rhonchi or rales.   Chest:      Chest wall: Tenderness (reproducible on palpation) present.   Abdominal:      General: Abdomen is flat. There is no distension.      Palpations: Abdomen is soft. There is no mass.      Tenderness: There is no abdominal tenderness. There is no guarding.      Hernia: No hernia is present.   Musculoskeletal:         General: Tenderness (to palpation of LLE) present. No swelling. Normal range of motion.      Cervical back: Normal range of motion.      Right lower leg: No edema.      Left lower leg: No edema.   Skin:     General: Skin is warm.      Capillary Refill: Capillary refill takes less than 2 seconds.      Coloration: Skin is not jaundiced.      Findings: No bruising or erythema.   Neurological:      General: No focal deficit present.      Mental Status: He is alert and oriented to person, place, and time.   Psychiatric:         Mood and Affect: Mood normal.         Behavior: Behavior normal.         Thought Content: Thought content normal.         Judgment: Judgment normal.             Significant Labs: All pertinent labs within the past 24 hours have been reviewed.    Significant Imaging: I have reviewed all pertinent imaging results/findings within the past 24 hours.

## 2024-05-27 NOTE — ASSESSMENT & PLAN NOTE
Trop at 0.029 on admission. Trended up to 0.04 to 0.043. Likely 2/2 to HFrEF vs right heart strain 2/2 to PE though less likely    - TTE

## 2024-05-27 NOTE — ASSESSMENT & PLAN NOTE
Patient with hx of LUE deficit 2/2 to CVA. Reports issues with medication management due to his deficit along with being out of his medications due to him being homeless. Can benefit from placement upon discharge    - PT/OT evaluation  - Social work consult  - Will need refills on all home meds upon discharge.

## 2024-05-27 NOTE — ASSESSMENT & PLAN NOTE
POCT Lactate at 2.6 on admission. Vital signs on admission notable for hypotension. Now s/p 1 L IVF with improvement. WBC unremarkable. Suspect that elevated lactate is 2/2 to hypovolemia. Low suspicion for infectious causes. Improved to 1.6 after IVF.

## 2024-05-27 NOTE — ED NOTES
Assumed care of patient at this time. Patient remains in hospital gown and on continuous vital sign monitoring.

## 2024-05-27 NOTE — ASSESSMENT & PLAN NOTE
POCT Lactate at 2.6 on admission. Vital signs on admission notable for hypotension. Now s/p 1 L IVF with improvement. WBC unremarkable. Suspect that elevated lactate is 2/2 to hypovolemia. Low suspicion for infectious causes.     - Trend lactate

## 2024-05-27 NOTE — HOSPITAL COURSE
59 y/o M admitted for chest pain and left LLE swelling. DVT US significant for DVT in left popliteal vein. Started on Heparin gtt. CTA Chest significant for right pulmonary artery PE. Restart on home Metoprolol. Started home Jardiance and Losartan on 5/27. Started on Spironolactone on 5/28. Patient reports heavy alcohol use for the last 30 years and was started on CIWA protocol though low suspicion for withdrawals for Patient became normotensive and home Amlodipine was discontinued. Patient currently homeless and has been out of all his medications for a few months. Patient accepted at a group home. Patient is currently afebrile, and hemodynamically stable. Patient to start Xarelto upon discharge. Patient to continue home Aspirin, Lipitor, Jardiance, Losartan, Metoprolol, Spironolactone. Patient to also continue multivitamins, thiamine and folic acid on discharge. Patient to discontinue home Eliquis, Norvasc and Fluoxetine. Patient to follow up with [facility] to establish care in 1-2 weeks. Return to ED precautions given. Patient verbalized understanding. Patient medically ready for discharge. All questions and concern addressed at this time.

## 2024-05-27 NOTE — PROGRESS NOTES
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 For the past few days, he reported increase left leg swelling and pain with ambulation. He reports that he has previously had it repaired after a fracture and has been having ongoing issues. Also reports left sided chest pain for the past few day. Reports that it is reproducible with no radiation. He denies any fevers, nausea, vomiting, diarrhea, fever, chills, shortness of breath, tremors, confusion, palpitations or hallucinations. Patient denies any palpitations lightheadedness or dizziness at this time. Patient reports that is currently homeless and reports that he has not been taking his medication. Last time filled medications was in February of 2024. Patient requests to be restarted on home medications upon discharge. Reports having 4-5 shots of liquor daily. Last drink was yesterday. Denies any hx of alcohol withdrawals.     Initial vitals, pressure 89/61, satting well on room air.  CBC unremarkable, CMP remarkable for hypokalemia 3.2.  BUN creatinine baseline 16/1.4.  Chest x-ray showed no acute pulmonary process.  Cardiac workup elevated BNP of 219, troponin 0.029. BC pending. Patient admitted for chest pain rule out.     Overview/Hospital Course:  57 y/o M admitted for chest pain and left LLE swelling. DVT US significant for DVT in left popliteal vein. Started on Heparin gtt. CTA Chest significant for right pulmonary artery PE. Restart on home Metoprolol. Started  home Jardiance and Losartan on 5/27.     Interval History: NAEO. Patient with mild improvement of chest pain and left leg tenderness. CTA chest significant for PE. Already on heparin gtt for DVT. Pending TTE. Restart on home Jardiance and Losartan. Reports that he is currently homeless and has having issues with medication management d/t residual deficit from his prior CVA. Will get PT/OT as we suspect patient will post-acute needs. Social work consult for discharge planning. Will need refills on home medications.    Review of Systems   Constitutional:  Negative for chills, fatigue and fever.   HENT:  Negative for congestion.    Eyes:  Negative for discharge.   Respiratory:  Positive for shortness of breath. Negative for cough.    Cardiovascular:  Positive for chest pain and leg swelling. Negative for palpitations.   Gastrointestinal:  Negative for abdominal pain, diarrhea, nausea and vomiting.   Genitourinary:  Negative for dysuria.   Musculoskeletal:  Negative for back pain.   Skin:  Negative for rash.   Neurological:  Negative for dizziness and headaches.     Objective:     Vital Signs (Most Recent):  Temp: 98.4 °F (36.9 °C) (05/27/24 1129)  Pulse: 73 (05/27/24 1129)  Resp: 18 (05/27/24 1129)  BP: 126/60 (05/27/24 1129)  SpO2: 96 % (05/27/24 1129) Vital Signs (24h Range):  Temp:  [97.9 °F (36.6 °C)-98.4 °F (36.9 °C)] 98.4 °F (36.9 °C)  Pulse:  [61-91] 73  Resp:  [17-22] 18  SpO2:  [96 %-100 %] 96 %  BP: ()/() 126/60     Weight: 90.3 kg (199 lb)  Body mass index is 26.99 kg/m².    Intake/Output Summary (Last 24 hours) at 5/27/2024 1205  Last data filed at 5/27/2024 1037  Gross per 24 hour   Intake --   Output 750 ml   Net -750 ml         Physical Exam  Vitals and nursing note reviewed.   Constitutional:       General: He is not in acute distress.     Appearance: He is not toxic-appearing.      Comments: overweight   HENT:      Head: Normocephalic and atraumatic.      Right Ear: External ear normal.       Left Ear: External ear normal.      Nose: Nose normal.   Eyes:      General: No scleral icterus.  Cardiovascular:      Rate and Rhythm: Normal rate and regular rhythm.      Heart sounds: Normal heart sounds. No murmur heard.     No friction rub. No gallop.   Pulmonary:      Effort: Pulmonary effort is normal. No respiratory distress.      Breath sounds: No wheezing, rhonchi or rales.   Chest:      Chest wall: Tenderness (reproducible on palpation) present.   Abdominal:      General: Abdomen is flat. There is no distension.      Palpations: Abdomen is soft. There is no mass.      Tenderness: There is no abdominal tenderness. There is no guarding.      Hernia: No hernia is present.   Musculoskeletal:         General: Tenderness (to palpation of LLE) present. No swelling. Normal range of motion.      Cervical back: Normal range of motion.      Right lower leg: No edema.      Left lower leg: No edema.   Skin:     General: Skin is warm.      Capillary Refill: Capillary refill takes less than 2 seconds.      Coloration: Skin is not jaundiced.      Findings: No bruising or erythema.   Neurological:      General: No focal deficit present.      Mental Status: He is alert and oriented to person, place, and time.   Psychiatric:         Mood and Affect: Mood normal.         Behavior: Behavior normal.         Thought Content: Thought content normal.         Judgment: Judgment normal.             Significant Labs: All pertinent labs within the past 24 hours have been reviewed.    Significant Imaging: I have reviewed all pertinent imaging results/findings within the past 24 hours.    Assessment/Plan:      * Acute deep vein thrombosis (DVT) of popliteal vein of left lower extremity  57 y/o M with hx of CAD, HFrEF (25-30% on 2023)  HTN, HLD, paroxysmal afib, prior MCA who presented for chest pain and left leg swelling and tenderness. Noncompliant with Eliquis since Feb 2024. Doppler ultrasound significant for thrombus within the  mid to distal aspect of the popliteal vein and 1 of the paired posterior tibial veins.     - started on Heparin  - restart Eliquis on discharge    Elevated troponin  Trop at 0.029 on admission. Trended up to 0.04 to 0.043. Likely 2/2 to HFrEF vs right heart strain 2/2 to PE though less likely    - TTE    Impaired mobility and ADLs  Patient with hx of LUE deficit 2/2 to CVA. Reports issues with medication management due to his deficit along with being out of his medications due to him being homeless. Can benefit from placement upon discharge    - PT/OT evaluation  - Social work consult  - Will need refills on all home meds upon discharge.      Alcohol dependence  Hx of chronic alcohol use for 30 years with daily use of 4-5 drinks. Last drink was on 5/26. Denies any hx of alcohol withdrawals or DT. Denies any symptoms of alcohol withdrawal at this time.     - CIWA q4h  - PO thiamin  - Multivitamin, Folic acid  - Can consider adding Benzodiazepine if needed    Acute pulmonary embolism  57 y/o M with hx of CAD, HFrEF (25-30% on 2023)  HTN, HLD, paroxysmal afib, prior MCA who presented for left sided chest pain. Reproducible on exam. EKG on admission with no ST changes or LBBB noted. Trop at 0.029 on admission. . CXR unremarkable. Low suspicion for ACS at this time. Suspect chest pain is MSK in nature. Considered PE but given findings of acute DVT, additional workup will not . Trops peaked at 0.043. CTA Chest significant for right pulmonary artery and segmental branch PE of left upper lobe.     - continue heparin; restart Eliquis on discharge  - pending TTE for evaluate for right heart strain  - tele    Pure hypercholesterolemia  Lab Results   Component Value Date    CHOL 225 (H) 11/06/2023    HDL 85 (H) 11/06/2023    LDLCALC 126.0 11/06/2023    TRIG 70 11/06/2023     Plan:  - continue liptor 80 mg      Coronary arteriosclerosis  Questionable stent placement, was documented on chart. Will continue  to monitor on telemetry. Previously on Lipitor but no longer taking as of Feb 2024.     - restart home Lipitor    Chronic combined systolic and diastolic congestive heart failure  Patient is identified as having Systolic (HFrEF) heart failure that is Acute on chronic. CHF is currently not controlled due to medications non adherence. Latest ECHO performed and demonstrates-     Results for orders placed during the hospital encounter of 11/05/23    Echo    Interpretation Summary    Left Ventricle: The left ventricle is normal in size. Normal wall thickness. Global hypokinesis present. There is severely reduced systolic function with a visually estimated ejection fraction of 25 - 30%. Grade I diastolic dysfunction.    Right Ventricle: Mild right ventricular enlargement. Wall thickness is normal. Right ventricle wall motion  is normal. Systolic function is mildly reduced.    Biatrial enlargement    Pulmonary Artery: The estimated pulmonary artery systolic pressure is 19 mmHg.    IVC/SVC: Normal venous pressure at 3 mmHg.    Recent Labs   Lab 05/26/24  1537   *       Likely elevated in the setting of medication noncompliance. No overt sign of decompensation on exam    Plan:  - Restart GDMT; uptritrate as tolerated   - Repeat TTE    History of CVA with residual deficit  -Previous CVA w/ residual L sided weakness.    - Restart statin, and eliquis  - Can consider restarting ASA pending tolerance of Eliquis      Essential hypertension  Chronic, controlled. Latest blood pressure and vitals reviewed-     Temp:  [97.9 °F (36.6 °C)]   Pulse:  [82-89]   Resp:  [18-20]   BP: ()/(61-70)   SpO2:  [96 %-98 %] .   Home meds for hypertension were reviewed and noted below.   Hypertension Medications               amLODIPine (NORVASC) 10 MG tablet Take 1 tablet (10 mg total) by mouth once daily.    furosemide (LASIX) 40 MG tablet Take 1 tablet (40 mg total) by mouth once daily.    losartan (COZAAR) 25 MG tablet Take 1 tablet  (25 mg total) by mouth once daily.    metoprolol succinate (TOPROL-XL) 50 MG 24 hr tablet Take 1 tablet (50 mg total) by mouth once daily.    spironolactone (ALDACTONE) 25 MG tablet Take 1 tablet (25 mg total) by mouth once daily.            While in the hospital, will manage blood pressure as follows; Adjust home antihypertensive regimen as follows- HOLDING patient is currently normotensive    Will utilize p.r.n. blood pressure medication only if patient's blood pressure greater than 180/110 and he develops symptoms such as worsening chest pain or shortness of breath.    Paroxysmal atrial fibrillation  Patient with Paroxysmal (<7 days) atrial fibrillation which is controlled  Patient is currently in sinus rhythm.[id number] Score: 1. Anticoagulation indicated. Anticoagulation done with Eliquis . No longer taking as of Feb 2024.     - Restart Eliquis and Metoprolol      VTE Risk Mitigation (From admission, onward)           Ordered     heparin 25,000 units in dextrose 5% (100 units/ml) IV bolus from bag HIGH INTENSITY nomogram - [facility]  As needed (PRN)        Question:  Heparin Infusion Adjustment (DO NOT MODIFY ANSWER)  Answer:  \\[email or web address]\epic\Images\Pharmacy\HeparinInfusions\heparin HIGH INTENSITY nomogram for [facility] [id number].pdf    05/26/24 1854     heparin 25,000 units in dextrose 5% (100 units/ml) IV bolus from bag HIGH INTENSITY nomogram - [facility]  As needed (PRN)        Question:  Heparin Infusion Adjustment (DO NOT MODIFY ANSWER)  Answer:  \\[email or web address]\epic\Images\Pharmacy\HeparinInfusions\heparin HIGH INTENSITY nomogram for [facility] [id number].pdf    05/26/24 1854     heparin 25,000 units in dextrose 5% 250 mL (100 units/mL) infusion HIGH INTENSITY nomogram - [facility]  Continuous        Question:  Begin at (units/kg/hr)  Answer:  18    05/26/24 1854                    Discharge Planning   ERWIN:      Code Status: Full Code   Is the patient medically ready for discharge?:     Reason for patient still in hospital (select all that  apply): Treatment, PT / OT recommendations, and Pending disposition                     Kashmir Glynn DO  Department of Hospital Medicine   [facility]

## 2024-05-27 NOTE — ED NOTES
Telemetry Verification   Patient placed on Telemetry Box  Verified with War Room  Box # [id number]   Monitor Tech     Rate  88   Rhythm  NSR

## 2024-05-27 NOTE — ASSESSMENT & PLAN NOTE
Patient is identified as having Systolic (HFrEF) heart failure that is Acute on chronic. CHF is currently not controlled due to medications non adherence. Latest ECHO performed and demonstrates-     Results for orders placed during the hospital encounter of 11/05/23    Echo    Interpretation Summary    Left Ventricle: The left ventricle is normal in size. Normal wall thickness. Global hypokinesis present. There is severely reduced systolic function with a visually estimated ejection fraction of 25 - 30%. Grade I diastolic dysfunction.    Right Ventricle: Mild right ventricular enlargement. Wall thickness is normal. Right ventricle wall motion  is normal. Systolic function is mildly reduced.    Biatrial enlargement    Pulmonary Artery: The estimated pulmonary artery systolic pressure is 19 mmHg.    IVC/SVC: Normal venous pressure at 3 mmHg.    Recent Labs   Lab 05/26/24  1537   *       Likely elevated in the setting of medication noncompliance. No overt sign of decompensation on exam    Plan:  - Restart GDMT; uptritrate as tolerated   - Repeat TTE

## 2024-05-27 NOTE — H&P
[facility] - Emergency Dept  [facility] Medicine  History & Physical    Patient Name: Dionicio Bess  MRN: [id number]  Patient Class: OP- Observation  Admission Date: 5/26/2024  Attending Physician: Ashley Francois*   Primary Care Provider: Isela, Primary Doctor         Patient information was obtained from patient, past medical records, and ER records.     Subjective:     Principal Problem:Acute deep vein thrombosis (DVT) of popliteal vein of left lower extremity    Chief Complaint:   Chief Complaint   Patient presents with    Joint Swelling     L ankle swelling and pain. Entire L arm pain. Hx stroke with residual weakness on L side. Has been drinking alcohol. 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 For the past few days, he reported increase left leg swelling and pain with ambulation. He reports that he has previously had it repaired after a fracture and has been having ongoing issues. Also reports left sided chest pain for the past few day. Reports that it is reproducible with no radiation. He denies any fevers, nausea, vomiting, diarrhea, fever, chills, shortness of breath, tremors, confusion, palpitations or hallucinations. Patient denies any palpitations lightheadedness or dizziness at this time. Patient reports that is currently homeless and reports that he has not been taking his medication. Last time filled medications was in February of 2024. Patient requests to be restarted on home medications upon discharge. Reports having 4-5 shots of liquor daily. Last drink was yesterday. Denies any hx of alcohol withdrawals.     Initial vitals, pressure 89/61, satting well on room air.  CBC unremarkable, CMP remarkable for hypokalemia 3.2.  BUN creatinine baseline 16/1.4.  Chest x-ray showed no acute pulmonary process.  Cardiac workup elevated BNP of 219, troponin 0.029. BC  pending. Patient admitted for chest pain rule out.     Past Medical History:   Diagnosis Date    CAD (coronary artery disease)     HLD (hyperlipidemia)     HTN (hypertension)     Stroke     R MCA 2/2023       Past Surgical History:   Procedure Laterality Date    FIXATION OF SYNDESMOSIS OF ANKLE Left 1/5/2024    Procedure: FIXATION, SYNDESMOSIS, ANKLE;  Surgeon: Rogerio Hu MD;  Location: [facility] OR [address] [place] [address];  Service: Orthopedics;  Laterality: Left;    OPEN REDUCTION AND INTERNAL FIXATION (ORIF) OF INJURY OF ANKLE Left 1/5/2024    Procedure: ORIF, ANKLE - LEFT;  Surgeon: Rogerio Hu MD;  Location: [facility] OR [address] [place] [address];  Service: Orthopedics;  Laterality: Left;       Review of patient's allergies indicates:  No Known Allergies    Current Facility-Administered Medications on File Prior to Encounter   Medication    onabotulinumtoxina injection 300 Units     Current Outpatient Medications on File Prior to Encounter   Medication Sig    acetaminophen (TYLENOL) 500 MG tablet Take 2 tablets (1,000 mg total) by mouth every 8 (eight) hours.    albuterol-ipratropium (DUO-NEB) 2.5 mg-0.5 mg/3 mL nebulizer solution Take 3 mLs by nebulization every 4 (four) hours as needed for Wheezing or Shortness of Breath. Rescue    amLODIPine (NORVASC) 10 MG tablet Take 1 tablet (10 mg total) by mouth once daily.    apixaban (ELIQUIS) 5 mg Tab Take 1 tablet (5 mg total) by mouth 2 (two) times daily.    aspirin 81 MG Chew Chew and swallow 1 (1 mg total) by mouth once daily.    atorvastatin (LIPITOR) 80 MG tablet Take 1 tablet (80 mg total) by mouth every evening.    diphenhydrAMINE (BENADRYL) 25 mg capsule Take 1 capsule (25 mg total) by mouth every 6 (six) hours as needed for Itching.    FLUoxetine 20 MG capsule Take 1 capsule (20 mg total) by mouth once daily.    folic acid (FOLVITE) 1 MG tablet Take 1 tablet (1 mg total) by mouth once daily.    furosemide (LASIX) 40 MG tablet Take 1 tablet (40 mg total) by mouth once daily.     JARDIANCE 10 mg tablet Take 1 tablet (10 mg total) by mouth once daily.    losartan (COZAAR) 25 MG tablet Take 1 tablet (25 mg total) by mouth once daily.    melatonin (MELATIN) 3 mg tablet Take 2 tablets (6 mg total) by mouth nightly as needed for Insomnia.    methocarbamoL 1,000 mg Tab Take 1,000 mg by mouth 4 (four) times daily.    metoprolol succinate (TOPROL-XL) 50 MG 24 hr tablet Take 1 tablet (50 mg total) by mouth once daily.    multivitamin Tab Take 1 tablet by mouth once daily.    ondansetron (ZOFRAN-ODT) 8 MG TbDL Take 1 tablet (8 mg total) by mouth every 8 (eight) hours as needed (nausea).    pantoprazole (PROTONIX) 20 MG tablet Take 1 tablet (20 mg total) by mouth every morning.    polyethylene glycol (GLYCOLAX) 17 gram PwPk Take 17 g by mouth once daily.    pregabalin (LYRICA) 75 MG capsule Take 1 capsule (75 mg total) by mouth 2 (two) times daily.    senna (SENOKOT) 8.6 mg tablet Take 1 tablet by mouth once daily.    spironolactone (ALDACTONE) 25 MG tablet Take 1 tablet (25 mg total) by mouth once daily.     Family History    None       Tobacco Use    Smoking status: Never    Smokeless tobacco: Never   Substance and Sexual Activity    Alcohol use: Yes     Comment: occasional    Drug use: No    Sexual activity: Not on file     Review of Systems   Constitutional:  Negative for chills, fatigue and fever.   HENT:  Negative for congestion.    Eyes:  Negative for discharge.   Respiratory:  Negative for cough and shortness of breath.    Cardiovascular:  Positive for chest pain and leg swelling. Negative for palpitations.   Gastrointestinal:  Negative for abdominal pain, diarrhea, nausea and vomiting.   Genitourinary:  Negative for dysuria.   Musculoskeletal:  Negative for back pain.   Skin:  Negative for rash.   Neurological:  Negative for dizziness and headaches.     Objective:     Vital Signs (Most Recent):  Temp: 97.9 °F (36.6 °C) (05/26/24 1443)  Pulse: 89 (05/26/24 1545)  Resp: 20 (05/26/24 1545)  BP:  109/61 (05/26/24 1545)  SpO2: 96 % (05/26/24 1545) Vital Signs (24h Range):  Temp:  [97.9 °F (36.6 °C)] 97.9 °F (36.6 °C)  Pulse:  [82-89] 89  Resp:  [18-20] 20  SpO2:  [96 %-98 %] 96 %  BP: ()/(61-70) 109/61     Weight: 90.3 kg (199 lb)  Body mass index is 26.99 kg/m².     Physical Exam  Vitals and nursing note reviewed.   Constitutional:       General: He is not in acute distress.     Appearance: He is not toxic-appearing.      Comments: overweight   HENT:      Head: Normocephalic and atraumatic.      Right Ear: External ear normal.      Left Ear: External ear normal.      Nose: Nose normal.   Eyes:      General: No scleral icterus.  Cardiovascular:      Rate and Rhythm: Normal rate and regular rhythm.      Heart sounds: Normal heart sounds. No murmur heard.     No friction rub. No gallop.   Pulmonary:      Effort: Pulmonary effort is normal. No respiratory distress.      Breath sounds: No wheezing, rhonchi or rales.   Chest:      Chest wall: Tenderness (reproducible on palpation) present.   Abdominal:      General: Abdomen is flat. There is no distension.      Palpations: Abdomen is soft. There is no mass.      Tenderness: There is no abdominal tenderness. There is no guarding.      Hernia: No hernia is present.   Musculoskeletal:         General: Tenderness (to palpation of LLE) present. No swelling. Normal range of motion.      Cervical back: Normal range of motion.      Right lower leg: No edema.      Left lower leg: No edema.   Skin:     General: Skin is warm.      Capillary Refill: Capillary refill takes less than 2 seconds.      Coloration: Skin is not jaundiced.      Findings: No bruising or erythema.   Neurological:      General: No focal deficit present.      Mental Status: He is alert and oriented to person, place, and time.   Psychiatric:         Mood and Affect: Mood normal.         Behavior: Behavior normal.         Thought Content: Thought content normal.         Judgment: Judgment normal.                 Significant Labs: All pertinent labs within the past 24 hours have been reviewed.  CBC:   Recent Labs   Lab 05/26/24  1537   WBC 4.90   HGB 14.8   HCT 45.4        CMP:   Recent Labs   Lab 05/26/24  1537      K 3.2*      CO2 21*   *   BUN 16   CREATININE 1.4   CALCIUM 9.8   PROT 8.0   ALBUMIN 3.9   BILITOT 0.6   ALKPHOS 91   AST 40   ALT 19   ANIONGAP 13       Significant Imaging: I have reviewed all pertinent imaging results/findings within the past 24 hours.  Assessment/Plan:     * Acute deep vein thrombosis (DVT) of popliteal vein of left lower extremity  59 y/o M with hx of CAD, HFrEF (25-30% on 2023)  HTN, HLD, paroxysmal afib, prior MCA who presented for chest pain and left leg swelling and tenderness. Noncompliant with Eliquis since Feb 2024. Doppler ultrasound significant for thrombus within the mid to distal aspect of the popliteal vein and 1 of the paired posterior tibial veins.     - started on Heparin    Lactic acidosis  POCT Lactate at 2.6 on admission. Vital signs on admission notable for hypotension. Now s/p 1 L IVF with improvement. WBC unremarkable. Suspect that elevated lactate is 2/2 to hypovolemia. Low suspicion for infectious causes.     - Trend lactate     Hypokalemia  Potassium 3.2 on admission.    - replete with goal K >4      Alcohol dependence with uncomplicated intoxication  Hx of chronic alcohol use. Last drink was on 5/26. Denies any hx of alcohol withdrawals or DT. Denies any symptoms of alcohol withdrawal at this time.     - CIWA q4h  - PO thiamin  - Multivitamin, Folic acid  - Can consider adding Benzodiazepine if needed    Chest pain  59 y/o M with hx of CAD, HFrEF (25-30% on 2023)  HTN, HLD, paroxysmal afib, prior MCA who presented for left sided chest pain. Reproducible on exam. EKG on admission with no ST changes or LBBB noted. Trop at 0.029 on admission. . CXR unremarkable. Low suspicion for ACS at this time. Suspect chest pain is  MSK in nature. Considered PE but given findings of acute DVT, additional workup will not .    - f/u repeat trop, trend to peak  - tele  - continue heparin for DVT     Pure hypercholesterolemia  Lab Results   Component Value Date    CHOL 225 (H) 11/06/2023    HDL 85 (H) 11/06/2023    LDLCALC 126.0 11/06/2023    TRIG 70 11/06/2023     Plan:  - continue liptor 80 mg      Coronary artery disease involving native coronary artery with angina pectoris  Questionable stent placement, was documented on chart. Will continue to monitor on telemetry. Previously on Lipitor but no longer taking as of Feb 2024.     - restart home Lipitor    Chronic combined systolic and diastolic congestive heart failure  Patient is identified as having Systolic (HFrEF) heart failure that is Acute on chronic. CHF is currently not controlled due to medications non adherence. Latest ECHO performed and demonstrates-     Results for orders placed during the hospital encounter of 11/05/23    Echo    Interpretation Summary    Left Ventricle: The left ventricle is normal in size. Normal wall thickness. Global hypokinesis present. There is severely reduced systolic function with a visually estimated ejection fraction of 25 - 30%. Grade I diastolic dysfunction.    Right Ventricle: Mild right ventricular enlargement. Wall thickness is normal. Right ventricle wall motion  is normal. Systolic function is mildly reduced.    Biatrial enlargement    Pulmonary Artery: The estimated pulmonary artery systolic pressure is 19 mmHg.    IVC/SVC: Normal venous pressure at 3 mmHg.    Recent Labs   Lab 05/26/24  1537   *     Likely elevated in the setting of medication noncompliance. No overt sign of decompensation on exam    Plan:  - Restart GDMT; uptritrate as tolerated   - Repeat TTE    History of CVA with residual deficit  -Previous CVA w/ residual L sided weakness.    - Restart statin, and eliquis  - Can consider restarting ASA pending  tolerance of Eliquis      Essential hypertension  Chronic, controlled. Latest blood pressure and vitals reviewed-     Temp:  [97.9 °F (36.6 °C)]   Pulse:  [82-89]   Resp:  [18-20]   BP: ()/(61-70)   SpO2:  [96 %-98 %] .   Home meds for hypertension were reviewed and noted below.   Hypertension Medications               amLODIPine (NORVASC) 10 MG tablet Take 1 tablet (10 mg total) by mouth once daily.    furosemide (LASIX) 40 MG tablet Take 1 tablet (40 mg total) by mouth once daily.    losartan (COZAAR) 25 MG tablet Take 1 tablet (25 mg total) by mouth once daily.    metoprolol succinate (TOPROL-XL) 50 MG 24 hr tablet Take 1 tablet (50 mg total) by mouth once daily.    spironolactone (ALDACTONE) 25 MG tablet Take 1 tablet (25 mg total) by mouth once daily.            While in the hospital, will manage blood pressure as follows; Adjust home antihypertensive regimen as follows- HOLDING patient is currently normotensive    Will utilize p.r.n. blood pressure medication only if patient's blood pressure greater than 180/110 and he develops symptoms such as worsening chest pain or shortness of breath.    Paroxysmal atrial fibrillation  Patient with Paroxysmal (<7 days) atrial fibrillation which is controlled  Patient is currently in sinus rhythm.[id number] Score: 1. Anticoagulation indicated. Anticoagulation done with Eliquis . No longer taking as of Feb 2024.     - Restart Eliquis and Metoprolol      VTE Risk Mitigation (From admission, onward)           Ordered     heparin 25,000 units in dextrose 5% (100 units/ml) IV bolus from bag HIGH INTENSITY nomogram - [facility]  As needed (PRN)        Question:  Heparin Infusion Adjustment (DO NOT MODIFY ANSWER)  Answer:  [email or web address]\Images\Pharmacy\HeparinInfusions\heparin HIGH INTENSITY nomogram for [facility] [id number].pdf    05/26/24 3314     heparin 25,000 units in dextrose 5% (100 units/ml) IV bolus from bag HIGH INTENSITY nomogram - [facility]  As needed (PRN)        Question:   Heparin Infusion Adjustment (DO NOT MODIFY ANSWER)  Answer:  [email or web address]\epic\Images\Pharmacy\HeparinInfusions\heparin HIGH INTENSITY nomogram for [facility] [id number].pdf    05/26/24 1854     heparin 25,000 units in dextrose 5% 250 mL (100 units/mL) infusion HIGH INTENSITY nomogram - [facility]  Continuous        Question:  Begin at (units/kg/hr)  Answer:  18    05/26/24 1854     heparin 25,000 units in dextrose 5% (100 units/ml) IV bolus from bag HIGH INTENSITY nomogram - [facility]  Once        Question:  Heparin Infusion Adjustment (DO NOT MODIFY ANSWER)  Answer:  [email or web address]\Images\Pharmacy\HeparinInfusions\heparin HIGH INTENSITY nomogram for [facility] [id number].pdf    05/26/24 1854                            Kashmir Glynn DO  Department of Hospital Medicine  [facility] - Emergency Dept

## 2024-05-27 NOTE — ASSESSMENT & PLAN NOTE
Hx of chronic alcohol use. Last drink was on 5/26. Denies any hx of alcohol withdrawals or DT. Denies any symptoms of alcohol withdrawal at this time.     - CIWA q4h  - PO thiamin  - Multivitamin, Folic acid  - Can consider adding Benzodiazepine if needed

## 2024-05-28 LAB
ALBUMIN SERPL BCP-MCNC: 3.3 G/DL (ref 3.5–5.2)
ALP SERPL-CCNC: 71 U/L (ref 55–135)
ALT SERPL W/O P-5'-P-CCNC: 14 U/L (ref 10–44)
ANION GAP SERPL CALC-SCNC: 10 MMOL/L (ref 8–16)
APTT PPP: 58.1 SEC (ref 21–32)
AST SERPL-CCNC: 28 U/L (ref 10–40)
BASOPHILS # BLD AUTO: 0.02 K/UL (ref 0–0.2)
BASOPHILS NFR BLD: 0.5 % (ref 0–1.9)
BILIRUB SERPL-MCNC: 0.7 MG/DL (ref 0.1–1)
BUN SERPL-MCNC: 10 MG/DL (ref 6–20)
CALCIUM SERPL-MCNC: 9.3 MG/DL (ref 8.7–10.5)
CHLORIDE SERPL-SCNC: 103 MMOL/L (ref 95–110)
CO2 SERPL-SCNC: 25 MMOL/L (ref 23–29)
CREAT SERPL-MCNC: 1 MG/DL (ref 0.5–1.4)
DIFFERENTIAL METHOD BLD: ABNORMAL
EOSINOPHIL # BLD AUTO: 0.2 K/UL (ref 0–0.5)
EOSINOPHIL NFR BLD: 4.3 % (ref 0–8)
ERYTHROCYTE [DISTWIDTH] IN BLOOD BY AUTOMATED COUNT: 14.4 % (ref 11.5–14.5)
EST. GFR  (NO RACE VARIABLE): >60 ML/MIN/1.73 M^2
GLUCOSE SERPL-MCNC: 73 MG/DL (ref 70–110)
HCT VFR BLD AUTO: 45.1 % (ref 40–54)
HGB BLD-MCNC: 14.6 G/DL (ref 14–18)
IMM GRANULOCYTES # BLD AUTO: 0.01 K/UL (ref 0–0.04)
IMM GRANULOCYTES NFR BLD AUTO: 0.2 % (ref 0–0.5)
LYMPHOCYTES # BLD AUTO: 1.7 K/UL (ref 1–4.8)
LYMPHOCYTES NFR BLD: 41.1 % (ref 18–48)
MAGNESIUM SERPL-MCNC: 2 MG/DL (ref 1.6–2.6)
MCH RBC QN AUTO: 31.1 PG (ref 27–31)
MCHC RBC AUTO-ENTMCNC: 32.4 G/DL (ref 32–36)
MCV RBC AUTO: 96 FL (ref 82–98)
MONOCYTES # BLD AUTO: 0.4 K/UL (ref 0.3–1)
MONOCYTES NFR BLD: 9.9 % (ref 4–15)
NEUTROPHILS # BLD AUTO: 1.8 K/UL (ref 1.8–7.7)
NEUTROPHILS NFR BLD: 44 % (ref 38–73)
NRBC BLD-RTO: 0 /100 WBC
PHOSPHATE SERPL-MCNC: 4.4 MG/DL (ref 2.7–4.5)
PLATELET # BLD AUTO: 166 K/UL (ref 150–450)
PMV BLD AUTO: 11 FL (ref 9.2–12.9)
POTASSIUM SERPL-SCNC: 3.9 MMOL/L (ref 3.5–5.1)
PROT SERPL-MCNC: 6.7 G/DL (ref 6–8.4)
RBC # BLD AUTO: 4.7 M/UL (ref 4.6–6.2)
SODIUM SERPL-SCNC: 138 MMOL/L (ref 136–145)
WBC # BLD AUTO: 4.14 K/UL (ref 3.9–12.7)

## 2024-05-28 PROCEDURE — 85730 THROMBOPLASTIN TIME PARTIAL: CPT | Performed by: HOSPITALIST

## 2024-05-28 PROCEDURE — 21400001 HC TELEMETRY ROOM

## 2024-05-28 PROCEDURE — 83735 ASSAY OF MAGNESIUM: CPT

## 2024-05-28 PROCEDURE — 84100 ASSAY OF PHOSPHORUS: CPT

## 2024-05-28 PROCEDURE — 25000003 PHARM REV CODE 250

## 2024-05-28 PROCEDURE — 80053 COMPREHEN METABOLIC PANEL: CPT

## 2024-05-28 PROCEDURE — 36415 COLL VENOUS BLD VENIPUNCTURE: CPT | Performed by: HOSPITALIST

## 2024-05-28 PROCEDURE — 63600175 PHARM REV CODE 636 W HCPCS

## 2024-05-28 PROCEDURE — 25000242 PHARM REV CODE 250 ALT 637 W/ HCPCS

## 2024-05-28 PROCEDURE — 97161 PT EVAL LOW COMPLEX 20 MIN: CPT

## 2024-05-28 PROCEDURE — 85025 COMPLETE CBC W/AUTO DIFF WBC: CPT

## 2024-05-28 RX ORDER — METHOCARBAMOL 500 MG/1
500 TABLET, FILM COATED ORAL 3 TIMES DAILY PRN
Qty: 30 TABLET | Refills: 0 | Status: SHIPPED | OUTPATIENT
Start: 2024-05-28

## 2024-05-28 RX ORDER — SPIRONOLACTONE 25 MG/1
25 TABLET ORAL DAILY
Status: DISCONTINUED | OUTPATIENT
Start: 2024-05-28 | End: 2024-05-30 | Stop reason: HOSPADM

## 2024-05-28 RX ORDER — SPIRONOLACTONE 25 MG/1
25 TABLET ORAL DAILY
Qty: 90 TABLET | Refills: 0 | Status: SHIPPED | OUTPATIENT
Start: 2024-05-28

## 2024-05-28 RX ADMIN — FOLIC ACID 1 MG: 1 TABLET ORAL at 10:05

## 2024-05-28 RX ADMIN — SPIRONOLACTONE 25 MG: 25 TABLET ORAL at 10:05

## 2024-05-28 RX ADMIN — Medication 100 MG: at 10:05

## 2024-05-28 RX ADMIN — ASPIRIN 81 MG CHEWABLE TABLET 81 MG: 81 TABLET CHEWABLE at 10:05

## 2024-05-28 RX ADMIN — LOSARTAN POTASSIUM 25 MG: 25 TABLET, FILM COATED ORAL at 10:05

## 2024-05-28 RX ADMIN — ATORVASTATIN CALCIUM 80 MG: 40 TABLET, FILM COATED ORAL at 09:05

## 2024-05-28 RX ADMIN — PREGABALIN 75 MG: 75 CAPSULE ORAL at 10:05

## 2024-05-28 RX ADMIN — METHOCARBAMOL 500 MG: 500 TABLET ORAL at 06:05

## 2024-05-28 RX ADMIN — PREGABALIN 75 MG: 75 CAPSULE ORAL at 09:05

## 2024-05-28 RX ADMIN — METHOCARBAMOL 500 MG: 500 TABLET ORAL at 04:05

## 2024-05-28 RX ADMIN — METOPROLOL SUCCINATE 50 MG: 50 TABLET, EXTENDED RELEASE ORAL at 10:05

## 2024-05-28 RX ADMIN — EMPAGLIFLOZIN 10 MG: 10 TABLET, FILM COATED ORAL at 10:05

## 2024-05-28 RX ADMIN — HEPARIN SODIUM AND DEXTROSE 14 UNITS/KG/HR: 10000; 5 INJECTION INTRAVENOUS at 05:05

## 2024-05-28 RX ADMIN — THERA TABS 1 TABLET: TAB at 10:05

## 2024-05-28 NOTE — PLAN OF CARE
PTT level remian therapeutic. Next lab draw will be in the morning.  Problem: Adult Inpatient Plan of Care  Goal: Plan of Care Review  Outcome: Progressing  Goal: Patient-Specific Goal (Individualized)  Outcome: Progressing  Goal: Absence of Hospital-Acquired Illness or Injury  Outcome: Progressing  Goal: Optimal Comfort and Wellbeing  Outcome: Progressing  Goal: Readiness for Transition of Care  Outcome: Progressing     Problem: Fall Injury Risk  Goal: Absence of Fall and Fall-Related Injury  Outcome: Progressing     Problem: Pain Acute  Goal: Optimal Pain Control and Function  Outcome: Progressing     Problem: VTE (Venous Thromboembolism)  Goal: Tissue Perfusion  Outcome: Progressing  Goal: Right Ventricular Function  Outcome: Progressing

## 2024-05-28 NOTE — PT/OT/SLP EVAL
"Physical Therapy Evaluation/D/C Summary    Patient Name:  Dionicio Bess   MRN:  [id number]    Recommendations:     Discharge Recommendations: No Therapy Indicated   Discharge Equipment Recommendations: none   Barriers to discharge:  pt is homeless    Assessment:     Dionicio Bess is a 58 y.o. male admitted with a medical diagnosis of Acute deep vein thrombosis (DVT) of popliteal vein of left lower extremity.  He presents with the following impairments/functional limitations: L UE flexor contractures..PT D/Juan due to pt able to perform functional mobility with straight cane    Recent Surgery: * No surgery found *      Plan:       (D/C PT due to pt able to perform functional mobility) Plan of Care Expires:  06/27/24    Subjective   "I like to take walks each day"    Pain/Comfort:  Pain Rating 1: 8/10  Location - Side 1: Left  Location - Orientation 1: generalized  Location 1: ankle  Pain Addressed 1: Pre-medicate for activity, Reposition, Distraction  Pain Rating Post-Intervention 1: 8/10    Patients cultural, spiritual, Lutheran conflicts given the current situation: no    Living Environment:  Pt is homeless  Prior to admission, patients level of function was independent with straight cane.  Equipment used at home: cane, straight.  Upon discharge, patient will have assistance from unknown.    Objective:     Communicated with nurse prior to session.  Patient found HOB elevated with peripheral IV, telemetry  upon PT entry to room.    General Precautions: Standard, fall  Orthopedic Precautions:N/A   Braces: N/A  Respiratory Status: Room air    Exams:  Cognitive Exam:  Patient is oriented to Person, Place, and Time  Sensation:    -       Intact  light/touch B LE  RLE ROM: WFL  RLE Strength: WFL  LLE ROM: WFL  LLE Strength: WFL except hip flex 4-/5    Functional Mobility:  Bed Mobility:     Supine to Sit: modified independence  Sit to Supine: modified independence  Transfers:     Sit to Stand:  modified independence with " straight cane  Gait: 400ft with straight cane with mod independent. Pt performed gait with increased lateral sway to the L>R with no LOB noted.    AM-PAC 6 CLICK MOBILITY  Total Score:24     Treatment & Education:  Pt sat on the EOB ~ 2 min independently prior to transfer    Patient left sitting edge of bed with all lines intact, call button in reach, and MD present.    GOALS:   Multidisciplinary Problems       Physical Therapy Goals       Not on file                    History:     Past Medical History:   Diagnosis Date    CAD (coronary artery disease)     Chest pain 12/30/2023    HLD (hyperlipidemia)     HTN (hypertension)     Stroke     R MCA 2/2023       Past Surgical History:   Procedure Laterality Date    FIXATION OF SYNDESMOSIS OF ANKLE Left 1/5/2024    Procedure: FIXATION, SYNDESMOSIS, ANKLE;  Surgeon: Rogerio Hu MD;  Location: [facility] OR [address] [place] [address];  Service: Orthopedics;  Laterality: Left;    OPEN REDUCTION AND INTERNAL FIXATION (ORIF) OF INJURY OF ANKLE Left 1/5/2024    Procedure: ORIF, ANKLE - LEFT;  Surgeon: Rogerio Hu MD;  Location: [facility] OR [address] [place] [address];  Service: Orthopedics;  Laterality: Left;       Time Tracking:     PT Received On: 05/28/24  PT Start Time: 1022     PT Stop Time: 1030  PT Total Time (min): 8 min     Billable Minutes: Evaluation 8      05/28/2024

## 2024-05-28 NOTE — PLAN OF CARE
Problem: Adult Inpatient Plan of Care  Goal: Plan of Care Review  Outcome: Progressing  Goal: Absence of Hospital-Acquired Illness or Injury  Outcome: Progressing  Goal: Optimal Comfort and Wellbeing  Outcome: Progressing     Problem: Fall Injury Risk  Goal: Absence of Fall and Fall-Related Injury  Outcome: Progressing     Problem: Pain Acute  Goal: Optimal Pain Control and Function  Outcome: Progressing     Problem: VTE (Venous Thromboembolism)  Goal: Tissue Perfusion  Outcome: Progressing   POC reviewed with pt who verbalized understanding. AAOx4. VSS on RA. Heparin drip infusing - therapeutic throughout shift. Urinal at bedside. Tele monitored. Resting between care. Remains free of falls and injury. Call light in reach and rounds made for safety.

## 2024-05-28 NOTE — SUBJECTIVE & OBJECTIVE
Interval History: NAEON. Patient doing well now on total GDMT. Pending PT/OT recs. Consider restarting home CCB as now hypertensive.     Review of Systems  Objective:     Vital Signs (Most Recent):  Temp: 98.5 °F (36.9 °C) (05/28/24 0716)  Pulse: 63 (05/28/24 1019)  Resp: 18 (05/28/24 0541)  BP: (!) 146/98 (05/28/24 1019)  SpO2: 96 % (05/28/24 0716) Vital Signs (24h Range):  Temp:  [97.3 °F (36.3 °C)-98.7 °F (37.1 °C)] 98.5 °F (36.9 °C)  Pulse:  [60-75] 63  Resp:  [16-18] 18  SpO2:  [96 %-99 %] 96 %  BP: (126-146)/(60-98) 146/98     Weight: 100 kg (220 lb 7.4 oz)  Body mass index is 29.9 kg/m².    Intake/Output Summary (Last 24 hours) at 5/28/2024 1057  Last data filed at 5/28/2024 0630  Gross per 24 hour   Intake 480 ml   Output 600 ml   Net -120 ml         Physical Exam  Vitals and nursing note reviewed.   Constitutional:       General: He is not in acute distress.     Appearance: He is not toxic-appearing.      Comments: overweight   HENT:      Head: Normocephalic and atraumatic.      Right Ear: External ear normal.      Left Ear: External ear normal.      Nose: Nose normal.   Eyes:      General: No scleral icterus.  Cardiovascular:      Rate and Rhythm: Normal rate and regular rhythm.      Heart sounds: Normal heart sounds. No murmur heard.     No friction rub. No gallop.   Pulmonary:      Effort: Pulmonary effort is normal. No respiratory distress.      Breath sounds: No wheezing, rhonchi or rales.   Chest:      Chest wall: Tenderness (reproducible on palpation) present.   Abdominal:      General: Abdomen is flat. There is no distension.      Palpations: Abdomen is soft. There is no mass.      Tenderness: There is no abdominal tenderness. There is no guarding.      Hernia: No hernia is present.   Musculoskeletal:         General: Tenderness (improved LLE tenderness) present. No swelling. Normal range of motion.      Cervical back: Normal range of motion.      Right lower leg: No edema.      Left lower leg: No  edema.   Skin:     General: Skin is warm.      Capillary Refill: Capillary refill takes less than 2 seconds.      Coloration: Skin is not jaundiced.      Findings: No bruising or erythema.   Neurological:      General: No focal deficit present.      Mental Status: He is alert and oriented to person, place, and time.   Psychiatric:         Mood and Affect: Mood normal.         Behavior: Behavior normal.         Thought Content: Thought content normal.         Judgment: Judgment normal.             Significant Labs: All pertinent labs within the past 24 hours have been reviewed.    Significant Imaging: I have reviewed all pertinent imaging results/findings within the past 24 hours.

## 2024-05-28 NOTE — PROGRESS NOTES
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 For the past few days, he reported increase left leg swelling and pain with ambulation. He reports that he has previously had it repaired after a fracture and has been having ongoing issues. Also reports left sided chest pain for the past few day. Reports that it is reproducible with no radiation. He denies any fevers, nausea, vomiting, diarrhea, fever, chills, shortness of breath, tremors, confusion, palpitations or hallucinations. Patient denies any palpitations lightheadedness or dizziness at this time. Patient reports that is currently homeless and reports that he has not been taking his medication. Last time filled medications was in February of 2024. Patient requests to be restarted on home medications upon discharge. Reports having 4-5 shots of liquor daily. Last drink was yesterday. Denies any hx of alcohol withdrawals.     Initial vitals, pressure 89/61, satting well on room air.  CBC unremarkable, CMP remarkable for hypokalemia 3.2.  BUN creatinine baseline 16/1.4.  Chest x-ray showed no acute pulmonary process.  Cardiac workup elevated BNP of 219, troponin 0.029. BC pending. Patient admitted for chest pain rule out.     Overview/Hospital Course:  59 y/o M admitted for chest pain and left LLE swelling. DVT US significant for DVT in left popliteal vein. Started on Heparin gtt. CTA Chest significant for right pulmonary artery PE. Restart on home Metoprolol. Started  home Jardiance and Losartan on 5/27.     Interval History: NAEON. Patient doing well now on total GDMT. Pending PT/OT recs. Consider restarting home CCB as now hypertensive.     Review of Systems  Objective:     Vital Signs (Most Recent):  Temp: 98.5 °F (36.9 °C) (05/28/24 0716)  Pulse: 63 (05/28/24 1019)  Resp: 18 (05/28/24 0541)  BP: (!) 146/98 (05/28/24 1019)  SpO2: 96 % (05/28/24 0716) Vital Signs (24h Range):  Temp:  [97.3 °F (36.3 °C)-98.7 °F (37.1 °C)] 98.5 °F (36.9 °C)  Pulse:  [60-75] 63  Resp:  [16-18] 18  SpO2:  [96 %-99 %] 96 %  BP: (126-146)/(60-98) 146/98     Weight: 100 kg (220 lb 7.4 oz)  Body mass index is 29.9 kg/m².    Intake/Output Summary (Last 24 hours) at 5/28/2024 1057  Last data filed at 5/28/2024 0630  Gross per 24 hour   Intake 480 ml   Output 600 ml   Net -120 ml         Physical Exam  Vitals and nursing note reviewed.   Constitutional:       General: He is not in acute distress.     Appearance: He is not toxic-appearing.      Comments: overweight   HENT:      Head: Normocephalic and atraumatic.      Right Ear: External ear normal.      Left Ear: External ear normal.      Nose: Nose normal.   Eyes:      General: No scleral icterus.  Cardiovascular:      Rate and Rhythm: Normal rate and regular rhythm.      Heart sounds: Normal heart sounds. No murmur heard.     No friction rub. No gallop.   Pulmonary:      Effort: Pulmonary effort is normal. No respiratory distress.      Breath sounds: No wheezing, rhonchi or rales.   Chest:      Chest wall: Tenderness (reproducible on palpation) present.   Abdominal:      General: Abdomen is flat. There is no distension.      Palpations: Abdomen is soft. There is no mass.      Tenderness: There is no abdominal tenderness. There is no guarding.      Hernia: No hernia is present.   Musculoskeletal:         General: Tenderness (improved LLE tenderness) present. No swelling. Normal range of motion.      Cervical back: Normal range of motion.      Right lower  leg: No edema.      Left lower leg: No edema.   Skin:     General: Skin is warm.      Capillary Refill: Capillary refill takes less than 2 seconds.      Coloration: Skin is not jaundiced.      Findings: No bruising or erythema.   Neurological:      General: No focal deficit present.      Mental Status: He is alert and oriented to person, place, and time.   Psychiatric:         Mood and Affect: Mood normal.         Behavior: Behavior normal.         Thought Content: Thought content normal.         Judgment: Judgment normal.             Significant Labs: All pertinent labs within the past 24 hours have been reviewed.    Significant Imaging: I have reviewed all pertinent imaging results/findings within the past 24 hours.    Assessment/Plan:      * Acute deep vein thrombosis (DVT) of popliteal vein of left lower extremity  57 y/o M with hx of CAD, HFrEF (25-30% on 2023)  HTN, HLD, paroxysmal afib, prior MCA who presented for chest pain and left leg swelling and tenderness. Noncompliant with Eliquis since Feb 2024. Doppler ultrasound significant for thrombus within the mid to distal aspect of the popliteal vein and 1 of the paired posterior tibial veins.     - started on Heparin  - restart Eliquis on discharge    Elevated troponin  Trop at 0.029 on admission. Trended up to 0.04 to 0.043. Likely 2/2 to HFrEF vs right heart strain 2/2 to PE though less likely    - TTE    Impaired mobility and ADLs  Patient with hx of LUE deficit 2/2 to CVA. Reports issues with medication management due to his deficit along with being out of his medications due to him being homeless. Can benefit from placement upon discharge    - PT/OT evaluation  - Social work consult  - Will need refills on all home meds upon discharge.      Alcohol dependence  Hx of chronic alcohol use for 30 years with daily use of 4-5 drinks. Last drink was on 5/26. Denies any hx of alcohol withdrawals or DT. Denies any symptoms of alcohol withdrawal at this time.      - CIWA q4h  - PO thiamin  - Multivitamin, Folic acid  - Can consider adding Benzodiazepine if needed    Acute pulmonary embolism  59 y/o M with hx of CAD, HFrEF (25-30% on 2023)  HTN, HLD, paroxysmal afib, prior MCA who presented for left sided chest pain. Reproducible on exam. EKG on admission with no ST changes or LBBB noted. Trop at 0.029 on admission. . CXR unremarkable. Low suspicion for ACS at this time. Suspect chest pain is MSK in nature. Considered PE but given findings of acute DVT, additional workup will not . Trops peaked at 0.043. CTA Chest significant for right pulmonary artery and segmental branch PE of left upper lobe.     - continue heparin; restart Eliquis on discharge  - pending TTE for evaluate for right heart strain  - tele    Pure hypercholesterolemia  Lab Results   Component Value Date    CHOL 225 (H) 11/06/2023    HDL 85 (H) 11/06/2023    LDLCALC 126.0 11/06/2023    TRIG 70 11/06/2023     Plan:  - continue liptor 80 mg      Coronary arteriosclerosis  Questionable stent placement, was documented on chart. Will continue to monitor on telemetry. Previously on Lipitor but no longer taking as of Feb 2024.     - restart home Lipitor    Chronic combined systolic and diastolic congestive heart failure  Patient is identified as having Systolic (HFrEF) heart failure that is Acute on chronic. CHF is currently not controlled due to medications non adherence. Latest ECHO performed and demonstrates-     Results for orders placed during the hospital encounter of 11/05/23    Echo    Interpretation Summary    Left Ventricle: The left ventricle is normal in size. Normal wall thickness. Global hypokinesis present. There is severely reduced systolic function with a visually estimated ejection fraction of 25 - 30%. Grade I diastolic dysfunction.    Right Ventricle: Mild right ventricular enlargement. Wall thickness is normal. Right ventricle wall motion  is normal. Systolic function is  mildly reduced.    Biatrial enlargement    Pulmonary Artery: The estimated pulmonary artery systolic pressure is 19 mmHg.    IVC/SVC: Normal venous pressure at 3 mmHg.    Recent Labs   Lab 05/26/24  1537   *       Likely elevated in the setting of medication noncompliance. No overt sign of decompensation on exam    Plan:  - Restart GDMT; uptritrate as tolerated   - Repeat TTE    History of CVA with residual deficit  -Previous CVA w/ residual L sided weakness.    - Restart statin, and eliquis  - Can consider restarting ASA pending tolerance of Eliquis      Essential hypertension  Chronic, controlled. Latest blood pressure and vitals reviewed-     Temp:  [97.9 °F (36.6 °C)]   Pulse:  [82-89]   Resp:  [18-20]   BP: ()/(61-70)   SpO2:  [96 %-98 %] .   Home meds for hypertension were reviewed and noted below.   Hypertension Medications               amLODIPine (NORVASC) 10 MG tablet Take 1 tablet (10 mg total) by mouth once daily.    furosemide (LASIX) 40 MG tablet Take 1 tablet (40 mg total) by mouth once daily.    losartan (COZAAR) 25 MG tablet Take 1 tablet (25 mg total) by mouth once daily.    metoprolol succinate (TOPROL-XL) 50 MG 24 hr tablet Take 1 tablet (50 mg total) by mouth once daily.    spironolactone (ALDACTONE) 25 MG tablet Take 1 tablet (25 mg total) by mouth once daily.            While in the hospital, will manage blood pressure as follows; Adjust home antihypertensive regimen as follows- HOLDING patient is currently normotensive    Will utilize p.r.n. blood pressure medication only if patient's blood pressure greater than 180/110 and he develops symptoms such as worsening chest pain or shortness of breath.    Paroxysmal atrial fibrillation  Patient with Paroxysmal (<7 days) atrial fibrillation which is controlled  Patient is currently in sinus rhythm.[id number] Score: 1. Anticoagulation indicated. Anticoagulation done with Eliquis . No longer taking as of Feb 2024.     - Restart Eliquis  and Metoprolol      VTE Risk Mitigation (From admission, onward)           Ordered     heparin 25,000 units in dextrose 5% (100 units/ml) IV bolus from bag HIGH INTENSITY nomogram - [facility]  As needed (PRN)        Question:  Heparin Infusion Adjustment (DO NOT MODIFY ANSWER)  Answer:  [email or web address]\epic\Images\Pharmacy\HeparinInfusions\heparin HIGH INTENSITY nomogram for [facility] [id number]    05/26/24 1854     heparin 25,000 units in dextrose 5% (100 units/ml) IV bolus from bag HIGH INTENSITY nomogram - [facility]  As needed (PRN)        Question:  Heparin Infusion Adjustment (DO NOT MODIFY ANSWER)  Answer:  [email or web address]\epic\Images\Pharmacy\HeparinInfusions\heparin HIGH INTENSITY nomogram for [facility] [id number]    05/26/24 1854     heparin 25,000 units in dextrose 5% 250 mL (100 units/mL) infusion HIGH INTENSITY nomogram - [facility]  Continuous        Question:  Begin at (units/kg/hr)  Answer:  18    05/26/24 1854                    Discharge Planning   ERWIN:      Code Status: Full Code   Is the patient medically ready for discharge?:     Reason for patient still in hospital (select all that apply): Patient trending condition, PT / OT recommendations, and Pending disposition  Discharge Plan A: Brandt Cardona DO  Department of Hospital Medicine   [facility]

## 2024-05-28 NOTE — PLAN OF CARE
SW met w/ pt at bedside to do check-in and discuss d/c planning. Pt stated he would like to go somewhere to live upon d/c. Pt stated he does not want to go to a NH. SW and pt discuss alternative and pt agreed to going to a Group Home. Pt stated he receives $900 monthly and he is his own payee. SW informed pt SW will reached out to local . GEORGE informed pt CM dept will continue following along w/ treatment team for recommendation/needs.     GEORGE reached out to [place] w/  [facility] [phone] to inquire about any openings. She stated she has opening and willing to accept pt. GEORGE informed [place] pt projected to discharge tomorrow 5/29 and CM dept will f/u.     Discharge Plan A and Plan B have been determined by review of patient's clinical status, future medical and therapeutic needs, and coverage/benefits for post-acute care in coordination with multidisciplinary team members.    ARVIN Bonilla   [facility]    Case Management Dept  [phone]

## 2024-05-29 LAB
ALBUMIN SERPL BCP-MCNC: 3.6 G/DL (ref 3.5–5.2)
ALP SERPL-CCNC: 69 U/L (ref 55–135)
ALT SERPL W/O P-5'-P-CCNC: 16 U/L (ref 10–44)
ANION GAP SERPL CALC-SCNC: 11 MMOL/L (ref 8–16)
APTT PPP: 27.5 SEC (ref 21–32)
AST SERPL-CCNC: 27 U/L (ref 10–40)
BASOPHILS # BLD AUTO: 0.03 K/UL (ref 0–0.2)
BASOPHILS NFR BLD: 0.6 % (ref 0–1.9)
BILIRUB SERPL-MCNC: 0.6 MG/DL (ref 0.1–1)
BUN SERPL-MCNC: 11 MG/DL (ref 6–20)
CALCIUM SERPL-MCNC: 9.2 MG/DL (ref 8.7–10.5)
CHLORIDE SERPL-SCNC: 100 MMOL/L (ref 95–110)
CO2 SERPL-SCNC: 27 MMOL/L (ref 23–29)
CREAT SERPL-MCNC: 1 MG/DL (ref 0.5–1.4)
DIFFERENTIAL METHOD BLD: ABNORMAL
EOSINOPHIL # BLD AUTO: 0.2 K/UL (ref 0–0.5)
EOSINOPHIL NFR BLD: 3.9 % (ref 0–8)
ERYTHROCYTE [DISTWIDTH] IN BLOOD BY AUTOMATED COUNT: 14.4 % (ref 11.5–14.5)
EST. GFR  (NO RACE VARIABLE): >60 ML/MIN/1.73 M^2
GLUCOSE SERPL-MCNC: 75 MG/DL (ref 70–110)
HCT VFR BLD AUTO: 45.2 % (ref 40–54)
HGB BLD-MCNC: 15.1 G/DL (ref 14–18)
IMM GRANULOCYTES # BLD AUTO: 0.01 K/UL (ref 0–0.04)
IMM GRANULOCYTES NFR BLD AUTO: 0.2 % (ref 0–0.5)
LYMPHOCYTES # BLD AUTO: 2 K/UL (ref 1–4.8)
LYMPHOCYTES NFR BLD: 38.5 % (ref 18–48)
MAGNESIUM SERPL-MCNC: 2 MG/DL (ref 1.6–2.6)
MCH RBC QN AUTO: 31.5 PG (ref 27–31)
MCHC RBC AUTO-ENTMCNC: 33.4 G/DL (ref 32–36)
MCV RBC AUTO: 94 FL (ref 82–98)
MONOCYTES # BLD AUTO: 0.5 K/UL (ref 0.3–1)
MONOCYTES NFR BLD: 9.4 % (ref 4–15)
NEUTROPHILS # BLD AUTO: 2.4 K/UL (ref 1.8–7.7)
NEUTROPHILS NFR BLD: 47.4 % (ref 38–73)
NRBC BLD-RTO: 0 /100 WBC
PHOSPHATE SERPL-MCNC: 4.1 MG/DL (ref 2.7–4.5)
PLATELET # BLD AUTO: 151 K/UL (ref 150–450)
PMV BLD AUTO: 11.6 FL (ref 9.2–12.9)
POTASSIUM SERPL-SCNC: 4.3 MMOL/L (ref 3.5–5.1)
PROT SERPL-MCNC: 7.2 G/DL (ref 6–8.4)
RBC # BLD AUTO: 4.8 M/UL (ref 4.6–6.2)
SODIUM SERPL-SCNC: 138 MMOL/L (ref 136–145)
WBC # BLD AUTO: 5.12 K/UL (ref 3.9–12.7)

## 2024-05-29 PROCEDURE — 25000003 PHARM REV CODE 250

## 2024-05-29 PROCEDURE — 36415 COLL VENOUS BLD VENIPUNCTURE: CPT | Performed by: HOSPITALIST

## 2024-05-29 PROCEDURE — 80053 COMPREHEN METABOLIC PANEL: CPT

## 2024-05-29 PROCEDURE — 85025 COMPLETE CBC W/AUTO DIFF WBC: CPT

## 2024-05-29 PROCEDURE — 25000242 PHARM REV CODE 250 ALT 637 W/ HCPCS

## 2024-05-29 PROCEDURE — 63600175 PHARM REV CODE 636 W HCPCS

## 2024-05-29 PROCEDURE — 83735 ASSAY OF MAGNESIUM: CPT

## 2024-05-29 PROCEDURE — 84100 ASSAY OF PHOSPHORUS: CPT

## 2024-05-29 PROCEDURE — 85730 THROMBOPLASTIN TIME PARTIAL: CPT | Performed by: HOSPITALIST

## 2024-05-29 PROCEDURE — 21400001 HC TELEMETRY ROOM

## 2024-05-29 RX ORDER — LOSARTAN POTASSIUM 25 MG/1
25 TABLET ORAL DAILY
Status: DISCONTINUED | OUTPATIENT
Start: 2024-05-30 | End: 2024-05-30 | Stop reason: HOSPADM

## 2024-05-29 RX ORDER — FUROSEMIDE 40 MG/1
40 TABLET ORAL DAILY PRN
Qty: 30 TABLET | Refills: 2 | Status: SHIPPED | OUTPATIENT
Start: 2024-05-29 | End: 2024-08-27

## 2024-05-29 RX ORDER — RIVAROXABAN 15 MG-20MG
KIT ORAL
Qty: 51 TABLET | Refills: 0 | Status: SHIPPED | OUTPATIENT
Start: 2024-05-29

## 2024-05-29 RX ORDER — LOSARTAN POTASSIUM 50 MG/1
50 TABLET ORAL DAILY
Qty: 90 TABLET | Refills: 3 | Status: SHIPPED | OUTPATIENT
Start: 2024-05-29 | End: 2024-05-29

## 2024-05-29 RX ORDER — LOSARTAN POTASSIUM 25 MG/1
25 TABLET ORAL DAILY
Qty: 90 TABLET | Refills: 3 | Status: SHIPPED | OUTPATIENT
Start: 2024-05-29 | End: 2025-05-29

## 2024-05-29 RX ORDER — LOSARTAN POTASSIUM 50 MG/1
50 TABLET ORAL DAILY
Status: DISCONTINUED | OUTPATIENT
Start: 2024-05-29 | End: 2024-05-29

## 2024-05-29 RX ORDER — AMLODIPINE BESYLATE 10 MG/1
10 TABLET ORAL DAILY
Status: DISCONTINUED | OUTPATIENT
Start: 2024-05-29 | End: 2024-05-29

## 2024-05-29 RX ADMIN — ATORVASTATIN CALCIUM 80 MG: 40 TABLET, FILM COATED ORAL at 08:05

## 2024-05-29 RX ADMIN — METOPROLOL SUCCINATE 50 MG: 50 TABLET, EXTENDED RELEASE ORAL at 09:05

## 2024-05-29 RX ADMIN — PREGABALIN 75 MG: 75 CAPSULE ORAL at 09:05

## 2024-05-29 RX ADMIN — ASPIRIN 81 MG CHEWABLE TABLET 81 MG: 81 TABLET CHEWABLE at 09:05

## 2024-05-29 RX ADMIN — HEPARIN SODIUM AND DEXTROSE 14.03 UNITS/KG/HR: 10000; 5 INJECTION INTRAVENOUS at 05:05

## 2024-05-29 RX ADMIN — Medication 100 MG: at 09:05

## 2024-05-29 RX ADMIN — PREGABALIN 75 MG: 75 CAPSULE ORAL at 08:05

## 2024-05-29 RX ADMIN — RIVAROXABAN 15 MG: 15 TABLET, FILM COATED ORAL at 09:05

## 2024-05-29 RX ADMIN — EMPAGLIFLOZIN 10 MG: 10 TABLET, FILM COATED ORAL at 09:05

## 2024-05-29 RX ADMIN — SPIRONOLACTONE 25 MG: 25 TABLET ORAL at 09:05

## 2024-05-29 RX ADMIN — THERA TABS 1 TABLET: TAB at 09:05

## 2024-05-29 RX ADMIN — FOLIC ACID 1 MG: 1 TABLET ORAL at 09:05

## 2024-05-29 RX ADMIN — METHOCARBAMOL 500 MG: 500 TABLET ORAL at 06:05

## 2024-05-29 RX ADMIN — RIVAROXABAN 15 MG: 15 TABLET, FILM COATED ORAL at 05:05

## 2024-05-29 RX ADMIN — LOSARTAN POTASSIUM 50 MG: 25 TABLET, FILM COATED ORAL at 09:05

## 2024-05-29 NOTE — PLAN OF CARE
Problem: Adult Inpatient Plan of Care  Goal: Plan of Care Review  Outcome: Progressing  Goal: Patient-Specific Goal (Individualized)  Outcome: Progressing  Goal: Absence of Hospital-Acquired Illness or Injury  Outcome: Progressing  Goal: Optimal Comfort and Wellbeing  Outcome: Progressing  Goal: Readiness for Transition of Care  Outcome: Progressing     Problem: Fall Injury Risk  Goal: Absence of Fall and Fall-Related Injury  Outcome: Progressing     Problem: Pain Acute  Goal: Optimal Pain Control and Function  Outcome: Progressing     Problem: VTE (Venous Thromboembolism)  Goal: Tissue Perfusion  Outcome: Progressing  Goal: Right Ventricular Function  Outcome: Progressing

## 2024-05-29 NOTE — SUBJECTIVE & OBJECTIVE
Interval History: NAEO, AF, HDS. Patient doing well this morning. Will stop heparin gtt and start Xarelto. Pending discharge to group home tomorrow.    Review of Systems   Constitutional:  Negative for chills, fatigue and fever.   HENT:  Negative for congestion.    Eyes:  Negative for discharge.   Respiratory:  Negative for cough and shortness of breath.    Cardiovascular:  Positive for chest pain and leg swelling. Negative for palpitations.   Gastrointestinal:  Negative for abdominal pain, diarrhea, nausea and vomiting.   Genitourinary:  Negative for dysuria.   Musculoskeletal:  Negative for back pain.   Skin:  Negative for rash.   Neurological:  Negative for dizziness and headaches.     Objective:     Vital Signs (Most Recent):  Temp: 98.3 °F (36.8 °C) (05/29/24 1132)  Pulse: 63 (05/29/24 1132)  Resp: 18 (05/29/24 1132)  BP: 114/81 (05/29/24 1132)  SpO2: 95 % (05/29/24 1132) Vital Signs (24h Range):  Temp:  [97.8 °F (36.6 °C)-98.3 °F (36.8 °C)] 98.3 °F (36.8 °C)  Pulse:  [58-72] 63  Resp:  [18] 18  SpO2:  [95 %-100 %] 95 %  BP: (106-153)/() 114/81     Weight: 100 kg (220 lb 7.4 oz)  Body mass index is 29.9 kg/m².  No intake or output data in the 24 hours ending 05/29/24 1228      Physical Exam  Vitals and nursing note reviewed.   Constitutional:       General: He is not in acute distress.     Appearance: He is not toxic-appearing.      Comments: overweight   HENT:      Head: Normocephalic and atraumatic.      Right Ear: External ear normal.      Left Ear: External ear normal.      Nose: Nose normal.   Eyes:      General: No scleral icterus.  Cardiovascular:      Rate and Rhythm: Normal rate and regular rhythm.      Heart sounds: Normal heart sounds. No murmur heard.     No friction rub. No gallop.   Pulmonary:      Effort: Pulmonary effort is normal. No respiratory distress.      Breath sounds: No wheezing, rhonchi or rales.   Chest:      Chest wall: Tenderness (reproducible on palpation) present.    Abdominal:      General: Abdomen is flat. There is no distension.      Palpations: Abdomen is soft. There is no mass.      Tenderness: There is no abdominal tenderness. There is no guarding.      Hernia: No hernia is present.   Musculoskeletal:         General: Tenderness (to palpation of LLE) present. No swelling. Normal range of motion.      Cervical back: Normal range of motion.      Right lower leg: No edema.      Left lower leg: No edema.   Skin:     General: Skin is warm.      Capillary Refill: Capillary refill takes less than 2 seconds.      Coloration: Skin is not jaundiced.      Findings: No bruising or erythema.   Neurological:      General: No focal deficit present.      Mental Status: He is alert and oriented to person, place, and time.   Psychiatric:         Mood and Affect: Mood normal.         Behavior: Behavior normal.         Thought Content: Thought content normal.         Judgment: Judgment normal.             Significant Labs: All pertinent labs within the past 24 hours have been reviewed.    Significant Imaging: I have reviewed all pertinent imaging results/findings within the past 24 hours.

## 2024-05-29 NOTE — PROGRESS NOTES
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 For the past few days, he reported increase left leg swelling and pain with ambulation. He reports that he has previously had it repaired after a fracture and has been having ongoing issues. Also reports left sided chest pain for the past few day. Reports that it is reproducible with no radiation. He denies any fevers, nausea, vomiting, diarrhea, fever, chills, shortness of breath, tremors, confusion, palpitations or hallucinations. Patient denies any palpitations lightheadedness or dizziness at this time. Patient reports that is currently homeless and reports that he has not been taking his medication. Last time filled medications was in February of 2024. Patient requests to be restarted on home medications upon discharge. Reports having 4-5 shots of liquor daily. Last drink was yesterday. Denies any hx of alcohol withdrawals.     Initial vitals, pressure 89/61, satting well on room air.  CBC unremarkable, CMP remarkable for hypokalemia 3.2.  BUN creatinine baseline 16/1.4.  Chest x-ray showed no acute pulmonary process.  Cardiac workup elevated BNP of 219, troponin 0.029. BC pending. Patient admitted for chest pain rule out.     Overview/Hospital Course:  59 y/o M admitted for chest pain and left LLE swelling. DVT US significant for DVT in left popliteal vein. Started on Heparin gtt. CTA Chest significant for right pulmonary artery PE. Restart on home Metoprolol. Started  home Jardiance and Losartan on 5/27. Started on Spironolactone on 5/28. Patient reports heavy alcohol use for the last 30 years and was started on CIWA protocol though low suspicion for withdrawals for Patient became normotensive and home Amlodipine was discontinued. Patient currently homeless and has been out of all his medications for a few months. Patient accepted at a group home. Patient is currently afebrile, and hemodynamically stable. Patient to start Xarelto upon discharge. Patient to continue home Aspirin, Lipitor, Jardiance, Losartan, Metoprolol, Spironolactone. Patient to also continue multivitamins, thiamine and folic acid on discharge. Patient to discontinue home Eliquis, Norvasc and Fluoxetine. Patient to follow up with [facility] to establish care in 1-2 weeks. Return to ED precautions given. Patient verbalized understanding. Patient medically ready for discharge. All questions and concern addressed at this time.      Interval History: NAEO, AF, HDS. Patient doing well this morning. Will stop heparin gtt and start Xarelto. Pending discharge to group home tomorrow.    Review of Systems   Constitutional:  Negative for chills, fatigue and fever.   HENT:  Negative for congestion.    Eyes:  Negative for discharge.   Respiratory:  Negative for cough and shortness of breath.    Cardiovascular:  Positive for chest pain and leg swelling. Negative for palpitations.   Gastrointestinal:  Negative for abdominal pain, diarrhea, nausea and vomiting.   Genitourinary:  Negative for dysuria.   Musculoskeletal:  Negative for back pain.   Skin:  Negative for rash.   Neurological:  Negative for dizziness and headaches.     Objective:     Vital Signs (Most Recent):  Temp: 98.3 °F (36.8 °C) (05/29/24 1132)  Pulse: 63 (05/29/24 1132)  Resp: 18 (05/29/24 1132)  BP: 114/81 (05/29/24 1132)  SpO2: 95 % (05/29/24 1132) Vital Signs (24h Range):  Temp:  [97.8 °F (36.6 °C)-98.3 °F (36.8 °C)] 98.3 °F (36.8  °C)  Pulse:  [58-72] 63  Resp:  [18] 18  SpO2:  [95 %-100 %] 95 %  BP: (106-153)/() 114/81     Weight: 100 kg (220 lb 7.4 oz)  Body mass index is 29.9 kg/m².  No intake or output data in the 24 hours ending 05/29/24 1228      Physical Exam  Vitals and nursing note reviewed.   Constitutional:       General: He is not in acute distress.     Appearance: He is not toxic-appearing.      Comments: overweight   HENT:      Head: Normocephalic and atraumatic.      Right Ear: External ear normal.      Left Ear: External ear normal.      Nose: Nose normal.   Eyes:      General: No scleral icterus.  Cardiovascular:      Rate and Rhythm: Normal rate and regular rhythm.      Heart sounds: Normal heart sounds. No murmur heard.     No friction rub. No gallop.   Pulmonary:      Effort: Pulmonary effort is normal. No respiratory distress.      Breath sounds: No wheezing, rhonchi or rales.   Chest:      Chest wall: Tenderness (reproducible on palpation) present.   Abdominal:      General: Abdomen is flat. There is no distension.      Palpations: Abdomen is soft. There is no mass.      Tenderness: There is no abdominal tenderness. There is no guarding.      Hernia: No hernia is present.   Musculoskeletal:         General: Tenderness (to palpation of LLE) present. No swelling. Normal range of motion.      Cervical back: Normal range of motion.      Right lower leg: No edema.      Left lower leg: No edema.   Skin:     General: Skin is warm.      Capillary Refill: Capillary refill takes less than 2 seconds.      Coloration: Skin is not jaundiced.      Findings: No bruising or erythema.   Neurological:      General: No focal deficit present.      Mental Status: He is alert and oriented to person, place, and time.   Psychiatric:         Mood and Affect: Mood normal.         Behavior: Behavior normal.         Thought Content: Thought content normal.         Judgment: Judgment normal.             Significant Labs: All pertinent labs  within the past 24 hours have been reviewed.    Significant Imaging: I have reviewed all pertinent imaging results/findings within the past 24 hours.    Assessment/Plan:      * Acute deep vein thrombosis (DVT) of popliteal vein of left lower extremity  59 y/o M with hx of CAD, HFrEF (25-30% on 2023)  HTN, HLD, paroxysmal afib, prior MCA who presented for chest pain and left leg swelling and tenderness. Noncompliant with Eliquis since Feb 2024. Doppler ultrasound significant for thrombus within the mid to distal aspect of the popliteal vein and 1 of the paired posterior tibial veins.     - started on Heparin  - restart Eliquis on discharge    Elevated troponin  Trop at 0.029 on admission. Trended up to 0.04 to 0.043. Likely 2/2 to HFrEF vs right heart strain 2/2 to PE though less likely    - TTE    Impaired mobility and ADLs  Patient with hx of LUE deficit 2/2 to CVA. Reports issues with medication management due to his deficit along with being out of his medications due to him being homeless. Can benefit from placement upon discharge    - PT/OT evaluation  - Social work consult  - Will need refills on all home meds upon discharge.      Alcohol dependence  Hx of chronic alcohol use for 30 years with daily use of 4-5 drinks. Last drink was on 5/26. Denies any hx of alcohol withdrawals or DT. Denies any symptoms of alcohol withdrawal at this time.     - CIWA q4h  - PO thiamin  - Multivitamin, Folic acid  - Can consider adding Benzodiazepine if needed    Acute pulmonary embolism  59 y/o M with hx of CAD, HFrEF (25-30% on 2023)  HTN, HLD, paroxysmal afib, prior MCA who presented for left sided chest pain. Reproducible on exam. EKG on admission with no ST changes or LBBB noted. Trop at 0.029 on admission. . CXR unremarkable. Low suspicion for ACS at this time. Suspect chest pain is MSK in nature. Considered PE but given findings of acute DVT, additional workup will not . Trops peaked at 0.043. CTA  Chest significant for right pulmonary artery and segmental branch PE of left upper lobe.     - stop heparin today; start Xarelto  - will switch from Eliquis to Xarelto upon discharge  - pending TTE for evaluate for right heart strain  - tele    Pure hypercholesterolemia  Lab Results   Component Value Date    CHOL 225 (H) 11/06/2023    HDL 85 (H) 11/06/2023    LDLCALC 126.0 11/06/2023    TRIG 70 11/06/2023     Plan:  - continue liptor 80 mg      Coronary arteriosclerosis  Questionable stent placement, was documented on chart. Will continue to monitor on telemetry. Previously on Lipitor but no longer taking as of Feb 2024.     - restart home Lipitor    Chronic combined systolic and diastolic congestive heart failure  Patient is identified as having Systolic (HFrEF) heart failure that is Acute on chronic. CHF is currently not controlled due to medications non adherence. Latest ECHO performed and demonstrates-     Results for orders placed during the hospital encounter of 11/05/23    Echo    Interpretation Summary    Left Ventricle: The left ventricle is normal in size. Normal wall thickness. Global hypokinesis present. There is severely reduced systolic function with a visually estimated ejection fraction of 25 - 30%. Grade I diastolic dysfunction.    Right Ventricle: Mild right ventricular enlargement. Wall thickness is normal. Right ventricle wall motion  is normal. Systolic function is mildly reduced.    Biatrial enlargement    Pulmonary Artery: The estimated pulmonary artery systolic pressure is 19 mmHg.    IVC/SVC: Normal venous pressure at 3 mmHg.    Recent Labs   Lab 05/26/24  1537   *       Likely elevated in the setting of medication noncompliance. No overt sign of decompensation on exam. Repeat TTE with EF 20-25% with grade II diastolic dysfunction.    Plan:  - Restart GDMT; uptritrate as tolerated     History of CVA with residual deficit  -Previous CVA w/ residual L sided weakness.    - Restart  "statin, and eliquis  - Can consider restarting ASA pending tolerance of Eliquis      Essential hypertension  Chronic, controlled. Latest blood pressure and vitals reviewed-     Temp:  [97.8 °F (36.6 °C)-98.3 °F (36.8 °C)]   Pulse:  [58-72]   Resp:  [18]   BP: (106-153)/()   SpO2:  [95 %-100 %] .   Home meds for hypertension were reviewed and noted below.   Hypertension Medications               amLODIPine (NORVASC) 10 MG tablet Take 1 tablet (10 mg total) by mouth once daily.    furosemide (LASIX) 40 MG tablet Take 1 tablet (40 mg total) by mouth once daily.    losartan (COZAAR) 25 MG tablet Take 1 tablet (25 mg total) by mouth once daily.    metoprolol succinate (TOPROL-XL) 50 MG 24 hr tablet Take 1 tablet (50 mg total) by mouth once daily.    spironolactone (ALDACTONE) 25 MG tablet Take 1 tablet (25 mg total) by mouth once daily.            While in the hospital, will manage blood pressure as follows; Losartan and Spironolactone    Will utilize p.r.n. blood pressure medication only if patient's blood pressure greater than 180/110 and he develops symptoms such as worsening chest pain or shortness of breath.    - discontinue amlodipine upon discharge  - continue Aldactone and Losartan    Paroxysmal atrial fibrillation  Patient with Paroxysmal (<7 days) atrial fibrillation which is controlled  Patient is currently in sinus rhythm.[id number] Score: 1. Anticoagulation indicated. Anticoagulation done with Eliquis . No longer taking as of Feb 2024.     - Restart Eliquis and Metoprolol      VTE Risk Mitigation (From admission, onward)           Ordered     rivaroxaban tablet 20 mg  With dinner        Placed in "Followed by" Linked Group    05/29/24 0855     rivaroxaban tablet 15 mg  2 times daily with meals        Placed in "Followed by" Linked Group    05/29/24 0855                    Discharge Planning   ERWIN: 5/29/2024     Code Status: Full Code   Is the patient medically ready for discharge?:     Reason for " patient still in hospital (select all that apply): Pending disposition  Discharge Plan A: Brandt Glynn DO  Department of Hospital Medicine   [facility]

## 2024-05-29 NOTE — ASSESSMENT & PLAN NOTE
57 y/o M with hx of CAD, HFrEF (25-30% on 2023)  HTN, HLD, paroxysmal afib, prior MCA who presented for left sided chest pain. Reproducible on exam. EKG on admission with no ST changes or LBBB noted. Trop at 0.029 on admission. . CXR unremarkable. Low suspicion for ACS at this time. Suspect chest pain is MSK in nature. Considered PE but given findings of acute DVT, additional workup will not . Trops peaked at 0.043. CTA Chest significant for right pulmonary artery and segmental branch PE of left upper lobe.     - stop heparin today; start Xarelto  - will switch from Eliquis to Xarelto upon discharge  - pending TTE for evaluate for right heart strain  - tele

## 2024-05-29 NOTE — DISCHARGE SUMMARY
[facility] Medicine  Discharge Summary      Patient Name: Dionicio Bess  MRN: [id number]  ALLY: [id number]  Patient Class: IP- Inpatient  Admission Date: 5/26/2024  Hospital Length of Stay: 3 days  Discharge Date and Time:  05/30/2024 12:25 PM  Attending Physician: Marcelino Klein MD   Discharging Provider: Kashmir Glynn DO  Primary Care Provider: Isela Primary Doctor  [facility] Team[facility] Kashmir Glynn DO  Primary Care Team: [facility]    HPI:   59 y/o M CAD, HFrEF (25-30% on 2023)  HTN, HLD, paroxysmal afib, prior MCA w/ spastic hemiplegia of left dominant side presenting here with chest pain. For the past few days, he reported increase left leg swelling and pain with ambulation. He reports that he has previously had it repaired after a fracture and has been having ongoing issues. Also reports left sided chest pain for the past few day. Reports that it is reproducible with no radiation. He denies any fevers, nausea, vomiting, diarrhea, fever, chills, shortness of breath, tremors, confusion, palpitations or hallucinations. Patient denies any palpitations lightheadedness or dizziness at this time. Patient reports that is currently homeless and reports that he has not been taking his medication. Last time filled medications was in February of 2024. Patient requests to be restarted on home medications upon discharge. Reports having 4-5 shots of liquor daily. Last drink was yesterday. Denies any hx of alcohol withdrawals.     Initial vitals, pressure 89/61, satting well on room air.  CBC unremarkable, CMP remarkable for hypokalemia 3.2.  BUN creatinine baseline 16/1.4.  Chest x-ray showed no acute pulmonary process.  Cardiac workup elevated BNP of 219, troponin 0.029. BC pending. Patient admitted for chest pain rule out.     * No surgery found *      Hospital Course:   59 y/o M admitted for chest pain and left LLE swelling. DVT US significant for DVT in left popliteal vein. Started on  Heparin gtt. CTA Chest significant for right pulmonary artery PE. Restarted on home Metoprolol. Started home Jardiance and Losartan on 5/27. Started on Spironolactone on 5/28. Patient reports heavy alcohol use for the last 30 years and was started on CIWA protocol though patient had minimal to no symptoms related to withdrawal during his stay.  Home Amlodipine was discontinued. Patient currently homeless and has been out of all his medications for a few months. Patient accepted at a group home. Patient is currently afebrile, and hemodynamically stable. Patient to start Xarelto upon discharge. Patient to continue home Aspirin, Lipitor, Jardiance, Losartan, Metoprolol, Spironolactone. Patient to also continue multivitamins, thiamine and folic acid on discharge. Patient to discontinue home Eliquis, Norvasc and Fluoxetine.  Patient counseled on cessation of alcohol and cocaine use.  Patient to follow up with [facility] to establish care in 1-2 weeks. Return to ED precautions given. Patient verbalized understanding. Patient medically ready for discharge. All questions and concern addressed at this time.       Goals of Care Treatment Preferences:  Code Status: Full Code      Consults:   Consults (From admission, onward)          Status Ordering Provider     Inpatient consult to Social Work  Once        Provider:  (Not yet assigned)    Completed ALI, SAQEEB            No new Assessment & Plan notes have been filed under this hospital service since the last note was generated.  Service: Hospital Medicine    Final Active Diagnoses:    Diagnosis Date Noted POA    PRINCIPAL PROBLEM:  Acute deep vein thrombosis (DVT) of popliteal vein of left lower extremity [I82.432] 05/26/2024 Yes    Elevated troponin [R79.89] 05/27/2024 Yes    Impaired mobility and ADLs [Z74.09, Z78.9] 02/01/2024 Yes    Acute pulmonary embolism [I26.99] 12/30/2023 Yes    Alcohol dependence [F10.20] 12/30/2023 Yes    Pure hypercholesterolemia  [E78.00] 11/13/2023 Yes    Coronary arteriosclerosis [I25.10] 11/06/2023 Yes    Chronic combined systolic and diastolic congestive heart failure [I50.42] 11/05/2023 Yes    Paroxysmal atrial fibrillation [I48.0] 05/28/2023 Yes     Chronic    Essential hypertension [I10] 05/18/2023 Yes     Chronic    History of CVA with residual deficit [I69.30] 05/18/2023 Not Applicable      Problems Resolved During this Admission:    Diagnosis Date Noted Date Resolved POA    Lactic acidosis [E87.20] 05/26/2024 05/27/2024 Yes    Hypokalemia [E87.6] 12/31/2023 05/27/2024 Yes       Discharged Condition: stable    Disposition: Home or Self Care    Follow Up:   Follow-up Information       Kashmir Glynn, DO Follow up.    Specialty: Internal Medicine  Contact information:  [facility] [address]  [phone]                           Patient Instructions:   No discharge procedures on file.    Significant Diagnostic Studies: N/A    Pending Diagnostic Studies:       None           Medications:  Reconciled Home Medications:      Medication List        START taking these medications      thiamine 100 MG tablet  Take 1 tablet (100 mg total) by mouth once daily.     * XARELTO DVT-PE TREAT 30D START 15 mg (42)- 20 mg (9) tablet dose pack  Generic drug: rivaroxaban  START THIS PRESCRIPTION FIRST: Take 1 tablet (15 mg) by mouth twice daily with food for 21 days followed by 1 tablet (20 mg) by mouth once daily with food     * rivaroxaban 20 mg Tab  Commonly known as: XARELTO  START THIS PRESCRIPTION AFTER FINISHING STARTER PACK. Take 1 tablet (20 mg total) by mouth daily with dinner or evening meal.           * This list has 2 medication(s) that are the same as other medications prescribed for you. Read the directions carefully, and ask your doctor or other care provider to review them with you.                CHANGE how you take these medications      furosemide 40 MG tablet  Commonly known as: LASIX  Take 1 tablet (40 mg total) by  mouth daily as needed (Take a dose of lasix if you notice 3 or more lb weight gain in 24 hours, or 5 or more lb weight gain in 1 wee).  What changed:   when to take this  reasons to take this     methocarbamoL 500 MG Tab  Commonly known as: ROBAXIN  Take 1 tablet (500 mg total) by mouth 3 (three) times daily as needed (Musclular tenderness/hypertonicity).  What changed:   medication strength  how much to take  when to take this  reasons to take this            CONTINUE taking these medications      aspirin 81 MG Chew  Chew and swallow 1 (1 mg total) by mouth once daily.     atorvastatin 80 MG tablet  Commonly known as: LIPITOR  Take 1 tablet (80 mg total) by mouth every evening.     folic acid 1 MG tablet  Commonly known as: FOLVITE  Take 1 tablet (1 mg total) by mouth once daily.     JARDIANCE 10 mg tablet  Generic drug: empagliflozin  Take 1 tablet (10 mg total) by mouth once daily.     losartan 25 MG tablet  Commonly known as: COZAAR  Take 1 tablet (25 mg total) by mouth once daily.     melatonin 3 mg tablet  Commonly known as: MELATIN  Take 2 tablets (6 mg total) by mouth nightly as needed for Insomnia.     metoprolol succinate 50 MG 24 hr tablet  Commonly known as: TOPROL-XL  Take 1 tablet (50 mg total) by mouth once daily.     multivitamin Tab  Take 1 tablet by mouth once daily.     pregabalin 75 MG capsule  Commonly known as: LYRICA  Take 1 capsule (75 mg total) by mouth 2 (two) times daily.     spironolactone 25 MG tablet  Commonly known as: ALDACTONE  Take 1 tablet (25 mg total) by mouth once daily.            STOP taking these medications      amLODIPine 10 MG tablet  Commonly known as: NORVASC     ELIQUIS 5 mg Tab  Generic drug: apixaban     FLUoxetine 20 MG capsule              Indwelling Lines/Drains at time of discharge:   Lines/Drains/Airways       None                   Time spent on the discharge of patient: 31 minutes         Kashmir Glynn DO  Department of Hospital Medicine  [facility]

## 2024-05-29 NOTE — ASSESSMENT & PLAN NOTE
Patient is identified as having Systolic (HFrEF) heart failure that is Acute on chronic. CHF is currently not controlled due to medications non adherence. Latest ECHO performed and demonstrates-     Results for orders placed during the hospital encounter of 11/05/23    Echo    Interpretation Summary    Left Ventricle: The left ventricle is normal in size. Normal wall thickness. Global hypokinesis present. There is severely reduced systolic function with a visually estimated ejection fraction of 25 - 30%. Grade I diastolic dysfunction.    Right Ventricle: Mild right ventricular enlargement. Wall thickness is normal. Right ventricle wall motion  is normal. Systolic function is mildly reduced.    Biatrial enlargement    Pulmonary Artery: The estimated pulmonary artery systolic pressure is 19 mmHg.    IVC/SVC: Normal venous pressure at 3 mmHg.    Recent Labs   Lab 05/26/24  1537   *       Likely elevated in the setting of medication noncompliance. No overt sign of decompensation on exam. Repeat TTE with EF 20-25% with grade II diastolic dysfunction.    Plan:  - Restart GDMT; uptritrate as tolerated

## 2024-05-29 NOTE — ASSESSMENT & PLAN NOTE
Chronic, controlled. Latest blood pressure and vitals reviewed-     Temp:  [97.8 °F (36.6 °C)-98.3 °F (36.8 °C)]   Pulse:  [58-72]   Resp:  [18]   BP: (106-153)/()   SpO2:  [95 %-100 %] .   Home meds for hypertension were reviewed and noted below.   Hypertension Medications               amLODIPine (NORVASC) 10 MG tablet Take 1 tablet (10 mg total) by mouth once daily.    furosemide (LASIX) 40 MG tablet Take 1 tablet (40 mg total) by mouth once daily.    losartan (COZAAR) 25 MG tablet Take 1 tablet (25 mg total) by mouth once daily.    metoprolol succinate (TOPROL-XL) 50 MG 24 hr tablet Take 1 tablet (50 mg total) by mouth once daily.    spironolactone (ALDACTONE) 25 MG tablet Take 1 tablet (25 mg total) by mouth once daily.            While in the hospital, will manage blood pressure as follows; Losartan and Spironolactone    Will utilize p.r.n. blood pressure medication only if patient's blood pressure greater than 180/110 and he develops symptoms such as worsening chest pain or shortness of breath.    - discontinue amlodipine upon discharge  - continue Aldactone and Losartan

## 2024-05-30 ENCOUNTER — TELEPHONE (OUTPATIENT)
Dept: INTERNAL MEDICINE | Facility: CLINIC | Age: 59
End: 2024-05-30
Payer: MEDICAID

## 2024-05-30 VITALS
TEMPERATURE: 98 F | OXYGEN SATURATION: 97 % | WEIGHT: 220.44 LBS | HEART RATE: 69 BPM | DIASTOLIC BLOOD PRESSURE: 89 MMHG | BODY MASS INDEX: 29.86 KG/M2 | HEIGHT: 72 IN | RESPIRATION RATE: 18 BRPM | SYSTOLIC BLOOD PRESSURE: 134 MMHG

## 2024-05-30 PROCEDURE — 25000003 PHARM REV CODE 250

## 2024-05-30 PROCEDURE — 25000242 PHARM REV CODE 250 ALT 637 W/ HCPCS

## 2024-05-30 RX ADMIN — Medication 100 MG: at 08:05

## 2024-05-30 RX ADMIN — METOPROLOL SUCCINATE 50 MG: 50 TABLET, EXTENDED RELEASE ORAL at 08:05

## 2024-05-30 RX ADMIN — RIVAROXABAN 15 MG: 15 TABLET, FILM COATED ORAL at 08:05

## 2024-05-30 RX ADMIN — FOLIC ACID 1 MG: 1 TABLET ORAL at 08:05

## 2024-05-30 RX ADMIN — ASPIRIN 81 MG CHEWABLE TABLET 81 MG: 81 TABLET CHEWABLE at 08:05

## 2024-05-30 RX ADMIN — THERA TABS 1 TABLET: TAB at 08:05

## 2024-05-30 RX ADMIN — EMPAGLIFLOZIN 10 MG: 10 TABLET, FILM COATED ORAL at 08:05

## 2024-05-30 RX ADMIN — LOSARTAN POTASSIUM 25 MG: 25 TABLET, FILM COATED ORAL at 08:05

## 2024-05-30 RX ADMIN — SPIRONOLACTONE 25 MG: 25 TABLET ORAL at 08:05

## 2024-05-30 RX ADMIN — PREGABALIN 75 MG: 75 CAPSULE ORAL at 08:05

## 2024-05-30 NOTE — PLAN OF CARE
Problem: Adult Inpatient Plan of Care  Goal: Plan of Care Review  Outcome: Progressing  Goal: Patient-Specific Goal (Individualized)  Outcome: Progressing  Goal: Absence of Hospital-Acquired Illness or Injury  Outcome: Progressing  Goal: Optimal Comfort and Wellbeing  Outcome: Progressing  Goal: Readiness for Transition of Care  Outcome: Progressing     Problem: Fall Injury Risk  Goal: Absence of Fall and Fall-Related Injury  Outcome: Progressing     Problem: Pain Acute  Goal: Optimal Pain Control and Function  Outcome: Progressing

## 2024-05-30 NOTE — NURSING
Discharge instructions explained and given to patient. IV removed with catheter tip intact. Medications delivered to bedside. Patient states he want to ride the bus home. Patient will not be going to in home care due to financial obligations and having to get his car out the shop.

## 2024-05-30 NOTE — TELEPHONE ENCOUNTER
----- Message from Marcelina Wise sent at 5/29/2024 11:33 AM CDT -----  Contact: [phone]  No blue slot available to schedule an appointment for the patient.  Patient is established with which PCP: NANCI DELANEY  Reason for the visit: hosp fu   Would the patient like a call back, or a response through their [facility] portal?:  call back

## 2024-05-30 NOTE — PLAN OF CARE
[facility] - Med Surg  Discharge Final Note    Primary Care Provider: Isela, Primary Doctor    Expected Discharge Date: 5/30/2024      Patient discharged to home with no needs. Follow up appointment scheduled and placed in AVS. Discharge Plan A and Plan B have been determined by review of patient's clinical status, future medical and therapeutic needs, and coverage/benefits for post-acute care in coordination with multidisciplinary team members.  Final Discharge Note (most recent)       Final Note - 05/30/24 1347          Final Note    Assessment Type Final Discharge Note (P)      Anticipated Discharge Disposition Home or Self Care (P)      Hospital Resources/Appts/Education Provided Appointments scheduled and added to AVS (P)         Post-Acute Status    Discharge Delays None known at this time (P)                      Important Message from Medicare             Contact Info       Kashmir Glynn, DO   Specialty: Internal Medicine    [address]  [facility] [address]   Phone: [phone]       Next Steps: Follow up            TAMIKO Reynolds  Case Management  [phone]

## 2024-05-30 NOTE — PLAN OF CARE
Medications delivered to bedside. Patient refusing to go to in home placement do top financial hardship and personal reason. Patient stated he has to get his car out the shop before going to the home.  Problem: Adult Inpatient Plan of Care  Goal: Plan of Care Review  Outcome: Met  Goal: Patient-Specific Goal (Individualized)  Outcome: Met  Goal: Absence of Hospital-Acquired Illness or Injury  Outcome: Met  Goal: Optimal Comfort and Wellbeing  Outcome: Met  Goal: Readiness for Transition of Care  Outcome: Met     Problem: Fall Injury Risk  Goal: Absence of Fall and Fall-Related Injury  Outcome: Met     Problem: Pain Acute  Goal: Optimal Pain Control and Function  Outcome: Met     Problem: VTE (Venous Thromboembolism)  Goal: Tissue Perfusion  Outcome: Met  Goal: Right Ventricular Function  Outcome: Met

## 2024-05-31 ENCOUNTER — OUTPATIENT CASE MANAGEMENT (OUTPATIENT)
Dept: ADMINISTRATIVE | Facility: OTHER | Age: 59
End: 2024-05-31
Payer: MEDICAID

## 2024-05-31 LAB
BACTERIA BLD CULT: NORMAL
BACTERIA BLD CULT: NORMAL

## 2024-05-31 NOTE — LETTER
Dionicio Bess  [address] [facility] [address]      Dear Dionicio Bess,     I am writing from the Outpatient Care Management Department at [facility]. I have been unsuccessful at reaching you since we spoke on April 22, 2024.  I hope you found the assistance previously provided to you helpful.     Please contact me at [phone] from 8:30AM to 4:30 PM on Monday thru Friday.     As you know, [facility] also has a program with a nurse available 24/7 to answer questions or provide medical advice.  [facility] can be reached at [phone].    Sincerely,       [facility]aron Almazan [facility]  Outpatient Care Management

## 2024-05-31 NOTE — PROGRESS NOTES
Outpatient Care Management   - Care Plan Follow Up    Patient: Dionicio Bess  MRN:  [id number]  Date of Service:  6/7/2024  Completed by:  Sharon Almazan LCSW  Referral Date: 02/01/2024    Reason for Visit   Patient presents with    Other     5/31/2024  1st attempt to complete Follow-Up  for Outpatient Care Management, left message.  Will mail unable to assess letter.    6/7/2024  2nd attempt to complete Follow-Up  for Outpatient Care Management, left message.  Letter mailed 5/31- no response.    Case Closure     6/7/24       Brief Summary: Multiple attempts to reach Pt post hospital stay. Unable to reach Pt. Closed case.    No future appointments.    Sharon Almazan LCSW  Neuro Therapy   [facility]  [phone]

## 2024-08-09 ENCOUNTER — HOSPITAL ENCOUNTER (EMERGENCY)
Facility: HOSPITAL | Age: 59
Discharge: HOME | End: 2024-08-09
Attending: EMERGENCY MEDICINE
Payer: MEDICAID

## 2024-08-09 DIAGNOSIS — U07.1 COVID-19 VIRUS INFECTION: Primary | ICD-10-CM

## 2024-08-09 DIAGNOSIS — U07.1 COVID-19 VIRUS DETECTED: ICD-10-CM

## 2024-08-09 DIAGNOSIS — R06.02 SOB (SHORTNESS OF BREATH): ICD-10-CM

## 2024-08-09 DIAGNOSIS — R06.02 SHORTNESS OF BREATH: ICD-10-CM

## 2024-08-09 LAB
ALBUMIN SERPL BCP-MCNC: 4.2 G/DL (ref 3.5–5.2)
ALP SERPL-CCNC: 97 U/L (ref 55–135)
ALT SERPL W/O P-5'-P-CCNC: 17 U/L (ref 10–44)
ANION GAP SERPL CALC-SCNC: 12 MMOL/L (ref 8–16)
AST SERPL-CCNC: 23 U/L (ref 10–40)
BASOPHILS # BLD AUTO: 0.03 K/UL (ref 0–0.2)
BASOPHILS NFR BLD: 0.5 % (ref 0–1.9)
BILIRUB SERPL-MCNC: 0.2 MG/DL (ref 0.1–1)
BNP SERPL-MCNC: 34 PG/ML (ref 0–99)
BUN SERPL-MCNC: 12 MG/DL (ref 6–20)
CALCIUM SERPL-MCNC: 9.6 MG/DL (ref 8.7–10.5)
CHLORIDE SERPL-SCNC: 107 MMOL/L (ref 95–110)
CO2 SERPL-SCNC: 19 MMOL/L (ref 23–29)
CREAT SERPL-MCNC: 1.2 MG/DL (ref 0.5–1.4)
DIFFERENTIAL METHOD BLD: ABNORMAL
EOSINOPHIL # BLD AUTO: 0.3 K/UL (ref 0–0.5)
EOSINOPHIL NFR BLD: 5.3 % (ref 0–8)
ERYTHROCYTE [DISTWIDTH] IN BLOOD BY AUTOMATED COUNT: 13 % (ref 11.5–14.5)
EST. GFR  (NO RACE VARIABLE): >60 ML/MIN/1.73 M^2
GLUCOSE SERPL-MCNC: 105 MG/DL (ref 70–110)
HCT VFR BLD AUTO: 39.2 % (ref 40–54)
HGB BLD-MCNC: 13.4 G/DL (ref 14–18)
IMM GRANULOCYTES # BLD AUTO: 0.02 K/UL (ref 0–0.04)
IMM GRANULOCYTES NFR BLD AUTO: 0.3 % (ref 0–0.5)
INFLUENZA A, MOLECULAR: NEGATIVE
INFLUENZA B, MOLECULAR: NEGATIVE
LYMPHOCYTES # BLD AUTO: 2.6 K/UL (ref 1–4.8)
LYMPHOCYTES NFR BLD: 44 % (ref 18–48)
MCH RBC QN AUTO: 30.8 PG (ref 27–31)
MCHC RBC AUTO-ENTMCNC: 34.2 G/DL (ref 32–36)
MCV RBC AUTO: 90 FL (ref 82–98)
MONOCYTES # BLD AUTO: 0.5 K/UL (ref 0.3–1)
MONOCYTES NFR BLD: 9.2 % (ref 4–15)
NEUTROPHILS # BLD AUTO: 2.4 K/UL (ref 1.8–7.7)
NEUTROPHILS NFR BLD: 40.7 % (ref 38–73)
NRBC BLD-RTO: 0 /100 WBC
PLATELET # BLD AUTO: 206 K/UL (ref 150–450)
PMV BLD AUTO: 9.7 FL (ref 9.2–12.9)
POTASSIUM SERPL-SCNC: 3.8 MMOL/L (ref 3.5–5.1)
PROT SERPL-MCNC: 8.5 G/DL (ref 6–8.4)
RBC # BLD AUTO: 4.35 M/UL (ref 4.6–6.2)
SARS-COV-2 RDRP RESP QL NAA+PROBE: POSITIVE
SODIUM SERPL-SCNC: 138 MMOL/L (ref 136–145)
SPECIMEN SOURCE: NORMAL
TROPONIN I SERPL DL<=0.01 NG/ML-MCNC: 0.02 NG/ML (ref 0–0.03)
WBC # BLD AUTO: 5.87 K/UL (ref 3.9–12.7)

## 2024-08-09 PROCEDURE — 94640 AIRWAY INHALATION TREATMENT: CPT

## 2024-08-09 PROCEDURE — 87502 INFLUENZA DNA AMP PROBE: CPT | Performed by: STUDENT IN AN ORGANIZED HEALTH CARE EDUCATION/TRAINING PROGRAM

## 2024-08-09 PROCEDURE — 80053 COMPREHEN METABOLIC PANEL: CPT | Performed by: STUDENT IN AN ORGANIZED HEALTH CARE EDUCATION/TRAINING PROGRAM

## 2024-08-09 PROCEDURE — 99285 EMERGENCY DEPT VISIT HI MDM: CPT | Mod: 25

## 2024-08-09 PROCEDURE — 93010 ELECTROCARDIOGRAM REPORT: CPT | Mod: ,,, | Performed by: INTERNAL MEDICINE

## 2024-08-09 PROCEDURE — 83880 ASSAY OF NATRIURETIC PEPTIDE: CPT | Performed by: STUDENT IN AN ORGANIZED HEALTH CARE EDUCATION/TRAINING PROGRAM

## 2024-08-09 PROCEDURE — 27100098 HC SPACER

## 2024-08-09 PROCEDURE — U0002 COVID-19 LAB TEST NON-CDC: HCPCS | Performed by: STUDENT IN AN ORGANIZED HEALTH CARE EDUCATION/TRAINING PROGRAM

## 2024-08-09 PROCEDURE — 93005 ELECTROCARDIOGRAM TRACING: CPT

## 2024-08-09 PROCEDURE — 25000242 PHARM REV CODE 250 ALT 637 W/ HCPCS: Performed by: EMERGENCY MEDICINE

## 2024-08-09 PROCEDURE — 84484 ASSAY OF TROPONIN QUANT: CPT | Performed by: STUDENT IN AN ORGANIZED HEALTH CARE EDUCATION/TRAINING PROGRAM

## 2024-08-09 PROCEDURE — 85025 COMPLETE CBC W/AUTO DIFF WBC: CPT | Performed by: STUDENT IN AN ORGANIZED HEALTH CARE EDUCATION/TRAINING PROGRAM

## 2024-08-09 RX ORDER — ALBUTEROL SULFATE 90 UG/1
2 INHALANT RESPIRATORY (INHALATION) 4 TIMES DAILY
Status: DISCONTINUED | OUTPATIENT
Start: 2024-08-09 | End: 2024-08-09 | Stop reason: HOSPADM

## 2024-08-09 RX ADMIN — ALBUTEROL SULFATE 2 PUFF: 108 INHALANT RESPIRATORY (INHALATION) at 06:08

## 2024-08-09 NOTE — DISCHARGE INSTRUCTIONS
Your COVID test was positive.  Your labs overall are very reassuring.  Your chest x-ray did not show signs of pneumonia.  Continue to take Tylenol as needed for body aches.      I will discharge you with an albuterol inhaler, continue to take it as needed for shortness of breath or wheezing.  Return to emergency department for any concerning symptom otherwise please follow up with the primary doctor

## 2024-08-09 NOTE — ED PROVIDER NOTES
Encounter Date: 8/9/2024       History     Chief Complaint   Patient presents with    Shortness of Breath     Wheezing  Nebulizer in route  HX: COPD     Dionicio Bess is a 59-year-old male with a history of CAD, hypertension, hyperlipidemia, right MCA infarct in 2/23, DVT on Eliquis, NSTEMI 7/15 presenting today for shortness of breath.  Symptoms started earlier.  States he has been having wheezing and a productive cough.  No fevers or chills.  He does not have a nebulizer.  He states he is compliant with his medications.  He feels dehydrated.  Patient was given a nebulizer EN route by EMS      Review of patient's allergies indicates:  No Known Allergies  Past Medical History:   Diagnosis Date    CAD (coronary artery disease)     Chest pain 12/30/2023    HLD (hyperlipidemia)     HTN (hypertension)     Stroke     R MCA 2/2023     Past Surgical History:   Procedure Laterality Date    FIXATION OF SYNDESMOSIS OF ANKLE Left 1/5/2024    Procedure: FIXATION, SYNDESMOSIS, ANKLE;  Surgeon: Rogerio Hu MD;  Location: [facility] OR [address] [place] [address];  Service: Orthopedics;  Laterality: Left;    OPEN REDUCTION AND INTERNAL FIXATION (ORIF) OF INJURY OF ANKLE Left 1/5/2024    Procedure: ORIF, ANKLE - LEFT;  Surgeon: Rogerio Hu MD;  Location: [facility] OR [address] [place] [address];  Service: Orthopedics;  Laterality: Left;     No family history on file.  Social History     Tobacco Use    Smoking status: Never    Smokeless tobacco: Never   Substance Use Topics    Alcohol use: Yes     Comment: occasional    Drug use: No     Review of Systems    Physical Exam     Initial Vitals [08/09/24 1613]   BP Pulse Resp Temp SpO2   114/61 109 15 97.5 °F (36.4 °C) 100 %      MAP       --         Physical Exam    Nursing note and vitals reviewed.  Constitutional: He appears well-developed and well-nourished. No distress.   HENT:   Mouth/Throat: Oropharynx is clear and moist.   Eyes: Conjunctivae are normal.   Neck: Neck supple.   Cardiovascular:  Normal rate, regular  rhythm and intact distal pulses.           Pulmonary/Chest: He has wheezes (faint). He has no rales.   Abdominal: Abdomen is soft. Bowel sounds are normal. There is no abdominal tenderness.   Musculoskeletal:         General: No edema.      Cervical back: Neck supple.     Lymphadenopathy:     He has no cervical adenopathy.   Neurological: He is alert and oriented to person, place, and time.   + left sided weakness with left hand contracture     Skin: No rash noted.   Psychiatric: He has a normal mood and affect.         ED Course   Procedures  Labs Reviewed   SARS-COV-2 RNA AMPLIFICATION, QUAL - Abnormal       Result Value    SARS-CoV-2 RNA, Amplification, Qual Positive (*)    CBC W/ AUTO DIFFERENTIAL - Abnormal    WBC 5.87      RBC 4.35 (*)     Hemoglobin 13.4 (*)     Hematocrit 39.2 (*)     MCV 90      MCH 30.8      MCHC 34.2      RDW 13.0      Platelets 206      MPV 9.7      Immature Granulocytes 0.3      Gran # (ANC) 2.4      Immature Grans (Abs) 0.02      Lymph # 2.6      Mono # 0.5      Eos # 0.3      Baso # 0.03      nRBC 0      Gran % 40.7      Lymph % 44.0      Mono % 9.2      Eosinophil % 5.3      Basophil % 0.5      Differential Method Automated     COMPREHENSIVE METABOLIC PANEL - Abnormal    Sodium 138      Potassium 3.8      Chloride 107      CO2 19 (*)     Glucose 105      BUN 12      Creatinine 1.2      Calcium 9.6      Total Protein 8.5 (*)     Albumin 4.2      Total Bilirubin 0.2      Alkaline Phosphatase 97      AST 23      ALT 17      eGFR >60.0      Anion Gap 12     INFLUENZA A & B BY MOLECULAR    Influenza A, Molecular Negative      Influenza B, Molecular Negative      Flu A & B Source Nasal swab     TROPONIN I    Troponin I 0.019     B-TYPE NATRIURETIC PEPTIDE    BNP 34       EKG Readings: (Independently Interpreted)   EKG: Sinus tachycardia 107, PVCs, left axis, QRS 94, , no STEMI       Imaging Results              X-Ray Chest AP Portable (Final result)  Result time 08/09/24 19:22:23       Final result by Giovanny Wang MD (08/09/24 19:22:23)                   Impression:      No radiographic evidence of pneumonia or other source of cough/shortness of breath, noting that early/mild viral pneumonia or nonspecific bronchitis may be radiographically occult.      Electronically signed by: Giovanny Wang MD  Date:    08/09/2024  Time:    19:22               Narrative:    EXAMINATION:  XR CHEST AP PORTABLE    CLINICAL HISTORY:  Shortness of breath    TECHNIQUE:  Single frontal view of the chest was performed.    COMPARISON:  Chest radiograph and CT thorax 05/26/2024    FINDINGS:  Lower left chest loop recorder device stable.Cardiomediastinal silhouette is midline with similar calcification and mild tortuosity of the aorta and enlarged heart, without evidence of failure.  Pulmonary vasculature and hilar contours are within normal limits.    Bibasilar minimal platelike scarring versus atelectasis.  The lungs are otherwise well expanded without consolidation, pleural effusion or pneumothorax.    No acute osseous process seen.  PA and lateral views can be obtained.                                       Medications   albuterol inhaler 2 puff (2 puffs Inhalation Given 8/9/24 1809)     Medical Decision Making  Emergent evaluation a 59-year-old male history of COPD presenting today with shortness of breath and cough.    Vital signs stable.  Overall well-appearing, no acute distress.    Differential includes but not limited to COVID, flu, pneumonia, bronchitis, COPD exacerbation.  Low suspicion for ACS this patient denies active chest pain at this time.    Plan:  EKG  Chest x-ray  Labs, COVID  Albuterol MDI    Amount and/or Complexity of Data Reviewed  Labs: ordered. Decision-making details documented in ED Course.  Radiology: ordered and independent interpretation performed.  ECG/medicine tests: ordered and independent interpretation performed.    Risk  Prescription drug management.               ED Course as  of 08/09/24 1929   Fri Aug 09, 2024   1801 SARS-CoV-2 RNA, Amplification, Qual(!): Positive [GM]   1801 Hemoglobin(!): 13.4 [GM]   1801 WBC: 5.87 [GM]   1829 Chest x-ray reviewed and independently interpreted by me as no acute consolidation or effusion. [GM]   1829 Labs overall reviewed with no leukocytosis.  Hemoglobin stable.  CMP largely unremarkable.  Troponin and BNP negative.  Influenza negative, patient is COVID positive.    He was given 2 puffs of albuterol inhaler, oral fluids.      I do not think he requires admission at this time.  Patient stable for discharge. [GM]      ED Course User Index  [GM] Martina Reveles MD                             Clinical Impression:  Final diagnoses:  [R06.02] Shortness of breath  [R06.02] SOB (shortness of breath)  [U07.1] COVID-19 virus infection (Primary)                 Martina Reveles MD  08/09/24 1932

## 2024-08-09 NOTE — FIRST PROVIDER EVALUATION
Medical screening examination initiated.  I have conducted a focused provider triage encounter, findings are as follows:    Brief history of present illness:  Here with SOB. Has a recent hx of NSTEMI, DVT, COPD and CHF per chart review.     Vitals:    08/09/24 1613   BP: 114/61   Pulse: 109   Resp: 15   Temp: 97.5 °F (36.4 °C)   TempSrc: Oral   SpO2: 100%   Weight: 99.8 kg (220 lb)   Height: 6' (1.829 m)       Pertinent physical exam:  Speaking in no resp distress, lung sounds coarse at bases but without wheezing.     Brief workup plan:  ECG, Labs, CXR    Preliminary workup initiated; this workup will be continued and followed by the physician or advanced practice provider that is assigned to the patient when roomed.

## 2024-08-09 NOTE — ED NOTES
LOC: The patient is awake and alert; oriented x 3 and speaking appropriately.  APPEARANCE: Patient resting comfortably, patient is clean and well groomed  SKIN: warm and dry, normal skin turgor & moist mucus membranes, skin intact, no breakdown noted.  MUSCULOSKELETAL: Patient moving all extremities well, no obvious swelling or deformities noted. Usesd a cane to walk.   RESPIRATORY: Airway is open and patent, breath sounds clear throughout all lung fields; respirations are spontaneous, normal effort and rate  CARDIAC: Patient has a normal rate, no peripheral edema noted, capillary refill < 3 seconds; complaints of sternal chest pain radiating down left arm. For last 3 hrs  ABDOMEN: Soft and non tender to palpation, no distention noted.

## 2024-08-10 VITALS
RESPIRATION RATE: 16 BRPM | SYSTOLIC BLOOD PRESSURE: 103 MMHG | BODY MASS INDEX: 29.8 KG/M2 | DIASTOLIC BLOOD PRESSURE: 77 MMHG | OXYGEN SATURATION: 97 % | HEART RATE: 87 BPM | TEMPERATURE: 97 F | WEIGHT: 220 LBS | HEIGHT: 72 IN

## 2024-08-12 LAB
OHS QRS DURATION: 94 MS
OHS QTC CALCULATION: 400 MS

## 2024-08-12 NOTE — PROGRESS NOTES
"[facility]) nurse navigator notified of [facility] candidate in need of education and introduction to 4-6 week program.      PT aao x 3 while lying in bed. Introduced self to pt as [facility] nurse navigator.     Patient given "[facility] Home Care Guide" which includes "Daily weight and symptom tracker".  Encouraged pt  to review information.      Reviewed the following key points of [facility] program with pt and visitor :              1.) Take your medications as directed. Call Ms Malachi if any health Care Professional changes your Heart/Fluid medications.               2.) Weight yourself daily. Daily Dry Weights. Upon waking ,empty your bladder, weigh with as little clothes as possible before eating/drinking. Record weight in Symptom Tracker and compare these weights for fluid gain. Weight gain overnight of 3-4 lbs is Fluid, also a gain of 5 lbs in 3 days is Fluid as well. Call US!              3.) Follow low salt and limited fluid diet. Salt/Sodium Restriction 2463-0803 mg, see page 4. Sodium makes you hold onto Fluid. High Sodium Foods;Deli Meat/Cheese, Sausages/Hot Dogs, Fast Food/Restaurant Food, Anything in a box, bottle or can. Cook with Fresh or Frozen ingredients and use seasonings that are labeled NO SALT. Check Portion Sizes, Salt is reported for 1 portion. A can may contain 2-3 portions. Fluid Restriction 1.5 -2 Liters/Day, see page 6, measure all of your Fluid, the milk in your cereal, broth in your soup and ice cream because anything that melts at room temp is a liquid.              4.) Stop smoking and start exercising. Brisk walking is good, don't walk like you are going to the [employer] and DON"T WALK IN THE HEAT! Start low and increase as tolerated. Remember if you don't use it you lose it.              5.) Go to your appointments and call your team. Have your weight and BP/P ready when we call to do the Phone Check ins and call us if you have fluid " gain or questions      Pt reminded to follow Symptom tracker and to call at the onset of symptoms according to tracker.     PT states that he is Homeless and has been trying a number on a handout that was given to him. PT has a cell but can't tell me the number. I get the cell number(by calling my phone and reading the number that displays on my phone) and put it in his phone for him under his own name.  I will send a secure chat so that the charge nurse can place a referral for a  to help this PT find a temporary shelter. PT doesn't   know if he will be able to get to an appointment but we will try.    Reviewed plan for follow up once discharged to include phone calls, in person and virtual visits to assist pt optimizing their heart failure medication regimen and encouraging healthy lifestyle modifications.  Reminded pt that program will assist them over the next 4-6 weeks and then patient will be transferred to long term care provider .  Reminded pt how to contact [facility] navigator via phone and or via [employer].     Pt instructed appointment will be printed on hospital discharge paperwork.     Pt also reminded RN will call 48-72 hours after discharge to check on them.     PT can verbalize read back of a little of the  information given.  Encouraged pt to read over information often and contact team with any questions or concerns.  Home Care Guide placed in his Belongings Bag.   Home

## (undated) DEVICE — WIRE-K 20MM X 150MM.
Type: IMPLANTABLE DEVICE | Site: ANKLE | Status: NON-FUNCTIONAL
Removed: 2024-01-05

## (undated) DEVICE — BIT DRILL CALIB QC 2.5X230MM

## (undated) DEVICE — BNDG COFLEX FOAM LF2 ST 4X5YD

## (undated) DEVICE — DRAPE U SPLIT SHEET 54X76IN

## (undated) DEVICE — SUT VICRYL PLUS 0 CT1 18IN

## (undated) DEVICE — TRAY MINOR ORTHO OMC

## (undated) DEVICE — DRAPE TOP 53X102IN

## (undated) DEVICE — SUT VICRYL PLUS 3-0 SH 18IN

## (undated) DEVICE — PAD CAST SPECIALIST STRL 4

## (undated) DEVICE — TOURNIQUET SB QC DP 34X4IN

## (undated) DEVICE — DRESSING GAUZE OIL EMUL 3X8

## (undated) DEVICE — SUT ETHILON 3/0 18IN PS-1

## (undated) DEVICE — BANDAGE ELAS SOFTWRAP ST 4X5YD

## (undated) DEVICE — APPLICATOR CHLORAPREP ORN 26ML

## (undated) DEVICE — BANDAGE ELAS SOFTWRAP ST 6X5YD

## (undated) DEVICE — BLADE SURG CARBON STEEL #10

## (undated) DEVICE — TAPE SURG DURAPORE 2 X10YD

## (undated) DEVICE — BANDAGE ESMARK 6X12

## (undated) DEVICE — 2.5 DRILL BIT

## (undated) DEVICE — K-WIRE TRCR PT1.6MM DIA 150MM
Type: IMPLANTABLE DEVICE | Site: ANKLE | Status: NON-FUNCTIONAL
Removed: 2024-01-05

## (undated) DEVICE — GOWN AERO CHROME W/ TOWEL XL

## (undated) DEVICE — DRAPE C-ARMOR EQUIPMENT COVER

## (undated) DEVICE — DRAPE T EXTRM SURG 121X128X90

## (undated) DEVICE — TIP YANKAUERS BULB NO VENT

## (undated) DEVICE — DRAPE STERI U-SHAPED 47X51IN

## (undated) DEVICE — CATH SUCTION 10FR

## (undated) DEVICE — SPONGE COTTON TRAY 4X4IN

## (undated) DEVICE — DRAPE THREE-QTR REINF 53X77IN

## (undated) DEVICE — TOWEL OR DISP STRL BLUE 4/PK

## (undated) DEVICE — BIT DRILL 2.0MM D DEPTH 110MM

## (undated) DEVICE — SPLINT PLASTER FS 5INX45IN

## (undated) DEVICE — DRAPE C-ARM ELAS CLIP 42X120IN

## (undated) DEVICE — ELECTRODE REM PLYHSV RETURN 9